# Patient Record
Sex: FEMALE | Race: OTHER | Employment: UNEMPLOYED | URBAN - METROPOLITAN AREA
[De-identification: names, ages, dates, MRNs, and addresses within clinical notes are randomized per-mention and may not be internally consistent; named-entity substitution may affect disease eponyms.]

---

## 2017-01-10 DIAGNOSIS — N18.4 CKD (CHRONIC KIDNEY DISEASE) STAGE 4, GFR 15-29 ML/MIN (HCC): ICD-10-CM

## 2017-01-10 DIAGNOSIS — I10 ESSENTIAL HYPERTENSION WITH GOAL BLOOD PRESSURE LESS THAN 140/90: ICD-10-CM

## 2017-01-11 RX ORDER — AMLODIPINE BESYLATE 10 MG/1
10 TABLET ORAL DAILY
Qty: 90 TABLET | Refills: 1 | Status: SHIPPED | OUTPATIENT
Start: 2017-01-11 | End: 2017-05-02

## 2017-01-11 RX ORDER — FUROSEMIDE 20 MG/1
TABLET ORAL
Qty: 360 TABLET | Refills: 0 | Status: SHIPPED | OUTPATIENT
Start: 2017-01-11 | End: 2017-02-28

## 2017-01-11 RX ORDER — RANITIDINE 300 MG/1
300 TABLET ORAL NIGHTLY
Qty: 90 TABLET | Refills: 1 | Status: SHIPPED | OUTPATIENT
Start: 2017-01-11 | End: 2017-09-01

## 2017-01-11 RX ORDER — MONTELUKAST SODIUM 10 MG/1
TABLET ORAL
Qty: 45 TABLET | Refills: 1 | Status: SHIPPED | OUTPATIENT
Start: 2017-01-11 | End: 2017-05-02

## 2017-01-11 NOTE — TELEPHONE ENCOUNTER
From: Diaz Tan  To: Yang Puckett DO  Sent: 1/10/2017 10:48 PM CST  Subject: Medication Renewal Request    Original authorizing provider: DO Diaz Bateman would like a refill of the following medications:   Jerry Pill

## 2017-01-11 NOTE — TELEPHONE ENCOUNTER
Pt requesting refills on Furosemide and Amlodipine, protocol failed, unable to approve    LOV 12/13/16    LF Furosemide 12/13/16    LF Amlodipine 9/27/16    Pt also requesting refill on Montelukast and Ranitidine, protocol passed, refill approved    LOV 12

## 2017-02-06 DIAGNOSIS — Z79.4 LONG TERM CURRENT USE OF INSULIN (HCC): ICD-10-CM

## 2017-02-06 DIAGNOSIS — E11.21 DIABETIC NEPHROPATHY ASSOCIATED WITH TYPE 2 DIABETES MELLITUS (HCC): ICD-10-CM

## 2017-02-06 DIAGNOSIS — N18.4 CKD (CHRONIC KIDNEY DISEASE) STAGE 4, GFR 15-29 ML/MIN (HCC): ICD-10-CM

## 2017-02-06 DIAGNOSIS — N18.4 TYPE 2 DIABETES MELLITUS WITH STAGE 4 CHRONIC KIDNEY DISEASE, UNSPECIFIED LONG TERM INSULIN USE STATUS: ICD-10-CM

## 2017-02-06 DIAGNOSIS — E11.22 TYPE 2 DIABETES MELLITUS WITH STAGE 4 CHRONIC KIDNEY DISEASE, UNSPECIFIED LONG TERM INSULIN USE STATUS: ICD-10-CM

## 2017-02-06 DIAGNOSIS — I10 ESSENTIAL HYPERTENSION WITH GOAL BLOOD PRESSURE LESS THAN 140/90: ICD-10-CM

## 2017-02-07 RX ORDER — HYDRALAZINE HYDROCHLORIDE 25 MG/1
25 TABLET, FILM COATED ORAL 2 TIMES DAILY
Qty: 180 TABLET | Refills: 1 | Status: CANCELLED | OUTPATIENT
Start: 2017-02-07

## 2017-02-07 RX ORDER — PEN NEEDLE, DIABETIC 30 GX3/16"
1 NEEDLE, DISPOSABLE MISCELLANEOUS DAILY
Qty: 50 EACH | Refills: 1 | Status: SHIPPED | OUTPATIENT
Start: 2017-02-07 | End: 2017-05-15

## 2017-02-07 RX ORDER — CETIRIZINE HYDROCHLORIDE 10 MG/1
10 TABLET ORAL
Qty: 90 TABLET | Refills: 1 | Status: CANCELLED | OUTPATIENT
Start: 2017-02-07

## 2017-02-07 RX ORDER — ATORVASTATIN CALCIUM 80 MG/1
80 TABLET, FILM COATED ORAL DAILY
Qty: 90 TABLET | Refills: 3 | Status: CANCELLED | OUTPATIENT
Start: 2017-02-07 | End: 2018-02-02

## 2017-02-07 RX ORDER — LEVOTHYROXINE SODIUM 88 UG/1
88 TABLET ORAL
Qty: 90 TABLET | Refills: 1 | Status: CANCELLED | OUTPATIENT
Start: 2017-02-07

## 2017-02-07 NOTE — TELEPHONE ENCOUNTER
From: Brandon Terry  To:  Kwame Hernandez DO  Sent: 2/6/2017 10:42 PM CST  Subject: Medication Renewal Request    Original authorizing provider: DO Brandon Card would like a refill of the following medications:  Insuli

## 2017-02-07 NOTE — TELEPHONE ENCOUNTER
Pt requesting multiple refills, Pt given 6 month refills on 9/27/16 of all medications except pen needles and Lantus    Pen needles pass protocol, refill approved    Lantus, no protocol, unable to approve    LOV 12/13/16    LF 9/6/16  9ml w/6 refills    Fu

## 2017-02-07 NOTE — TELEPHONE ENCOUNTER
From: Iván Matthew  To: Apple Rodriguez MD  Sent: 2/6/2017 10:42 PM CST  Subject: Medication Renewal Request    Original authorizing provider: Ivy Cogan, MD Wava Caves would like a refill of the following medications:  Diane Del Cid

## 2017-02-13 RX ORDER — METOLAZONE 5 MG/1
5 TABLET ORAL
Qty: 30 TABLET | Refills: 3 | Status: SHIPPED | OUTPATIENT
Start: 2017-02-13 | End: 2017-09-18

## 2017-02-28 RX ORDER — FUROSEMIDE 20 MG/1
TABLET ORAL
Qty: 360 TABLET | Refills: 0 | Status: SHIPPED | OUTPATIENT
Start: 2017-02-28 | End: 2017-05-02

## 2017-02-28 NOTE — TELEPHONE ENCOUNTER
Pt requesting refill on Furosemide, protocol failed, unable to approve    LOV 12/13/16    LF 9/27/16 #180w/1    Future Appointments  Date Time Provider Luci Loja   4/18/2017 2:00 PM Natty Sherman DO [facility] [place]

## 2017-03-01 NOTE — TELEPHONE ENCOUNTER
Pt requesting refill on Carvedilol, protocol failed, unable to approve    LOV 12/13/16    LF 6/21/16 #180 w/1    Future Appointments  Date Time Provider Luci Loja   4/18/2017 2:00 PM Kris Adams DO [facility] 30 [facility] [place]

## 2017-03-02 RX ORDER — CARVEDILOL 25 MG/1
TABLET ORAL
Qty: 180 TABLET | Refills: 0 | Status: SHIPPED | OUTPATIENT
Start: 2017-03-02 | End: 2017-05-02

## 2017-05-02 ENCOUNTER — OFFICE VISIT (OUTPATIENT)
Dept: FAMILY MEDICINE CLINIC | Facility: CLINIC | Age: 57
End: 2017-05-02

## 2017-05-02 VITALS
BODY MASS INDEX: 32 KG/M2 | TEMPERATURE: 98 F | WEIGHT: 164.38 LBS | SYSTOLIC BLOOD PRESSURE: 145 MMHG | RESPIRATION RATE: 17 BRPM | DIASTOLIC BLOOD PRESSURE: 75 MMHG | OXYGEN SATURATION: 98 % | HEART RATE: 82 BPM

## 2017-05-02 DIAGNOSIS — E11.22 TYPE 2 DIABETES MELLITUS WITH STAGE 4 CHRONIC KIDNEY DISEASE, WITH LONG-TERM CURRENT USE OF INSULIN (HCC): ICD-10-CM

## 2017-05-02 DIAGNOSIS — J44.9 ASTHMA WITH COPD (CHRONIC OBSTRUCTIVE PULMONARY DISEASE) (HCC): Chronic | ICD-10-CM

## 2017-05-02 DIAGNOSIS — E78.5 HYPERLIPIDEMIA ASSOCIATED WITH TYPE 2 DIABETES MELLITUS (HCC): ICD-10-CM

## 2017-05-02 DIAGNOSIS — N18.4 TYPE 2 DIABETES MELLITUS WITH STAGE 4 CHRONIC KIDNEY DISEASE, WITH LONG-TERM CURRENT USE OF INSULIN (HCC): ICD-10-CM

## 2017-05-02 DIAGNOSIS — N18.4 CKD (CHRONIC KIDNEY DISEASE) STAGE 4, GFR 15-29 ML/MIN (HCC): ICD-10-CM

## 2017-05-02 DIAGNOSIS — Z79.4 TYPE 2 DIABETES MELLITUS WITH STAGE 4 CHRONIC KIDNEY DISEASE, WITH LONG-TERM CURRENT USE OF INSULIN (HCC): ICD-10-CM

## 2017-05-02 DIAGNOSIS — I10 ESSENTIAL HYPERTENSION WITH GOAL BLOOD PRESSURE LESS THAN 140/90: ICD-10-CM

## 2017-05-02 DIAGNOSIS — F33.2 SEVERE EPISODE OF RECURRENT MAJOR DEPRESSIVE DISORDER, WITHOUT PSYCHOTIC FEATURES (HCC): ICD-10-CM

## 2017-05-02 DIAGNOSIS — D69.6 THROMBOCYTOPENIA (HCC): Chronic | ICD-10-CM

## 2017-05-02 DIAGNOSIS — E11.69 HYPERLIPIDEMIA ASSOCIATED WITH TYPE 2 DIABETES MELLITUS (HCC): ICD-10-CM

## 2017-05-02 DIAGNOSIS — G47.33 OBSTRUCTIVE SLEEP APNEA SYNDROME: ICD-10-CM

## 2017-05-02 PROBLEM — J44.89 ASTHMA WITH COPD (CHRONIC OBSTRUCTIVE PULMONARY DISEASE): Chronic | Status: ACTIVE | Noted: 2017-05-02

## 2017-05-02 PROBLEM — J44.89 ASTHMA WITH COPD (CHRONIC OBSTRUCTIVE PULMONARY DISEASE) (HCC): Chronic | Status: ACTIVE | Noted: 2017-05-02

## 2017-05-02 PROCEDURE — G0010 ADMIN HEPATITIS B VACCINE: HCPCS | Performed by: FAMILY MEDICINE

## 2017-05-02 PROCEDURE — 90746 HEPB VACCINE 3 DOSE ADULT IM: CPT | Performed by: FAMILY MEDICINE

## 2017-05-02 PROCEDURE — 99214 OFFICE O/P EST MOD 30 MIN: CPT | Performed by: FAMILY MEDICINE

## 2017-05-02 RX ORDER — ATORVASTATIN CALCIUM 80 MG/1
80 TABLET, FILM COATED ORAL NIGHTLY
Qty: 90 TABLET | Refills: 1 | Status: SHIPPED | OUTPATIENT
Start: 2017-05-02 | End: 2017-11-27

## 2017-05-02 RX ORDER — OMEPRAZOLE 20 MG/1
20 CAPSULE, DELAYED RELEASE ORAL
Qty: 60 CAPSULE | Refills: 6 | COMMUNITY
Start: 2017-05-02 | End: 2018-12-11

## 2017-05-02 RX ORDER — CARVEDILOL 25 MG/1
25 TABLET ORAL 2 TIMES DAILY WITH MEALS
Qty: 180 TABLET | Refills: 1 | Status: SHIPPED | OUTPATIENT
Start: 2017-05-02 | End: 2018-04-04

## 2017-05-02 RX ORDER — AMLODIPINE BESYLATE 10 MG/1
10 TABLET ORAL DAILY
Qty: 90 TABLET | Refills: 1 | Status: SHIPPED | OUTPATIENT
Start: 2017-05-02 | End: 2017-09-17

## 2017-05-02 RX ORDER — MONTELUKAST SODIUM 10 MG/1
TABLET ORAL
Qty: 45 TABLET | Refills: 1 | Status: SHIPPED | OUTPATIENT
Start: 2017-05-02 | End: 2017-09-17

## 2017-05-02 RX ORDER — BUPROPION HYDROCHLORIDE 150 MG/1
150 TABLET, EXTENDED RELEASE ORAL DAILY
Qty: 90 TABLET | Refills: 2 | Status: SHIPPED | OUTPATIENT
Start: 2017-05-02 | End: 2017-11-27

## 2017-05-02 RX ORDER — LEVOTHYROXINE SODIUM 88 UG/1
88 TABLET ORAL
Qty: 90 TABLET | Refills: 1 | Status: SHIPPED | OUTPATIENT
Start: 2017-05-02 | End: 2017-09-17

## 2017-05-02 RX ORDER — ONDANSETRON 8 MG/1
8 TABLET, ORALLY DISINTEGRATING ORAL EVERY 8 HOURS PRN
Qty: 90 TABLET | Refills: 1 | Status: SHIPPED | OUTPATIENT
Start: 2017-05-02 | End: 2018-12-11

## 2017-05-02 NOTE — PATIENT INSTRUCTIONS
Wednesday wishes, advance directives, living will  -Meet one-on-one with member of palliative care team.  -Assisted in completing a power of  for healthcare.  -Witnessed your document-make copies for your life    For additional information on advan

## 2017-05-02 NOTE — PROGRESS NOTES
Patient presents with: Follow - Up: labs, medication    DM, HL, HTN f/u. HPI:   Brandon Terry is a 64year old female who presents for recheck of her diabetes, HTN, lipids. Patient’s FBS have been .   Last HgbA1c was   HGBA1C (%)   Date 178 lb      HGBA1C (%)   Date Value   11/28/2016 6.4*   06/21/2016 6.8*   03/15/2016 6.6*   ----------  CHOLESTEROL, TOTAL (mg/dL)   Date Value   11/28/2016 134   03/16/2016 98   08/26/2015 169   ----------  HDL CHOLESTEROL (mg/dL)   Date Value   11/28/201 1 Inhaler Rfl: 1   Blood Glucose Monitoring Suppl (TRUETRACK BLOOD GLUCOSE) W/DEVICE Does not apply Kit Check blood sugars BID and prn symptoms of hypoglycemia Disp: 1 kit Rfl: 0   metolazone 5 MG Oral Tab Take 1 tablet (5 mg total) by mouth 3 (three) time • Prerenal azotemia    • Diastolic dysfunction 4/1002     with LVH. EF >55% on ECHO    • Renal disorder    • Diabetic retinopathy ([facility] [address] 1/26/15     Per Dr. Constanza Montemayor   • Cataracts, bilateral 1/26/15     Per Dr. Constanza Montemayor   • Moderate nonproliferative diabet Signs: As above. General: WD/WN in no acute distress. HEENT: is unremarkable, PERRLA and EOMI. No carotid bruits. No thyromegaly or masses. Heart: Regular rate and rhythm. S1, S2 no murmurs.    Lungs: Clear to auscultation bilaterally, with no wheeze Calcium 80 MG Oral Tab; Take 1 tablet (80 mg total) by mouth nightly. Dispense: 90 tablet; Refill: 1  Adjust meds if labs indicate  - CMP in 3 months; Future  - Lipid in 3 months; Future    4.  Thrombocytopenia (HCC)  stable  Last .0 WNL  Monitor wi capsule (20 mg total) by mouth 2 (two) times daily before meals. 1 tab po q am on empty stomach 45mins AC breakfast      carvedilol 25 MG Oral Tab 180 tablet 1      Sig: Take 1 tablet (25 mg total) by mouth 2 (two) times daily with meals.       BuPROPion HC

## 2017-05-12 DIAGNOSIS — N18.4 TYPE 2 DIABETES MELLITUS WITH STAGE 4 CHRONIC KIDNEY DISEASE, UNSPECIFIED LONG TERM INSULIN USE STATUS: ICD-10-CM

## 2017-05-12 DIAGNOSIS — E11.22 TYPE 2 DIABETES MELLITUS WITH STAGE 4 CHRONIC KIDNEY DISEASE, UNSPECIFIED LONG TERM INSULIN USE STATUS: ICD-10-CM

## 2017-05-12 DIAGNOSIS — Z79.4 LONG TERM CURRENT USE OF INSULIN (HCC): ICD-10-CM

## 2017-05-15 RX ORDER — HYDRALAZINE HYDROCHLORIDE 25 MG/1
TABLET, FILM COATED ORAL
Qty: 180 TABLET | Refills: 0 | Status: SHIPPED | OUTPATIENT
Start: 2017-05-15 | End: 2017-11-27

## 2017-05-15 RX ORDER — PEN NEEDLE, DIABETIC 30 GX3/16"
1 NEEDLE, DISPOSABLE MISCELLANEOUS DAILY
Qty: 50 EACH | Refills: 1 | Status: SHIPPED | OUTPATIENT
Start: 2017-05-15 | End: 2017-12-12

## 2017-05-15 RX ORDER — CETIRIZINE HYDROCHLORIDE 10 MG/1
TABLET ORAL
Qty: 90 TABLET | Refills: 0 | Status: SHIPPED | OUTPATIENT
Start: 2017-05-15 | End: 2017-10-07

## 2017-05-15 NOTE — TELEPHONE ENCOUNTER
Refill request for insulin pen needles, protocol passed, refill approved. Refill request for insulin glargine pen injector, no protocol, cannot approve. LOV 5/2/2017. LF 2/7/2017 9 ml w/ 6 refills.  Please advise, thanks

## 2017-05-15 NOTE — TELEPHONE ENCOUNTER
Pt requesting refill on Hydralazine, protocol failed, unable to approve    LOV 5/2/17     9/27/16 #180 w/1      Pt also requesting refill on Cetirizine, protocol passed, refill approved    LOV 5/2/17     9/27/16 #90w/ 1

## 2017-05-15 NOTE — TELEPHONE ENCOUNTER
From: Tish Lundborg  To:  Anibal Kemp DO  Sent: 5/12/2017 4:44 PM CDT  Subject: Medication Renewal Request    Original authorizing provider: Rolla Slider, DO Tish Lundborg would like a refill of the following medications:  Insuli

## 2017-06-11 DIAGNOSIS — N18.4 CKD (CHRONIC KIDNEY DISEASE) STAGE 4, GFR 15-29 ML/MIN (HCC): ICD-10-CM

## 2017-06-11 DIAGNOSIS — I10 ESSENTIAL HYPERTENSION WITH GOAL BLOOD PRESSURE LESS THAN 140/90: ICD-10-CM

## 2017-06-12 RX ORDER — FUROSEMIDE 20 MG/1
TABLET ORAL
Qty: 180 TABLET | Refills: 1 | Status: SHIPPED | OUTPATIENT
Start: 2017-06-12 | End: 2017-11-27

## 2017-06-12 NOTE — TELEPHONE ENCOUNTER
Refill request for furosemide. Protocol failed, unable to refill. LOV 5/2/2017. LF 9/27/2016 #180 w/ 1.

## 2017-06-12 NOTE — TELEPHONE ENCOUNTER
From: Henna Smith  To:  Shaquille Escobar DO  Sent: 6/11/2017 6:15 AM CDT  Subject: Medication Renewal Request    Original authorizing provider: DO Henna Fernandez would like a refill of the following medications:  furose

## 2017-07-17 DIAGNOSIS — N18.4 TYPE 2 DIABETES MELLITUS WITH STAGE 4 CHRONIC KIDNEY DISEASE, UNSPECIFIED LONG TERM INSULIN USE STATUS: ICD-10-CM

## 2017-07-17 DIAGNOSIS — E11.22 TYPE 2 DIABETES MELLITUS WITH STAGE 4 CHRONIC KIDNEY DISEASE, UNSPECIFIED LONG TERM INSULIN USE STATUS: ICD-10-CM

## 2017-07-17 DIAGNOSIS — Z79.4 LONG TERM CURRENT USE OF INSULIN (HCC): ICD-10-CM

## 2017-07-17 NOTE — TELEPHONE ENCOUNTER
Refill request for insulin glargine (lantus) No protocol, cannot approve. LOV 5/2/2017 LF 5/15/2017 9 mL w/ 6 refills.

## 2017-07-17 NOTE — TELEPHONE ENCOUNTER
From: Castro Murphy  Sent: 7/17/2017 1:00 PM CDT  Subject: Medication Renewal Request    Castro Murphy would like a refill of the following medications:  Insulin Glargine (LANTUS SOLOSTAR) 100 UNIT/ML Subcutaneous Solution Pen-injector

## 2017-08-28 ENCOUNTER — PATIENT OUTREACH (OUTPATIENT)
Dept: FAMILY MEDICINE CLINIC | Facility: CLINIC | Age: 57
End: 2017-08-28

## 2017-08-28 NOTE — PROGRESS NOTES
Called son to re-schedule appt for MAW visit that was cancelled from August 1. LVM for son to call back.

## 2017-09-05 RX ORDER — RANITIDINE 300 MG/1
TABLET ORAL
Qty: 90 TABLET | Refills: 0 | Status: SHIPPED | OUTPATIENT
Start: 2017-09-05 | End: 2018-03-11

## 2017-09-14 ENCOUNTER — PATIENT OUTREACH (OUTPATIENT)
Dept: FAMILY MEDICINE CLINIC | Facility: CLINIC | Age: 57
End: 2017-09-14

## 2017-09-17 DIAGNOSIS — N18.4 CKD (CHRONIC KIDNEY DISEASE) STAGE 4, GFR 15-29 ML/MIN (HCC): ICD-10-CM

## 2017-09-17 DIAGNOSIS — N18.4 TYPE 2 DIABETES MELLITUS WITH STAGE 4 CHRONIC KIDNEY DISEASE, UNSPECIFIED LONG TERM INSULIN USE STATUS: ICD-10-CM

## 2017-09-17 DIAGNOSIS — E03.9 ACQUIRED HYPOTHYROIDISM: Primary | ICD-10-CM

## 2017-09-17 DIAGNOSIS — I10 ESSENTIAL HYPERTENSION WITH GOAL BLOOD PRESSURE LESS THAN 140/90: ICD-10-CM

## 2017-09-17 DIAGNOSIS — E11.22 TYPE 2 DIABETES MELLITUS WITH STAGE 4 CHRONIC KIDNEY DISEASE, UNSPECIFIED LONG TERM INSULIN USE STATUS: ICD-10-CM

## 2017-09-17 DIAGNOSIS — Z79.4 LONG TERM CURRENT USE OF INSULIN (HCC): ICD-10-CM

## 2017-09-17 RX ORDER — METOLAZONE 5 MG/1
5 TABLET ORAL
Qty: 30 TABLET | Refills: 3 | Status: CANCELLED
Start: 2017-09-18

## 2017-09-18 ENCOUNTER — TELEPHONE (OUTPATIENT)
Dept: FAMILY MEDICINE CLINIC | Facility: CLINIC | Age: 57
End: 2017-09-18

## 2017-09-18 DIAGNOSIS — N18.4 CKD (CHRONIC KIDNEY DISEASE) STAGE 4, GFR 15-29 ML/MIN (HCC): ICD-10-CM

## 2017-09-18 DIAGNOSIS — I10 ESSENTIAL HYPERTENSION WITH GOAL BLOOD PRESSURE LESS THAN 140/90: ICD-10-CM

## 2017-09-18 RX ORDER — AMLODIPINE BESYLATE 10 MG/1
TABLET ORAL
Qty: 90 TABLET | Refills: 0 | OUTPATIENT
Start: 2017-09-18

## 2017-09-18 RX ORDER — MONTELUKAST SODIUM 10 MG/1
TABLET ORAL
Qty: 45 TABLET | Refills: 0 | Status: SHIPPED
Start: 2017-09-18 | End: 2018-03-11

## 2017-09-18 RX ORDER — LEVOTHYROXINE SODIUM 88 UG/1
88 TABLET ORAL
Qty: 30 TABLET | Refills: 0 | Status: SHIPPED
Start: 2017-09-18 | End: 2017-10-07

## 2017-09-18 RX ORDER — METOLAZONE 5 MG/1
TABLET ORAL
Qty: 38 TABLET | Refills: 0 | OUTPATIENT
Start: 2017-09-18

## 2017-09-18 RX ORDER — AMLODIPINE BESYLATE 10 MG/1
10 TABLET ORAL DAILY
Qty: 30 TABLET | Refills: 0 | Status: SHIPPED
Start: 2017-09-18 | End: 2017-10-07

## 2017-09-18 RX ORDER — METOLAZONE 5 MG/1
5 TABLET ORAL
Qty: 36 TABLET | Refills: 0 | Status: SHIPPED | OUTPATIENT
Start: 2017-09-18 | End: 2018-02-08

## 2017-09-18 RX ORDER — LEVOTHYROXINE SODIUM 88 UG/1
TABLET ORAL
Qty: 90 TABLET | Refills: 0 | OUTPATIENT
Start: 2017-09-18

## 2017-09-18 NOTE — TELEPHONE ENCOUNTER
Refill request for montelukast, protocol passed, refill approved. Refill requests for levothyroxine and amlodipine, protocols failed. Unable to approve. LOV 5/2/2017 LF levothyroxine 5/2/2017 #90 w/ 1. Last fill amlodipine 5/2/2017 #90 w/ 1.     Refill r

## 2017-09-18 NOTE — TELEPHONE ENCOUNTER
From: Nandini Barakat  Sent: 9/17/2017 5:57 PM CDT  Subject: Medication Renewal Request    Nandini Barakat would like a refill of the following medications:     Levothyroxine Sodium 88 MCG Oral Tab RAIMUNDO Elias     Montelukast Sodium

## 2017-09-18 NOTE — TELEPHONE ENCOUNTER
No lab work in almost 1 year and diabetic. Seen in the office 5/2017. Will not continue to fill medications until lab work is drawn. Please inform. Will reject any further refills for diabetes.

## 2017-09-22 NOTE — TELEPHONE ENCOUNTER
Spoke with son Evan [place], informed him of need for labs and appointment. Lab hours and locations provided. Evan [place] will take pt for labs, then call for appointment    No future appointments.

## 2017-10-07 DIAGNOSIS — I10 ESSENTIAL HYPERTENSION WITH GOAL BLOOD PRESSURE LESS THAN 140/90: ICD-10-CM

## 2017-10-07 DIAGNOSIS — N18.4 CKD (CHRONIC KIDNEY DISEASE) STAGE 4, GFR 15-29 ML/MIN (HCC): ICD-10-CM

## 2017-10-09 RX ORDER — CETIRIZINE HYDROCHLORIDE 10 MG/1
TABLET ORAL
Qty: 30 TABLET | Refills: 0 | Status: SHIPPED | OUTPATIENT
Start: 2017-10-09 | End: 2017-11-27

## 2017-10-09 RX ORDER — AMLODIPINE BESYLATE 10 MG/1
TABLET ORAL
Qty: 30 TABLET | Refills: 0 | Status: SHIPPED | OUTPATIENT
Start: 2017-10-09 | End: 2017-11-27

## 2017-10-09 RX ORDER — LEVOTHYROXINE SODIUM 88 UG/1
TABLET ORAL
Qty: 30 TABLET | Refills: 0 | Status: SHIPPED | OUTPATIENT
Start: 2017-10-09 | End: 2017-11-27

## 2017-10-26 ENCOUNTER — TELEPHONE (OUTPATIENT)
Dept: FAMILY MEDICINE CLINIC | Facility: CLINIC | Age: 57
End: 2017-10-26

## 2017-11-07 ENCOUNTER — APPOINTMENT (OUTPATIENT)
Dept: GENERAL RADIOLOGY | Age: 57
End: 2017-11-07
Attending: PHYSICIAN ASSISTANT
Payer: MEDICARE

## 2017-11-07 ENCOUNTER — HOSPITAL ENCOUNTER (EMERGENCY)
Age: 57
Discharge: HOME | End: 2017-11-07
Payer: MEDICARE

## 2017-11-07 VITALS
DIASTOLIC BLOOD PRESSURE: 75 MMHG | OXYGEN SATURATION: 97 % | BODY MASS INDEX: 28.16 KG/M2 | RESPIRATION RATE: 16 BRPM | TEMPERATURE: 99 F | WEIGHT: 153 LBS | SYSTOLIC BLOOD PRESSURE: 158 MMHG | HEART RATE: 74 BPM | HEIGHT: 62 IN

## 2017-11-07 DIAGNOSIS — S80.01XA CONTUSION OF RIGHT KNEE, INITIAL ENCOUNTER: ICD-10-CM

## 2017-11-07 DIAGNOSIS — S42.202A CLOSED FRACTURE OF PROXIMAL END OF LEFT HUMERUS, UNSPECIFIED FRACTURE MORPHOLOGY, INITIAL ENCOUNTER: Primary | ICD-10-CM

## 2017-11-07 PROCEDURE — 73560 X-RAY EXAM OF KNEE 1 OR 2: CPT | Performed by: PHYSICIAN ASSISTANT

## 2017-11-07 PROCEDURE — 99284 EMERGENCY DEPT VISIT MOD MDM: CPT

## 2017-11-07 PROCEDURE — 73110 X-RAY EXAM OF WRIST: CPT | Performed by: PHYSICIAN ASSISTANT

## 2017-11-07 PROCEDURE — 73562 X-RAY EXAM OF KNEE 3: CPT | Performed by: PHYSICIAN ASSISTANT

## 2017-11-07 PROCEDURE — 90471 IMMUNIZATION ADMIN: CPT

## 2017-11-07 PROCEDURE — 73080 X-RAY EXAM OF ELBOW: CPT | Performed by: PHYSICIAN ASSISTANT

## 2017-11-07 PROCEDURE — 73030 X-RAY EXAM OF SHOULDER: CPT | Performed by: PHYSICIAN ASSISTANT

## 2017-11-07 RX ORDER — HYDROCODONE BITARTRATE AND ACETAMINOPHEN 5; 325 MG/1; MG/1
TABLET ORAL
Status: COMPLETED
Start: 2017-11-07 | End: 2017-11-07

## 2017-11-07 RX ORDER — HYDROCODONE BITARTRATE AND ACETAMINOPHEN 5; 325 MG/1; MG/1
1 TABLET ORAL ONCE
Status: COMPLETED | OUTPATIENT
Start: 2017-11-07 | End: 2017-11-07

## 2017-11-07 RX ORDER — HYDROCODONE BITARTRATE AND ACETAMINOPHEN 5; 325 MG/1; MG/1
1 TABLET ORAL EVERY 6 HOURS PRN
Qty: 17 TABLET | Refills: 0 | Status: SHIPPED | OUTPATIENT
Start: 2017-11-07 | End: 2018-01-02 | Stop reason: ALTCHOICE

## 2017-11-07 RX ORDER — NAPROXEN 500 MG/1
500 TABLET ORAL 2 TIMES DAILY PRN
Qty: 30 TABLET | Refills: 0 | Status: SHIPPED | OUTPATIENT
Start: 2017-11-07 | End: 2017-11-07 | Stop reason: CLARIF

## 2017-11-07 NOTE — ED INITIAL ASSESSMENT (HPI)
Tripped and fell on sidewalk, denies loc, landed on left arm, pain from left hand up to left shoulder, also pain to right knee

## 2017-11-07 NOTE — ED PROVIDER NOTES
Patient Seen in: THE [facility] Emergency Department In [place]    History   Patient presents with:  Upper Extremity Injury (musculoskeletal)    Stated Complaint: l arm inj    HPI    Yvette Benz is a 68-year-old female who comes in today after tripping and falli Used                      Alcohol use: No                Review of Systems    Positive for stated complaint: l arm inj  Other systems are as noted in HPI. Constitutional and vital signs reviewed.       All other systems reviewed and negative except as note Skin: Skin is warm and dry. No rash noted. She is not diaphoretic. No erythema. No pallor. Psychiatric: She has a normal mood and affect.  Her behavior is normal. Judgment and thought content normal.       ED Course   Labs Reviewed - No data to display COMPLETE (MIN 2 VIEWS), LEFT (CPT=73030)     TECHNIQUE:  Multiple views were obtained. COMPARISON:  None.   INDICATIONS:  l arm inj  PATIENT STATED HISTORY: (As transcribed by Technologist)  Patient states she tripped and fell on sidewalk, landed on left a Comments:              l arm inj Tripped and fell on sidewalk, denies loc, landed on left arm, pain from               left hand up to left shoulder, also pain to right knee                        Order Specific Question: What is the Relevant Clinical Arabella today.        Disposition and Plan     Clinical Impression:  Closed fracture of proximal end of left humerus, unspecified fracture morphology, initial encounter  (primary encounter diagnosis)  Contusion of right knee, initial encounter    Disposition:  Dis

## 2017-11-13 DIAGNOSIS — E11.22 TYPE 2 DIABETES MELLITUS WITH STAGE 4 CHRONIC KIDNEY DISEASE, UNSPECIFIED LONG TERM INSULIN USE STATUS: ICD-10-CM

## 2017-11-13 DIAGNOSIS — N18.4 TYPE 2 DIABETES MELLITUS WITH STAGE 4 CHRONIC KIDNEY DISEASE, UNSPECIFIED LONG TERM INSULIN USE STATUS: ICD-10-CM

## 2017-11-13 DIAGNOSIS — E11.21 DIABETIC NEPHROPATHY ASSOCIATED WITH TYPE 2 DIABETES MELLITUS (HCC): ICD-10-CM

## 2017-11-14 DIAGNOSIS — N18.4 TYPE 2 DIABETES MELLITUS WITH STAGE 4 CHRONIC KIDNEY DISEASE, UNSPECIFIED LONG TERM INSULIN USE STATUS: ICD-10-CM

## 2017-11-14 DIAGNOSIS — E11.22 TYPE 2 DIABETES MELLITUS WITH STAGE 4 CHRONIC KIDNEY DISEASE, UNSPECIFIED LONG TERM INSULIN USE STATUS: ICD-10-CM

## 2017-11-14 DIAGNOSIS — E11.21 DIABETIC NEPHROPATHY ASSOCIATED WITH TYPE 2 DIABETES MELLITUS (HCC): ICD-10-CM

## 2017-11-14 RX ORDER — DIAPER,BRIEF,ADULT, DISPOSABLE
EACH MISCELLANEOUS
Qty: 150 STRIP | Refills: 0 | Status: SHIPPED | OUTPATIENT
Start: 2017-11-14 | End: 2018-01-12

## 2017-11-14 NOTE — TELEPHONE ENCOUNTER
From: Alexandru Hurtado  Sent: 11/13/2017 7:37 PM CST  Subject: Medication Renewal Request    Alexandru Hurtado would like a refill of the following medications:     Glucose Blood (TRUETRACK TEST) In Vitro Strip Kendra Ugarte, DO]    Preferred

## 2017-11-14 NOTE — TELEPHONE ENCOUNTER
Pt requesting refill on test strips, protocol passed, refill approved    LOV 5/2/17    LF 9/6/16 #100 w/11    Future Appointments  Date Time Provider Luci Loja   11/27/2017 5:40 PM Pedro Luis Kay DO [facility] [place]   12/4/2017 2:30 PM Allyson Schneider

## 2017-11-27 ENCOUNTER — OFFICE VISIT (OUTPATIENT)
Dept: FAMILY MEDICINE CLINIC | Facility: CLINIC | Age: 57
End: 2017-11-27

## 2017-11-27 VITALS
BODY MASS INDEX: 30 KG/M2 | SYSTOLIC BLOOD PRESSURE: 152 MMHG | DIASTOLIC BLOOD PRESSURE: 80 MMHG | RESPIRATION RATE: 18 BRPM | WEIGHT: 162.19 LBS | TEMPERATURE: 98 F

## 2017-11-27 DIAGNOSIS — N18.4 CKD (CHRONIC KIDNEY DISEASE) STAGE 4, GFR 15-29 ML/MIN (HCC): ICD-10-CM

## 2017-11-27 DIAGNOSIS — Z79.4 TYPE 2 DIABETES MELLITUS WITH STAGE 4 CHRONIC KIDNEY DISEASE, WITH LONG-TERM CURRENT USE OF INSULIN (HCC): ICD-10-CM

## 2017-11-27 DIAGNOSIS — E11.3599 PROLIFERATIVE DIABETIC RETINOPATHY ASSOCIATED WITH TYPE 2 DIABETES MELLITUS, UNSPECIFIED LATERALITY, UNSPECIFIED PROLIFERATIVE RETINOPATHY TYPE (HCC): ICD-10-CM

## 2017-11-27 DIAGNOSIS — Z12.11 SCREEN FOR COLON CANCER: ICD-10-CM

## 2017-11-27 DIAGNOSIS — J44.9 ASTHMA WITH COPD (CHRONIC OBSTRUCTIVE PULMONARY DISEASE) (HCC): Chronic | ICD-10-CM

## 2017-11-27 DIAGNOSIS — Z12.39 SCREENING FOR MALIGNANT NEOPLASM OF BREAST: ICD-10-CM

## 2017-11-27 DIAGNOSIS — R87.618 OTHER ABNORMAL CYTOLOGICAL FINDING OF SPECIMEN FROM CERVIX: ICD-10-CM

## 2017-11-27 DIAGNOSIS — E11.22 TYPE 2 DIABETES MELLITUS WITH STAGE 4 CHRONIC KIDNEY DISEASE, WITH LONG-TERM CURRENT USE OF INSULIN (HCC): ICD-10-CM

## 2017-11-27 DIAGNOSIS — I10 ESSENTIAL HYPERTENSION WITH GOAL BLOOD PRESSURE LESS THAN 140/90: ICD-10-CM

## 2017-11-27 DIAGNOSIS — E11.69 HYPERLIPIDEMIA ASSOCIATED WITH TYPE 2 DIABETES MELLITUS (HCC): ICD-10-CM

## 2017-11-27 DIAGNOSIS — N18.4 TYPE 2 DIABETES MELLITUS WITH STAGE 4 CHRONIC KIDNEY DISEASE, WITH LONG-TERM CURRENT USE OF INSULIN (HCC): ICD-10-CM

## 2017-11-27 DIAGNOSIS — E78.5 HYPERLIPIDEMIA ASSOCIATED WITH TYPE 2 DIABETES MELLITUS (HCC): ICD-10-CM

## 2017-11-27 DIAGNOSIS — S42.295A OTHER CLOSED NONDISPLACED FRACTURE OF PROXIMAL END OF LEFT HUMERUS, INITIAL ENCOUNTER: ICD-10-CM

## 2017-11-27 DIAGNOSIS — Z00.00 ENCOUNTER FOR ANNUAL HEALTH EXAMINATION: Primary | ICD-10-CM

## 2017-11-27 DIAGNOSIS — N18.5 CKD (CHRONIC KIDNEY DISEASE) STAGE 5, GFR LESS THAN 15 ML/MIN (HCC): ICD-10-CM

## 2017-11-27 PROCEDURE — 99214 OFFICE O/P EST MOD 30 MIN: CPT | Performed by: FAMILY MEDICINE

## 2017-11-27 PROCEDURE — 82570 ASSAY OF URINE CREATININE: CPT | Performed by: FAMILY MEDICINE

## 2017-11-27 PROCEDURE — 90746 HEPB VACCINE 3 DOSE ADULT IM: CPT | Performed by: FAMILY MEDICINE

## 2017-11-27 PROCEDURE — G0010 ADMIN HEPATITIS B VACCINE: HCPCS | Performed by: FAMILY MEDICINE

## 2017-11-27 PROCEDURE — 90686 IIV4 VACC NO PRSV 0.5 ML IM: CPT | Performed by: FAMILY MEDICINE

## 2017-11-27 PROCEDURE — G0008 ADMIN INFLUENZA VIRUS VAC: HCPCS | Performed by: FAMILY MEDICINE

## 2017-11-27 PROCEDURE — 82043 UR ALBUMIN QUANTITATIVE: CPT | Performed by: FAMILY MEDICINE

## 2017-11-27 PROCEDURE — G0439 PPPS, SUBSEQ VISIT: HCPCS | Performed by: FAMILY MEDICINE

## 2017-11-27 RX ORDER — NAPROXEN 500 MG/1
TABLET ORAL
Refills: 0 | COMMUNITY
Start: 2017-11-07 | End: 2018-04-05

## 2017-11-27 RX ORDER — AMLODIPINE BESYLATE 10 MG/1
10 TABLET ORAL
Qty: 90 TABLET | Refills: 0 | Status: SHIPPED | OUTPATIENT
Start: 2017-11-27 | End: 2018-07-10

## 2017-11-27 RX ORDER — CETIRIZINE HYDROCHLORIDE 10 MG/1
TABLET ORAL
Qty: 365 TABLET | Refills: 0 | COMMUNITY
Start: 2017-11-27 | End: 2018-01-04

## 2017-11-27 RX ORDER — CARVEDILOL 25 MG/1
TABLET ORAL
Refills: 1 | Status: CANCELLED | OUTPATIENT
Start: 2017-11-27

## 2017-11-27 RX ORDER — HYDRALAZINE HYDROCHLORIDE 25 MG/1
TABLET, FILM COATED ORAL
Qty: 180 TABLET | Refills: 0 | Status: SHIPPED | OUTPATIENT
Start: 2017-11-27 | End: 2018-04-05

## 2017-11-27 RX ORDER — ATORVASTATIN CALCIUM 80 MG/1
80 TABLET, FILM COATED ORAL NIGHTLY
Qty: 90 TABLET | Refills: 0 | Status: SHIPPED | OUTPATIENT
Start: 2017-11-27 | End: 2018-03-13

## 2017-11-27 RX ORDER — LEVOTHYROXINE SODIUM 88 UG/1
88 TABLET ORAL
Qty: 30 TABLET | Refills: 0 | Status: SHIPPED | OUTPATIENT
Start: 2017-11-27 | End: 2018-01-02

## 2017-11-27 RX ORDER — BUPROPION HYDROCHLORIDE 150 MG/1
150 TABLET, EXTENDED RELEASE ORAL DAILY
Qty: 90 TABLET | Refills: 3 | Status: SHIPPED | OUTPATIENT
Start: 2017-11-27 | End: 2018-06-18

## 2017-11-27 RX ORDER — FUROSEMIDE 20 MG/1
TABLET ORAL
Qty: 180 TABLET | Refills: 0 | Status: SHIPPED | OUTPATIENT
Start: 2017-11-27 | End: 2018-03-22

## 2017-11-28 ENCOUNTER — TELEPHONE (OUTPATIENT)
Dept: FAMILY MEDICINE CLINIC | Facility: CLINIC | Age: 57
End: 2017-11-28

## 2017-11-28 RX ORDER — FUROSEMIDE 20 MG/1
TABLET ORAL
Qty: 270 TABLET | Refills: 0 | OUTPATIENT
Start: 2017-11-28

## 2017-11-28 NOTE — TELEPHONE ENCOUNTER
Spoke with Isrrael Garvey from medical records at [facility]. Looking for patient's colonoscopy report. No record on file at [facility]. Isrrael Garvey will return call with information.

## 2017-11-28 NOTE — PATIENT INSTRUCTIONS
Wednesday wishes, advance directives, living will-power of - bring copy to visit  -Meet one-on-one with member of palliative care team.  -Assisted in completing a power of  for healthcare.  -Witnessed your document-make copies for your life EKG - covered if needed at [facility], and non-screening if indicated for medical reasons Electrocardiogram date03/15/2016 Routine EKG is not a screening covered service except at the Welcome to Medicare Visit    Abdominal aortic aneurysm s 33-67, Covered every 2 yrs up to age 79 or Yearly if High Risk   Pap Smear,1 Year due on 07/26/2017     Chlamydia  Annually if high risk No results found for: CHLAMYDIA No flowsheet data found.     Screening Mammogram      Mammogram    Recommend Annually to physician but may not be covered, or covered at this frequency, by your insurer. Please check with your insurance carrier before scheduling to verify coverage.    PREVENTATIVE SERVICES  INDICATIONS AND SCHEDULE Internal Lab or Procedure External Lab or Proc years- more often if abnormal Colonoscopy,10 Years due on 09/26/2010 Update [facility] if applicable    Flex Sigmoidoscopy Screen  Covered every 5 years No results found for this or any previous visit. No flowsheet data found.      Fecal Occult Bloo previous visit.  Please get once after your 65th birthday    Pneumococcal 23 (Pneumovax)  Covered Once after 72   Orders placed or performed in visit on 07/26/16  -PNEUMOCOCCAL IMM (PNEUMOVAX)    Please get once after your 65th birthday    Hepatitis B for M

## 2017-11-29 ENCOUNTER — TELEPHONE (OUTPATIENT)
Dept: FAMILY MEDICINE CLINIC | Facility: CLINIC | Age: 57
End: 2017-11-29

## 2017-11-29 PROBLEM — S42.202A CLOSED FRACTURE OF LEFT PROXIMAL HUMERUS: Status: ACTIVE | Noted: 2017-11-29

## 2017-11-29 RX ORDER — ALBUTEROL SULFATE 90 UG/1
2 AEROSOL, METERED RESPIRATORY (INHALATION) EVERY 4 HOURS PRN
Qty: 18 G | Refills: 0 | Status: SHIPPED | OUTPATIENT
Start: 2017-11-29 | End: 2018-01-02 | Stop reason: ALTCHOICE

## 2017-11-29 NOTE — TELEPHONE ENCOUNTER
Spoke with patient's son, Pippa Josue, he states he will call us back with [facility] physician that performed colonoscopy on patient.

## 2017-11-29 NOTE — TELEPHONE ENCOUNTER
Spoke with son, Clover Ruthie, reviewed results and recommendations, son verbalized understanding. Notes Recorded by Rufino Knutson DO on 11/28/2017 at 10:01 PM CST  Worse micro albumin in urine. Known renal disease-needs labs drawn- ASAP . Please inform.  Ask

## 2017-11-29 NOTE — TELEPHONE ENCOUNTER
Please call and verify why taking naproxen and update medical hx also verify if taking aspirin 81mg EC daily.

## 2017-11-29 NOTE — PROGRESS NOTES
HPI:   Kamaljit Navarro is a 62year old female who presents for a Medicare Subsequent Annual Wellness visit (Pt already had Initial Annual Wellness). On 11/7/2017, tripped and fell on the sidewalk taking her grandchildren to the bus.   Fractured weeks ago woke up early am and used albuterol for sob with relief. No asthma flare >1 year. If she uses cpap and takes singular she has no asthma symptoms. She denies wheezing, sob, cough, LEON. Taking montelukast. Has AAP at home from last visit.      Wendi Valenzuela 11/28/2016   TSH 1.620 06/21/2016   CREATSERUM 5.61 (H) 11/28/2016   GLU 81 11/28/2016        CBC  (most recent labs)     Lab Results  Component Value Date   WBC 8.3 11/28/2016   HGB 10.4 (L) 11/28/2016   .0 11/28/2016        ALLERGIES:   She is all as needed for Nausea. Flunisolide 25 MCG/ACT (0.025%) Nasal Solution 2 sprays by Each Nare route 2 (two) times daily.    Albuterol Sulfate HFA (PROAIR HFA) 108 (90 BASE) MCG/ACT Inhalation Aero Soln Inhale 2 puffs into the lungs every 4 (four) hours as ne never smoked. She has never used smokeless tobacco. She reports that she does not drink alcohol or use drugs.      REVIEW OF SYSTEMS:   GENERAL: feels well otherwise  SKIN: denies any unusual skin lesions  EYES: denies blurred vision or double vision  HEENT tenderness/mass/nodules; no carotid bruit or JVD   Back:   Symmetric, no curvature, ROM normal, no CVA tenderness   Lungs:   Clear to auscultation bilaterally, respirations unlabored   Heart:  Regular rate and rhythm, S1 and S2 normal, no murmur, rub, or g Future  - LIPID PANEL; Future  - HEMOGLOBIN A1C; Future  - MICROALB/CREAT RATIO, RANDOM URINE; Future  - COMP METABOLIC PANEL (14); Future  - LIPID PANEL;  Future  - HEMOGLOBIN A1C; Future  - HEPATITIS B VACCINE, OVER 20  - OPHTHALMOLOGY - INTERNAL  - MICRO not certain location or physician- signed medical release. Had upper endoscopy 5/2015- Dr Parviz Wray and explained difference in procedure. Will need to repeat if records not located. Pt and son agree.        9. Proliferative diabetic retinopathy associated w [id number] does not have a Power of  for [employer] on file in [place].  Discussed with patient and provided information-son present for discussion and provided Wednesday wishes           PLAN:  The patient indicates understanding of these issues 1-Yes     Have you had any memory issues?: 0-No    Fall/Risk Scorin    Scoring Interpretation: 4+ At Risk     Depression Screening (PHQ-2/PHQ-9): Over the LAST 2 WEEKS   Little interest or pleasure in doing things (over the last two weeks)?: Nearly kasey Visit Annually: Diabetics, FHx Glaucoma, AA>50, > 65 Data entered on: 2/23/2015   Last Dilated Eye Exam 1/26/2015       Bone Density Screening      Dexascan Every two years No results found for this or any previous visit. No flowsheet data found. Value   11/28/2016 3.6     POTASSIUM (mmol/L)   Date Value   07/11/2014 5.3 (H)    No flowsheet data found.     Creatinine  Annually CREATININE (mg/dL)   Date Value   07/11/2014 2.04 (H)     Creatinine (mg/dL)   Date Value   11/28/2016 5.61 (H)    No flowsh

## 2017-12-11 ENCOUNTER — TELEPHONE (OUTPATIENT)
Dept: FAMILY MEDICINE CLINIC | Facility: CLINIC | Age: 57
End: 2017-12-11

## 2017-12-11 DIAGNOSIS — Z79.4 TYPE 2 DIABETES MELLITUS WITH STAGE 4 CHRONIC KIDNEY DISEASE, WITH LONG-TERM CURRENT USE OF INSULIN (HCC): Primary | ICD-10-CM

## 2017-12-11 DIAGNOSIS — E11.22 TYPE 2 DIABETES MELLITUS WITH STAGE 4 CHRONIC KIDNEY DISEASE, WITH LONG-TERM CURRENT USE OF INSULIN (HCC): Primary | ICD-10-CM

## 2017-12-11 DIAGNOSIS — N18.4 TYPE 2 DIABETES MELLITUS WITH STAGE 4 CHRONIC KIDNEY DISEASE, WITH LONG-TERM CURRENT USE OF INSULIN (HCC): Primary | ICD-10-CM

## 2017-12-11 RX ORDER — CETIRIZINE HYDROCHLORIDE 10 MG/1
TABLET ORAL
Qty: 30 TABLET | Refills: 0 | Status: SHIPPED | OUTPATIENT
Start: 2017-12-11 | End: 2018-01-12

## 2017-12-11 RX ORDER — CETIRIZINE HYDROCHLORIDE 10 MG/1
TABLET ORAL
Qty: 30 TABLET | Refills: 0 | OUTPATIENT
Start: 2017-12-11

## 2017-12-11 NOTE — TELEPHONE ENCOUNTER
Dilia Beck from Dr Kwame Pal office requesting referral for patient's upcoming appointment.     Referral to be faxed to [phone]    Future Appointments  Date Time Provider Luci Loja   1/2/2018 1:20 PM Timmy Valente,  [facility] 30 [facility] [place]   2/1/2018 2:00

## 2017-12-12 DIAGNOSIS — Z79.4 LONG TERM CURRENT USE OF INSULIN (HCC): ICD-10-CM

## 2017-12-12 DIAGNOSIS — E11.22 TYPE 2 DIABETES MELLITUS WITH STAGE 4 CHRONIC KIDNEY DISEASE, UNSPECIFIED LONG TERM INSULIN USE STATUS: ICD-10-CM

## 2017-12-12 DIAGNOSIS — N18.4 TYPE 2 DIABETES MELLITUS WITH STAGE 4 CHRONIC KIDNEY DISEASE, UNSPECIFIED LONG TERM INSULIN USE STATUS: ICD-10-CM

## 2017-12-13 RX ORDER — PEN NEEDLE, DIABETIC 30 GX3/16"
1 NEEDLE, DISPOSABLE MISCELLANEOUS DAILY
Qty: 50 EACH | Refills: 1 | Status: SHIPPED
Start: 2017-12-13 | End: 2018-04-05

## 2017-12-13 NOTE — TELEPHONE ENCOUNTER
Pt requesting refill of Lantus, no protocol, unable to approve:     Last Time Medication was Filled:  9/18/17 9 ml    Last Office Visit with PCP: 11/27/17    Future Appointments  Date Time Provider Luci Loja   1/2/2018 1:20 PM DO WALTER Atkinson

## 2017-12-13 NOTE — TELEPHONE ENCOUNTER
From: Leydi Coy  Sent: 12/12/2017 7:50 PM CST  Subject: Medication Renewal Request    Leydi Coy would like a refill of the following medications:     Insulin Pen Needle (PEN NEEDLES) 31G X 5 MM Does not apply Misjuan daniel Mcgill

## 2017-12-15 ENCOUNTER — APPOINTMENT (OUTPATIENT)
Dept: LAB | Age: 57
End: 2017-12-15
Attending: FAMILY MEDICINE
Payer: MEDICARE

## 2017-12-15 DIAGNOSIS — Z79.4 TYPE 2 DIABETES MELLITUS WITH STAGE 4 CHRONIC KIDNEY DISEASE, WITH LONG-TERM CURRENT USE OF INSULIN (HCC): ICD-10-CM

## 2017-12-15 DIAGNOSIS — E11.22 TYPE 2 DIABETES MELLITUS WITH STAGE 4 CHRONIC KIDNEY DISEASE, WITH LONG-TERM CURRENT USE OF INSULIN (HCC): ICD-10-CM

## 2017-12-15 DIAGNOSIS — N18.4 TYPE 2 DIABETES MELLITUS WITH STAGE 4 CHRONIC KIDNEY DISEASE, WITH LONG-TERM CURRENT USE OF INSULIN (HCC): ICD-10-CM

## 2017-12-15 DIAGNOSIS — E03.9 ACQUIRED HYPOTHYROIDISM: ICD-10-CM

## 2017-12-15 PROCEDURE — 80053 COMPREHEN METABOLIC PANEL: CPT | Performed by: FAMILY MEDICINE

## 2017-12-15 PROCEDURE — 83036 HEMOGLOBIN GLYCOSYLATED A1C: CPT | Performed by: FAMILY MEDICINE

## 2017-12-15 PROCEDURE — 80061 LIPID PANEL: CPT | Performed by: FAMILY MEDICINE

## 2017-12-15 PROCEDURE — 84439 ASSAY OF FREE THYROXINE: CPT | Performed by: FAMILY MEDICINE

## 2017-12-15 PROCEDURE — 36415 COLL VENOUS BLD VENIPUNCTURE: CPT | Performed by: FAMILY MEDICINE

## 2017-12-15 PROCEDURE — 84443 ASSAY THYROID STIM HORMONE: CPT | Performed by: FAMILY MEDICINE

## 2017-12-15 RX ORDER — BUPROPION HYDROCHLORIDE 150 MG/1
150 TABLET, EXTENDED RELEASE ORAL DAILY
Qty: 90 TABLET | Refills: 3
Start: 2017-12-15

## 2017-12-15 NOTE — TELEPHONE ENCOUNTER
From: Elena Connelly  Sent: 12/15/2017 10:44 AM CST  Subject: Medication Renewal Request    Elena Connelly would like a refill of the following medications:     BuPROPion HCl ER, SR, 150 MG Oral Tablet 12 Hr James DO Carlos]    Preferr

## 2017-12-15 NOTE — TELEPHONE ENCOUNTER
Refills on file, spoke to [facility] pharmacist. Notified patient's son that refills are at [facility].     Future Appointments  Date Time Provider Luci Loja   1/2/2018 1:20 PM Lenny Foreman DO [facility] 30 [facility] [place]   2/1/2018 2:00 PM Robert Schneider MD

## 2017-12-20 ENCOUNTER — TELEPHONE (OUTPATIENT)
Dept: FAMILY MEDICINE CLINIC | Facility: CLINIC | Age: 57
End: 2017-12-20

## 2017-12-20 NOTE — TELEPHONE ENCOUNTER
Spoke with son, reviewed results and recommendations.  Pt completed eye exam with Dr Lloyd Self and will schedule with Dr Sandra Pyle for kidneys    Notes Recorded by Lety Rosales DO on 12/19/2017 at 10:44 PM CST  Diabetes controlled.  Needs her eye exam ASAP.  P

## 2017-12-26 ENCOUNTER — TELEPHONE (OUTPATIENT)
Dept: NEPHROLOGY | Facility: CLINIC | Age: 57
End: 2017-12-26

## 2017-12-26 NOTE — TELEPHONE ENCOUNTER
Per dr Karyn Steele asking pt's son to call for an appt on Jan 8th, dr Sherron Mukherjee wants her to come in B4 February appt.

## 2017-12-27 NOTE — TELEPHONE ENCOUNTER
Will speak with patient at appointment on 1/2/2018.     Future Appointments  Date Time Provider Luci Loja   1/2/2018 1:20 PM Ravindra Lambert DO [facility] 30 [facility] [place]   1/8/2018 1:00 AM Jey Erwin MD [facility] [place]   2/1/2018 2:00 PM [place]

## 2018-01-02 ENCOUNTER — OFFICE VISIT (OUTPATIENT)
Dept: FAMILY MEDICINE CLINIC | Facility: CLINIC | Age: 58
End: 2018-01-02

## 2018-01-02 VITALS
DIASTOLIC BLOOD PRESSURE: 60 MMHG | OXYGEN SATURATION: 97 % | BODY MASS INDEX: 30 KG/M2 | SYSTOLIC BLOOD PRESSURE: 124 MMHG | HEART RATE: 63 BPM | RESPIRATION RATE: 18 BRPM | WEIGHT: 165.19 LBS | TEMPERATURE: 98 F

## 2018-01-02 DIAGNOSIS — Z12.11 SCREEN FOR COLON CANCER: ICD-10-CM

## 2018-01-02 DIAGNOSIS — N18.5 CKD (CHRONIC KIDNEY DISEASE) STAGE 5, GFR LESS THAN 15 ML/MIN (HCC): ICD-10-CM

## 2018-01-02 DIAGNOSIS — E78.5 HYPERLIPIDEMIA ASSOCIATED WITH TYPE 2 DIABETES MELLITUS (HCC): ICD-10-CM

## 2018-01-02 DIAGNOSIS — E11.21 DIABETIC NEPHROPATHY ASSOCIATED WITH TYPE 2 DIABETES MELLITUS (HCC): ICD-10-CM

## 2018-01-02 DIAGNOSIS — E11.22 TYPE 2 DIABETES MELLITUS WITH STAGE 4 CHRONIC KIDNEY DISEASE, UNSPECIFIED LONG TERM INSULIN USE STATUS: ICD-10-CM

## 2018-01-02 DIAGNOSIS — N18.4 TYPE 2 DIABETES MELLITUS WITH STAGE 4 CHRONIC KIDNEY DISEASE, UNSPECIFIED LONG TERM INSULIN USE STATUS: ICD-10-CM

## 2018-01-02 DIAGNOSIS — J30.2 SEASONAL ALLERGIC RHINITIS, UNSPECIFIED CHRONICITY, UNSPECIFIED TRIGGER: ICD-10-CM

## 2018-01-02 DIAGNOSIS — E03.9 ACQUIRED HYPOTHYROIDISM: Primary | ICD-10-CM

## 2018-01-02 DIAGNOSIS — J01.01 ACUTE RECURRENT MAXILLARY SINUSITIS: ICD-10-CM

## 2018-01-02 DIAGNOSIS — E11.69 HYPERLIPIDEMIA ASSOCIATED WITH TYPE 2 DIABETES MELLITUS (HCC): ICD-10-CM

## 2018-01-02 PROCEDURE — G0009 ADMIN PNEUMOCOCCAL VACCINE: HCPCS | Performed by: FAMILY MEDICINE

## 2018-01-02 PROCEDURE — 90670 PCV13 VACCINE IM: CPT | Performed by: FAMILY MEDICINE

## 2018-01-02 PROCEDURE — 99214 OFFICE O/P EST MOD 30 MIN: CPT | Performed by: FAMILY MEDICINE

## 2018-01-02 RX ORDER — DIAPER,BRIEF,ADULT, DISPOSABLE
EACH MISCELLANEOUS
Qty: 150 STRIP | Refills: 0 | Status: CANCELLED | OUTPATIENT
Start: 2018-01-02

## 2018-01-02 RX ORDER — LEVOTHYROXINE SODIUM 88 UG/1
88 TABLET ORAL
Qty: 90 TABLET | Refills: 3 | Status: SHIPPED | OUTPATIENT
Start: 2018-01-02 | End: 2019-01-23

## 2018-01-02 RX ORDER — FLUNISOLIDE 0.25 MG/ML
2 SOLUTION NASAL 2 TIMES DAILY
Qty: 25 ML | Refills: 3 | Status: SHIPPED | OUTPATIENT
Start: 2018-01-02 | End: 2018-04-05

## 2018-01-02 RX ORDER — AMOXICILLIN AND CLAVULANATE POTASSIUM 875; 125 MG/1; MG/1
1 TABLET, FILM COATED ORAL 2 TIMES DAILY
Qty: 20 TABLET | Refills: 0 | Status: SHIPPED | OUTPATIENT
Start: 2018-01-02 | End: 2018-01-12

## 2018-01-02 NOTE — TELEPHONE ENCOUNTER
l-m on pt's son voicemail telling him dr Aguilar Alt wants to see pt this week;offered 4:45pm on 1-4-18

## 2018-01-02 NOTE — PATIENT INSTRUCTIONS
Central scheduling- call to schedule diagnostic tests, labs, imaging, physical therapy. Follow prompts.    [phone]- call to schedule mammogram    [address] - Gastroenterology    Vinnie Sanders- speaks Sami- colonoscopy  [address]

## 2018-01-02 NOTE — PROGRESS NOTES
Patient presents with: Follow - Up: labs    DM, HL, HTN f/u. HPI:   Lynette Candelario is a 62year old female who presents for recheck of her diabetes, HTN, lipids. Patient’s FBS have been . On Lantus only.   If evening blood sugar less darius extended release with some improvement. Using flunisolide for allergies and nose. Unsure of when had screening for colon cancer or location. Agrees to have repeated. Asking for referral.    Wt Readings from Last 6 Encounters:  01/02/18 : 165 lb 3. 2 tablet Rfl: 0   atorvastatin 80 MG Oral Tab Take 1 tablet (80 mg total) by mouth nightly. Disp: 90 tablet Rfl: 0   BuPROPion HCl ER, SR, 150 MG Oral Tablet 12 Hr Take 1 tablet (150 mg total) by mouth daily.  Disp: 90 tablet Rfl: 3   cetirizine 10 MG Oral Ta Rfl: 0      Past Medical History:   Diagnosis Date   • Allergic rhinitis    • Cataracts, bilateral 1/26/15    Per Dr. Constanza Montemayor   • Cholecystitis    • Chronic kidney disease (CKD)    • Depression    • Diabetic retinopathy ([facility] 1/26/15    Per Dr. Constanza Montemayor   • chronic leg edema  Pertinent positives and negatives noted in the the HPI.     EXAM:   /60 (BP Location: Right arm, Patient Position: Sitting, Cuff Size: adult)   Pulse 63   Temp 97.6 °F (36.4 °C) (Oral)   Resp 18   Wt 165 lb 3.2 oz   SpO2 97%   BMI 3 levothyroxine in a.m. on an empty stomach without other medications    5.  Hyperlipidemia associated with type 2 diabetes mellitus ([facility] [address]  LDL not at goal but question if had been taking regularly  Monitor and repeat labs in 3 months  If not controlled then understanding. Patient is notified to call with any questions, complications, allergies, or worsening or changing symptoms. Patient is to call with any side effects or complications from the treatments as a result of today.        Imaging & Referrals:  Non

## 2018-01-03 ENCOUNTER — OFFICE VISIT (OUTPATIENT)
Dept: PHYSICAL THERAPY | Age: 58
End: 2018-01-03
Attending: ORTHOPAEDIC SURGERY
Payer: MEDICARE

## 2018-01-03 DIAGNOSIS — S42.295D OTHER CLOSED NONDISPLACED FRACTURE OF PROXIMAL END OF LEFT HUMERUS WITH ROUTINE HEALING, SUBSEQUENT ENCOUNTER: ICD-10-CM

## 2018-01-03 PROCEDURE — 97110 THERAPEUTIC EXERCISES: CPT

## 2018-01-03 PROCEDURE — 97162 PT EVAL MOD COMPLEX 30 MIN: CPT

## 2018-01-03 NOTE — PROGRESS NOTES
UPPER EXTREMITY EVALUATION:   Referring Physician: Dr. Ricki Shields  Diagnosis: left proximal humerus fracture     Date of Service: 1/3/2018     PATIENT SUMMARY   General Dennise is a 62year old y/o female who presents to therapy today with complaints has impaired shoulder active and passive range of motion limited by pain, decreased strength as expected after the injury and immobilization.    Adrianna Pierson would benefit from skilled Physical Therapy to address the above impairments to improve shoulder AROM, deg AROM into abduction with LUE for increased functional use of her left UE and for side reaching motions    Frequency / Duration: Patient will be seen for 2 x/week or a total of 8 visits over a 90 day period. Treatment will include: Manual Therapy;  Lashae Maldonado

## 2018-01-04 ENCOUNTER — OFFICE VISIT (OUTPATIENT)
Dept: NEPHROLOGY | Facility: CLINIC | Age: 58
End: 2018-01-04

## 2018-01-04 VITALS — SYSTOLIC BLOOD PRESSURE: 132 MMHG | DIASTOLIC BLOOD PRESSURE: 78 MMHG | BODY MASS INDEX: 30 KG/M2 | WEIGHT: 163 LBS

## 2018-01-04 DIAGNOSIS — N18.5 STAGE 5 CHRONIC KIDNEY DISEASE NOT ON CHRONIC DIALYSIS (HCC): Primary | ICD-10-CM

## 2018-01-04 PROCEDURE — 99215 OFFICE O/P EST HI 40 MIN: CPT | Performed by: INTERNAL MEDICINE

## 2018-01-04 NOTE — PROGRESS NOTES
Nephrology Progress Note      Valerie Marino is a 62year old female.     HPI:   Patient presents with:  Chronic Kidney Disease  Hypertension      61 y/o  female - primarily [address] speaking with hx of CKD - Stage V, HTN, DM (20+ yrs), reti Breast Cancer Paternal Aunt 36   • High Blood Pressure Mother    • High Blood Pressure Son    • Hypertension Son    • Depression Daughter    • High Blood Pressure, and Pre-Diabetes [OTHER] Daughter    • High Blood Pressure Son    • Depression Daughter am on empty stomach 45mins AC breakfast Disp: 60 capsule Rfl: 6   Cholecalciferol (VITAMIN D) 2000 UNITS Oral Tab Take 1 tablet by mouth daily.  Disp: 30 tablet Rfl: 0   Flunisolide 25 MCG/ACT (0.025%) Nasal Solution 2 sprays by Each Nare route 2 (two) time 153 lb  05/02/17 : 164 lb 6.4 oz  12/13/16 : 162 lb       General: Alert and oriented in no apparent distress.  Depressed affect  HEENT: No scleral icterus, MMM  Neck: Supple, no OTILIA or thyromegaly  Cardiac: Regular rate and rhythm, S1, S2 normal, no murmur

## 2018-01-08 ENCOUNTER — OFFICE VISIT (OUTPATIENT)
Dept: PHYSICAL THERAPY | Age: 58
End: 2018-01-08
Attending: ORTHOPAEDIC SURGERY
Payer: MEDICARE

## 2018-01-08 PROCEDURE — 97110 THERAPEUTIC EXERCISES: CPT

## 2018-01-08 NOTE — PROGRESS NOTES
Dx: left proximal humerus fracture 11/7/17         Authorized # of Visits:  8         Next MD visit: none scheduled  Fall Risk: standard         Precautions: n/a           Subjective: son present to translate.  He reports she has been doing her exercises da

## 2018-01-10 ENCOUNTER — APPOINTMENT (OUTPATIENT)
Dept: PHYSICAL THERAPY | Age: 58
End: 2018-01-10
Attending: ORTHOPAEDIC SURGERY
Payer: MEDICARE

## 2018-01-12 DIAGNOSIS — E11.22 TYPE 2 DIABETES MELLITUS WITH STAGE 4 CHRONIC KIDNEY DISEASE, UNSPECIFIED LONG TERM INSULIN USE STATUS: ICD-10-CM

## 2018-01-12 DIAGNOSIS — N18.4 TYPE 2 DIABETES MELLITUS WITH STAGE 4 CHRONIC KIDNEY DISEASE, UNSPECIFIED LONG TERM INSULIN USE STATUS: ICD-10-CM

## 2018-01-12 DIAGNOSIS — E11.21 DIABETIC NEPHROPATHY ASSOCIATED WITH TYPE 2 DIABETES MELLITUS (HCC): ICD-10-CM

## 2018-01-15 RX ORDER — CETIRIZINE HYDROCHLORIDE 10 MG/1
10 TABLET ORAL DAILY
Qty: 30 TABLET | Refills: 2 | Status: SHIPPED
Start: 2018-01-15 | End: 2018-02-07

## 2018-01-15 RX ORDER — DIAPER,BRIEF,ADULT, DISPOSABLE
EACH MISCELLANEOUS
Qty: 180 STRIP | Refills: 0 | Status: SHIPPED
Start: 2018-01-15 | End: 2018-04-05

## 2018-01-15 NOTE — TELEPHONE ENCOUNTER
Refill request for true track test strips, protocol passed, refills approved.      Refill request for cetrizine, protocol passed, refills approved

## 2018-01-15 NOTE — TELEPHONE ENCOUNTER
From: Leydi Coy  Sent: 1/12/2018 4:23 PM CST  Subject: Medication Renewal Request    Leydi Coy would like a refill of the following medications:     TRUETRACK TEST In Vitro Strip Yeny Brown DO]     CETIRIZINE 10 MG Oral Tab

## 2018-01-17 ENCOUNTER — OFFICE VISIT (OUTPATIENT)
Dept: PHYSICAL THERAPY | Age: 58
End: 2018-01-17
Attending: ORTHOPAEDIC SURGERY
Payer: MEDICARE

## 2018-01-17 PROCEDURE — 97110 THERAPEUTIC EXERCISES: CPT

## 2018-01-22 ENCOUNTER — OFFICE VISIT (OUTPATIENT)
Dept: PHYSICAL THERAPY | Age: 58
End: 2018-01-22
Attending: ORTHOPAEDIC SURGERY
Payer: MEDICARE

## 2018-01-22 PROCEDURE — 97110 THERAPEUTIC EXERCISES: CPT

## 2018-01-24 ENCOUNTER — APPOINTMENT (OUTPATIENT)
Dept: PHYSICAL THERAPY | Age: 58
End: 2018-01-24
Attending: ORTHOPAEDIC SURGERY
Payer: MEDICARE

## 2018-01-29 ENCOUNTER — OFFICE VISIT (OUTPATIENT)
Dept: PHYSICAL THERAPY | Age: 58
End: 2018-01-29
Attending: ORTHOPAEDIC SURGERY
Payer: MEDICARE

## 2018-01-29 PROCEDURE — 97110 THERAPEUTIC EXERCISES: CPT

## 2018-01-29 NOTE — PROGRESS NOTES
Dx: left proximal humerus fracture 11/7/17         Authorized # of Visits:  8         Next MD visit: none scheduled  Fall Risk: standard         Precautions: n/a       Discharge Summary    Pt has attended 5, cancelled 1, and no shown 1 visits in Physical T functional activities she was doing prior to the accident      Plan: at this time I feel she is appropriate to continue with her home program. They have been educated to continue strengthening over the next 2 months and if any questions or concerns arise t udated       Skilled Services: skilled selection of exercise progression within pain tolerance     Charges: there x 3       Total Timed Treatment: 40 min  Total Treatment Time: 40 min

## 2018-02-07 DIAGNOSIS — N18.4 CKD (CHRONIC KIDNEY DISEASE) STAGE 4, GFR 15-29 ML/MIN (HCC): ICD-10-CM

## 2018-02-07 RX ORDER — METOLAZONE 5 MG/1
5 TABLET ORAL
Qty: 36 TABLET | Refills: 0 | Status: CANCELLED
Start: 2018-02-07

## 2018-02-08 DIAGNOSIS — N18.4 CKD (CHRONIC KIDNEY DISEASE) STAGE 4, GFR 15-29 ML/MIN (HCC): ICD-10-CM

## 2018-02-08 RX ORDER — METOLAZONE 5 MG/1
5 TABLET ORAL
Qty: 36 TABLET | Refills: 1 | Status: ON HOLD | OUTPATIENT
Start: 2018-02-09 | End: 2018-04-07

## 2018-02-08 RX ORDER — CETIRIZINE HYDROCHLORIDE 10 MG/1
10 TABLET ORAL DAILY
Qty: 30 TABLET | Refills: 2 | Status: SHIPPED
Start: 2018-02-08 | End: 2018-03-26

## 2018-02-08 NOTE — TELEPHONE ENCOUNTER
From: Iván Matthew  Sent: 2/7/2018 5:57 PM CST  Subject: Medication Renewal Request    Iván Matthew would like a refill of the following medications:     cetirizine 10 MG Oral Tab RAIMUNDO Osborne    Preferred pharmacy: [facility]

## 2018-02-08 NOTE — TELEPHONE ENCOUNTER
From: Kanwal James  Sent: 2/7/2018 5:57 PM CST  Subject: Medication Renewal Request    Kanwalus Saldanao would like a refill of the following medications:     metolazone 5 MG Oral Tab Mu Palomo MD]    Preferred pharmacy: [facility] [address] [place] [address] AT [address] [place] [address], [phone], [phone]    Comment:      Medication renewals requested in this message routed separately:     cetirizine 10 MG Oral Tab RAIMUNDO Neely

## 2018-02-23 ENCOUNTER — PATIENT MESSAGE (OUTPATIENT)
Dept: FAMILY MEDICINE CLINIC | Facility: CLINIC | Age: 58
End: 2018-02-23

## 2018-02-23 DIAGNOSIS — J44.9 ASTHMA WITH COPD (CHRONIC OBSTRUCTIVE PULMONARY DISEASE) (HCC): Chronic | ICD-10-CM

## 2018-02-23 DIAGNOSIS — G47.33 SEVERE OBSTRUCTIVE SLEEP APNEA: Primary | ICD-10-CM

## 2018-02-26 RX ORDER — LEVOTHYROXINE SODIUM 88 UG/1
TABLET ORAL
Qty: 30 TABLET | Refills: 0 | OUTPATIENT
Start: 2018-02-26

## 2018-02-26 NOTE — TELEPHONE ENCOUNTER
From: General Bowden  To:  Pedro Luis Kay DO  Sent: 2/23/2018 7:15 PM CST  Subject: Referral Request    Hello,    Would I be able to get a referral to see s sleep specialist Kerwin Mcneill for sleep apnea    Thanks  Donis Pro

## 2018-03-12 DIAGNOSIS — N18.4 CKD (CHRONIC KIDNEY DISEASE) STAGE 4, GFR 15-29 ML/MIN (HCC): ICD-10-CM

## 2018-03-12 RX ORDER — RANITIDINE 300 MG/1
TABLET ORAL
Qty: 90 TABLET | Refills: 0 | Status: SHIPPED | OUTPATIENT
Start: 2018-03-12 | End: 2018-04-05

## 2018-03-12 RX ORDER — MONTELUKAST SODIUM 10 MG/1
TABLET ORAL
Qty: 45 TABLET | Refills: 0 | Status: SHIPPED | OUTPATIENT
Start: 2018-03-12 | End: 2018-04-05

## 2018-03-12 RX ORDER — OFLOXACIN 3 MG/ML
SOLUTION/ DROPS OPHTHALMIC
Refills: 0 | COMMUNITY
Start: 2018-02-02 | End: 2018-04-05

## 2018-03-12 RX ORDER — PREDNISOLONE ACETATE 10 MG/ML
SUSPENSION/ DROPS OPHTHALMIC
Refills: 0 | COMMUNITY
Start: 2018-02-02 | End: 2018-04-05

## 2018-03-12 RX ORDER — KETOROLAC TROMETHAMINE 5 MG/ML
SOLUTION OPHTHALMIC
Refills: 0 | COMMUNITY
Start: 2018-02-02 | End: 2018-04-05

## 2018-03-12 RX ORDER — METOLAZONE 5 MG/1
5 TABLET ORAL
Qty: 36 TABLET | Refills: 1
Start: 2018-03-12

## 2018-03-12 NOTE — TELEPHONE ENCOUNTER
Pt requesting refills on Montelukast and Ranitidine, protocols passed, refills approved    LOV 1/2/18  LF 9/18/17    Future Appointments  Date Time Provider Luci Loja   4/3/2018 1:00 PM Lety March, DO EMG 30 EMG [place]   4/3/2018 4:00 PM Sandra Pyle,

## 2018-03-12 NOTE — TELEPHONE ENCOUNTER
From: Octavio Mary  Sent: 3/12/2018 1:38 PM CDT  Subject: Medication Renewal Request    Octavio Mary would like a refill of the following medications:     metolazone 5 MG Oral Tab Jimbo Lowe MD]    Preferred pharmacy: [address] AT [address] [place] [address], [phone], [phone]    Comment:

## 2018-03-13 DIAGNOSIS — N18.4 CKD (CHRONIC KIDNEY DISEASE) STAGE 4, GFR 15-29 ML/MIN (HCC): ICD-10-CM

## 2018-03-13 DIAGNOSIS — E11.21 DIABETIC NEPHROPATHY ASSOCIATED WITH TYPE 2 DIABETES MELLITUS (HCC): ICD-10-CM

## 2018-03-13 DIAGNOSIS — I10 ESSENTIAL HYPERTENSION WITH GOAL BLOOD PRESSURE LESS THAN 140/90: ICD-10-CM

## 2018-03-13 DIAGNOSIS — N18.4 TYPE 2 DIABETES MELLITUS WITH STAGE 4 CHRONIC KIDNEY DISEASE, UNSPECIFIED LONG TERM INSULIN USE STATUS: ICD-10-CM

## 2018-03-13 DIAGNOSIS — Z79.4 LONG TERM CURRENT USE OF INSULIN (HCC): ICD-10-CM

## 2018-03-13 DIAGNOSIS — E11.22 TYPE 2 DIABETES MELLITUS WITH STAGE 4 CHRONIC KIDNEY DISEASE, UNSPECIFIED LONG TERM INSULIN USE STATUS: ICD-10-CM

## 2018-03-13 RX ORDER — INSULIN GLARGINE 100 [IU]/ML
INJECTION, SOLUTION SUBCUTANEOUS
Qty: 15 ML | Refills: 0 | Status: SHIPPED | OUTPATIENT
Start: 2018-03-13 | End: 2018-04-05

## 2018-03-13 RX ORDER — ATORVASTATIN CALCIUM 80 MG/1
TABLET, FILM COATED ORAL
Qty: 90 TABLET | Refills: 0 | Status: SHIPPED | OUTPATIENT
Start: 2018-03-13 | End: 2018-04-05

## 2018-03-13 RX ORDER — DIAPER,BRIEF,ADULT, DISPOSABLE
EACH MISCELLANEOUS
Qty: 100 STRIP | Refills: 0 | Status: SHIPPED | OUTPATIENT
Start: 2018-03-13 | End: 2018-04-05

## 2018-03-13 RX ORDER — AMLODIPINE BESYLATE 10 MG/1
TABLET ORAL
Qty: 90 TABLET | Refills: 0 | Status: SHIPPED | OUTPATIENT
Start: 2018-03-13 | End: 2018-04-05

## 2018-03-13 NOTE — TELEPHONE ENCOUNTER
Pt requesting refill of Amlodipine, protocol failed, unable to approve:     Last Time Medication was Filled:  11/27/17    Last Office Visit with PCP: 1/2/18    Future Appointments  Date Time Provider Luci Loja   4/3/2018 1:00 PM Celia Chen DO

## 2018-03-22 DIAGNOSIS — N18.4 CKD (CHRONIC KIDNEY DISEASE) STAGE 4, GFR 15-29 ML/MIN (HCC): ICD-10-CM

## 2018-03-22 DIAGNOSIS — I10 ESSENTIAL HYPERTENSION WITH GOAL BLOOD PRESSURE LESS THAN 140/90: ICD-10-CM

## 2018-03-23 RX ORDER — FUROSEMIDE 20 MG/1
TABLET ORAL
Qty: 180 TABLET | Refills: 0 | Status: SHIPPED | OUTPATIENT
Start: 2018-03-23 | End: 2018-04-05

## 2018-03-23 NOTE — TELEPHONE ENCOUNTER
Pt requesting refill of Furosemide, protocol failed, unable to approve:     Last Time Medication was Filled:  11/27/17    Last Office Visit with PCP: 1/2/18    Future Appointments  Date Time Provider Luci Loja   4/3/2018 1:00 PM Rufino Knutson DO

## 2018-03-26 RX ORDER — CETIRIZINE HYDROCHLORIDE 10 MG/1
TABLET ORAL
Qty: 90 TABLET | Refills: 0 | Status: SHIPPED | OUTPATIENT
Start: 2018-03-26 | End: 2018-04-05

## 2018-03-26 NOTE — TELEPHONE ENCOUNTER
Pt requesting refill on Cetirizine, protocol passed, refill approved      LOV 1/2/18    LF 2/8/18    Future Appointments  Date Time Provider Luci Loja   4/3/2018 1:00 PM Timmy Valente, DO EMG 30 EMG [place]   4/3/2018 4:00 PM Kia Kent MD [facility]

## 2018-03-29 ENCOUNTER — TELEPHONE (OUTPATIENT)
Dept: FAMILY MEDICINE CLINIC | Facility: CLINIC | Age: 58
End: 2018-03-29

## 2018-03-29 DIAGNOSIS — N18.5 CKD (CHRONIC KIDNEY DISEASE) STAGE 5, GFR LESS THAN 15 ML/MIN (HCC): Primary | ICD-10-CM

## 2018-03-29 NOTE — TELEPHONE ENCOUNTER
Future Appointments  Date Time Provider Luci Loja   4/3/2018 1:00 PM Jose Alejandro Kaiser DO EMG 30 EMG [place]   4/3/2018 4:00 PM Joyice Goldmann, MD [facility] EM[facility] [place]   7/16/2018 2:30 PM Magi Dillon MD [facility] NEGRIT[address] [place]   11/29/2018 1:20 PM [place]

## 2018-04-04 ENCOUNTER — LAB ENCOUNTER (OUTPATIENT)
Dept: LAB | Age: 58
DRG: 674 | End: 2018-04-04
Attending: INTERNAL MEDICINE
Payer: MEDICARE

## 2018-04-04 DIAGNOSIS — I10 ESSENTIAL HYPERTENSION WITH GOAL BLOOD PRESSURE LESS THAN 140/90: ICD-10-CM

## 2018-04-04 DIAGNOSIS — N18.5 STAGE 5 CHRONIC KIDNEY DISEASE NOT ON CHRONIC DIALYSIS (HCC): ICD-10-CM

## 2018-04-04 DIAGNOSIS — E11.69 HYPERLIPIDEMIA ASSOCIATED WITH TYPE 2 DIABETES MELLITUS (HCC): ICD-10-CM

## 2018-04-04 DIAGNOSIS — N18.4 TYPE 2 DIABETES MELLITUS WITH STAGE 4 CHRONIC KIDNEY DISEASE (HCC): ICD-10-CM

## 2018-04-04 DIAGNOSIS — E78.5 HYPERLIPIDEMIA ASSOCIATED WITH TYPE 2 DIABETES MELLITUS (HCC): ICD-10-CM

## 2018-04-04 DIAGNOSIS — E11.22 TYPE 2 DIABETES MELLITUS WITH STAGE 4 CHRONIC KIDNEY DISEASE (HCC): ICD-10-CM

## 2018-04-04 DIAGNOSIS — N18.5 CKD (CHRONIC KIDNEY DISEASE) STAGE 5, GFR LESS THAN 15 ML/MIN (HCC): ICD-10-CM

## 2018-04-04 PROCEDURE — 83036 HEMOGLOBIN GLYCOSYLATED A1C: CPT

## 2018-04-04 PROCEDURE — 80061 LIPID PANEL: CPT

## 2018-04-04 PROCEDURE — 83735 ASSAY OF MAGNESIUM: CPT

## 2018-04-04 PROCEDURE — 85025 COMPLETE CBC W/AUTO DIFF WBC: CPT

## 2018-04-04 PROCEDURE — 36415 COLL VENOUS BLD VENIPUNCTURE: CPT

## 2018-04-04 PROCEDURE — 84100 ASSAY OF PHOSPHORUS: CPT

## 2018-04-04 PROCEDURE — 80053 COMPREHEN METABOLIC PANEL: CPT

## 2018-04-05 ENCOUNTER — HOSPITAL ENCOUNTER (INPATIENT)
Facility: HOSPITAL | Age: 58
LOS: 2 days | Discharge: HOME | DRG: 674 | End: 2018-04-07
Attending: EMERGENCY MEDICINE | Admitting: HOSPITALIST
Payer: MEDICARE

## 2018-04-05 DIAGNOSIS — N19 UREMIA: ICD-10-CM

## 2018-04-05 DIAGNOSIS — E16.2 HYPOGLYCEMIA: ICD-10-CM

## 2018-04-05 DIAGNOSIS — N17.9 ACUTE RENAL FAILURE SUPERIMPOSED ON STAGE 4 CHRONIC KIDNEY DISEASE, UNSPECIFIED ACUTE RENAL FAILURE TYPE (HCC): Primary | ICD-10-CM

## 2018-04-05 DIAGNOSIS — N18.4 ACUTE RENAL FAILURE SUPERIMPOSED ON STAGE 4 CHRONIC KIDNEY DISEASE, UNSPECIFIED ACUTE RENAL FAILURE TYPE (HCC): Primary | ICD-10-CM

## 2018-04-05 DIAGNOSIS — N18.6 ESRD (END STAGE RENAL DISEASE) (HCC): ICD-10-CM

## 2018-04-05 PROBLEM — E87.2 METABOLIC ACIDOSIS: Status: ACTIVE | Noted: 2018-04-05

## 2018-04-05 PROBLEM — E87.20 METABOLIC ACIDOSIS: Status: ACTIVE | Noted: 2018-04-05

## 2018-04-05 PROCEDURE — 99223 1ST HOSP IP/OBS HIGH 75: CPT | Performed by: HOSPITALIST

## 2018-04-05 PROCEDURE — 99223 1ST HOSP IP/OBS HIGH 75: CPT | Performed by: INTERNAL MEDICINE

## 2018-04-05 RX ORDER — HYDRALAZINE HYDROCHLORIDE 25 MG/1
25 TABLET, FILM COATED ORAL 2 TIMES DAILY
Status: DISCONTINUED | OUTPATIENT
Start: 2018-04-05 | End: 2018-04-05

## 2018-04-05 RX ORDER — DEXTROSE MONOHYDRATE 25 G/50ML
50 INJECTION, SOLUTION INTRAVENOUS
Status: DISCONTINUED | OUTPATIENT
Start: 2018-04-05 | End: 2018-04-07

## 2018-04-05 RX ORDER — FUROSEMIDE 20 MG/1
20 TABLET ORAL EVERY EVENING
Status: ON HOLD | COMMUNITY
End: 2018-04-07

## 2018-04-05 RX ORDER — AMLODIPINE BESYLATE 5 MG/1
10 TABLET ORAL
Status: DISCONTINUED | OUTPATIENT
Start: 2018-04-05 | End: 2018-04-07

## 2018-04-05 RX ORDER — PANTOPRAZOLE SODIUM 40 MG/1
40 TABLET, DELAYED RELEASE ORAL
Status: DISCONTINUED | OUTPATIENT
Start: 2018-04-06 | End: 2018-04-07

## 2018-04-05 RX ORDER — MONTELUKAST SODIUM 10 MG/1
5 TABLET ORAL NIGHTLY
Status: DISCONTINUED | OUTPATIENT
Start: 2018-04-05 | End: 2018-04-07

## 2018-04-05 RX ORDER — CETIRIZINE HYDROCHLORIDE 10 MG/1
10 TABLET ORAL DAILY
COMMUNITY
End: 2018-05-16

## 2018-04-05 RX ORDER — ACETAMINOPHEN 160 MG
2000 TABLET,DISINTEGRATING ORAL DAILY
Status: DISCONTINUED | OUTPATIENT
Start: 2018-04-05 | End: 2018-04-07

## 2018-04-05 RX ORDER — RANITIDINE 300 MG/1
300 CAPSULE ORAL EVERY EVENING
COMMUNITY
End: 2018-06-26

## 2018-04-05 RX ORDER — CARVEDILOL 12.5 MG/1
25 TABLET ORAL 2 TIMES DAILY WITH MEALS
Status: DISCONTINUED | OUTPATIENT
Start: 2018-04-05 | End: 2018-04-07

## 2018-04-05 RX ORDER — ACETAMINOPHEN 325 MG/1
650 TABLET ORAL EVERY 6 HOURS PRN
Status: DISCONTINUED | OUTPATIENT
Start: 2018-04-05 | End: 2018-04-07

## 2018-04-05 RX ORDER — MONTELUKAST SODIUM 10 MG/1
5 TABLET ORAL NIGHTLY
COMMUNITY
End: 2018-06-26

## 2018-04-05 RX ORDER — FUROSEMIDE 20 MG/1
40 TABLET ORAL EVERY MORNING
Status: ON HOLD | COMMUNITY
End: 2018-04-07

## 2018-04-05 RX ORDER — LEVOTHYROXINE SODIUM 88 UG/1
88 TABLET ORAL
Status: DISCONTINUED | OUTPATIENT
Start: 2018-04-05 | End: 2018-04-07

## 2018-04-05 RX ORDER — HEPARIN SODIUM 5000 [USP'U]/ML
5000 INJECTION, SOLUTION INTRAVENOUS; SUBCUTANEOUS EVERY 8 HOURS SCHEDULED
Status: DISCONTINUED | OUTPATIENT
Start: 2018-04-05 | End: 2018-04-07

## 2018-04-05 RX ORDER — ATORVASTATIN CALCIUM 80 MG/1
80 TABLET, FILM COATED ORAL NIGHTLY
COMMUNITY
End: 2018-04-26

## 2018-04-05 RX ORDER — ALBUMIN (HUMAN) 12.5 G/50ML
100 SOLUTION INTRAVENOUS AS NEEDED
Status: DISCONTINUED | OUTPATIENT
Start: 2018-04-05 | End: 2018-04-07

## 2018-04-05 RX ORDER — HYDRALAZINE HYDROCHLORIDE 25 MG/1
25 TABLET, FILM COATED ORAL 2 TIMES DAILY
Status: DISCONTINUED | OUTPATIENT
Start: 2018-04-05 | End: 2018-04-07

## 2018-04-05 RX ORDER — HEPARIN SODIUM 1000 [USP'U]/ML
1.5 INJECTION, SOLUTION INTRAVENOUS; SUBCUTANEOUS ONCE
Status: COMPLETED | OUTPATIENT
Start: 2018-04-05 | End: 2018-04-06

## 2018-04-05 RX ORDER — MULTIVIT-MIN/IRON/FOLIC ACID/K 18-600-40
2000 CAPSULE ORAL DAILY
COMMUNITY
End: 2020-01-07 | Stop reason: ALTCHOICE

## 2018-04-05 RX ORDER — CETIRIZINE HYDROCHLORIDE 5 MG/1
5 TABLET ORAL DAILY
Status: DISCONTINUED | OUTPATIENT
Start: 2018-04-05 | End: 2018-04-07

## 2018-04-05 RX ORDER — ATORVASTATIN CALCIUM 80 MG/1
80 TABLET, FILM COATED ORAL NIGHTLY
Status: DISCONTINUED | OUTPATIENT
Start: 2018-04-05 | End: 2018-04-07

## 2018-04-05 RX ORDER — CARVEDILOL 25 MG/1
TABLET ORAL
Qty: 180 TABLET | Refills: 3 | Status: SHIPPED | OUTPATIENT
Start: 2018-04-05 | End: 2018-09-06

## 2018-04-05 RX ORDER — DEXTROSE MONOHYDRATE 25 G/50ML
50 INJECTION, SOLUTION INTRAVENOUS ONCE
Status: COMPLETED | OUTPATIENT
Start: 2018-04-05 | End: 2018-04-05

## 2018-04-05 RX ORDER — HYDRALAZINE HYDROCHLORIDE 25 MG/1
25 TABLET, FILM COATED ORAL 2 TIMES DAILY
COMMUNITY
End: 2018-09-06

## 2018-04-05 RX ORDER — BUPROPION HYDROCHLORIDE 150 MG/1
150 TABLET, EXTENDED RELEASE ORAL DAILY
Status: DISCONTINUED | OUTPATIENT
Start: 2018-04-05 | End: 2018-04-07

## 2018-04-05 RX ORDER — ONDANSETRON 2 MG/ML
4 INJECTION INTRAMUSCULAR; INTRAVENOUS EVERY 4 HOURS PRN
Status: DISCONTINUED | OUTPATIENT
Start: 2018-04-05 | End: 2018-04-07

## 2018-04-05 RX ORDER — NAPROXEN 500 MG/1
500 TABLET ORAL 2 TIMES DAILY WITH MEALS
Status: ON HOLD | COMMUNITY
End: 2018-04-07

## 2018-04-05 NOTE — PROGRESS NOTES
NURSING ADMISSION NOTE      Patient admitted via Cart  Oriented to room. Safety precautions initiated. Bed in low position. Call light in reach. VSS afebrile. Son at bedside. Dr. Eliud Thakur notified of admit.

## 2018-04-05 NOTE — PROGRESS NOTES
[facility] Pharmacy Note:  Renal Dose Adjustment for Cetirizine (ZYRTEC)    Kamaljit Navarro has been prescribed Cetirizine (Zyrtec) 10 mg orally daily. Estimated Creatinine Clearance: 4.9 mL/min (A) (based on SCr of 9.93 mg/dL (H)).     Considering andrew

## 2018-04-05 NOTE — H&P
[facility] H[facility]PITAL[facility]  History and Physical     Ryan Fire Patient Status:  Emergency    1960 MRN [id number]   Location 6[address] [facility] Attending Aramis Koo MD   Hosp Day # 0 PCP Rosendo Encinas DO     Chief Compl Past Surgical History: Past Surgical History:  No date: APPENDECTOMY  2013: EYE SURGERY      Comment: RV surgery  1990: HYSTERECTOMY      Comment: fibroids.  unsure but thinks she still has her                cervix  ??: AMPARO BIOPSY STEREOTACTIC NODULE 1 S tablet Rfl: 0   MONTELUKAST SODIUM 10 MG Oral Tab TAKE 1/2 TABLET BY MOUTH EVERY NIGHT Disp: 45 tablet Rfl: 0   ketorolac tromethamine 0.5 % Ophthalmic Solution INSTILL 1 DROP IN LEFT EYE QID Disp:  Rfl: 0   metolazone 5 MG Oral Tab Take 1 tablet (5 mg tot (VITAMIN D) 2000 UNITS Oral Tab Take 1 tablet by mouth daily. Disp: 30 tablet Rfl: 0   Dextrose-Fructose-Sod Citrate (NAUZENE) [phone] MG Oral Chew Tab Chew 2-4 tablets by mouth every 15 (fifteen) minutes as needed (MAX 24 tabs/24 hours).  Disp: 100 tab Epic.  ASSESSMENT / PLAN:   1. ALINA on CKD IV now progressed warranting HD  1. Permacath and HD in next 24 hours  2. Renal consult   2. Metabolic acidosis- 2/2 above- monitor   3. INDRA with obesity  hypoventilation syndrome- INDRA protocol   4.  HTN- cont home

## 2018-04-05 NOTE — ED PROVIDER NOTES
Patient Seen in: [facility] Emergency Department    History   Patient presents with:  Abnormal Result (metabolic, cardiac)    Stated Complaint: abnormal labs; needs dialysis per PMD    HPI    Presents to the emergency department with her son and the [facility]PPER GI ENDO NO BARRETTS      Comment: antral erosions        Smoking status: Never Smoker                                                              Smokeless tobacco: Never Used                      Alcohol use:  No                Review of Systems within normal limits   CBC W/ DIFFERENTIAL - Abnormal; Notable for the following:     RBC 2.87 (*)     HGB 8.0 (*)     HCT 24.4 (*)     All other components within normal limits   IRON AND TIBC - Normal   FERRITIN - Normal   POCT GLUCOSE - Normal   CBC WIT duplicate medications found. Please discuss with provider.           Present on Admission  Date Reviewed: 1/2/2018          ICD-10-CM Noted POA    * (Principal)Acute renal failure superimposed on stage 4 chronic kidney disease, unspecified acute renal failu

## 2018-04-05 NOTE — TELEPHONE ENCOUNTER
Last office visit 1-2-18  Last BP 3-5-18 124/74  Last refill 5-2-17 #180 with 1; 10-8-17 reported historically.

## 2018-04-05 NOTE — CONSULTS
[facility]  Report of Consultation    Hill Hutchinson Patient Status:  Emergency    1960 MRN [id number]   Location [address] [facility] Attending Elvina Mcburney, MD   [facility] Day # 0 PCP Veena Hill DO     Reason for Cons Problem Relation Age of Onset   • Diabetes Father    • Breast Cancer Maternal Aunt 36   • Breast Cancer Paternal Aunt 44   • High Blood Pressure Mother    • High Blood Pressure Son    • Hypertension Son    • Depression Daughter    • High Blood Pressure, omeprazole 20 MG Oral Capsule Delayed Release Take 1 capsule (20 mg total) by mouth 2 (two) times daily before meals.  1 tab po q am on empty stomach 45mins AC breakfast   ondansetron 8 MG Oral Tablet Dispersible Take 1 tablet (8 mg total) by mouth every ----------      Imaging:  Reviewed    Impression:    1.  ESRD - due to DM/HTN; her disease has progressed and now with uremic symptoms  - plan to initiate HD during this admit  - will [facility] tomorrow  - SW consult for o/p HD unit placement    2

## 2018-04-06 ENCOUNTER — APPOINTMENT (OUTPATIENT)
Dept: INTERVENTIONAL RADIOLOGY/VASCULAR | Facility: HOSPITAL | Age: 58
DRG: 674 | End: 2018-04-06
Attending: INTERNAL MEDICINE
Payer: MEDICARE

## 2018-04-06 PROCEDURE — 0JH63XZ INSERTION OF TUNNELED VASCULAR ACCESS DEVICE INTO CHEST SUBCUTANEOUS TISSUE AND FASCIA, PERCUTANEOUS APPROACH: ICD-10-PCS | Performed by: RADIOLOGY

## 2018-04-06 PROCEDURE — 02HV33Z INSERTION OF INFUSION DEVICE INTO SUPERIOR VENA CAVA, PERCUTANEOUS APPROACH: ICD-10-PCS | Performed by: RADIOLOGY

## 2018-04-06 PROCEDURE — 99232 SBSQ HOSP IP/OBS MODERATE 35: CPT | Performed by: HOSPITALIST

## 2018-04-06 PROCEDURE — 90935 HEMODIALYSIS ONE EVALUATION: CPT | Performed by: INTERNAL MEDICINE

## 2018-04-06 PROCEDURE — 5A1D70Z PERFORMANCE OF URINARY FILTRATION, INTERMITTENT, LESS THAN 6 HOURS PER DAY: ICD-10-PCS | Performed by: INTERNAL MEDICINE

## 2018-04-06 RX ORDER — LIDOCAINE HYDROCHLORIDE 10 MG/ML
INJECTION, SOLUTION INFILTRATION; PERINEURAL
Status: COMPLETED
Start: 2018-04-06 | End: 2018-04-06

## 2018-04-06 RX ORDER — ONDANSETRON 2 MG/ML
INJECTION INTRAMUSCULAR; INTRAVENOUS
Status: DISCONTINUED
Start: 2018-04-06 | End: 2018-04-06 | Stop reason: WASHOUT

## 2018-04-06 RX ORDER — LIDOCAINE HYDROCHLORIDE AND EPINEPHRINE 15; 5 MG/ML; UG/ML
INJECTION, SOLUTION EPIDURAL
Status: DISCONTINUED
Start: 2018-04-06 | End: 2018-04-06 | Stop reason: WASHOUT

## 2018-04-06 RX ORDER — HEPARIN SODIUM 5000 [USP'U]/ML
INJECTION, SOLUTION INTRAVENOUS; SUBCUTANEOUS
Status: COMPLETED
Start: 2018-04-06 | End: 2018-04-06

## 2018-04-06 RX ORDER — MIDAZOLAM HYDROCHLORIDE 1 MG/ML
INJECTION INTRAMUSCULAR; INTRAVENOUS
Status: COMPLETED
Start: 2018-04-06 | End: 2018-04-06

## 2018-04-06 NOTE — CM/SW NOTE
04/06/18 1415   CM/SW Referral Data   Referral Source Physician   Reason for Referral Discharge planning  (out pt HD)   Informant Patient; Children      Method of Interpretation Translation line    Translated To Discharge   Interpre

## 2018-04-06 NOTE — PLAN OF CARE
METABOLIC/FLUID AND ELECTROLYTES - ADULT    • Glucose maintained within prescribed range Progressing    • Electrolytes maintained within normal limits Progressing        Pt tolerating diet well, bedside BG monitored, no insulin required.  NPO after MN for p

## 2018-04-06 NOTE — PROGRESS NOTES
[facility]  Progress Note    Bernita Hatchet Patient Status:  Emergency    1960 MRN [id number]   Location [address] [facility] Attending Ming Salmon MD   [facility] Day # 1 PCP Marie Zamudio DO     No acute issues  s/p PC Once in dialysis   hydrALAzine HCl (APRESOLINE) tab 25 mg 25 mg Oral BID       Physical Exam:  Vital signs: Blood pressure 142/66, pulse 63, temperature 97.9 °F (36.6 °C), temperature source Oral, resp. rate 16, height 62\", weight 158 lb, SpO2 95 %.   Gene concerns.     Ranjit Rosas MD  4/6/2018

## 2018-04-06 NOTE — PAYOR COMM NOTE
--------------  CONTINUED STAY REVIEW    Payor: MALINA DANIELS    Procedure: Permcath placement     Pre-Procedure Diagnosis:  ESRD     Post-Procedure Diagnosis: Same        Findings:  R IJ 19 cm perm cath placed with tip to prox RA.   OK to use     Specimens:

## 2018-04-06 NOTE — PROCEDURES
[facility]  Procedure Note    Rick Novoa Patient Status:  Inpatient    1960 MRN [id number]   [facility] Dominick [facility]   [facility] Day # 1 PCP Elin Nelson DO     Procedure: Permcath p

## 2018-04-06 NOTE — H&P
[address] [id number] Patient Status:  Inpatient    1960 MRN [id number]   [facility] 3NW-A Attending Samaria Wiseman MD   [facility] Day # 1 PCP Jason Bahena DO     Admitting Diagnosis:   ESRD    H Depression Daughter    • High Blood Pressure, and Pre-Diabetes [OTHER] Daughter    • High Blood Pressure Son    • Depression Daughter        Allergies/Medications: Allergies:     Ace Inhibitors          Renal insuffficiency  B12-Ca                  Rash placement    Monik Chase MD  4/6/2018  8:34 AM

## 2018-04-06 NOTE — PLAN OF CARE
Received call from chemistry, glucose on am lab 69. Bedside glucose rechecked , result 70. Pt NPO. D50 given IVP per protocol. IR dept called & will send for pt at 0800. Informed of glucose & action taken. 1130 Dr Mesfin Putnam informed of BS & dextrose given.

## 2018-04-06 NOTE — PROGRESS NOTES
[facility]  Progress Note     Dalila Laura Patient Status:  Inpatient    1960 MRN [id number]   [facility] 3NW-A Attending Carolina Dailey MD   Hosp Day # 1 PCP Taras Saavedra DO     Chief Complaint: ESRD  S:  Ardelia Deal mg Oral BID with meals   • AmLODIPine Besylate  10 mg Oral Daily   • BuPROPion HCl ER (SR)  150 mg Oral Daily   • Levothyroxine Sodium  88 mcg Oral Before breakfast   • atorvastatin  80 mg Oral Nightly   • Cetirizine HCl  5 mg Oral Daily   • Vitamin D3  2,

## 2018-04-06 NOTE — PROGRESS NOTES
[facility]  Progress Note      Pt assessed at bedside for report of oozing around catheter site from procedure this AM.  Site assessed, slow trickle of blood products noted from skin insertion site of catheter.   Treated with small injection of gelfoam

## 2018-04-06 NOTE — PAYOR COMM NOTE
--------------  ADMISSION REVIEW         4/5      ED LATE      Abnormal Result     Stated Complaint: abnormal labs; needs dialysis per PMD        Presents to the emergency department   URGENTLY SENT HERE  + FATIGUED PAST WEEK   they were directed here by anirudh within normal limits   CBC W/ DIFFERENTIAL - Abnormal; Notable for the following:      RBC 2.87 (*)       HGB 8.0 (*)       HCT 24.4 (*)       All other components within normal limits   IRON AND TIBC - Normal   FERRITIN - Normal   POCT GLUCOSE - Normal

## 2018-04-07 VITALS
TEMPERATURE: 98 F | SYSTOLIC BLOOD PRESSURE: 143 MMHG | RESPIRATION RATE: 18 BRPM | HEIGHT: 62 IN | HEART RATE: 77 BPM | WEIGHT: 158 LBS | BODY MASS INDEX: 29.08 KG/M2 | OXYGEN SATURATION: 96 % | DIASTOLIC BLOOD PRESSURE: 75 MMHG

## 2018-04-07 PROCEDURE — 99239 HOSP IP/OBS DSCHRG MGMT >30: CPT | Performed by: HOSPITALIST

## 2018-04-07 PROCEDURE — 90935 HEMODIALYSIS ONE EVALUATION: CPT | Performed by: INTERNAL MEDICINE

## 2018-04-07 RX ORDER — HEPARIN SODIUM 1000 [USP'U]/ML
1000 INJECTION, SOLUTION INTRAVENOUS; SUBCUTANEOUS
Status: DISCONTINUED | OUTPATIENT
Start: 2018-04-07 | End: 2018-04-07

## 2018-04-07 NOTE — PROGRESS NOTES
[facility]  Progress Note     Sam Lisa Patient Status:  Inpatient    1960 MRN [id number]   [facility] 3NW-A Attending Ronald Del Toro MD   [facility] Day # 2 PCP Juan Nichole DO     Chief Complaint: ESRD  S:  Daniel Syed 10,000 Units Intravenous Once in dialysis   • Pantoprazole Sodium  40 mg Oral QAM AC   • carvedilol  25 mg Oral BID with meals   • AmLODIPine Besylate  10 mg Oral Daily   • BuPROPion HCl ER (SR)  150 mg Oral Daily   • Levothyroxine Sodium  88 mcg Oral Bef

## 2018-04-07 NOTE — PLAN OF CARE
METABOLIC/FLUID AND ELECTROLYTES - ADULT    • Glucose maintained within prescribed range Adequate for Discharge    • Electrolytes maintained within normal limits Adequate for Discharge                Patient assessed and ready for DC.  All instructions give

## 2018-04-07 NOTE — PLAN OF CARE
METABOLIC/FLUID AND ELECTROLYTES - ADULT    • Glucose maintained within prescribed range Progressing    • Electrolytes maintained within normal limits Progressing        Alert and oriented x 4. Romanian speaking, understands little [place]. VSS.  Sats >92 on

## 2018-04-07 NOTE — PLAN OF CARE
METABOLIC/FLUID AND ELECTROLYTES - ADULT    • Glucose maintained within prescribed range Progressing    • Electrolytes maintained within normal limits Progressing              0815: Patient BRENDA for permacath insertion.       0900: Patient back from cath lab

## 2018-04-07 NOTE — PROGRESS NOTES
[facility]  Nephrology Progress Note    Ryan Capps Patient Status:  Inpatient    1960 MRN [id number]   [facility] 3NW-A Attending Harvey Huang MD   [facility] Day # 2 PCP Rosendo Encinas DO       SUBJECTIVE:  Pt seen on d Lab  04/06/18   0809  04/06/18   1130  04/06/18   1731  04/06/18   2049  04/07/18   0726   PGLU  189*  104*  91  175*  91       Meds:     Current Facility-Administered Medications:  epoetin tennille (EPOGEN,PROCRIT) injection 10,000 Units 10,000 Units Luiz Councilman recommendations as outpt and will need referral for AVF ASAP as well as transplant eval.    #2. Anemia- due to #1. Cont ESAs for goal hgb 10-11 gms    #3.   HTN- cont usual meds and follow BP closely    OK for d/c from renal standpoint    Questions/concer

## 2018-04-07 NOTE — RESPIRATORY THERAPY NOTE
INDRA - Equipment Use Daily Summary:  · Set Mode CFLEX  · Usage in hours: 2:19  · 90% Pressure (EPAP) level:   · 90% Insp Pressure (IPAP): 16  · AHI: 22  · Supplemental Oxygen:  · Comments:

## 2018-04-07 NOTE — CM/SW NOTE
Per RN pt is ready for d/c however there is no confirmation for her outpt dialysis although referral was sent and received yesterday. Nephrologist okayed pt to d/c without confirmation, per RN.  SW left message for LISETTE Cowan who made referral to follow up Mon

## 2018-04-08 NOTE — DISCHARGE SUMMARY
[facility] HOSPITAL[facility]  DISCHARGE SUMMARY     Leydi Coy Patient Status:  Inpatient    1960 MRN [id number]   [facility] 3NW-A Attending No att. providers found   Hosp Day # 2 PCP Yeny Brown DO     Date of Admission: 20 rashes.       Brief Synopsis: Patient was found to have acidosis secondary to profound kidney disease. Patient now advanced end-stage renal disease. Catheter was placed and patient underwent dialysis.   Significant improvement throughout the hospital cour known as:  SINGULAIR      Take 5 mg by mouth nightly. Refills:  0     omeprazole 20 MG Cpdr  Commonly known as:  PRILOSEC      Take 1 capsule (20 mg total) by mouth 2 (two) times daily before meals.  1 tab po q am on empty stomach 45mins AC breakfast   Qu

## 2018-04-09 ENCOUNTER — PATIENT OUTREACH (OUTPATIENT)
Dept: CASE MANAGEMENT | Age: 58
End: 2018-04-09

## 2018-04-09 DIAGNOSIS — N17.9 ACUTE RENAL FAILURE SUPERIMPOSED ON STAGE 4 CHRONIC KIDNEY DISEASE, UNSPECIFIED ACUTE RENAL FAILURE TYPE (HCC): ICD-10-CM

## 2018-04-09 DIAGNOSIS — N18.4 ACUTE RENAL FAILURE SUPERIMPOSED ON STAGE 4 CHRONIC KIDNEY DISEASE, UNSPECIFIED ACUTE RENAL FAILURE TYPE (HCC): ICD-10-CM

## 2018-04-09 DIAGNOSIS — N19 UREMIA: ICD-10-CM

## 2018-04-09 DIAGNOSIS — E87.5 HYPERKALEMIA: ICD-10-CM

## 2018-04-09 NOTE — PROGRESS NOTES
Initial Post Discharge Follow Up   Discharge Date: 4/7/18  Contact Date: 4/9/2018    Consent Verification:  Assessment Completed With: Other: Selma Rahman patient's son Permission received per patient?  written  HIPAA Verified?   Yes    Discharge Dx:    Uremia yet they will discuss with Dr. Boone Stevens  o Which diet? Zhanna Ramírez states they were told to watch her potassium  - Do you need more information on this diet?  no      Medications:     Current Outpatient Prescriptions:  CARVEDILOL 25 MG Oral Tab TAKE 1 TABLET(25 M time.    Services ordered at D/C?   No,         Needs post D/C:   Now that you are home, are there any needs or concerns you need addressed before your next visit with your PCP?  (DME, meds, disease concerns, Etc): No     Follow up appointments:       Your doctor and when to call 911. Magali Leanne verbalized understanding of these. NCM instructed Magali Perdomo  to call his mother PCP with any questions or needs, he states he will.     [x]  Discharge Summary, Discharge medications reviewed/discussed/and reconciled with tate

## 2018-04-09 NOTE — PAYOR COMM NOTE
--------------  DISCHARGE REVIEW    Payor: Cuauhtemoc Tyler #:  [id number]  Authorization Number: N/A    Admit date: 4/5/18  Admit time:  9323  Discharge Date: 4/7/2018  3:03 PM     Admitting Physician: Gerry Chisholm MD  Attending Physician:  Ellen sarabia

## 2018-04-12 ENCOUNTER — LAB ENCOUNTER (OUTPATIENT)
Dept: LAB | Facility: HOSPITAL | Age: 58
End: 2018-04-12
Attending: INTERNAL MEDICINE
Payer: MEDICARE

## 2018-04-12 DIAGNOSIS — Z71.84 ENCOUNTER FOR COUNSELING FOR TRAVEL: ICD-10-CM

## 2018-04-12 DIAGNOSIS — N18.6 ESRD (END STAGE RENAL DISEASE) (HCC): Primary | ICD-10-CM

## 2018-04-12 PROCEDURE — 86803 HEPATITIS C AB TEST: CPT

## 2018-04-12 PROCEDURE — 87389 HIV-1 AG W/HIV-1&-2 AB AG IA: CPT

## 2018-04-12 PROCEDURE — 36415 COLL VENOUS BLD VENIPUNCTURE: CPT

## 2018-04-13 ENCOUNTER — TELEPHONE (OUTPATIENT)
Dept: FAMILY MEDICINE CLINIC | Facility: CLINIC | Age: 58
End: 2018-04-13

## 2018-04-13 NOTE — TELEPHONE ENCOUNTER
Attempted to reach son to reschedule appointment to discuss labs, no answer, VM full. My chart message sent    Notes recorded by Jose Alejandro Kaiser, DO on 4/4/2018 at 10:09 PM CDT  Discuss at upcoming [address]  4/12/2018.  Controlled lipids and diabetes.     Appointm

## 2018-04-13 NOTE — TELEPHONE ENCOUNTER
Pt requesting refill of Hydralazine, protocol failed, unable to approve:     Last Time Medication was Filled:  11/27/17    Last Office Visit with PCP: 1/2/18    Future Appointments  Date Time Provider Luci Loja   4/26/2018 9:00 AM VISHNU Hutchinson

## 2018-04-15 DIAGNOSIS — E11.21 DIABETIC NEPHROPATHY ASSOCIATED WITH TYPE 2 DIABETES MELLITUS (HCC): ICD-10-CM

## 2018-04-15 DIAGNOSIS — E11.22 TYPE 2 DIABETES MELLITUS WITH STAGE 4 CHRONIC KIDNEY DISEASE (HCC): ICD-10-CM

## 2018-04-15 DIAGNOSIS — N18.4 TYPE 2 DIABETES MELLITUS WITH STAGE 4 CHRONIC KIDNEY DISEASE (HCC): ICD-10-CM

## 2018-04-16 ENCOUNTER — TELEPHONE (OUTPATIENT)
Dept: FAMILY MEDICINE CLINIC | Facility: CLINIC | Age: 58
End: 2018-04-16

## 2018-04-16 RX ORDER — HYDRALAZINE HYDROCHLORIDE 25 MG/1
TABLET, FILM COATED ORAL
Qty: 180 TABLET | Refills: 0 | OUTPATIENT
Start: 2018-04-16

## 2018-04-16 RX ORDER — DIAPER,BRIEF,ADULT, DISPOSABLE
EACH MISCELLANEOUS
Qty: 100 STRIP | Refills: 11 | Status: SHIPPED | OUTPATIENT
Start: 2018-04-16 | End: 2018-09-06

## 2018-04-16 RX ORDER — HYDRALAZINE HYDROCHLORIDE 25 MG/1
25 TABLET, FILM COATED ORAL 2 TIMES DAILY
Qty: 180 TABLET | Refills: 0 | OUTPATIENT
Start: 2018-04-16

## 2018-04-16 NOTE — TELEPHONE ENCOUNTER
Pt requesting refills of test strips, protocol passed, refill approved    LOV 1/2/18    Future Appointments  Date Time Provider Luci Purvi   4/26/2018 9:00 AM Joann Moulton,  EMG 30 [facility] [place]   7/16/2018 2:30 PM [facility] [id number]

## 2018-04-16 NOTE — TELEPHONE ENCOUNTER
[place] from [employer], called in regards to a PCP referral for outpatient Dialysis. She asked to be called back with a status to [phone] or for order to be faxed to [phone].

## 2018-04-16 NOTE — TELEPHONE ENCOUNTER
Left message for [facility], orders were forwarded to Nephrology as PCP does not do referrals for dialysis

## 2018-04-17 NOTE — TELEPHONE ENCOUNTER
Spoke with Alejo, informed her that PCP does not do referral for Dialysis, needs to come from Nephrologist. Informed Alejo that PCP will not manage Dialysis and our office was instructed by referrals, Enma Schwartz, that Nephrology is to provide referral

## 2018-04-17 NOTE — TELEPHONE ENCOUNTER
Need to call nephrologist for refill of hydralazine. Patient recently started dialysis. Will on occasion adjust medication and do not want to cause confusion. Please inform.

## 2018-04-18 NOTE — TELEPHONE ENCOUNTER
Spoke with son Selma Rahman, informed him that Nephrology will need to be managing Hydralazine as it may need to be adjusted with dialysis.  Son verbalized understanding    Message forwarded to Dr Pricilla Silva

## 2018-04-20 ENCOUNTER — TELEPHONE (OUTPATIENT)
Dept: NEPHROLOGY | Facility: CLINIC | Age: 58
End: 2018-04-20

## 2018-04-26 ENCOUNTER — OFFICE VISIT (OUTPATIENT)
Dept: FAMILY MEDICINE CLINIC | Facility: CLINIC | Age: 58
End: 2018-04-26

## 2018-04-26 VITALS
DIASTOLIC BLOOD PRESSURE: 70 MMHG | TEMPERATURE: 98 F | BODY MASS INDEX: 28 KG/M2 | HEART RATE: 68 BPM | SYSTOLIC BLOOD PRESSURE: 120 MMHG | RESPIRATION RATE: 18 BRPM | WEIGHT: 150.38 LBS | OXYGEN SATURATION: 98 %

## 2018-04-26 DIAGNOSIS — R20.2 PARESTHESIA: ICD-10-CM

## 2018-04-26 DIAGNOSIS — Z23 NEED FOR VACCINATION: Primary | ICD-10-CM

## 2018-04-26 DIAGNOSIS — Z99.2 TYPE 2 DIABETES MELLITUS WITH CHRONIC KIDNEY DISEASE ON CHRONIC DIALYSIS, WITHOUT LONG-TERM CURRENT USE OF INSULIN (HCC): ICD-10-CM

## 2018-04-26 DIAGNOSIS — E78.5 HYPERLIPIDEMIA ASSOCIATED WITH TYPE 2 DIABETES MELLITUS (HCC): ICD-10-CM

## 2018-04-26 DIAGNOSIS — N18.6 TYPE 2 DIABETES MELLITUS WITH CHRONIC KIDNEY DISEASE ON CHRONIC DIALYSIS, WITHOUT LONG-TERM CURRENT USE OF INSULIN (HCC): ICD-10-CM

## 2018-04-26 DIAGNOSIS — R29.898 LEG WEAKNESS, BILATERAL: ICD-10-CM

## 2018-04-26 DIAGNOSIS — E11.22 TYPE 2 DIABETES MELLITUS WITH CHRONIC KIDNEY DISEASE ON CHRONIC DIALYSIS, WITHOUT LONG-TERM CURRENT USE OF INSULIN (HCC): ICD-10-CM

## 2018-04-26 DIAGNOSIS — E11.69 HYPERLIPIDEMIA ASSOCIATED WITH TYPE 2 DIABETES MELLITUS (HCC): ICD-10-CM

## 2018-04-26 PROCEDURE — 90746 HEPB VACCINE 3 DOSE ADULT IM: CPT | Performed by: FAMILY MEDICINE

## 2018-04-26 PROCEDURE — 99214 OFFICE O/P EST MOD 30 MIN: CPT | Performed by: FAMILY MEDICINE

## 2018-04-26 PROCEDURE — G0010 ADMIN HEPATITIS B VACCINE: HCPCS | Performed by: FAMILY MEDICINE

## 2018-04-26 RX ORDER — ATORVASTATIN CALCIUM 80 MG/1
80 TABLET, FILM COATED ORAL NIGHTLY
Qty: 90 TABLET | Refills: 1 | Status: SHIPPED | OUTPATIENT
Start: 2018-04-26 | End: 2018-06-18

## 2018-04-26 NOTE — PATIENT INSTRUCTIONS
Programa para el cuidado de los pies en personas con diabetes    Usted depende constantemente de estella pies para moverse de un lado a otro.  Sin embargo, si tiene diabetes, estella pies necesitan un cuidado especial. Incluso un problema pequeño en un pie puede Milena examen determina el estado de diversas partes de mccrary pie. En primer lugar, le revisarán mccrary piel y uñas en busca de signos de infección. Se comprobará la circulación de la lenora tomando el pulso en ambos pies.  Es posible que le sangita pruebas para estud Cuando usted tiene diabetes, es preferible prevenir los problemas que tener que tratarlos más adelante. Por esta razón, visite a mccrary equipo de atención médica para hacerse chequeos y cuidar de estella pies regularmente.  Mccrary equipo de [employer] pued Usted puede ayudar a mantener mccrary nivel de azúcar sanguíneo dentro de los límites recomendados si lleva paradise alimentación elen. Mccrary equipo de atención médica puede ayudarle a crear un plan de comidas nutritivo y bajo en grasas.  Usted puede desempeñar un papel · Los carbohidratos consisten en féculas, azúcares y [place], y se encuentran en muchos alimentos, entre ellos la fruta, el pan, las pastas, la [place] y los dulces.  De [employer], los carbohidratos tienen el mayor efecto sobre el nivel de azúcar en l Para controlar el nivel de azúcar, lo importante no es solamente lo que come, sino también cuándo lo come. Es posible que deba comer varias comidas pequeñas, espaciadas de forma regular a lo yogesh del día, para mantenerse dentro de los límites recomendados La complicación más seria del pie diabético es la gangrena. Generalmente ocurre en la punta de los dedos seth áreas de piel ennegrecidas. El [place] nora es tejido [place].  En los casos severos, la gangrena se extiende para abarcar el dedo del pie completo, o · Bethany Longorias abierta con pus saliendo de la herida  · Dolor de pie o pierna que aumenta  · Nuevas zonas enrojecidas o hinchadas o zonas sensibles en el pie  · Fiebre de 101º F (38.3º C) o más johny  Date Last Reviewed: 5/25/2016  © 4034-5272 [facility] [employer] · No se trate los callos usted mismo. Hable con mccrary proveedor de [employer] o con un podiatra (podólogo; un proveedor de atención médica especializado en el cuidado de los pies) si necesita ayuda para recortarse las uñas de los pies.   · Use paradise crema o El uso de calzado adecuado es muy importante. Si algunas zonas de estella pies se steward dañado a consecuencia del exceso de presión, mccrary proveedor de atención médica podrá recomendarle que cambie de calzado.  En algunos casos lo único que se necesita es evitar el El ejercicio regular mejora el flujo de lenora en estella pies. También aumenta la fortaleza y la flexibilidad de estella pies. El mejor tipo de [address] es paradise actividad [place], seth caminar o pedalear en paradise bicicleta estacionaria.  [address]

## 2018-04-26 NOTE — PROGRESS NOTES
Patient presents with: Follow - Up: labs    DM, HL, HTN f/u. HPI:   Emil Biswas is a 62year old female who presents for recheck of her diabetes, HTN, lipids.  Patient’s FBS have been 130-140, post prandial 160-170 off lantus- ate sugar carb 47   11/28/2016 48   ----------  LDL Cholesterol (mg/dL)   Date Value   04/04/2018 35   12/15/2017 102   11/28/2016 46   ----------  AST (U/L)   Date Value   04/05/2018 39   04/04/2018 29   12/15/2017 25   05/23/2011 21   ----------  ALT (U/L)   Date Value Rere   • Cholecystitis    • Chronic kidney disease (CKD)    • Depression    • Diabetic retinopathy ([facility].) 1/26/15    Per Dr. Mona Rollins   • Dialysis patient [facility])     3xs weekly   • Diastolic dysfunction 7/5830    with LVH. EF >55% on ECHO    • Encephalopat Location: Left arm, Patient Position: Sitting, Cuff Size: adult)   Pulse 68   Temp 97.9 °F (36.6 °C) (Oral)   Resp 18   Wt 150 lb 6.4 oz   SpO2 98%   BMI 27.51 kg/m²   Vital Signs: As above. General: WD/WN in no acute distress.     HEENT: is unremarkable, sensation intact  Poor meat intake  - VITAMIN B12; Future    5.  Leg weakness, bilateral  With exercise or walking, suspect deconditioning with uremic syndrome almost x 2 years now on dialysis  Parking placard signed    Mammogram after right chest port au c

## 2018-05-24 DIAGNOSIS — I10 ESSENTIAL HYPERTENSION WITH GOAL BLOOD PRESSURE LESS THAN 140/90: ICD-10-CM

## 2018-05-24 DIAGNOSIS — N18.4 CKD (CHRONIC KIDNEY DISEASE) STAGE 4, GFR 15-29 ML/MIN (HCC): ICD-10-CM

## 2018-05-25 NOTE — TELEPHONE ENCOUNTER
Pt requesting refill of Furosemide, protocol failed, unable to approve:     Last Time Medication was Filled:  3/23/18    Last Office Visit with PCP: 4/26/18    Future Appointments  Date Time Provider Luci Loja   6/15/2018 1:45 PM Attila Núñez

## 2018-05-26 RX ORDER — FUROSEMIDE 20 MG/1
TABLET ORAL
Qty: 180 TABLET | Refills: 0 | OUTPATIENT
Start: 2018-05-26

## 2018-05-26 NOTE — TELEPHONE ENCOUNTER
Pt on dialysis. Verify discontinued by nephrology. Pt should call nephrology for refill. Please notify.

## 2018-06-14 DIAGNOSIS — N18.4 CKD (CHRONIC KIDNEY DISEASE) STAGE 4, GFR 15-29 ML/MIN (HCC): ICD-10-CM

## 2018-06-14 DIAGNOSIS — I10 ESSENTIAL HYPERTENSION WITH GOAL BLOOD PRESSURE LESS THAN 140/90: ICD-10-CM

## 2018-06-14 RX ORDER — AMLODIPINE BESYLATE 10 MG/1
TABLET ORAL
Qty: 90 TABLET | Refills: 0 | Status: SHIPPED | OUTPATIENT
Start: 2018-06-14 | End: 2018-07-17

## 2018-06-14 NOTE — TELEPHONE ENCOUNTER
Pt requesting refill of Amlodipine, protocol failed, unable to approve:     Last Time Medication was Filled:  11/27/17    Last Office Visit with PCP: 4/26/18    Future Appointments  Date Time Provider Luci Loja   6/15/2018 1:45 PM David Peña,

## 2018-06-15 ENCOUNTER — TELEPHONE (OUTPATIENT)
Dept: FAMILY MEDICINE CLINIC | Facility: CLINIC | Age: 58
End: 2018-06-15

## 2018-06-15 DIAGNOSIS — N18.6 TYPE 2 DIABETES MELLITUS WITH ESRD (END-STAGE RENAL DISEASE) (HCC): Primary | ICD-10-CM

## 2018-06-15 DIAGNOSIS — E11.22 TYPE 2 DIABETES MELLITUS WITH ESRD (END-STAGE RENAL DISEASE) (HCC): Primary | ICD-10-CM

## 2018-06-18 RX ORDER — ATORVASTATIN CALCIUM 80 MG/1
TABLET, FILM COATED ORAL
Qty: 90 TABLET | Refills: 0 | Status: SHIPPED | OUTPATIENT
Start: 2018-06-18 | End: 2018-07-17

## 2018-06-18 RX ORDER — BUPROPION HYDROCHLORIDE 150 MG/1
TABLET, EXTENDED RELEASE ORAL
Qty: 90 TABLET | Refills: 0 | Status: SHIPPED | OUTPATIENT
Start: 2018-06-18 | End: 2018-07-17

## 2018-06-18 NOTE — TELEPHONE ENCOUNTER
Pt requesting refill Atorvastatin protocol passed. Refill approved.     LOV 4/26/18  LF 4/26/18 #90 (1 refill)    Pt requesting refill of Bupropion, no protocol, unable to approve:     Last Time Medication was Filled:  11/27/17 #90 (3 refills)    Last Offic

## 2018-06-25 DIAGNOSIS — I10 ESSENTIAL HYPERTENSION WITH GOAL BLOOD PRESSURE LESS THAN 140/90: ICD-10-CM

## 2018-06-25 DIAGNOSIS — N18.4 CKD (CHRONIC KIDNEY DISEASE) STAGE 4, GFR 15-29 ML/MIN (HCC): ICD-10-CM

## 2018-06-25 RX ORDER — AMLODIPINE BESYLATE 10 MG/1
10 TABLET ORAL
Qty: 90 TABLET | Refills: 0 | Status: CANCELLED
Start: 2018-06-25

## 2018-06-26 NOTE — TELEPHONE ENCOUNTER
Pt requesting refill of Amlodipine, protocol failed, unable to approve:     Last Time Medication was Filled:  6/14/18 #90    Last Office Visit with PCP: 4/26/18    Directed pt to contact pharmacy

## 2018-06-26 NOTE — TELEPHONE ENCOUNTER
From: Candelaria Apodaca  Sent: 6/25/2018 9:49 PM CDT  Subject: Medication Renewal Request    Candelaria Apodaca would like a refill of the following medications:      AmLODIPine Besylate 10 MG Oral Tab Lucas Gerardo, DO]    Preferred pharmacy: [address] AT [address] [place] [address], [phone], [phone]    Comment:

## 2018-07-10 ENCOUNTER — OFFICE VISIT (OUTPATIENT)
Dept: FAMILY MEDICINE CLINIC | Facility: CLINIC | Age: 58
End: 2018-07-10

## 2018-07-10 VITALS
HEART RATE: 71 BPM | BODY MASS INDEX: 27 KG/M2 | DIASTOLIC BLOOD PRESSURE: 80 MMHG | SYSTOLIC BLOOD PRESSURE: 132 MMHG | WEIGHT: 150 LBS | OXYGEN SATURATION: 98 % | RESPIRATION RATE: 18 BRPM | TEMPERATURE: 98 F

## 2018-07-10 DIAGNOSIS — L97.512 SKIN ULCER OF SECOND TOE OF RIGHT FOOT WITH FAT LAYER EXPOSED (HCC): Primary | ICD-10-CM

## 2018-07-10 DIAGNOSIS — L97.522 SKIN ULCER OF SECOND TOE OF LEFT FOOT WITH FAT LAYER EXPOSED (HCC): ICD-10-CM

## 2018-07-10 DIAGNOSIS — Z99.2 TYPE 2 DIABETES MELLITUS WITH CHRONIC KIDNEY DISEASE ON CHRONIC DIALYSIS, WITHOUT LONG-TERM CURRENT USE OF INSULIN (HCC): ICD-10-CM

## 2018-07-10 DIAGNOSIS — N18.6 TYPE 2 DIABETES MELLITUS WITH CHRONIC KIDNEY DISEASE ON CHRONIC DIALYSIS, WITHOUT LONG-TERM CURRENT USE OF INSULIN (HCC): ICD-10-CM

## 2018-07-10 DIAGNOSIS — E11.22 TYPE 2 DIABETES MELLITUS WITH CHRONIC KIDNEY DISEASE ON CHRONIC DIALYSIS, WITHOUT LONG-TERM CURRENT USE OF INSULIN (HCC): ICD-10-CM

## 2018-07-10 PROCEDURE — 99214 OFFICE O/P EST MOD 30 MIN: CPT | Performed by: FAMILY MEDICINE

## 2018-07-10 RX ORDER — AMOXICILLIN AND CLAVULANATE POTASSIUM 875; 125 MG/1; MG/1
1 TABLET, FILM COATED ORAL 2 TIMES DAILY
Qty: 20 TABLET | Refills: 0 | Status: SHIPPED | OUTPATIENT
Start: 2018-07-10 | End: 2018-07-17 | Stop reason: SINTOL

## 2018-07-10 RX ORDER — HONEY 100 %
1 PASTE (ML) TOPICAL 2 TIMES DAILY
Qty: 103 ML | Refills: 0 | Status: SHIPPED | OUTPATIENT
Start: 2018-07-10 | End: 2018-09-06

## 2018-07-10 NOTE — PATIENT INSTRUCTIONS
· Change dressing 2xdaily and observe for signs of infection such as increased redness, pain, swelling, drainage, streaking redness or fever, call immediately with your primary care provider or go to nearest ER.   · Apply medihoney -cover with wearing rupinder

## 2018-07-10 NOTE — PROGRESS NOTES
CHIEF COMPLAINT: Patient presents with:  Blisters: both feet/toes; x 4 days    HPI:     Brandon Terry is a 62year old female presents for bilateral blisters on second toes distal after wearing new closed toe shoes 5 days ago.   Daughter noticed 4 Diabetes Father    • Breast Cancer Maternal Aunt 36   • Breast Cancer Paternal Aunt 44   • High Blood Pressure Mother    • High Blood Pressure Son    • Hypertension Son    • Depression Daughter    • High Blood Pressure, and Pre-Diabetes [OTHER] Daughter Disp: 90 tablet Rfl: 3   omeprazole 20 MG Oral Capsule Delayed Release Take 1 capsule (20 mg total) by mouth 2 (two) times daily before meals.  1 tab po q am on empty stomach 45mins AC breakfast Disp: 60 capsule Rfl: 6   ondansetron 8 MG Oral Tablet Dispers foot with fat layer exposed ([address]  2.  Skin ulcer of second toe of left foot with fat layer exposed ([address]  Foot care discussed  Keep covered in sandals with cotton socks and open to air at night and rest at home  Wearing open sandals to avoid rubbing  ABX t result of today.      Problem List:   Patient Active Problem List:     Hyperlipidemia associated with type 2 diabetes mellitus ([facility]     Paresthesia     GERD (gastroesophageal reflux disease)     Vitamin D deficiency     Hypomagnesemia     Seasonal allergie

## 2018-07-16 ENCOUNTER — NURSE ONLY (OUTPATIENT)
Dept: FAMILY MEDICINE CLINIC | Facility: CLINIC | Age: 58
End: 2018-07-16

## 2018-07-16 DIAGNOSIS — E11.22 TYPE 2 DIABETES MELLITUS WITH CHRONIC KIDNEY DISEASE ON CHRONIC DIALYSIS, WITHOUT LONG-TERM CURRENT USE OF INSULIN (HCC): ICD-10-CM

## 2018-07-16 DIAGNOSIS — N18.5 STAGE 5 CHRONIC KIDNEY DISEASE NOT ON CHRONIC DIALYSIS (HCC): ICD-10-CM

## 2018-07-16 DIAGNOSIS — E11.69 HYPERLIPIDEMIA ASSOCIATED WITH TYPE 2 DIABETES MELLITUS (HCC): ICD-10-CM

## 2018-07-16 DIAGNOSIS — N18.6 TYPE 2 DIABETES MELLITUS WITH CHRONIC KIDNEY DISEASE ON CHRONIC DIALYSIS, WITHOUT LONG-TERM CURRENT USE OF INSULIN (HCC): ICD-10-CM

## 2018-07-16 DIAGNOSIS — R20.2 PARESTHESIA: ICD-10-CM

## 2018-07-16 DIAGNOSIS — E78.5 HYPERLIPIDEMIA ASSOCIATED WITH TYPE 2 DIABETES MELLITUS (HCC): ICD-10-CM

## 2018-07-16 DIAGNOSIS — Z99.2 TYPE 2 DIABETES MELLITUS WITH CHRONIC KIDNEY DISEASE ON CHRONIC DIALYSIS, WITHOUT LONG-TERM CURRENT USE OF INSULIN (HCC): ICD-10-CM

## 2018-07-16 LAB
ALBUMIN SERPL-MCNC: 3.6 G/DL (ref 3.5–4.8)
ALP LIVER SERPL-CCNC: 152 U/L (ref 46–118)
ALT SERPL-CCNC: 40 U/L (ref 14–54)
AST SERPL-CCNC: 30 U/L (ref 15–41)
BASOPHILS # BLD AUTO: 0.04 X10(3) UL (ref 0–0.1)
BASOPHILS NFR BLD AUTO: 0.6 %
BILIRUB SERPL-MCNC: 0.4 MG/DL (ref 0.1–2)
BUN BLD-MCNC: 62 MG/DL (ref 8–20)
CALCIUM BLD-MCNC: 8.4 MG/DL (ref 8.3–10.3)
CHLORIDE: 98 MMOL/L (ref 101–111)
CHOLEST SMN-MCNC: 144 MG/DL (ref ?–200)
CO2: 29 MMOL/L (ref 22–32)
CREAT BLD-MCNC: 8.01 MG/DL (ref 0.55–1.02)
EOSINOPHIL # BLD AUTO: 0.3 X10(3) UL (ref 0–0.3)
EOSINOPHIL NFR BLD AUTO: 4.5 %
ERYTHROCYTE [DISTWIDTH] IN BLOOD BY AUTOMATED COUNT: 15.1 % (ref 11.5–16)
EST. AVERAGE GLUCOSE BLD GHB EST-MCNC: 169 MG/DL (ref 68–126)
GLUCOSE BLD-MCNC: 164 MG/DL (ref 70–99)
HAV AB SERPL IA-ACNC: 1123 PG/ML (ref 193–986)
HBA1C MFR BLD HPLC: 7.5 % (ref ?–5.7)
HCT VFR BLD AUTO: 35.5 % (ref 34–50)
HDLC SERPL-MCNC: 47 MG/DL (ref 45–?)
HDLC SERPL: 3.06 {RATIO} (ref ?–4.44)
HGB BLD-MCNC: 11.2 G/DL (ref 12–16)
IMMATURE GRANULOCYTE COUNT: 0.02 X10(3) UL (ref 0–1)
IMMATURE GRANULOCYTE RATIO %: 0.3 %
LDLC SERPL CALC-MCNC: 53 MG/DL (ref ?–130)
LYMPHOCYTES # BLD AUTO: 1.55 X10(3) UL (ref 0.9–4)
LYMPHOCYTES NFR BLD AUTO: 23 %
M PROTEIN MFR SERPL ELPH: 7.7 G/DL (ref 6.1–8.3)
MCH RBC QN AUTO: 26.7 PG (ref 27–33.2)
MCHC RBC AUTO-ENTMCNC: 31.5 G/DL (ref 31–37)
MCV RBC AUTO: 84.5 FL (ref 81–100)
MONOCYTES # BLD AUTO: 0.6 X10(3) UL (ref 0.1–1)
MONOCYTES NFR BLD AUTO: 8.9 %
NEUTROPHIL ABS PRELIM: 4.23 X10 (3) UL (ref 1.3–6.7)
NEUTROPHILS # BLD AUTO: 4.23 X10(3) UL (ref 1.3–6.7)
NEUTROPHILS NFR BLD AUTO: 62.7 %
NONHDLC SERPL-MCNC: 97 MG/DL (ref ?–130)
PLATELET # BLD AUTO: 190 10(3)UL (ref 150–450)
POTASSIUM SERPL-SCNC: 5 MMOL/L (ref 3.6–5.1)
RBC # BLD AUTO: 4.2 X10(6)UL (ref 3.8–5.1)
RED CELL DISTRIBUTION WIDTH-SD: 45.5 FL (ref 35.1–46.3)
SODIUM SERPL-SCNC: 136 MMOL/L (ref 136–144)
TRIGL SERPL-MCNC: 220 MG/DL (ref ?–150)
VLDLC SERPL CALC-MCNC: 44 MG/DL (ref 5–40)
WBC # BLD AUTO: 6.7 X10(3) UL (ref 4–13)

## 2018-07-16 PROCEDURE — 82607 VITAMIN B-12: CPT | Performed by: FAMILY MEDICINE

## 2018-07-16 PROCEDURE — 83036 HEMOGLOBIN GLYCOSYLATED A1C: CPT | Performed by: FAMILY MEDICINE

## 2018-07-16 PROCEDURE — 36415 COLL VENOUS BLD VENIPUNCTURE: CPT | Performed by: FAMILY MEDICINE

## 2018-07-16 PROCEDURE — 85025 COMPLETE CBC W/AUTO DIFF WBC: CPT | Performed by: FAMILY MEDICINE

## 2018-07-16 PROCEDURE — 80061 LIPID PANEL: CPT | Performed by: FAMILY MEDICINE

## 2018-07-16 PROCEDURE — 80053 COMPREHEN METABOLIC PANEL: CPT | Performed by: FAMILY MEDICINE

## 2018-07-16 NOTE — PROGRESS NOTES
Patient came in for fasting labs. Patient drawn out of left AC x 1 attempt. Green and lavender tubes drawn.

## 2018-07-17 ENCOUNTER — OFFICE VISIT (OUTPATIENT)
Dept: FAMILY MEDICINE CLINIC | Facility: CLINIC | Age: 58
End: 2018-07-17
Payer: MEDICARE

## 2018-07-17 VITALS
WEIGHT: 148.19 LBS | TEMPERATURE: 98 F | RESPIRATION RATE: 16 BRPM | OXYGEN SATURATION: 99 % | DIASTOLIC BLOOD PRESSURE: 74 MMHG | SYSTOLIC BLOOD PRESSURE: 136 MMHG | HEART RATE: 73 BPM | BODY MASS INDEX: 27 KG/M2

## 2018-07-17 DIAGNOSIS — E11.69 HYPERLIPIDEMIA ASSOCIATED WITH TYPE 2 DIABETES MELLITUS (HCC): ICD-10-CM

## 2018-07-17 DIAGNOSIS — E78.5 HYPERLIPIDEMIA ASSOCIATED WITH TYPE 2 DIABETES MELLITUS (HCC): ICD-10-CM

## 2018-07-17 DIAGNOSIS — E11.29 TYPE 2 DIABETES MELLITUS WITH OTHER DIABETIC KIDNEY COMPLICATION, WITHOUT LONG-TERM CURRENT USE OF INSULIN (HCC): ICD-10-CM

## 2018-07-17 DIAGNOSIS — I10 ESSENTIAL HYPERTENSION: ICD-10-CM

## 2018-07-17 DIAGNOSIS — L97.522 SKIN ULCER OF LEFT FOOT WITH FAT LAYER EXPOSED (HCC): ICD-10-CM

## 2018-07-17 DIAGNOSIS — N18.4 CKD (CHRONIC KIDNEY DISEASE) STAGE 4, GFR 15-29 ML/MIN (HCC): ICD-10-CM

## 2018-07-17 DIAGNOSIS — Z12.11 ENCOUNTER FOR SCREENING COLONOSCOPY: ICD-10-CM

## 2018-07-17 DIAGNOSIS — L97.512 SKIN ULCER OF TOE OF RIGHT FOOT WITH FAT LAYER EXPOSED (HCC): Primary | ICD-10-CM

## 2018-07-17 DIAGNOSIS — I10 ESSENTIAL HYPERTENSION WITH GOAL BLOOD PRESSURE LESS THAN 140/90: ICD-10-CM

## 2018-07-17 PROCEDURE — 99214 OFFICE O/P EST MOD 30 MIN: CPT | Performed by: FAMILY MEDICINE

## 2018-07-17 RX ORDER — ATORVASTATIN CALCIUM 80 MG/1
TABLET, FILM COATED ORAL
Qty: 90 TABLET | Refills: 0 | Status: SHIPPED | OUTPATIENT
Start: 2018-07-17 | End: 2018-09-06

## 2018-07-17 RX ORDER — BUPROPION HYDROCHLORIDE 150 MG/1
TABLET, EXTENDED RELEASE ORAL
Qty: 90 TABLET | Refills: 2 | Status: SHIPPED | OUTPATIENT
Start: 2018-07-17 | End: 2018-09-06

## 2018-07-17 RX ORDER — MONTELUKAST SODIUM 10 MG/1
5 TABLET ORAL NIGHTLY
Qty: 45 TABLET | Refills: 0 | Status: SHIPPED | OUTPATIENT
Start: 2018-07-17 | End: 2018-10-06

## 2018-07-17 RX ORDER — AMLODIPINE BESYLATE 10 MG/1
10 TABLET ORAL
Qty: 90 TABLET | Refills: 0 | Status: SHIPPED | OUTPATIENT
Start: 2018-07-17 | End: 2018-10-06

## 2018-07-17 NOTE — PROGRESS NOTES
CHIEF COMPLAINT: Patient presents with: Follow - Up: wound right foot    HPI:     Lynette Candelario is a 62year old female presents for FU bilateral blisters on second toes distal after wearing new closed toe shoes 2 weeks ago.  Day 7 of a 10 of aug History:  No date: APPENDECTOMY  2013: EYE SURGERY      Comment: RV surgery  1990: HYSTERECTOMY      Comment: fibroids.  unsure but thinks she still has her                cervix  ??: AMPARO BIOPSY STEREOTACTIC NODULE 1 SITE RIGHT      Comment: BENIGN  1990: O MEALS Disp: 180 tablet Rfl: 3   Cholecalciferol (VITAMIN D) 2000 units Oral Cap Take 2,000 Units by mouth daily. Disp:  Rfl:    hydrALAzine HCl 25 MG Oral Tab Take 25 mg by mouth 2 (two) times daily.  Disp:  Rfl:    Levothyroxine Sodium 88 MCG Oral Tab Take wounds tender to touch. Neuro: AOx3. Normal gait. Monofilament intact toes /feet bilateral.     LABS     No visits with results within 2 Month(s) from this visit.    Latest known visit with results is:   [facility] Lab Encounter on 04/12/2018   Component Date Va no appt with wound care- sooner if signs of infection or worse ulcers, fever or new symptoms-pt and daughter to understand  Plan-AVS given  - OP RERERRAL TO WOUND VISIT    5.  Type 2 diabetes mellitus with chronic kidney disease on chronic dialysis, without 07/26/2021      Patient/Caregiver Education: Patient/Caregiver Education: There are no barriers to learning. Medical education done. Outcome: Patient verbalizes understanding.  Patient is notified to call with any questions, comp lications, allergies, or Leg weakness, bilateral     Skin ulcer of left foot with fat layer exposed ([address]     Skin ulcer of toe of right foot with fat layer exposed [facility])      Imaging & Referrals:  None     7/10/2018  Yeny Brown, DO      Patient understands plan and follow-up.

## 2018-07-25 ENCOUNTER — TELEPHONE (OUTPATIENT)
Dept: FAMILY MEDICINE CLINIC | Facility: CLINIC | Age: 58
End: 2018-07-25

## 2018-07-25 NOTE — TELEPHONE ENCOUNTER
Son Gilberto Sammy calling, Pt has appointment with wound care for wound on her foot.  Per OV note, pt ok to cancel appointment with Dr Armaan Stafford if pt has appointment with wound care  Appointment cancelled  Future Appointments  Date Time Provider Luci Loja

## 2018-08-10 ENCOUNTER — OFFICE VISIT (OUTPATIENT)
Dept: WOUND CARE | Facility: HOSPITAL | Age: 58
End: 2018-08-10
Attending: NURSE PRACTITIONER
Payer: MEDICARE

## 2018-08-10 DIAGNOSIS — L97.509 DIABETIC FOOT ULCER (HCC): Primary | ICD-10-CM

## 2018-08-10 DIAGNOSIS — E11.621 DIABETIC FOOT ULCER (HCC): Primary | ICD-10-CM

## 2018-08-10 DIAGNOSIS — L97.522 NON-PRESSURE CHRONIC ULCER OF OTHER PART OF LEFT FOOT WITH FAT LAYER EXPOSED (HCC): ICD-10-CM

## 2018-08-10 PROCEDURE — 99215 OFFICE O/P EST HI 40 MIN: CPT

## 2018-08-10 NOTE — PROGRESS NOTES
Subjective    Chief Complaint  This information was obtained from the patient  Patients here for initial visit for bilateral foot. Patients stated 3 weeks ago develop a blister due a shoes. applying a medihoney gel to the wound.     Allergies  Ace inhibitor Appendectomy  Eye surgery  Upper GI endoscopy  breast biopsy  Cyst removal    Complaints and Symptoms  This information was obtained from the patient, caregiver and chart.   Patient complains of:   Eyes: Other (has had cataracts removed, + diabetic retinopa bupropion HCl  mg tablet,12 hr sustained-release oral tablet extended release 12 hr oral  omeprazole 20 mg capsule,delayed release oral capsule,delayed release(DR/EC) oral  levothyroxine 88 mcg tablet oral tablet oral  cholecalciferol (vitamin D3) 2, The periwound skin moisture is normal. The periwound skin did not exhibit: Edema, Induration, Crepitus, Fluctuance, Ecchymosis, Erythema, Hemosiderosis. The temperature of the periwound skin is Warm.  Periwound skin does not exhibit signs or symptoms of inf Discussed with patient and son the aspects of wound healing including:  blood flow in/out (arterial vs venous) and micro vs macro flow, wound bed optimization including moist wound healing, removal of necrosis and bioburden control, offloading and making s

## 2018-08-24 ENCOUNTER — OFFICE VISIT (OUTPATIENT)
Dept: WOUND CARE | Facility: HOSPITAL | Age: 58
End: 2018-08-24
Attending: NURSE PRACTITIONER
Payer: MEDICARE

## 2018-08-24 DIAGNOSIS — L97.509 DIABETIC FOOT ULCER (HCC): ICD-10-CM

## 2018-08-24 DIAGNOSIS — L97.522 NON-PRESSURE CHRONIC ULCER OF OTHER PART OF LEFT FOOT WITH FAT LAYER EXPOSED (HCC): Primary | ICD-10-CM

## 2018-08-24 DIAGNOSIS — E11.621 DIABETIC FOOT ULCER (HCC): ICD-10-CM

## 2018-08-24 PROCEDURE — 99213 OFFICE O/P EST LOW 20 MIN: CPT

## 2018-08-24 RX ORDER — CETIRIZINE HYDROCHLORIDE 10 MG/1
TABLET ORAL
Qty: 90 TABLET | Refills: 0 | Status: SHIPPED | OUTPATIENT
Start: 2018-08-24 | End: 2018-09-06

## 2018-08-24 NOTE — TELEPHONE ENCOUNTER
Pt requesting refill on Cetirizine protocol passed, refill approved    LOV 7/17/18    LF 5/16/18    Future Appointments  Date Time Provider Luci Loja   8/24/2018 11:00 AM [facility] [address] [facility]   9/20/2018 1:00 AM Audrey Morrow MD [facility]

## 2018-08-24 NOTE — PROGRESS NOTES
Subjective    Chief Complaint  This information was obtained from the patient  Patients here for initial visit for bilateral foot. Wound open to air on arrival. Stated it was dressed prior to coming, left open for appt.     Allergies  Ace inhibitors (Reacti Cardiovascular (Central/Peripheral): Chest Pain, Dyspnea on Exertion, Intermittent Claudication, Lower extremity (leg) resting pain  Integumentary (Hair/Skin/Nails): Dryness, Ulcers  Psychiatric: Nervousness / Tension, Memory Loss    Additional Information Judgment and insight intact. Alert and oriented times 3. No evidence of depression, anxiety, or agitation. Calm, cooperative, and communicative. Appropriate interactions and affect. Assessment    Active Problems    ICD-10  (Encounter Diagnosis) E11.

## 2018-09-06 ENCOUNTER — HOSPITAL ENCOUNTER (OUTPATIENT)
Facility: HOSPITAL | Age: 58
Setting detail: OBSERVATION
Discharge: HOME | End: 2018-09-07
Attending: EMERGENCY MEDICINE | Admitting: HOSPITALIST
Payer: MEDICARE

## 2018-09-06 ENCOUNTER — APPOINTMENT (OUTPATIENT)
Dept: CT IMAGING | Facility: HOSPITAL | Age: 58
End: 2018-09-06
Attending: EMERGENCY MEDICINE
Payer: MEDICARE

## 2018-09-06 DIAGNOSIS — N18.6 ESRD (END STAGE RENAL DISEASE) (HCC): ICD-10-CM

## 2018-09-06 DIAGNOSIS — R10.9 ABDOMINAL PAIN, ACUTE: ICD-10-CM

## 2018-09-06 DIAGNOSIS — R11.2 NAUSEA AND VOMITING, INTRACTABILITY OF VOMITING NOT SPECIFIED, UNSPECIFIED VOMITING TYPE: Primary | ICD-10-CM

## 2018-09-06 LAB
ALBUMIN SERPL-MCNC: 4.2 G/DL (ref 3.5–4.8)
ALBUMIN/GLOB SERPL: 0.9 {RATIO} (ref 1–2)
ALP LIVER SERPL-CCNC: 158 U/L (ref 46–118)
ALT SERPL-CCNC: 37 U/L (ref 14–54)
ANION GAP SERPL CALC-SCNC: 14 MMOL/L (ref 0–18)
AST SERPL-CCNC: 37 U/L (ref 15–41)
BASOPHILS # BLD AUTO: 0.03 X10(3) UL (ref 0–0.1)
BASOPHILS NFR BLD AUTO: 0.4 %
BILIRUB SERPL-MCNC: 0.8 MG/DL (ref 0.1–2)
BUN BLD-MCNC: 56 MG/DL (ref 8–20)
BUN/CREAT SERPL: 5.6 (ref 10–20)
CALCIUM BLD-MCNC: 8.8 MG/DL (ref 8.3–10.3)
CHLORIDE SERPL-SCNC: 98 MMOL/L (ref 101–111)
CO2 SERPL-SCNC: 23 MMOL/L (ref 22–32)
CREAT BLD-MCNC: 9.96 MG/DL (ref 0.55–1.02)
EOSINOPHIL # BLD AUTO: 0.18 X10(3) UL (ref 0–0.3)
EOSINOPHIL NFR BLD AUTO: 2.2 %
ERYTHROCYTE [DISTWIDTH] IN BLOOD BY AUTOMATED COUNT: 16.3 % (ref 11.5–16)
EST. AVERAGE GLUCOSE BLD GHB EST-MCNC: 177 MG/DL (ref 68–126)
GLOBULIN PLAS-MCNC: 4.9 G/DL (ref 2.5–4)
GLUCOSE BLD-MCNC: 170 MG/DL (ref 70–99)
GLUCOSE BLD-MCNC: 75 MG/DL (ref 65–99)
GLUCOSE BLD-MCNC: 98 MG/DL (ref 65–99)
HAV IGM SER QL: NONREACTIVE
HBA1C MFR BLD HPLC: 7.8 % (ref ?–5.7)
HBV CORE IGM SER QL: NONREACTIVE
HBV SURFACE AG SERPL QL IA: NONREACTIVE
HCT VFR BLD AUTO: 36.5 % (ref 34–50)
HCV AB SERPL QL IA: NONREACTIVE
HGB BLD-MCNC: 12.5 G/DL (ref 12–16)
IMMATURE GRANULOCYTE COUNT: 0.01 X10(3) UL (ref 0–1)
IMMATURE GRANULOCYTE RATIO %: 0.1 %
LYMPHOCYTES # BLD AUTO: 1.5 X10(3) UL (ref 0.9–4)
LYMPHOCYTES NFR BLD AUTO: 18 %
M PROTEIN MFR SERPL ELPH: 9.1 G/DL (ref 6.1–8.3)
MCH RBC QN AUTO: 27.8 PG (ref 27–33.2)
MCHC RBC AUTO-ENTMCNC: 34.2 G/DL (ref 31–37)
MCV RBC AUTO: 81.1 FL (ref 81–100)
MONOCYTES # BLD AUTO: 0.55 X10(3) UL (ref 0.1–1)
MONOCYTES NFR BLD AUTO: 6.6 %
NEUTROPHIL ABS PRELIM: 6.08 X10 (3) UL (ref 1.3–6.7)
NEUTROPHILS # BLD AUTO: 6.08 X10(3) UL (ref 1.3–6.7)
NEUTROPHILS NFR BLD AUTO: 72.7 %
OSMOLALITY SERPL CALC.SUM OF ELEC: 299 MOSM/KG (ref 275–295)
PLATELET # BLD AUTO: 186 10(3)UL (ref 150–450)
POTASSIUM SERPL-SCNC: 4.5 MMOL/L (ref 3.6–5.1)
RBC # BLD AUTO: 4.5 X10(6)UL (ref 3.8–5.1)
RED CELL DISTRIBUTION WIDTH-SD: 47.7 FL (ref 35.1–46.3)
SODIUM SERPL-SCNC: 135 MMOL/L (ref 136–144)
WBC # BLD AUTO: 8.4 X10(3) UL (ref 4–13)

## 2018-09-06 PROCEDURE — 5A1D70Z PERFORMANCE OF URINARY FILTRATION, INTERMITTENT, LESS THAN 6 HOURS PER DAY: ICD-10-PCS | Performed by: INTERNAL MEDICINE

## 2018-09-06 PROCEDURE — 99220 INITIAL OBSERVATION CARE,LEVL III: CPT | Performed by: HOSPITALIST

## 2018-09-06 PROCEDURE — 99214 OFFICE O/P EST MOD 30 MIN: CPT | Performed by: INTERNAL MEDICINE

## 2018-09-06 PROCEDURE — 74176 CT ABD & PELVIS W/O CONTRAST: CPT | Performed by: EMERGENCY MEDICINE

## 2018-09-06 RX ORDER — BUPROPION HYDROCHLORIDE 150 MG/1
150 TABLET, EXTENDED RELEASE ORAL DAILY
COMMUNITY
End: 2018-10-16

## 2018-09-06 RX ORDER — MONTELUKAST SODIUM 5 MG/1
5 TABLET, CHEWABLE ORAL NIGHTLY
Status: DISCONTINUED | OUTPATIENT
Start: 2018-09-06 | End: 2018-09-07

## 2018-09-06 RX ORDER — LEVOTHYROXINE SODIUM 88 UG/1
88 TABLET ORAL
Status: DISCONTINUED | OUTPATIENT
Start: 2018-09-06 | End: 2018-09-07

## 2018-09-06 RX ORDER — ALBUMIN (HUMAN) 12.5 G/50ML
100 SOLUTION INTRAVENOUS AS NEEDED
Status: DISCONTINUED | OUTPATIENT
Start: 2018-09-06 | End: 2018-09-07

## 2018-09-06 RX ORDER — PANTOPRAZOLE SODIUM 20 MG/1
20 TABLET, DELAYED RELEASE ORAL
Status: DISCONTINUED | OUTPATIENT
Start: 2018-09-07 | End: 2018-09-07

## 2018-09-06 RX ORDER — SODIUM CHLORIDE 9 MG/ML
INJECTION, SOLUTION INTRAVENOUS CONTINUOUS
Status: ACTIVE | OUTPATIENT
Start: 2018-09-06 | End: 2018-09-06

## 2018-09-06 RX ORDER — AMLODIPINE BESYLATE 5 MG/1
10 TABLET ORAL
Status: DISCONTINUED | OUTPATIENT
Start: 2018-09-06 | End: 2018-09-07

## 2018-09-06 RX ORDER — CARVEDILOL 12.5 MG/1
25 TABLET ORAL 2 TIMES DAILY WITH MEALS
Status: DISCONTINUED | OUTPATIENT
Start: 2018-09-06 | End: 2018-09-07

## 2018-09-06 RX ORDER — ACETAMINOPHEN 325 MG/1
650 TABLET ORAL EVERY 6 HOURS PRN
Status: DISCONTINUED | OUTPATIENT
Start: 2018-09-06 | End: 2018-09-07

## 2018-09-06 RX ORDER — ONDANSETRON 4 MG/1
8 TABLET, ORALLY DISINTEGRATING ORAL EVERY 8 HOURS PRN
Status: DISCONTINUED | OUTPATIENT
Start: 2018-09-06 | End: 2018-09-07

## 2018-09-06 RX ORDER — HEPARIN SODIUM 5000 [USP'U]/ML
5000 INJECTION, SOLUTION INTRAVENOUS; SUBCUTANEOUS EVERY 8 HOURS SCHEDULED
Status: DISCONTINUED | OUTPATIENT
Start: 2018-09-06 | End: 2018-09-07

## 2018-09-06 RX ORDER — ONDANSETRON 2 MG/ML
4 INJECTION INTRAMUSCULAR; INTRAVENOUS EVERY 4 HOURS PRN
Status: DISCONTINUED | OUTPATIENT
Start: 2018-09-06 | End: 2018-09-06

## 2018-09-06 RX ORDER — IBUPROFEN 600 MG/1
600 TABLET ORAL EVERY 4 HOURS PRN
Status: DISCONTINUED | OUTPATIENT
Start: 2018-09-06 | End: 2018-09-06

## 2018-09-06 RX ORDER — CALCIUM ACETATE 667 MG/1
667 CAPSULE ORAL
Status: DISCONTINUED | OUTPATIENT
Start: 2018-09-06 | End: 2018-09-07

## 2018-09-06 RX ORDER — CARVEDILOL 25 MG/1
25 TABLET ORAL 2 TIMES DAILY WITH MEALS
COMMUNITY
End: 2018-12-11

## 2018-09-06 RX ORDER — ATORVASTATIN CALCIUM 80 MG/1
80 TABLET, FILM COATED ORAL NIGHTLY
COMMUNITY
End: 2018-12-11

## 2018-09-06 RX ORDER — METOCLOPRAMIDE HYDROCHLORIDE 5 MG/ML
5 INJECTION INTRAMUSCULAR; INTRAVENOUS EVERY 8 HOURS PRN
Status: DISCONTINUED | OUTPATIENT
Start: 2018-09-06 | End: 2018-09-07

## 2018-09-06 RX ORDER — ATORVASTATIN CALCIUM 80 MG/1
80 TABLET, FILM COATED ORAL NIGHTLY
Status: DISCONTINUED | OUTPATIENT
Start: 2018-09-06 | End: 2018-09-07

## 2018-09-06 RX ORDER — SODIUM CHLORIDE 9 MG/ML
INJECTION, SOLUTION INTRAVENOUS ONCE
Status: COMPLETED | OUTPATIENT
Start: 2018-09-06 | End: 2018-09-07

## 2018-09-06 RX ORDER — CETIRIZINE HYDROCHLORIDE 5 MG/1
5 TABLET ORAL DAILY
Status: DISCONTINUED | OUTPATIENT
Start: 2018-09-06 | End: 2018-09-07

## 2018-09-06 RX ORDER — DEXTROSE MONOHYDRATE 25 G/50ML
50 INJECTION, SOLUTION INTRAVENOUS
Status: DISCONTINUED | OUTPATIENT
Start: 2018-09-06 | End: 2018-09-07

## 2018-09-06 RX ORDER — MORPHINE SULFATE 4 MG/ML
2 INJECTION, SOLUTION INTRAMUSCULAR; INTRAVENOUS EVERY 2 HOUR PRN
Status: DISCONTINUED | OUTPATIENT
Start: 2018-09-06 | End: 2018-09-07

## 2018-09-06 RX ORDER — HEPARIN SODIUM 1000 [USP'U]/ML
1.5 INJECTION, SOLUTION INTRAVENOUS; SUBCUTANEOUS ONCE
Status: COMPLETED | OUTPATIENT
Start: 2018-09-06 | End: 2018-09-06

## 2018-09-06 RX ORDER — MORPHINE SULFATE 4 MG/ML
1 INJECTION, SOLUTION INTRAMUSCULAR; INTRAVENOUS EVERY 2 HOUR PRN
Status: DISCONTINUED | OUTPATIENT
Start: 2018-09-06 | End: 2018-09-07

## 2018-09-06 RX ORDER — BUPROPION HYDROCHLORIDE 150 MG/1
150 TABLET, EXTENDED RELEASE ORAL DAILY
Status: DISCONTINUED | OUTPATIENT
Start: 2018-09-06 | End: 2018-09-07

## 2018-09-06 RX ORDER — ONDANSETRON 2 MG/ML
4 INJECTION INTRAMUSCULAR; INTRAVENOUS EVERY 6 HOURS PRN
Status: DISCONTINUED | OUTPATIENT
Start: 2018-09-06 | End: 2018-09-07

## 2018-09-06 RX ORDER — IBUPROFEN 200 MG
200 TABLET ORAL EVERY 4 HOURS PRN
Status: DISCONTINUED | OUTPATIENT
Start: 2018-09-06 | End: 2018-09-06

## 2018-09-06 RX ORDER — ONDANSETRON 2 MG/ML
4 INJECTION INTRAMUSCULAR; INTRAVENOUS ONCE
Status: COMPLETED | OUTPATIENT
Start: 2018-09-06 | End: 2018-09-06

## 2018-09-06 RX ORDER — CETIRIZINE HYDROCHLORIDE 10 MG/1
10 TABLET ORAL DAILY
COMMUNITY
End: 2018-10-08

## 2018-09-06 RX ORDER — CALCIUM ACETATE 667 MG/1
1 CAPSULE ORAL
COMMUNITY
End: 2020-01-07 | Stop reason: ALTCHOICE

## 2018-09-06 RX ORDER — IBUPROFEN 400 MG/1
400 TABLET ORAL EVERY 4 HOURS PRN
Status: DISCONTINUED | OUTPATIENT
Start: 2018-09-06 | End: 2018-09-06

## 2018-09-06 RX ORDER — MORPHINE SULFATE 4 MG/ML
4 INJECTION, SOLUTION INTRAMUSCULAR; INTRAVENOUS EVERY 2 HOUR PRN
Status: DISCONTINUED | OUTPATIENT
Start: 2018-09-06 | End: 2018-09-07

## 2018-09-06 NOTE — H&P
[facility]  History and Physical     Emil True Patient Status:  Observation    1960 MRN [id number]   [facility] 4NW-A Attending Mary Lange MD   Hosp Day # 0 PCP Dev Joseph DO     Chief Complaint: n/v/d [facility])       Past Surgical History: Past Surgical History:  No date: APPENDECTOMY  2013: EYE SURGERY      Comment: RV surgery  1990: HYSTERECTOMY      Comment: fibroids.  unsure but thinks she still has her                cervix  ??: AMPARO BIOPSY STEREOTACTIC capsule Rfl: 6   ondansetron 8 MG Oral Tablet Dispersible Take 1 tablet (8 mg total) by mouth every 8 (eight) hours as needed for Nausea.  Disp: 90 tablet Rfl: 1     Physical Exam:    /70 (BP Location: Right arm)   Pulse 64   Temp 98.3 °F (36.8 °C) (O PRN    Quality:  · DVT Prophylaxis: heparin  · CODE status: full  · Hagen: no  Plan of care discussed with patient     Mikala Mathew MD  9/6/2018

## 2018-09-06 NOTE — PROGRESS NOTES
[facility] Pharmacy Note:  Renal Dose Adjustment for Metoclopramide (REGLAN)    Emily Cardoso has been prescribed Metoclopramide (REGLAN) 10 mg every 8 hours as needed for nausea,vomiting.     Estimated Creatinine Clearance: 4.5 mL/min (A) (based on SCr

## 2018-09-06 NOTE — ED NOTES
Nausea is relieved, patient continues to have abdominal pain, unsure if she would like pain medication. Updated on plan for CT scan and estimate of time. VSS, will continue to monitor.   Side rails x 2 up, call light in reach, family at bedside

## 2018-09-06 NOTE — PROGRESS NOTES
[facility] Pharmacy Note:  Renal Dose Adjustment for Cetirizine (ZYRTEC)    Rick Novoa has been prescribed Cetirizine (Zyrtec) 10 mg orally daily. Estimated Creatinine Clearance: 4.5 mL/min (A) (based on SCr of 9.96 mg/dL (H)).     Her calculated c

## 2018-09-06 NOTE — CONSULTS
[facility]  Report of Consultation    Kamaljit Ramon Patient Status:  Emergency    1960 MRN [id number]   Location [address] [facility] Attending Anaid Lowe MD   [facility] Day # 0 PCP Authur Gosselin, DO     Reason for C Maternal Aunt 40   • Breast Cancer Paternal Aunt 44   • High Blood Pressure Mother    • High Blood Pressure Son    • Hypertension Son    • Depression Daughter    • High Blood Pressure, and Pre-Diabetes [OTHER] Daughter    • High Blood Pressure Son    • Dep °F (37.1 °C)     No intake or output data in the 24 hours ending 09/06/18 1331  Last 3 Weights  09/06/18 1038 : 145 lb 8.1 oz  07/17/18 1611 : 148 lb 3.2 oz  07/10/18 1447 : 150 lb  04/26/18 0915 : 150 lb 6.4 oz  04/05/18 1214 : 158 lb    General: Alert an 170*       Recent Labs   Lab  09/06/18   1044   ALT  37   AST  37   ALB  4.2       No results for input(s): PGLU in the last 168 hours. Imaging:  CT noted    Impression/Plan:    #1. ESRD- due to DM/HTN. Plan HD today per usual routine.       #2.  Geeta Kaiser

## 2018-09-06 NOTE — PLAN OF CARE
NURSING ADMISSION NOTE      Patient admitted via cart  Oriented to room. Safety precautions initiated. Bed in low position. Call light in reach.   Family at bedside, all questions and concerns addressed, support given, will monitor    Pt on dialysis

## 2018-09-06 NOTE — ED INITIAL ASSESSMENT (HPI)
Last Friday c/o nausea and vomiting taking zofran no relief  Unable to have a bowel movement did take a suppository  She is a dialysis Tue Thursday and Saturday  She did not go today because they felt she was to sick   Unable to keep any food down

## 2018-09-06 NOTE — ED PROVIDER NOTES
Patient Seen in: [facility] Emergency Department    History   Patient presents with:  Nausea/Vomiting/Diarrhea (gastrointestinal)    Stated Complaint: n/v lst bm sunday    HPI    Patient is a 20-year-old male who presents emergency room with a histo History:  No date: APPENDECTOMY  2013: EYE SURGERY      Comment: RV surgery  1990: HYSTERECTOMY      Comment: fibroids.  unsure but thinks she still has her                cervix  ??: AMPARO BIOPSY STEREOTACTIC NODULE 1 SITE RIGHT      Comment: BENIGN  1990: O and no organomegaly appreciated. There is normoactive bowel sounds. There is no hernia. EXTREMITIES: There is no cyanosis, clubbing, or edema appreciated. Pulses are 2+ and equal in all 4 extremities. There is no AV fistula appreciated.   NEURO: Patient i duodenum which may represent mild duodenitis. 2.  Small hiatal hernia.     Dictated by: Munir Hastings MD on 9/06/2018 at 12:38     Approved by: Munir Hastings MD                Patient had an IV line established and blood work drawn including a CBC, ch

## 2018-09-07 VITALS
HEIGHT: 60 IN | HEART RATE: 53 BPM | SYSTOLIC BLOOD PRESSURE: 120 MMHG | WEIGHT: 145.5 LBS | OXYGEN SATURATION: 98 % | BODY MASS INDEX: 28.57 KG/M2 | RESPIRATION RATE: 18 BRPM | DIASTOLIC BLOOD PRESSURE: 64 MMHG | TEMPERATURE: 99 F

## 2018-09-07 LAB
ALBUMIN SERPL-MCNC: 3.3 G/DL (ref 3.5–4.8)
ALBUMIN/GLOB SERPL: 0.9 {RATIO} (ref 1–2)
ALP LIVER SERPL-CCNC: 120 U/L (ref 46–118)
ALT SERPL-CCNC: 33 U/L (ref 14–54)
ANION GAP SERPL CALC-SCNC: 7 MMOL/L (ref 0–18)
AST SERPL-CCNC: 36 U/L (ref 15–41)
BILIRUB SERPL-MCNC: 0.8 MG/DL (ref 0.1–2)
BUN BLD-MCNC: 19 MG/DL (ref 8–20)
BUN/CREAT SERPL: 3.5 (ref 10–20)
CALCIUM BLD-MCNC: 8.2 MG/DL (ref 8.3–10.3)
CHLORIDE SERPL-SCNC: 104 MMOL/L (ref 101–111)
CO2 SERPL-SCNC: 26 MMOL/L (ref 22–32)
CREAT BLD-MCNC: 5.38 MG/DL (ref 0.55–1.02)
GLOBULIN PLAS-MCNC: 3.7 G/DL (ref 2.5–4)
GLUCOSE BLD-MCNC: 130 MG/DL (ref 65–99)
GLUCOSE BLD-MCNC: 93 MG/DL (ref 70–99)
GLUCOSE BLD-MCNC: 98 MG/DL (ref 65–99)
M PROTEIN MFR SERPL ELPH: 7 G/DL (ref 6.1–8.3)
OSMOLALITY SERPL CALC.SUM OF ELEC: 286 MOSM/KG (ref 275–295)
POTASSIUM SERPL-SCNC: 3.7 MMOL/L (ref 3.6–5.1)
SODIUM SERPL-SCNC: 137 MMOL/L (ref 136–144)

## 2018-09-07 PROCEDURE — 99213 OFFICE O/P EST LOW 20 MIN: CPT | Performed by: INTERNAL MEDICINE

## 2018-09-07 PROCEDURE — 99217 OBSERVATION CARE DISCHARGE: CPT | Performed by: HOSPITALIST

## 2018-09-07 NOTE — PROGRESS NOTES
Patient tolerated dialysis this shift. No complaint of pain. Family at bedside to translate. Accucheck QID,Resting quietly,C-PAP in use. Will monitor.

## 2018-09-07 NOTE — PLAN OF CARE
GASTROINTESTINAL - ADULT    • Minimal or absence of nausea and vomiting Adequate for Discharge    • Maintains adequate nutritional intake (undernourished) Adequate for Discharge        Pt. Denies having any nausea or vomiting.  Pt. Diet advanced from clear

## 2018-09-07 NOTE — PROGRESS NOTES
[facility]  Progress Note     Brittany Keyes Patient Status:  Observation    1960 MRN [id number]   [facility] 4NW-A Attending Richard Alegria MD   Hosp Day # 0 PCP Clemencia Flanagan,      Chief Complaint: Herman Mccauley  S:  Joan Rogers Oral QAM AC   • AmLODIPine Besylate  10 mg Oral Daily   • Heparin Sodium (Porcine)  5,000 Units Subcutaneous Q8H [facility]   • Insulin Aspart Pen  1-10 Units Subcutaneous TID AC and HS     ASSESSMENT / PLAN:   1. Acute duodenitis- much improved  1.  Advance diet

## 2018-09-07 NOTE — PROGRESS NOTES
[facility]  Nephrology Progress Note    Sam Lisa Patient Status:  Observation    1960 MRN [id number]   [facility] 4NW-A Attending Ronald Del Toro MD   Hosp Day # 0 PCP Juan Nichole DO       SUBJECTIVE:  Feels bett Facility-Administered Medications:   Albumin Human (ALBUMINAR) 25 % solution 100 mL 100 mL Intravenous PRN   atorvastatin (LIPITOR) tab 80 mg 80 mg Oral Nightly   BuPROPion HCl ER (SR) (WELLBUTRIN SR) 12 hr tab 150 mg 150 mg Oral Daily   calcium acetate (PH antihypertensive agents and follow     #3.  duodenitis- feels better today. Anti-emetics prn. Advancing diet    Questions/concerns were discussed with patient and/or family by bedside.           Williams Mike  9/7/2018  12:25 PM

## 2018-09-08 NOTE — DISCHARGE SUMMARY
[facility]  DISCHARGE SUMMARY     Jae Haynes Patient Status:  Observation    1960 MRN [id number]   [facility] 4NW-A Attending No att. providers found   Hosp Day # 0 PCP Joshua Prajapati DO     Date of Admission:  chest pain or shortness of breath. She denies any dysuria or hematuria. Patient denies dizziness or headache but does get mildly lightheaded.     Brief Synopsis: patient was given gentle IVF and bowel rest.  Patient suspected to have viral gastroenteritis Cpdr  Commonly known as:  PRILOSEC      Take 1 capsule (20 mg total) by mouth 2 (two) times daily before meals.  1 tab po q am on empty stomach 45mins AC breakfast   Quantity:  60 capsule  Refills:  6     ondansetron 8 MG Tbdp  Commonly known as:  Alison Mcfarlane

## 2018-09-10 ENCOUNTER — PATIENT OUTREACH (OUTPATIENT)
Dept: CASE MANAGEMENT | Age: 58
End: 2018-09-10

## 2018-09-10 DIAGNOSIS — Z02.9 ENCOUNTERS FOR ADMINISTRATIVE PURPOSE: ICD-10-CM

## 2018-09-10 NOTE — PROGRESS NOTES
Initial Post Discharge Follow Up   Discharge Date: 9/7/18  Contact Date: 9/10/2018    Consent Verification:  Assessment Completed With: Isabel Cyndee, lexis  HIPAA Verified?   Yes    Discharge Dx:     Acute duodenitis  ESRD   Hyponatremia    General:   • How have by mouth daily.  Disp:  Rfl:    Levothyroxine Sodium 88 MCG Oral Tab Take 1 tablet (88 mcg total) by mouth before breakfast. Disp: 90 tablet Rfl: 3   omeprazole 20 MG Oral Capsule Delayed Release Take 1 capsule (20 mg total) by mouth 2 (two) times daily bef 11/29/18 with her PCP. Advised call sent to PCP regarding a TCM HFU. Have you made all of your follow up appointments? yes    Is there any reason as to why you cannot make your appointments?    No     NCM Reviewed upcoming Specialist Appt with patient

## 2018-09-20 ENCOUNTER — ANESTHESIA (OUTPATIENT)
Dept: CARDIAC SURGERY | Facility: HOSPITAL | Age: 58
End: 2018-09-20
Payer: MEDICARE

## 2018-09-20 ENCOUNTER — HOSPITAL ENCOUNTER (OUTPATIENT)
Facility: HOSPITAL | Age: 58
LOS: 1 days | Discharge: HOME | End: 2018-09-20
Attending: SURGERY | Admitting: SURGERY
Payer: MEDICARE

## 2018-09-20 ENCOUNTER — ANESTHESIA EVENT (OUTPATIENT)
Dept: CARDIAC SURGERY | Facility: HOSPITAL | Age: 58
End: 2018-09-20
Payer: MEDICARE

## 2018-09-20 VITALS
WEIGHT: 149 LBS | DIASTOLIC BLOOD PRESSURE: 66 MMHG | TEMPERATURE: 98 F | OXYGEN SATURATION: 97 % | HEART RATE: 65 BPM | RESPIRATION RATE: 20 BRPM | SYSTOLIC BLOOD PRESSURE: 123 MMHG | BODY MASS INDEX: 27.42 KG/M2 | HEIGHT: 62 IN

## 2018-09-20 LAB
ANION GAP SERPL CALC-SCNC: 5 MMOL/L (ref 0–18)
APTT PPP: 30.9 SECONDS (ref 26.1–34.6)
ATRIAL RATE: 61 BPM
BUN BLD-MCNC: 48 MG/DL (ref 8–20)
BUN/CREAT SERPL: 6 (ref 10–20)
CALCIUM BLD-MCNC: 8.8 MG/DL (ref 8.3–10.3)
CHLORIDE SERPL-SCNC: 104 MMOL/L (ref 101–111)
CO2 SERPL-SCNC: 29 MMOL/L (ref 22–32)
CREAT BLD-MCNC: 7.96 MG/DL (ref 0.55–1.02)
GLUCOSE BLD-MCNC: 121 MG/DL (ref 70–99)
GLUCOSE BLD-MCNC: 132 MG/DL (ref 65–99)
INR BLD: 0.98 (ref 0.9–1.1)
OSMOLALITY SERPL CALC.SUM OF ELEC: 300 MOSM/KG (ref 275–295)
P AXIS: 19 DEGREES
P-R INTERVAL: 168 MS
POTASSIUM SERPL-SCNC: 5.4 MMOL/L (ref 3.6–5.1)
PSA SERPL DL<=0.01 NG/ML-MCNC: 13.4 SECONDS (ref 12.4–14.7)
Q-T INTERVAL: 432 MS
QRS DURATION: 88 MS
QTC CALCULATION (BEZET): 434 MS
R AXIS: -33 DEGREES
SODIUM SERPL-SCNC: 138 MMOL/L (ref 136–144)
T AXIS: -37 DEGREES
VENTRICULAR RATE: 61 BPM

## 2018-09-20 PROCEDURE — 85610 PROTHROMBIN TIME: CPT | Performed by: SURGERY

## 2018-09-20 PROCEDURE — 85730 THROMBOPLASTIN TIME PARTIAL: CPT | Performed by: SURGERY

## 2018-09-20 PROCEDURE — 36415 COLL VENOUS BLD VENIPUNCTURE: CPT | Performed by: SURGERY

## 2018-09-20 PROCEDURE — 93005 ELECTROCARDIOGRAM TRACING: CPT

## 2018-09-20 PROCEDURE — 80048 BASIC METABOLIC PNL TOTAL CA: CPT | Performed by: SURGERY

## 2018-09-20 PROCEDURE — 82962 GLUCOSE BLOOD TEST: CPT

## 2018-09-20 PROCEDURE — 031809F BYPASS LEFT BRACHIAL ARTERY TO LOWER ARM VEIN WITH AUTOLOGOUS VENOUS TISSUE, OPEN APPROACH: ICD-10-PCS | Performed by: SURGERY

## 2018-09-20 PROCEDURE — 93010 ELECTROCARDIOGRAM REPORT: CPT | Performed by: INTERNAL MEDICINE

## 2018-09-20 RX ORDER — ONDANSETRON 2 MG/ML
4 INJECTION INTRAMUSCULAR; INTRAVENOUS AS NEEDED
Status: DISCONTINUED | OUTPATIENT
Start: 2018-09-20 | End: 2018-09-20

## 2018-09-20 RX ORDER — SODIUM CHLORIDE 9 MG/ML
INJECTION, SOLUTION INTRAVENOUS CONTINUOUS
Status: DISCONTINUED | OUTPATIENT
Start: 2018-09-20 | End: 2018-09-20

## 2018-09-20 RX ORDER — NALOXONE HYDROCHLORIDE 0.4 MG/ML
80 INJECTION, SOLUTION INTRAMUSCULAR; INTRAVENOUS; SUBCUTANEOUS AS NEEDED
Status: DISCONTINUED | OUTPATIENT
Start: 2018-09-20 | End: 2018-09-20

## 2018-09-20 RX ORDER — BUPIVACAINE HYDROCHLORIDE 5 MG/ML
INJECTION, SOLUTION PERINEURAL AS NEEDED
Status: DISCONTINUED | OUTPATIENT
Start: 2018-09-20 | End: 2018-09-20 | Stop reason: HOSPADM

## 2018-09-20 RX ORDER — ONDANSETRON 2 MG/ML
INJECTION INTRAMUSCULAR; INTRAVENOUS
Status: COMPLETED
Start: 2018-09-20 | End: 2018-09-20

## 2018-09-20 RX ORDER — DEXTROSE MONOHYDRATE 25 G/50ML
50 INJECTION, SOLUTION INTRAVENOUS
Status: DISCONTINUED | OUTPATIENT
Start: 2018-09-20 | End: 2018-09-20

## 2018-09-20 RX ORDER — CEFAZOLIN SODIUM 1 G/3ML
INJECTION, POWDER, FOR SOLUTION INTRAMUSCULAR; INTRAVENOUS
Status: DISCONTINUED | OUTPATIENT
Start: 2018-09-20 | End: 2018-09-20 | Stop reason: HOSPADM

## 2018-09-20 RX ORDER — MORPHINE SULFATE 2 MG/ML
2 INJECTION, SOLUTION INTRAMUSCULAR; INTRAVENOUS EVERY 5 MIN PRN
Status: DISCONTINUED | OUTPATIENT
Start: 2018-09-20 | End: 2018-09-20

## 2018-09-20 RX ORDER — HYDROCODONE BITARTRATE AND ACETAMINOPHEN 5; 325 MG/1; MG/1
1 TABLET ORAL EVERY 4 HOURS PRN
Qty: 30 TABLET | Refills: 0 | Status: SHIPPED | OUTPATIENT
Start: 2018-09-20 | End: 2018-12-11 | Stop reason: ALTCHOICE

## 2018-09-20 RX ORDER — HYDROCODONE BITARTRATE AND ACETAMINOPHEN 5; 325 MG/1; MG/1
2 TABLET ORAL AS NEEDED
Status: DISCONTINUED | OUTPATIENT
Start: 2018-09-20 | End: 2018-09-20

## 2018-09-20 RX ORDER — HYDROCODONE BITARTRATE AND ACETAMINOPHEN 5; 325 MG/1; MG/1
1 TABLET ORAL AS NEEDED
Status: DISCONTINUED | OUTPATIENT
Start: 2018-09-20 | End: 2018-09-20

## 2018-09-20 RX ORDER — SODIUM CHLORIDE, SODIUM LACTATE, POTASSIUM CHLORIDE, CALCIUM CHLORIDE 600; 310; 30; 20 MG/100ML; MG/100ML; MG/100ML; MG/100ML
INJECTION, SOLUTION INTRAVENOUS CONTINUOUS
Status: DISCONTINUED | OUTPATIENT
Start: 2018-09-20 | End: 2018-09-20

## 2018-09-20 NOTE — ANESTHESIA PREPROCEDURE EVALUATION
PRE-OP EVALUATION    Patient Name: Elena Connelly    Pre-op Diagnosis: end stage renal disease    Procedure(s):  left arm arteriovenous fistula   SA pending    Surgeon(s) and Role:     Jose Oh MD - Primary    Pre-op vitals reviewed. GI/Hepatic/Renal      (+) GERD       (+) chronic renal disease and ESRD                   Cardiovascular  Comment: 1. Left ventricle:  The cavity size was normal. Wall thickness was normal.     Systolic function was normal. The estimated ejection fraction w 09/06/2018    MCV 81.1 09/06/2018    MCH 27.8 09/06/2018    MCHC 34.2 09/06/2018    RDW 16.3 (H) 09/06/2018    .0 09/06/2018     Lab Results   Component Value Date     09/07/2018    K 3.7 09/07/2018     09/07/2018    CO2 26.0 09/07/2018

## 2018-09-20 NOTE — ANESTHESIA POSTPROCEDURE EVALUATION
[facility] Patient Status:  Inpatient   Age/Gender 62year old female MRN [id number]   Location [address] [facility] Attending Jass Kate MD   [facility] Day # 0 PCP Heather Devries DO       Anesthesia Pos

## 2018-09-20 NOTE — PAYOR COMM NOTE
--------------  ADMISSION REVIEW       9/20    DIRECT FOR OR                    Pre-Operative Diagnosis: end stage renal disease     Post-Operative Diagnosis: same     Procedure Performed:   Left median antecubital vein to brachial artery av fistula at ant

## 2018-09-20 NOTE — BRIEF OP NOTE
Pre-Operative Diagnosis: end stage renal disease     Post-Operative Diagnosis: same     Procedure Performed:   Left median antecubital vein to brachial artery av fistula at antecubital fossa    Surgeon(s) and Role:     Apollo Perez MD - Primary    As

## 2018-09-21 ENCOUNTER — TELEPHONE (OUTPATIENT)
Dept: FAMILY MEDICINE CLINIC | Facility: CLINIC | Age: 58
End: 2018-09-21

## 2018-09-21 NOTE — TELEPHONE ENCOUNTER
Son, Areta Norton calling, pt had Fistula on Le arm created yesterday. Son reports that pt had nausea yesterday, but was not able to vomit since she had not eaten. Son reports this morning patient has been vomiting, no fever, no other symptoms.  Pt is supposed to

## 2018-09-24 NOTE — PAYOR COMM NOTE
--------------  DISCHARGE REVIEW    Payor: Sasha Harada #:  [id number]  Authorization Number: N/A    Admit date: 9/20/18  Admit time:  0888  Discharge Date: 9/20/2018  2:20 PM     Admitting Physician: Gaby Kwan MD  Attending Physician:  Ellen

## 2018-09-26 NOTE — OPERATIVE REPORT
[facility]    PATIENT'S NAME: Sharon  PHYSICIAN: Marcela Garrett M.D. OPERATING PHYSICIAN: Marcela Garrett M.D.    PATIENT ACCOUNT#:   [de-identified]    LOCATION:  PACU [facility] [facility] PACU 2 ED [address] #:   [id number] therefore did not attempt passing a 3 dilator.   Given that both veins were under 3 mm, I decided to use the median antecubital vein and would assess the maturity of the basilic and cephalic veins, understanding that the patient may need further procedure t

## 2018-09-26 NOTE — H&P
[facility]  Vascular Surgery Consultation    Ryan Capps Patient Status:  Outpatient in a Bed    1960 MRN [id number]   Location [address] [facility] Attending No att. providers found   Hosp Day # 1 PCP Aurelio Wilcox Comment:  RV surgery  1990: HYSTERECTOMY      Comment:  fibroids.  unsure but thinks she still has her cervix  ??: AMPARO BIOPSY STEREOTACTIC NODULE 1 SITE RIGHT      Comment:  BENIGN  1990: OTHER SURGICAL HISTORY      Comment:  cyst removal  5/15: UPPER GI EN wnl  CAROTID: No bruits  RESPIRATORY: no rales, rhonchi, or wheezes B  CARDIO: RRR without murmur, no murmur, no gallop   ABDOMEN: soft, non-tender with no palpable aneurysm or masses  BACK: normal, no tenderness  SKIN: no rashes, no suspicious lesions, wa

## 2018-10-05 RX ORDER — RANITIDINE 300 MG/1
CAPSULE ORAL
Qty: 90 CAPSULE | Refills: 0 | Status: SHIPPED | OUTPATIENT
Start: 2018-10-05 | End: 2018-12-11

## 2018-10-05 NOTE — TELEPHONE ENCOUNTER
Pt requesting a refill of Ranitidine, protocol passed. Refill approved.     LOV 7/17/18  LF 6/26/18 #90 with 0 refills    Future Appointments   Date Time Provider Luci Loja   10/5/2018  9:30 AM RODNEY Patel   11/29/201

## 2018-10-06 ENCOUNTER — PATIENT MESSAGE (OUTPATIENT)
Dept: FAMILY MEDICINE CLINIC | Facility: CLINIC | Age: 58
End: 2018-10-06

## 2018-10-06 DIAGNOSIS — N18.4 CKD (CHRONIC KIDNEY DISEASE) STAGE 4, GFR 15-29 ML/MIN (HCC): ICD-10-CM

## 2018-10-06 DIAGNOSIS — I10 ESSENTIAL HYPERTENSION WITH GOAL BLOOD PRESSURE LESS THAN 140/90: ICD-10-CM

## 2018-10-08 RX ORDER — CETIRIZINE HYDROCHLORIDE 10 MG/1
10 TABLET ORAL DAILY
Qty: 90 TABLET | Refills: 0 | Status: SHIPPED | OUTPATIENT
Start: 2018-10-08 | End: 2019-04-18

## 2018-10-08 RX ORDER — AMLODIPINE BESYLATE 10 MG/1
TABLET ORAL
Qty: 90 TABLET | Refills: 0 | OUTPATIENT
Start: 2018-10-08

## 2018-10-08 RX ORDER — MONTELUKAST SODIUM 10 MG/1
TABLET ORAL
Qty: 45 TABLET | Refills: 0 | OUTPATIENT
Start: 2018-10-08

## 2018-10-08 RX ORDER — MONTELUKAST SODIUM 10 MG/1
5 TABLET ORAL NIGHTLY
Qty: 45 TABLET | Refills: 0 | Status: SHIPPED | OUTPATIENT
Start: 2018-10-08 | End: 2018-12-11

## 2018-10-08 NOTE — TELEPHONE ENCOUNTER
From: Elena Connelly  To:  Sp Gonzalez DO  Sent: 10/6/2018 9:52 PM CDT  Subject: Prescription Question    Can I please get a refill for Cetirizine and can I pick it up at the [address], [address]    Thanks   Parvez Mares

## 2018-10-08 NOTE — TELEPHONE ENCOUNTER
Pt requesting a refill of certrizine, procotol pased. Refill approved.     LOV 7/17/18  LF 7/17/18 #90 with 0 refills    Future Appointments   Date Time Provider Luci Loja   10/29/2018 11:00 AM [facility] [place]   10/29/2018

## 2018-10-08 NOTE — TELEPHONE ENCOUNTER
Duplicate request for medications. See RX encounter from today 10/8/18.  Refills denied already addressed today 10/8/18

## 2018-10-08 NOTE — TELEPHONE ENCOUNTER
PT requesting a refill of montelukast, protocol passed. Refill approved. LOV 7/17/18  LF 7/17/18 #45 with 0 refills    PT also requesting a refill of amlodipine, protocol failed. Unable to approve.     LOV 7/17/18  LF 7/17/18 #90 with 0 refills    Future

## 2018-10-08 NOTE — TELEPHONE ENCOUNTER
Regarding: Prescription Question  Contact: [phone]  ----- Message from Generic, Tesora sent at 10/6/2018  9:52 PM CDT -----    Can I please get a refill for Cetirizine and can I pick it up at the [address], [address] [facility]    T

## 2018-10-09 RX ORDER — AMLODIPINE BESYLATE 10 MG/1
10 TABLET ORAL
Qty: 90 TABLET | Refills: 0 | Status: SHIPPED | OUTPATIENT
Start: 2018-10-09 | End: 2018-12-11

## 2018-10-09 NOTE — TELEPHONE ENCOUNTER
Patient is behind on health maintenance. Please call needs to complete FIT card testing in the next week and mail in. If negative then has 1 year until next colon screening is recommended. Colonoscopy is preferred method for screening for colon cancer.

## 2018-10-15 ENCOUNTER — PATIENT MESSAGE (OUTPATIENT)
Dept: FAMILY MEDICINE CLINIC | Facility: CLINIC | Age: 58
End: 2018-10-15

## 2018-10-16 RX ORDER — BUPROPION HYDROCHLORIDE 150 MG/1
150 TABLET, EXTENDED RELEASE ORAL DAILY
Qty: 90 TABLET | Refills: 0 | Status: SHIPPED | OUTPATIENT
Start: 2018-10-16 | End: 2018-12-11

## 2018-10-16 NOTE — TELEPHONE ENCOUNTER
From: Emil Biswas  To: Rufino Knutson DO  Sent: 10/15/2018 8:39 PM CDT  Subject: Prescription Question    Can I please get a refill for Bupropion SR 150mg.  Can I please pick it up at the [facility] at [address] [place] [address]    Thanks

## 2018-10-16 NOTE — TELEPHONE ENCOUNTER
Pt requesting refill of bupropion, no protocol  unable to approve:     Last Time Medication was Filled:  7/17/18  Last Office Visit with PCP: 7/17/18    Future Appointments   Date Time Provider Luci Loja   10/29/2018 11:00 AM [facility] [facility]

## 2018-12-11 ENCOUNTER — OFFICE VISIT (OUTPATIENT)
Dept: FAMILY MEDICINE CLINIC | Facility: CLINIC | Age: 58
End: 2018-12-11
Payer: MEDICARE

## 2018-12-11 VITALS
WEIGHT: 152 LBS | DIASTOLIC BLOOD PRESSURE: 66 MMHG | HEART RATE: 75 BPM | BODY MASS INDEX: 30.24 KG/M2 | HEIGHT: 59.5 IN | SYSTOLIC BLOOD PRESSURE: 126 MMHG | TEMPERATURE: 98 F | OXYGEN SATURATION: 96 % | RESPIRATION RATE: 18 BRPM

## 2018-12-11 DIAGNOSIS — I10 ESSENTIAL HYPERTENSION: ICD-10-CM

## 2018-12-11 DIAGNOSIS — J44.9 ASTHMA WITH COPD (CHRONIC OBSTRUCTIVE PULMONARY DISEASE) (HCC): Chronic | ICD-10-CM

## 2018-12-11 DIAGNOSIS — J30.2 SEASONAL ALLERGIES: ICD-10-CM

## 2018-12-11 DIAGNOSIS — Z12.4 SCREENING FOR CERVICAL CANCER: ICD-10-CM

## 2018-12-11 DIAGNOSIS — R87.618 OTHER ABNORMAL CYTOLOGICAL FINDING OF SPECIMEN FROM CERVIX: ICD-10-CM

## 2018-12-11 DIAGNOSIS — N18.6 TYPE 2 DIABETES MELLITUS WITH CHRONIC KIDNEY DISEASE ON CHRONIC DIALYSIS, WITHOUT LONG-TERM CURRENT USE OF INSULIN (HCC): ICD-10-CM

## 2018-12-11 DIAGNOSIS — Z99.2 ESRD (END STAGE RENAL DISEASE) ON DIALYSIS (HCC): ICD-10-CM

## 2018-12-11 DIAGNOSIS — G47.33 SEVERE OBSTRUCTIVE SLEEP APNEA: ICD-10-CM

## 2018-12-11 DIAGNOSIS — E11.69 HYPERLIPIDEMIA ASSOCIATED WITH TYPE 2 DIABETES MELLITUS (HCC): ICD-10-CM

## 2018-12-11 DIAGNOSIS — N18.6 ESRD ON HEMODIALYSIS (HCC): ICD-10-CM

## 2018-12-11 DIAGNOSIS — Z99.2 ESRD ON HEMODIALYSIS (HCC): ICD-10-CM

## 2018-12-11 DIAGNOSIS — E78.5 HYPERLIPIDEMIA ASSOCIATED WITH TYPE 2 DIABETES MELLITUS (HCC): ICD-10-CM

## 2018-12-11 DIAGNOSIS — D63.1 ANEMIA IN ESRD (END-STAGE RENAL DISEASE) (HCC): ICD-10-CM

## 2018-12-11 DIAGNOSIS — N18.6 ANEMIA IN ESRD (END-STAGE RENAL DISEASE) (HCC): ICD-10-CM

## 2018-12-11 DIAGNOSIS — Z23 NEED FOR VACCINATION: ICD-10-CM

## 2018-12-11 DIAGNOSIS — N18.6 ESRD (END STAGE RENAL DISEASE) ON DIALYSIS (HCC): ICD-10-CM

## 2018-12-11 DIAGNOSIS — Z99.2 TYPE 2 DIABETES MELLITUS WITH CHRONIC KIDNEY DISEASE ON CHRONIC DIALYSIS, WITHOUT LONG-TERM CURRENT USE OF INSULIN (HCC): ICD-10-CM

## 2018-12-11 DIAGNOSIS — E11.22 TYPE 2 DIABETES MELLITUS WITH CHRONIC KIDNEY DISEASE ON CHRONIC DIALYSIS, WITHOUT LONG-TERM CURRENT USE OF INSULIN (HCC): ICD-10-CM

## 2018-12-11 DIAGNOSIS — E03.9 ACQUIRED HYPOTHYROIDISM: ICD-10-CM

## 2018-12-11 DIAGNOSIS — Z12.11 SCREENING FOR COLON CANCER: ICD-10-CM

## 2018-12-11 DIAGNOSIS — Z12.39 SCREENING FOR MALIGNANT NEOPLASM OF BREAST: ICD-10-CM

## 2018-12-11 DIAGNOSIS — H43.10 VITREOUS HEMORRHAGE, UNSPECIFIED LATERALITY (HCC): ICD-10-CM

## 2018-12-11 DIAGNOSIS — F32.A DEPRESSIVE DISORDER: ICD-10-CM

## 2018-12-11 DIAGNOSIS — Z00.00 ENCOUNTER FOR ANNUAL HEALTH EXAMINATION: ICD-10-CM

## 2018-12-11 DIAGNOSIS — Z00.00 MEDICARE ANNUAL WELLNESS VISIT, SUBSEQUENT: Primary | ICD-10-CM

## 2018-12-11 DIAGNOSIS — E11.3599 PROLIFERATIVE DIABETIC RETINOPATHY ASSOCIATED WITH TYPE 2 DIABETES MELLITUS, UNSPECIFIED LATERALITY, UNSPECIFIED PROLIFERATIVE RETINOPATHY TYPE (HCC): ICD-10-CM

## 2018-12-11 PROBLEM — N17.9 ACUTE RENAL FAILURE SUPERIMPOSED ON STAGE 4 CHRONIC KIDNEY DISEASE (HCC): Status: RESOLVED | Noted: 2018-04-05 | Resolved: 2018-12-11

## 2018-12-11 PROBLEM — N17.9 ACUTE RENAL FAILURE (ARF) (HCC): Status: RESOLVED | Noted: 2018-04-05 | Resolved: 2018-12-11

## 2018-12-11 PROBLEM — N18.4 ACUTE RENAL FAILURE SUPERIMPOSED ON STAGE 4 CHRONIC KIDNEY DISEASE, UNSPECIFIED ACUTE RENAL FAILURE TYPE (HCC): Status: RESOLVED | Noted: 2018-04-05 | Resolved: 2018-12-11

## 2018-12-11 PROBLEM — S42.202A CLOSED FRACTURE OF LEFT PROXIMAL HUMERUS: Status: RESOLVED | Noted: 2017-11-29 | Resolved: 2018-12-11

## 2018-12-11 PROBLEM — N17.9 ACUTE RENAL FAILURE SUPERIMPOSED ON STAGE 4 CHRONIC KIDNEY DISEASE, UNSPECIFIED ACUTE RENAL FAILURE TYPE (HCC): Status: RESOLVED | Noted: 2018-04-05 | Resolved: 2018-12-11

## 2018-12-11 PROBLEM — N17.9 ACUTE RENAL FAILURE (ARF): Status: RESOLVED | Noted: 2018-04-05 | Resolved: 2018-12-11

## 2018-12-11 PROBLEM — D69.6 THROMBOCYTOPENIA (HCC): Chronic | Status: RESOLVED | Noted: 2017-05-02 | Resolved: 2018-12-11

## 2018-12-11 PROBLEM — R11.2 NAUSEA AND VOMITING: Status: RESOLVED | Noted: 2018-09-06 | Resolved: 2018-12-11

## 2018-12-11 PROBLEM — D69.6 THROMBOCYTOPENIA: Chronic | Status: RESOLVED | Noted: 2017-05-02 | Resolved: 2018-12-11

## 2018-12-11 PROBLEM — E87.2 METABOLIC ACIDOSIS: Status: RESOLVED | Noted: 2018-04-05 | Resolved: 2018-12-11

## 2018-12-11 PROBLEM — E87.20 METABOLIC ACIDOSIS: Status: RESOLVED | Noted: 2018-04-05 | Resolved: 2018-12-11

## 2018-12-11 PROBLEM — N18.4 ACUTE RENAL FAILURE SUPERIMPOSED ON STAGE 4 CHRONIC KIDNEY DISEASE (HCC): Status: RESOLVED | Noted: 2018-04-05 | Resolved: 2018-12-11

## 2018-12-11 PROBLEM — E16.2 HYPOGLYCEMIA: Status: RESOLVED | Noted: 2018-04-05 | Resolved: 2018-12-11

## 2018-12-11 PROBLEM — N19 UREMIA: Status: RESOLVED | Noted: 2018-04-05 | Resolved: 2018-12-11

## 2018-12-11 PROBLEM — R11.2 NAUSEA AND VOMITING, INTRACTABILITY OF VOMITING NOT SPECIFIED, UNSPECIFIED VOMITING TYPE: Status: RESOLVED | Noted: 2018-09-06 | Resolved: 2018-12-11

## 2018-12-11 PROBLEM — R10.9 ABDOMINAL PAIN, ACUTE: Status: RESOLVED | Noted: 2018-09-06 | Resolved: 2018-12-11

## 2018-12-11 PROCEDURE — 87624 HPV HI-RISK TYP POOLED RSLT: CPT | Performed by: FAMILY MEDICINE

## 2018-12-11 PROCEDURE — 88175 CYTOPATH C/V AUTO FLUID REDO: CPT | Performed by: FAMILY MEDICINE

## 2018-12-11 PROCEDURE — 80053 COMPREHEN METABOLIC PANEL: CPT | Performed by: FAMILY MEDICINE

## 2018-12-11 PROCEDURE — G0008 ADMIN INFLUENZA VIRUS VAC: HCPCS | Performed by: FAMILY MEDICINE

## 2018-12-11 PROCEDURE — G0439 PPPS, SUBSEQ VISIT: HCPCS | Performed by: FAMILY MEDICINE

## 2018-12-11 PROCEDURE — 83036 HEMOGLOBIN GLYCOSYLATED A1C: CPT | Performed by: FAMILY MEDICINE

## 2018-12-11 PROCEDURE — 36415 COLL VENOUS BLD VENIPUNCTURE: CPT | Performed by: FAMILY MEDICINE

## 2018-12-11 PROCEDURE — 80061 LIPID PANEL: CPT | Performed by: FAMILY MEDICINE

## 2018-12-11 PROCEDURE — 99214 OFFICE O/P EST MOD 30 MIN: CPT | Performed by: FAMILY MEDICINE

## 2018-12-11 PROCEDURE — 90686 IIV4 VACC NO PRSV 0.5 ML IM: CPT | Performed by: FAMILY MEDICINE

## 2018-12-11 RX ORDER — BUPROPION HYDROCHLORIDE 150 MG/1
150 TABLET, EXTENDED RELEASE ORAL DAILY
Qty: 90 TABLET | Refills: 0 | Status: SHIPPED | OUTPATIENT
Start: 2018-12-11 | End: 2019-03-15

## 2018-12-11 RX ORDER — CARVEDILOL 25 MG/1
25 TABLET ORAL 2 TIMES DAILY WITH MEALS
Qty: 180 TABLET | Refills: 1 | Status: SHIPPED | OUTPATIENT
Start: 2018-12-11 | End: 2019-03-15

## 2018-12-11 RX ORDER — ATORVASTATIN CALCIUM 80 MG/1
80 TABLET, FILM COATED ORAL NIGHTLY
Qty: 90 TABLET | Refills: 1 | Status: SHIPPED | OUTPATIENT
Start: 2018-12-11 | End: 2019-03-15

## 2018-12-11 RX ORDER — OMEPRAZOLE 20 MG/1
20 CAPSULE, DELAYED RELEASE ORAL
Qty: 60 CAPSULE | Refills: 6 | Status: SHIPPED | OUTPATIENT
Start: 2018-12-11 | End: 2019-12-31

## 2018-12-11 RX ORDER — MONTELUKAST SODIUM 10 MG/1
10 TABLET ORAL NIGHTLY
Qty: 90 TABLET | Refills: 3 | Status: SHIPPED | OUTPATIENT
Start: 2018-12-11 | End: 2020-02-12

## 2018-12-11 RX ORDER — ALBUTEROL SULFATE 90 UG/1
2 AEROSOL, METERED RESPIRATORY (INHALATION) EVERY 4 HOURS PRN
Qty: 18 G | Refills: 0 | Status: SHIPPED | OUTPATIENT
Start: 2018-12-11 | End: 2019-09-30

## 2018-12-11 RX ORDER — AMLODIPINE BESYLATE 10 MG/1
10 TABLET ORAL
Qty: 90 TABLET | Refills: 1 | Status: SHIPPED | OUTPATIENT
Start: 2018-12-11 | End: 2019-03-15

## 2018-12-11 NOTE — PROGRESS NOTES
HPI:   Clemencia Forbes is a 62year old female who presents for a Medicare Subsequent Annual Wellness visit (Pt already had Initial Annual Wellness).         Diabetes- Off insulin. No DM medications. -120. Post prandials 120-140.  Last visi hypothyroidism and here to recheck. Has been tolerating the medication well. Last TSH was year ago, needs repeat. Denies any shakiness, palpitations, increased BM's or wgt loss. Denies any fatigue, wgt gain.     Due for mammogram but has Port-A-Cath in rig Depression Screening (PHQ-2/PHQ-9): Over the LAST 2 WEEKS   Little interest or pleasure in doing things (over the last two weeks)?: Several days  Feeling down, depressed, or hopeless (over the last two weeks)?: Several days  PHQ-2 SCORE: 2     Adv Encounters:  12/11/18 : 152 lb  10/05/18 : 132 lb  09/20/18 : 149 lb     Last Cholesterol Labs:   Lab Results   Component Value Date    CHOLEST 124 12/11/2018    HDL 46 12/11/2018    LDL 42 12/11/2018    TRIG 181 (H) 12/11/2018          Last Chemistry Labs Levothyroxine Sodium 88 MCG Oral Tab Take 1 tablet (88 mcg total) by mouth before breakfast.      MEDICAL INFORMATION:   She  has a past medical history of Allergic rhinitis, Cataract, Cataracts, bilateral (1/26/15), Cholecystitis, Chronic kidney disease exertion  CARDIOVASCULAR: denies chest pain on exertion  GI: denies abdominal pain, denies heartburn  : on dialysis denies dysuria, vaginal discharge or itching, no complaint of urinary incontinence   MUSCULOSKELETAL: denies back pain  NEURO: denies head nodes: Cervical, supraclavicular, and axillary nodes normal   Neurologic: Normal   , Breasts:  normal appearance, no masses or tenderness and Pelvic: cervix normal in appearance, external genitalia normal, no adnexal masses or tenderness, no cervical motio mellitus, unspecified laterality, unspecified proliferative retinopathy type ([address]  Vitreous hemorrhage, unspecified laterality ([address]  -     HEMOGLOBIN A1C; Future  NEEDS APPT with optho-Rere ASAP- son states has scheduled.     Severe obstructive sleep a dose.    Other orders  -     omeprazole 20 MG Oral Capsule Delayed Release; Take 1 capsule (20 mg total) by mouth 2 (two) times daily before meals. 1 tab po q am on empty stomach 45mins AC breakfast  -     BuPROPion HCl ER, SR, 150 MG Oral Tablet 12 Hr;  Ta Flex Sigmoidoscopy Screen every 10 years No results found for this or any previous visit. No flowsheet data found. Fecal Occult Blood Annually No results found for: FOB No flowsheet data found.     Glaucoma Screening      Ophthalmology Visit Annually: D SPECIFIC DISEASE MONITORING Internal Lab or Procedure External Lab or Procedure         Diabetes      HgbA1C  Annually HgbA1C (%)   Date Value   12/11/2018 7.4 (H)       No flowsheet data found.     Creat/alb ratio  Annually Malb/Cre Calc (ug/mg)   Date V

## 2018-12-11 NOTE — PROGRESS NOTES
Patient came in for fasting labs. Patient first draw attempt at right [facility] unsuccessful. Second attempt at right [facility]. 1 Green and 1 lavender  tube drawn.

## 2018-12-11 NOTE — PATIENT INSTRUCTIONS
Minerva Duenas's SCREENING SCHEDULE   Tests on this list are recommended by your physician but may not be covered, or covered at this frequency, by your insurer. Please check with your insurance carrier before scheduling to verify coverage.    [place] Screening  Covered up to Age 76     Colonoscopy Screen   Covered every 10 years- more often if abnormal Colonoscopy due on 09/26/1960 Update Health Maintenance if applicable    Flex Sigmoidoscopy Screen  Covered every 5 years No results found for this or a (Prevnar)  Covered Once after 65 Orders placed or performed in visit on 01/02/18   • PNEUMOCOCCAL VACC, 13 MARLEN IM    Please get once after your 65th birthday    Pneumococcal 23 (Pneumovax)  Covered Once after 65 Orders placed or performed in visit on 07/26 (the forms are also available in [facility])  www. [email or web address]. org  This link also has information from the [address] [place] [address] regarding Advance Directives. Colorectal Cancer Screening    Colorectal cancer is cancer in the colon or rectum.  It is spread to nearby organs or lymph nodes or to other parts of the body. Finding and removing polyps can help prevent cancer from forming. Your screening  Screening means looking for a health problem before you have symptoms.  During screening for colorectal rectum. A bowel prep is a liquid diet plus strong laxatives or enemas. You will be awake for the test, but you may be given medicine to help you relax.  At the start of the test, a healthcare provider who specializes in imaging tests (radiologist) places a a lab for testing. If a polyp can’t be removed, a sample of tissue is taken and the polyp might be removed later during surgery.  You will need to bring someone with you to drive you home after this test. Colonoscopy is the only screening test that lets you pueden desarrollarse y sangrar. Estos vasos pueden dañar zonas de la retina, causando visión borrosa y distorsionada. ¿Cuáles son Euna Passey? Esta enfermedad de los ojos se debe a la diabetes.  Con el tiempo, la diabetes melissa Sahu oftalmólogo le revisará los ojos con paradise lámpara de hendidura. La retinopatía diabética ocurre cuando la diabetes daña los vasos sanguíneos en la parte posterior del hal, lo que puede producir pérdida de visión.  Para detectarla a tiempo, hágase un e hal).  · Tomografía de coherencia ocular (OCT, por estella siglas en inglés) para crear paradise imagen de la retina usando ondas luminosas y mega si hay un escape de líquido en ciertas partes del hal. También permite medir el grosor de la retina.   Neda Gomez fluor cáncer colorrectal y estella factores de riesgo, y hable con mccrary proveedor de [address] detección. [place] podría salvarle la pat.   Factores de riesgo del cáncer colorrectal  Mccrary riesgo de tener cáncer colorrectal aumenta si usted:  · Ti detección  Las pruebas de detección tienen la finalidad de determinar si existe un problema de peggy antes de que aparezcan los síntomas.  Jean-Pierre estella pruebas de detección del cáncer colorrectal, mccrary proveedor de atención médica le hará preguntas sobre estella a prueba, gustavo es posible que le den [employer] ayudarán a relajarse. Al comienzo de la prueba, un radiólogo (proveedor de atención médica que se especializa en pruebas de diagnóstico por imágenes) le inserta un tubo marrero en el recto.  Bonnie tubo se medicamento para adormecerle. Luego, se utilizará un tubo yogesh y flexible con martine, llamado colonoscopio, que le introducirán suavemente en el recto hasta recorrer todo el colon. Las imágenes de mccrary colon se verán en paradise pantalla de video.  Si se encuentra a [address]. [facility], [facility]. Todos los derechos reservados. Esta información no pretende sustituir la atención médica profesional. Sólo mccrary médico puede diagnosticar y tratar un problema de peggy.         ¿Qué es la osteo puede disminuir la pérdida ósea manteniéndose activo y [place] consumo de calcio. Si usted tiene osteoporosis, puede aprender medidas de seguridad que podrá [employer].     Date Last Reviewed: 10/17/2015  © 9516-1149 The [employer], mariia. Necesidades diarias de [employer] 14 a los 18 años: 1,300 mg  [employer] 19 a los 30 años: 1,000 mg  [employer] 31 a los 50 años: 1,000 mg      Date Last Reviewed: 10/17/2015  © 2330-9986 The [address].  [address] Livia Diop, [place] peggy. Caminar para mantenerse en forma    Caminar para mantenerse en forma es beneficioso para todo el shanelle, incluso para aquellas personas que ya están en buena forma.  Caminar es paradise de las maneras más seguras de poner mccrary cuerpo en forma mediante actividad que puede practicarse sin peligro aunque se tenga johny presión arterial, diabetes, trastornos cardíacos u otros problemas. Hable con mccrary proveedor de atención médica antes de comenzar.   Date Last Reviewed: 5/9/2015  © 6884-9979 The [facility]

## 2018-12-16 PROBLEM — L97.522 SKIN ULCER OF LEFT FOOT WITH FAT LAYER EXPOSED (HCC): Status: RESOLVED | Noted: 2018-07-17 | Resolved: 2018-12-16

## 2018-12-16 PROBLEM — L97.512 SKIN ULCER OF TOE OF RIGHT FOOT WITH FAT LAYER EXPOSED (HCC): Status: RESOLVED | Noted: 2018-07-17 | Resolved: 2018-12-16

## 2018-12-19 ENCOUNTER — APPOINTMENT (OUTPATIENT)
Dept: LAB | Facility: HOSPITAL | Age: 58
End: 2018-12-19
Attending: SURGERY
Payer: MEDICARE

## 2018-12-19 ENCOUNTER — LAB ENCOUNTER (OUTPATIENT)
Dept: LAB | Facility: HOSPITAL | Age: 58
End: 2018-12-19
Attending: SURGERY
Payer: MEDICARE

## 2018-12-19 DIAGNOSIS — N18.6 ESRD (END STAGE RENAL DISEASE) (HCC): ICD-10-CM

## 2018-12-19 DIAGNOSIS — Z01.818 PRE-OP TESTING: ICD-10-CM

## 2018-12-19 DIAGNOSIS — Z01.818 PREOP TESTING: ICD-10-CM

## 2018-12-19 PROCEDURE — 36415 COLL VENOUS BLD VENIPUNCTURE: CPT

## 2018-12-19 PROCEDURE — 93010 ELECTROCARDIOGRAM REPORT: CPT | Performed by: INTERNAL MEDICINE

## 2018-12-19 PROCEDURE — 85730 THROMBOPLASTIN TIME PARTIAL: CPT

## 2018-12-19 PROCEDURE — 85610 PROTHROMBIN TIME: CPT

## 2018-12-19 PROCEDURE — 93005 ELECTROCARDIOGRAM TRACING: CPT

## 2018-12-26 ENCOUNTER — ANESTHESIA EVENT (OUTPATIENT)
Dept: CARDIAC SURGERY | Facility: HOSPITAL | Age: 58
End: 2018-12-26

## 2018-12-27 ENCOUNTER — ANESTHESIA (OUTPATIENT)
Dept: CARDIAC SURGERY | Facility: HOSPITAL | Age: 58
End: 2018-12-27

## 2018-12-27 ENCOUNTER — HOSPITAL ENCOUNTER (OUTPATIENT)
Facility: HOSPITAL | Age: 58
Setting detail: HOSPITAL OUTPATIENT SURGERY
Discharge: HOME | End: 2018-12-27
Attending: SURGERY | Admitting: SURGERY
Payer: MEDICARE

## 2018-12-27 VITALS
TEMPERATURE: 98 F | HEART RATE: 75 BPM | SYSTOLIC BLOOD PRESSURE: 131 MMHG | RESPIRATION RATE: 16 BRPM | WEIGHT: 147 LBS | OXYGEN SATURATION: 97 % | BODY MASS INDEX: 27.75 KG/M2 | DIASTOLIC BLOOD PRESSURE: 53 MMHG | HEIGHT: 61 IN

## 2018-12-27 DIAGNOSIS — Z01.818 PRE-OP TESTING: Primary | ICD-10-CM

## 2018-12-27 DIAGNOSIS — N18.6 ESRD (END STAGE RENAL DISEASE) (HCC): ICD-10-CM

## 2018-12-27 PROCEDURE — 05WY07Z REVISION OF AUTOLOGOUS TISSUE SUBSTITUTE IN UPPER VEIN, OPEN APPROACH: ICD-10-PCS | Performed by: SURGERY

## 2018-12-27 PROCEDURE — 82962 GLUCOSE BLOOD TEST: CPT

## 2018-12-27 PROCEDURE — 80048 BASIC METABOLIC PNL TOTAL CA: CPT | Performed by: SURGERY

## 2018-12-27 RX ORDER — BACITRACIN 50000 [USP'U]/1
INJECTION, POWDER, LYOPHILIZED, FOR SOLUTION INTRAMUSCULAR AS NEEDED
Status: DISCONTINUED | OUTPATIENT
Start: 2018-12-27 | End: 2018-12-27 | Stop reason: HOSPADM

## 2018-12-27 RX ORDER — CEFAZOLIN SODIUM/WATER 2 G/20 ML
SYRINGE (ML) INTRAVENOUS
Status: DISCONTINUED | OUTPATIENT
Start: 2018-12-27 | End: 2018-12-27

## 2018-12-27 RX ORDER — DEXTROSE MONOHYDRATE 25 G/50ML
50 INJECTION, SOLUTION INTRAVENOUS
Status: DISCONTINUED | OUTPATIENT
Start: 2018-12-27 | End: 2018-12-27

## 2018-12-27 RX ORDER — ONDANSETRON 2 MG/ML
INJECTION INTRAMUSCULAR; INTRAVENOUS
Status: COMPLETED
Start: 2018-12-27 | End: 2018-12-27

## 2018-12-27 RX ORDER — HYDROCODONE BITARTRATE AND ACETAMINOPHEN 5; 325 MG/1; MG/1
2 TABLET ORAL AS NEEDED
Status: DISCONTINUED | OUTPATIENT
Start: 2018-12-27 | End: 2018-12-27

## 2018-12-27 RX ORDER — BUPIVACAINE HYDROCHLORIDE 5 MG/ML
INJECTION, SOLUTION PERINEURAL AS NEEDED
Status: DISCONTINUED | OUTPATIENT
Start: 2018-12-27 | End: 2018-12-27 | Stop reason: HOSPADM

## 2018-12-27 RX ORDER — DIPHENHYDRAMINE HYDROCHLORIDE 50 MG/ML
12.5 INJECTION INTRAMUSCULAR; INTRAVENOUS AS NEEDED
Status: DISCONTINUED | OUTPATIENT
Start: 2018-12-27 | End: 2018-12-27

## 2018-12-27 RX ORDER — HYDROMORPHONE HYDROCHLORIDE 1 MG/ML
0.4 INJECTION, SOLUTION INTRAMUSCULAR; INTRAVENOUS; SUBCUTANEOUS EVERY 5 MIN PRN
Status: DISCONTINUED | OUTPATIENT
Start: 2018-12-27 | End: 2018-12-27

## 2018-12-27 RX ORDER — SODIUM CHLORIDE 9 MG/ML
INJECTION, SOLUTION INTRAVENOUS CONTINUOUS
Status: DISCONTINUED | OUTPATIENT
Start: 2018-12-27 | End: 2018-12-27

## 2018-12-27 RX ORDER — HYDROCODONE BITARTRATE AND ACETAMINOPHEN 5; 325 MG/1; MG/1
1 TABLET ORAL AS NEEDED
Status: DISCONTINUED | OUTPATIENT
Start: 2018-12-27 | End: 2018-12-27

## 2018-12-27 RX ORDER — NALOXONE HYDROCHLORIDE 0.4 MG/ML
80 INJECTION, SOLUTION INTRAMUSCULAR; INTRAVENOUS; SUBCUTANEOUS AS NEEDED
Status: DISCONTINUED | OUTPATIENT
Start: 2018-12-27 | End: 2018-12-27

## 2018-12-27 RX ORDER — ONDANSETRON 2 MG/ML
4 INJECTION INTRAMUSCULAR; INTRAVENOUS AS NEEDED
Status: DISCONTINUED | OUTPATIENT
Start: 2018-12-27 | End: 2018-12-27

## 2018-12-27 NOTE — ANESTHESIA PREPROCEDURE EVALUATION
PRE-OP EVALUATION    Patient Name: Mi De Anda    Pre-op Diagnosis: end stage renal disease    Procedure(s):  arteriovenous fistula revision  SA pending    Surgeon(s) and Role:     Gage Weller MD - Primary    Pre-op vitals reviewed. Dispersible Take 1 tablet (8 mg total) by mouth every 8 (eight) hours as needed for Nausea. Disp: 90 tablet Rfl: 1       Allergies: Ace Inhibitors; B12-Ca      Anesthesia Evaluation    Patient summary reviewed.     Anesthetic Complications  (+) history of a Component Value Date    WBC 6.54 11/16/2018    RBC 4.30 11/16/2018    HGB 12.5 11/16/2018    HCT 38.8 11/16/2018    MCV 90.2 11/16/2018    MCH 29.1 11/16/2018    MCHC 32.2 11/16/2018    RDW 14.7 11/16/2018     11/16/2018     Lab Results   Componen

## 2018-12-27 NOTE — ANESTHESIA POSTPROCEDURE EVALUATION
[address] [facility] Patient Status:  Inpatient   Age/Gender 62year old female MRN [id number]   Location [facility] Attending Tashia Marquez MD   [facility] Day # 0 PCP Elin Nelson DO       Anesthesia Pos

## 2018-12-27 NOTE — H&P
[facility]  Vascular Surgery     [facility] Patient Status:  Inpatient    1960 MRN [id number]   Location [facility] Attending Giovanni Garcia MD   Hosp Day # 0 PCP Claudine Walls DO       History of Pre cyst removal   • UPPER GI ENDO NO BARRETTS  5/15    antral erosions     Family History   Problem Relation Age of Onset   • Diabetes Father    • Breast Cancer Maternal Aunt 36   • Breast Cancer Paternal Aunt 36   • High Blood Pressure Mother    • High Blood CONSTITUTIONAL: denies fever, chills  ENT: denies sore throat, nasal drainage/congestion  CHEST/CVS: denies cough, sputum, trouble breathing/SOB, chest pain, LEON  GI: denies abdominal pain, heartburn, diarrhea, blood in bm, tarry bm, constipation,     related to not having this procedure. Thank you for allowing me to participate in the care of your patient.     Remediso Maldonado MD  12/27/2018  6:56 AM

## 2018-12-27 NOTE — OPERATIVE REPORT
[facility]    PATIENT'S NAME: JAYESH Darby   ATTENDING PHYSICIAN: Karena Chirinos M.D. OPERATING PHYSICIAN: Karena Chirinos M.D.    PATIENT ACCOUNT#:   [de-identified]    LOCATION:  [place] [address] [facility]  MEDICAL RECORD #:   [id number] previous incision and identified the basilic vein. I then encircled it, dissected it free from the surrounding subcutaneous tissues, encircled it with a vessel loop. I then placed a temporary bulldog on it and then reevaluated the cephalic vein.   The cep

## 2018-12-27 NOTE — BRIEF OP NOTE
Pre-Operative Diagnosis: end stage renal disease     Post-Operative Diagnosis: end stage renal disease      Procedure Performed:   Revision of left arm cephalic av fistula - ligation of basilic vein at median antecubital       Surgeon(s) and Role:     * Ga

## 2019-01-10 RX ORDER — RANITIDINE 300 MG/1
CAPSULE ORAL
Qty: 90 CAPSULE | Refills: 0 | OUTPATIENT
Start: 2019-01-10

## 2019-01-11 ENCOUNTER — LAB ENCOUNTER (OUTPATIENT)
Dept: LAB | Facility: HOSPITAL | Age: 59
End: 2019-01-11
Attending: INTERNAL MEDICINE
Payer: MEDICARE

## 2019-01-11 ENCOUNTER — PATIENT MESSAGE (OUTPATIENT)
Dept: FAMILY MEDICINE CLINIC | Facility: CLINIC | Age: 59
End: 2019-01-11

## 2019-01-11 ENCOUNTER — APPOINTMENT (OUTPATIENT)
Dept: LAB | Age: 59
End: 2019-01-11
Attending: FAMILY MEDICINE
Payer: MEDICARE

## 2019-01-11 DIAGNOSIS — Z78.9 PATIENT TRAVELS: Primary | ICD-10-CM

## 2019-01-11 LAB — HCV AB SERPL QL IA: NONREACTIVE

## 2019-01-11 PROCEDURE — 86803 HEPATITIS C AB TEST: CPT

## 2019-01-11 PROCEDURE — 36415 COLL VENOUS BLD VENIPUNCTURE: CPT

## 2019-01-11 PROCEDURE — 87389 HIV-1 AG W/HIV-1&-2 AB AG IA: CPT

## 2019-01-11 RX ORDER — RANITIDINE 300 MG/1
CAPSULE ORAL
Qty: 90 CAPSULE | Refills: 0 | Status: SHIPPED | OUTPATIENT
Start: 2019-01-11 | End: 2019-03-15

## 2019-01-11 NOTE — TELEPHONE ENCOUNTER
Please call patient. May restart ranitidine and avoid trigger foods. Typically caffeine, acidic foods, orange juice, lemon, lime's, tomatoes or tomato sauce such as pizza or spaghetti etc., also avoid alcohol, caffeine, mint and chocolate.   Please verify

## 2019-01-11 NOTE — TELEPHONE ENCOUNTER
From: Higinio Hernandez  To: Gely Lorenzo DO  Sent: 1/11/2019 2:53 PM CST  Subject: Prescription Question    Dr. Vira Lesch,    I have notice that since I no longer take RANITIDINE.  The top of my stomach hurts, as well as I wake up with nausea, and

## 2019-01-15 DIAGNOSIS — N18.4 CKD (CHRONIC KIDNEY DISEASE) STAGE 4, GFR 15-29 ML/MIN (HCC): ICD-10-CM

## 2019-01-15 DIAGNOSIS — I10 ESSENTIAL HYPERTENSION WITH GOAL BLOOD PRESSURE LESS THAN 140/90: ICD-10-CM

## 2019-01-16 ENCOUNTER — PATIENT MESSAGE (OUTPATIENT)
Dept: FAMILY MEDICINE CLINIC | Facility: CLINIC | Age: 59
End: 2019-01-16

## 2019-01-16 DIAGNOSIS — N18.6 TYPE 2 DIABETES MELLITUS WITH CHRONIC KIDNEY DISEASE ON CHRONIC DIALYSIS, WITHOUT LONG-TERM CURRENT USE OF INSULIN (HCC): Primary | ICD-10-CM

## 2019-01-16 DIAGNOSIS — E11.22 TYPE 2 DIABETES MELLITUS WITH CHRONIC KIDNEY DISEASE ON CHRONIC DIALYSIS, WITHOUT LONG-TERM CURRENT USE OF INSULIN (HCC): Primary | ICD-10-CM

## 2019-01-16 DIAGNOSIS — Z99.2 TYPE 2 DIABETES MELLITUS WITH CHRONIC KIDNEY DISEASE ON CHRONIC DIALYSIS, WITHOUT LONG-TERM CURRENT USE OF INSULIN (HCC): Primary | ICD-10-CM

## 2019-01-16 RX ORDER — MONTELUKAST SODIUM 10 MG/1
TABLET ORAL
Qty: 45 TABLET | Refills: 0 | OUTPATIENT
Start: 2019-01-16

## 2019-01-16 RX ORDER — AMLODIPINE BESYLATE 10 MG/1
TABLET ORAL
Qty: 90 TABLET | Refills: 0 | OUTPATIENT
Start: 2019-01-16

## 2019-01-16 NOTE — TELEPHONE ENCOUNTER
Pt requests a refill of amlodipine and montelukast, protocol failed. Unable to approve.     LOV with PCP 12/11/18    LF montelukast 12/11/18 #90 with 3 refill    Lf amlodipine 12/11/18 #90 with 1 refill  Spoke to Archie pedroza, pts granddaughter, pt preferred phar

## 2019-01-17 NOTE — TELEPHONE ENCOUNTER
From: Jonah Winslow  To: Clarissa Simmons DO  Sent: 1/16/2019 7:50 PM CST  Subject: Prescription Question    I need a script for test strip to test my blood sugar.  I do not see them in my medication list.    Allen Ansari

## 2019-01-21 ENCOUNTER — TELEPHONE (OUTPATIENT)
Dept: FAMILY MEDICINE CLINIC | Facility: CLINIC | Age: 59
End: 2019-01-21

## 2019-01-21 NOTE — TELEPHONE ENCOUNTER
Beverly Rahman states heme occult cards did not have a collection date, pt would need to resend cards

## 2019-01-23 DIAGNOSIS — E03.9 ACQUIRED HYPOTHYROIDISM: Primary | ICD-10-CM

## 2019-01-24 RX ORDER — CALCIUM CITRATE/VITAMIN D3 200MG-6.25
TABLET ORAL
Qty: 100 STRIP | Refills: 11 | Status: SHIPPED | OUTPATIENT
Start: 2019-01-24 | End: 2019-11-18

## 2019-01-24 RX ORDER — LEVOTHYROXINE SODIUM 88 UG/1
TABLET ORAL
Qty: 90 TABLET | Refills: 0 | Status: SHIPPED | OUTPATIENT
Start: 2019-01-24 | End: 2019-03-15

## 2019-01-24 NOTE — TELEPHONE ENCOUNTER
Pt requesting a refill of levothyroxine, protocol failed. Unable to approve.     LOV with PCP 12/11/18    LF 1/2/18 #90    Future Appointments   Date Time Provider Luci Loja   2/11/2019  8:40 AM Honorio Boss MD [facility] SUB GI   2/20/2019  8:3

## 2019-01-28 NOTE — TELEPHONE ENCOUNTER
Spoke to pts daughter in [place], on pts release. Informed her of no dates on collection cards. States pt is in [facility] and wont get back until 2/4/19. PT [facility] has an appt with Dr. Denis Colorado 2/11/9.  If not having a colonoscopy will call to d repeat testin

## 2019-02-24 ENCOUNTER — ANESTHESIA EVENT (OUTPATIENT)
Dept: ENDOSCOPY | Facility: HOSPITAL | Age: 59
End: 2019-02-24
Payer: MEDICARE

## 2019-03-07 ENCOUNTER — LAB ENCOUNTER (OUTPATIENT)
Dept: LAB | Facility: HOSPITAL | Age: 59
End: 2019-03-07
Attending: INTERNAL MEDICINE
Payer: MEDICARE

## 2019-03-07 ENCOUNTER — ANESTHESIA (OUTPATIENT)
Dept: ENDOSCOPY | Facility: HOSPITAL | Age: 59
End: 2019-03-07
Payer: MEDICARE

## 2019-03-07 ENCOUNTER — HOSPITAL ENCOUNTER (OUTPATIENT)
Facility: HOSPITAL | Age: 59
Setting detail: HOSPITAL OUTPATIENT SURGERY
Discharge: HOME | End: 2019-03-07
Attending: INTERNAL MEDICINE | Admitting: INTERNAL MEDICINE
Payer: MEDICARE

## 2019-03-07 VITALS
RESPIRATION RATE: 20 BRPM | SYSTOLIC BLOOD PRESSURE: 121 MMHG | HEIGHT: 61 IN | OXYGEN SATURATION: 100 % | TEMPERATURE: 98 F | HEART RATE: 66 BPM | DIASTOLIC BLOOD PRESSURE: 60 MMHG | WEIGHT: 147 LBS | BODY MASS INDEX: 27.75 KG/M2

## 2019-03-07 DIAGNOSIS — R12 CHRONIC HEARTBURN: ICD-10-CM

## 2019-03-07 DIAGNOSIS — R19.4 CHANGE IN BOWEL HABIT: ICD-10-CM

## 2019-03-07 DIAGNOSIS — Z01.818 PRE-OP TESTING: Primary | ICD-10-CM

## 2019-03-07 LAB
ANION GAP SERPL CALC-SCNC: 11 MMOL/L (ref 0–18)
BUN BLD-MCNC: 52 MG/DL (ref 7–18)
BUN/CREAT SERPL: 6 (ref 10–20)
CALCIUM BLD-MCNC: 8.7 MG/DL (ref 8.5–10.1)
CHLORIDE SERPL-SCNC: 104 MMOL/L (ref 98–107)
CO2 SERPL-SCNC: 25 MMOL/L (ref 21–32)
CREAT BLD-MCNC: 8.69 MG/DL (ref 0.55–1.02)
GLUCOSE BLD-MCNC: 141 MG/DL (ref 70–99)
GLUCOSE BLD-MCNC: 152 MG/DL (ref 70–99)
OSMOLALITY SERPL CALC.SUM OF ELEC: 307 MOSM/KG (ref 275–295)
POTASSIUM SERPL-SCNC: 6.5 MMOL/L (ref 3.5–5.1)
SODIUM SERPL-SCNC: 140 MMOL/L (ref 136–145)

## 2019-03-07 PROCEDURE — 82962 GLUCOSE BLOOD TEST: CPT

## 2019-03-07 PROCEDURE — 80048 BASIC METABOLIC PNL TOTAL CA: CPT

## 2019-03-07 PROCEDURE — 36415 COLL VENOUS BLD VENIPUNCTURE: CPT

## 2019-03-07 RX ORDER — SODIUM CHLORIDE 9 MG/ML
INJECTION, SOLUTION INTRAVENOUS CONTINUOUS
Status: DISCONTINUED | OUTPATIENT
Start: 2019-03-07 | End: 2019-03-07

## 2019-03-07 NOTE — H&P
[address] [facility] Patient Status:  Hospital Outpatient Surgery    1960 MRN [id number]   [facility] ENDOSCOPY Attending Naina Starr MD   [facility] Day # 0 PCP DO Gurvinder Osborne Performed by Kiley Richardson MD at [facility] CVOR   • A.V. FISTULA Left 9/20/2018    Performed by Kiley Richardson MD at [address] [place] [address]     • CATARACT     • COLONOSCOPY     • ESOPHAGOGASTRODUODENOSCOPY (EGD) N/A 5/19/2015    Performed by Timo Parks hemorrhoids. General: Denies fatigue, chills/fever, night sweats, weight loss, loss of appetite, weight gain, sleep disturbance.   Neurological: Denies frequent headaches, history of stroke, recent passing out, recent dizziness, convulsions/seizures, demen without obvious abnormality, atraumatic. Eyes: Conjunctivae/corneas clear. No scleral icterus. No conjunctival     hemorrhage. Nose: Nares normal.   Throat: Lips, mucosa, and tongue normal.  No thrush noted.    Neck: Soft, supple neck; trachea midline

## 2019-03-07 NOTE — ANESTHESIA PREPROCEDURE EVALUATION
PRE-OP EVALUATION    Patient Name: Beck Allred    Pre-op Diagnosis: Change in bowel habit [R19.4]  Chronic heartburn [R12]    Procedure(s):  ESOPHAGOGASTRODUODENOSCOPY AND COLONOSCOPY      Surgeon(s) and Role:     * Dusty Vitale MD - Ghassan Bean B12-Ca      Anesthesia Evaluation    Patient summary reviewed. Anesthetic Complications           GI/Hepatic/Renal             (+) chronic renal disease and ESRD                   Cardiovascular      ECG reviewed.                (+) hyperlipidemia

## 2019-03-11 ENCOUNTER — NURSE ONLY (OUTPATIENT)
Dept: FAMILY MEDICINE CLINIC | Facility: CLINIC | Age: 59
End: 2019-03-11
Payer: MEDICARE

## 2019-03-11 DIAGNOSIS — E11.69 HYPERLIPIDEMIA ASSOCIATED WITH TYPE 2 DIABETES MELLITUS (HCC): Primary | ICD-10-CM

## 2019-03-11 DIAGNOSIS — N18.6 TYPE 2 DIABETES MELLITUS WITH CHRONIC KIDNEY DISEASE ON CHRONIC DIALYSIS, WITHOUT LONG-TERM CURRENT USE OF INSULIN (HCC): ICD-10-CM

## 2019-03-11 DIAGNOSIS — E78.5 HYPERLIPIDEMIA ASSOCIATED WITH TYPE 2 DIABETES MELLITUS (HCC): Primary | ICD-10-CM

## 2019-03-11 DIAGNOSIS — E11.22 TYPE 2 DIABETES MELLITUS WITH CHRONIC KIDNEY DISEASE ON CHRONIC DIALYSIS, WITHOUT LONG-TERM CURRENT USE OF INSULIN (HCC): ICD-10-CM

## 2019-03-11 DIAGNOSIS — E03.9 ACQUIRED HYPOTHYROIDISM: ICD-10-CM

## 2019-03-11 DIAGNOSIS — Z99.2 TYPE 2 DIABETES MELLITUS WITH CHRONIC KIDNEY DISEASE ON CHRONIC DIALYSIS, WITHOUT LONG-TERM CURRENT USE OF INSULIN (HCC): ICD-10-CM

## 2019-03-11 LAB
ALBUMIN SERPL-MCNC: 3.8 G/DL (ref 3.4–5)
ALBUMIN/GLOB SERPL: 1 {RATIO} (ref 1–2)
ALP LIVER SERPL-CCNC: 156 U/L (ref 46–118)
ALT SERPL-CCNC: 30 U/L (ref 13–56)
ANION GAP SERPL CALC-SCNC: 13 MMOL/L (ref 0–18)
AST SERPL-CCNC: 22 U/L (ref 15–37)
BILIRUB SERPL-MCNC: 0.5 MG/DL (ref 0.1–2)
BUN BLD-MCNC: 51 MG/DL (ref 7–18)
BUN/CREAT SERPL: 6.3 (ref 10–20)
CALCIUM BLD-MCNC: 8.3 MG/DL (ref 8.5–10.1)
CHLORIDE SERPL-SCNC: 100 MMOL/L (ref 98–107)
CHOLEST SMN-MCNC: 135 MG/DL (ref ?–200)
CO2 SERPL-SCNC: 25 MMOL/L (ref 21–32)
CREAT BLD-MCNC: 8.04 MG/DL (ref 0.55–1.02)
EST. AVERAGE GLUCOSE BLD GHB EST-MCNC: 163 MG/DL (ref 68–126)
GLOBULIN PLAS-MCNC: 3.8 G/DL (ref 2.8–4.4)
GLUCOSE BLD-MCNC: 156 MG/DL (ref 70–99)
HBA1C MFR BLD HPLC: 7.3 % (ref ?–5.7)
HDLC SERPL-MCNC: 51 MG/DL (ref 40–59)
LDLC SERPL CALC-MCNC: 45 MG/DL (ref ?–100)
M PROTEIN MFR SERPL ELPH: 7.6 G/DL (ref 6.4–8.2)
NONHDLC SERPL-MCNC: 84 MG/DL (ref ?–130)
OSMOLALITY SERPL CALC.SUM OF ELEC: 303 MOSM/KG (ref 275–295)
POTASSIUM SERPL-SCNC: 4.7 MMOL/L (ref 3.5–5.1)
SODIUM SERPL-SCNC: 138 MMOL/L (ref 136–145)
T4 FREE SERPL-MCNC: 1.2 NG/DL (ref 0.8–1.7)
TRIGL SERPL-MCNC: 197 MG/DL (ref 30–149)
TSI SER-ACNC: 1.79 MIU/ML (ref 0.36–3.74)
VLDLC SERPL CALC-MCNC: 39 MG/DL (ref 0–30)

## 2019-03-11 PROCEDURE — 80053 COMPREHEN METABOLIC PANEL: CPT | Performed by: FAMILY MEDICINE

## 2019-03-11 PROCEDURE — 84439 ASSAY OF FREE THYROXINE: CPT | Performed by: FAMILY MEDICINE

## 2019-03-11 PROCEDURE — 84443 ASSAY THYROID STIM HORMONE: CPT | Performed by: FAMILY MEDICINE

## 2019-03-11 PROCEDURE — 83036 HEMOGLOBIN GLYCOSYLATED A1C: CPT | Performed by: FAMILY MEDICINE

## 2019-03-11 PROCEDURE — 80061 LIPID PANEL: CPT | Performed by: FAMILY MEDICINE

## 2019-03-11 PROCEDURE — 36415 COLL VENOUS BLD VENIPUNCTURE: CPT | Performed by: FAMILY MEDICINE

## 2019-03-11 NOTE — PROGRESS NOTES
Patient came in for fasting labs. Patient drawn out of right AC x 1 attempt. Green and chrisube drawn.

## 2019-03-13 ENCOUNTER — TELEPHONE (OUTPATIENT)
Dept: FAMILY MEDICINE CLINIC | Facility: CLINIC | Age: 59
End: 2019-03-13

## 2019-03-13 NOTE — TELEPHONE ENCOUNTER
Called Dr. Abrams Falling office to request Lupe's last eye exam report. Per medical records Dept, will fax report today (3/13/2019). Office fax and phone number including office ours given.

## 2019-03-14 ENCOUNTER — MED REC SCAN ONLY (OUTPATIENT)
Dept: FAMILY MEDICINE CLINIC | Facility: CLINIC | Age: 59
End: 2019-03-14

## 2019-03-15 ENCOUNTER — OFFICE VISIT (OUTPATIENT)
Dept: FAMILY MEDICINE CLINIC | Facility: CLINIC | Age: 59
End: 2019-03-15
Payer: MEDICARE

## 2019-03-15 VITALS
SYSTOLIC BLOOD PRESSURE: 114 MMHG | WEIGHT: 155 LBS | HEART RATE: 67 BPM | TEMPERATURE: 98 F | DIASTOLIC BLOOD PRESSURE: 66 MMHG | OXYGEN SATURATION: 99 % | RESPIRATION RATE: 18 BRPM | BODY MASS INDEX: 29 KG/M2

## 2019-03-15 DIAGNOSIS — J45.20 MILD INTERMITTENT ASTHMA WITHOUT COMPLICATION: ICD-10-CM

## 2019-03-15 DIAGNOSIS — T17.320A CHOKING DUE TO FOOD IN LARYNX, INITIAL ENCOUNTER: ICD-10-CM

## 2019-03-15 DIAGNOSIS — E03.9 ACQUIRED HYPOTHYROIDISM: ICD-10-CM

## 2019-03-15 DIAGNOSIS — G47.33 OBSTRUCTIVE SLEEP APNEA SYNDROME: ICD-10-CM

## 2019-03-15 DIAGNOSIS — I10 ESSENTIAL HYPERTENSION: ICD-10-CM

## 2019-03-15 DIAGNOSIS — E78.5 HYPERLIPIDEMIA ASSOCIATED WITH TYPE 2 DIABETES MELLITUS (HCC): ICD-10-CM

## 2019-03-15 DIAGNOSIS — E11.22 TYPE 2 DIABETES MELLITUS WITH CHRONIC KIDNEY DISEASE ON CHRONIC DIALYSIS, WITHOUT LONG-TERM CURRENT USE OF INSULIN (HCC): Primary | ICD-10-CM

## 2019-03-15 DIAGNOSIS — N18.6 TYPE 2 DIABETES MELLITUS WITH CHRONIC KIDNEY DISEASE ON CHRONIC DIALYSIS, WITHOUT LONG-TERM CURRENT USE OF INSULIN (HCC): Primary | ICD-10-CM

## 2019-03-15 DIAGNOSIS — Z99.2 TYPE 2 DIABETES MELLITUS WITH CHRONIC KIDNEY DISEASE ON CHRONIC DIALYSIS, WITHOUT LONG-TERM CURRENT USE OF INSULIN (HCC): Primary | ICD-10-CM

## 2019-03-15 DIAGNOSIS — E11.69 HYPERLIPIDEMIA ASSOCIATED WITH TYPE 2 DIABETES MELLITUS (HCC): ICD-10-CM

## 2019-03-15 PROCEDURE — 99214 OFFICE O/P EST MOD 30 MIN: CPT | Performed by: FAMILY MEDICINE

## 2019-03-15 RX ORDER — RANITIDINE 300 MG/1
CAPSULE ORAL
Qty: 90 CAPSULE | Refills: 1 | Status: SHIPPED | OUTPATIENT
Start: 2019-03-15 | End: 2019-07-03

## 2019-03-15 RX ORDER — BUPROPION HYDROCHLORIDE 150 MG/1
150 TABLET, EXTENDED RELEASE ORAL DAILY
Qty: 90 TABLET | Refills: 3 | Status: SHIPPED | OUTPATIENT
Start: 2019-03-15 | End: 2020-05-22

## 2019-03-15 RX ORDER — LEVOTHYROXINE SODIUM 88 UG/1
TABLET ORAL
Qty: 90 TABLET | Refills: 3 | Status: SHIPPED | OUTPATIENT
Start: 2019-03-15 | End: 2020-04-03

## 2019-03-15 RX ORDER — OLOPATADINE HYDROCHLORIDE 7 MG/ML
SOLUTION OPHTHALMIC
Refills: 3 | COMMUNITY
Start: 2019-01-16

## 2019-03-15 RX ORDER — AMLODIPINE BESYLATE 10 MG/1
10 TABLET ORAL
Qty: 90 TABLET | Refills: 1 | Status: SHIPPED | OUTPATIENT
Start: 2019-03-15 | End: 2019-07-03

## 2019-03-15 RX ORDER — ATORVASTATIN CALCIUM 80 MG/1
80 TABLET, FILM COATED ORAL NIGHTLY
Qty: 90 TABLET | Refills: 1 | Status: SHIPPED | OUTPATIENT
Start: 2019-03-15 | End: 2019-07-03

## 2019-03-15 RX ORDER — CARVEDILOL 25 MG/1
25 TABLET ORAL 2 TIMES DAILY WITH MEALS
Qty: 180 TABLET | Refills: 1 | Status: SHIPPED | OUTPATIENT
Start: 2019-03-15 | End: 2019-07-03

## 2019-03-15 RX ORDER — POLYETHYLENE GLYCOL 3350 17 G/17G
17 POWDER, FOR SOLUTION ORAL DAILY
COMMUNITY
End: 2019-05-03 | Stop reason: ALTCHOICE

## 2019-03-15 NOTE — PROGRESS NOTES
Patient presents with:  Diabetes: F/U on labs     DM, HL, HTN f/u. HPI:   Janene Cobb is a 62year old female who presents for recheck of her diabetes, HTN, lipids. Patient’s FBS have been 120.  Post prandial 130-150   Last HgbA1c was   HgbA1 the upper throat. Happening with solids. Will have bronchospasm and cough and chest tightness is relieved with the inhaler. Taking PPI and Zantac. Has happened in the mornings not associated to eating. No chronic cough.   Symptoms started in the past c Oral Capsule Delayed Release Take 1 capsule (20 mg total) by mouth 2 (two) times daily before meals.  1 tab po q am on empty stomach 45mins AC breakfast Disp: 60 capsule Rfl: 6   Montelukast Sodium 10 MG Oral Tab Take 1 tablet (10 mg total) by mouth nightly disorder    • Severe obstructive sleep apnea SPLIT NIGHT 5-9-16   • Sleep apnea     cpap   • Traction detachment of retina    • Unspecified disorder of thyroid    • Visual impairment     glasses   • Vitreous hemorrhage (HCC)    • Wears glasses       Past S HPI.    EXAM:   /66 (BP Location: Right arm, Patient Position: Sitting, Cuff Size: adult)   Pulse 67   Temp 97.6 °F (36.4 °C) (Oral)   Resp 18   Wt 155 lb   SpO2 99%   BMI 29.29 kg/m²   Vital Signs: As above. General: WD/WN in no acute distress. aerobic exercise weekly  <2g Na diet daily  Weight loss  Home b/p monitoring 5x weekly goal >140/90, bring readings and cuff to each visit recommended- declines since evaluated at dialysis  Continue current medication  - amLODIPine Besylate 10 MG Oral Tab; buPROPion HCl ER, SR, 150 MG Oral Tablet 12 Hr 90 tablet 3     Sig: Take 1 tablet (150 mg total) by mouth daily.        Health Maintenance:  Mammogram due on 01/12/2017  Diabetes Care Dilated Eye Exam due on 12/15/2018    Patient/Caregiver Education: Ramon Walden

## 2019-03-15 NOTE — PATIENT INSTRUCTIONS
As of October 6th 2014, the Drug Enforcement Agency [facility]) is reclassifying all hydrocodone combination medications from Schedule III to Schedule II. This includes medications such as Norco, Vicodin, Lortab, Zohydro, and Vicoprofen.      What this means for [address]. [address] [facility], [address] [place] [address]. All rights reserved. This information is not intended as a substitute for professional medical care. Always follow your healthcare professional's instructions.         Diabetes: Keeping Feet During office visits, take off your shoes and socks as soon as you get in the exam room. Ask your healthcare provider to examine your feet for problems. This will make it easier to find and treat small skin irritations before they get worse.  Regular checku your healthcare provider before starting any exercise program. Also mention if any exercise causes pain, redness, or other signs of foot problems.     Note: If you have any kind of break in the skin of your foot or ankle, keep the area clean.  Then call you your feet. If you have neuropathy, you could get a burn and not feel it. · Stop smoking. Smoking restricts blood flow and can make it harder for wounds to heal.  · Don't use sharp blades to trim your nails.  Use a nail clipper and file instead.   Have regu open-toed or open-heeled. If you have questions about what kinds of shoes and socks are best, talk to your healthcare team.  Get regular exercise  Regular exercise improves blood flow in your feet. It also increases foot strength and flexibility.  Gentle ex proteger estella pies y mantenerlos sanos.   Ponga especial cuidado  Estos son algunos consejoa para poner especial cuidado en estella pies:  · Inspeccione diariamente los pies para mega si hay algún problema, seth enrojecimiento, ampollas, grietas, resecamiento de sensibilidad en los pies. Si tiene neuropatía, es posible que necesite hacerse exámenes médicos más a menudo. Utilice [facility] [address] [place] [address] adecuado es muy importante.  Si algunas zonas de estella pies se steward dañado a consecuencia del exceso de p hacer ejercicios especiales para los pies. Solo consulte a mccrary proveedor de atención médica antes de comenzar un programa de ejercicios.  También mencione si algún ejercicio le causa dolor, enrojecimiento u otros signos de problemas en los pies.     Importan

## 2019-03-21 ENCOUNTER — TELEPHONE (OUTPATIENT)
Dept: FAMILY MEDICINE CLINIC | Facility: CLINIC | Age: 59
End: 2019-03-21

## 2019-03-21 DIAGNOSIS — E11.22 TYPE 2 DIABETES MELLITUS WITH CHRONIC KIDNEY DISEASE ON CHRONIC DIALYSIS, WITHOUT LONG-TERM CURRENT USE OF INSULIN (HCC): ICD-10-CM

## 2019-03-21 DIAGNOSIS — Z99.2 ESRD (END STAGE RENAL DISEASE) ON DIALYSIS (HCC): Primary | ICD-10-CM

## 2019-03-21 DIAGNOSIS — Z99.2 TYPE 2 DIABETES MELLITUS WITH CHRONIC KIDNEY DISEASE ON CHRONIC DIALYSIS, WITHOUT LONG-TERM CURRENT USE OF INSULIN (HCC): ICD-10-CM

## 2019-03-21 DIAGNOSIS — N18.6 ESRD (END STAGE RENAL DISEASE) ON DIALYSIS (HCC): Primary | ICD-10-CM

## 2019-03-21 DIAGNOSIS — N18.6 TYPE 2 DIABETES MELLITUS WITH CHRONIC KIDNEY DISEASE ON CHRONIC DIALYSIS, WITHOUT LONG-TERM CURRENT USE OF INSULIN (HCC): ICD-10-CM

## 2019-03-21 NOTE — TELEPHONE ENCOUNTER
Per Zenobia at [facility], pt needs to have port-a-cath removed, but d/t insurance coverage needs to be through [facility], referral for general surgery pended

## 2019-03-21 NOTE — TELEPHONE ENCOUNTER
Zenobia with [facility] called stating that they need a referral to remove patient catheter.   Please call her at [phone]

## 2019-03-22 NOTE — TELEPHONE ENCOUNTER
Informed pts EC Jose Luis regarding order placed for Dr. Justina Martins to schedule consult for removal of dialysis catheter. Provided contact information for Dr. Justina Martins.  PTs EC verbalized understanding

## 2019-03-27 ENCOUNTER — HOSPITAL ENCOUNTER (OUTPATIENT)
Dept: GENERAL RADIOLOGY | Age: 59
Discharge: HOME | End: 2019-03-27
Attending: FAMILY MEDICINE
Payer: MEDICARE

## 2019-03-27 DIAGNOSIS — T17.320A CHOKING DUE TO FOOD IN LARYNX, INITIAL ENCOUNTER: ICD-10-CM

## 2019-03-27 PROCEDURE — 74246 X-RAY XM UPR GI TRC 2CNTRST: CPT | Performed by: FAMILY MEDICINE

## 2019-03-28 ENCOUNTER — TELEPHONE (OUTPATIENT)
Dept: FAMILY MEDICINE CLINIC | Facility: CLINIC | Age: 59
End: 2019-03-28

## 2019-03-28 NOTE — TELEPHONE ENCOUNTER
LMOM to return call to the office. Provided pt office phone [phone] along with office hours given. LM using interpretor services [facility]#[id number]    ----- Message from Lily Martinez MD sent at 3/27/2019  9:38 PM CDT -----  Results reviewed.  Tests show n

## 2019-03-29 NOTE — TELEPHONE ENCOUNTER
LMOM to return call to the office as this is my 2nd attempt to try to reach you. Provided pt office phone [phone] along with office hours given.

## 2019-04-01 NOTE — TELEPHONE ENCOUNTER
LMOM to return call to the office as this was my 3rd and final attempt to try to reach you. Provided pt office phone [phone] along with office hours given. Also, mailed patient a letter to contact office. Closing encounter.

## 2019-04-02 ENCOUNTER — OFFICE VISIT (OUTPATIENT)
Dept: SURGERY | Facility: CLINIC | Age: 59
End: 2019-04-02
Payer: MEDICARE

## 2019-04-02 VITALS
DIASTOLIC BLOOD PRESSURE: 80 MMHG | HEART RATE: 73 BPM | SYSTOLIC BLOOD PRESSURE: 143 MMHG | BODY MASS INDEX: 28 KG/M2 | TEMPERATURE: 98 F | WEIGHT: 147.69 LBS

## 2019-04-02 DIAGNOSIS — D63.1 ANEMIA IN ESRD (END-STAGE RENAL DISEASE) (HCC): ICD-10-CM

## 2019-04-02 DIAGNOSIS — Z99.2 ESRD ON HEMODIALYSIS (HCC): ICD-10-CM

## 2019-04-02 DIAGNOSIS — Z99.2 VASCULAR DIALYSIS CATHETER IN PLACE (HCC): Primary | ICD-10-CM

## 2019-04-02 DIAGNOSIS — Z99.2 TYPE 2 DIABETES MELLITUS WITH CHRONIC KIDNEY DISEASE ON CHRONIC DIALYSIS, WITHOUT LONG-TERM CURRENT USE OF INSULIN (HCC): ICD-10-CM

## 2019-04-02 DIAGNOSIS — I77.0 A-V FISTULA (HCC): ICD-10-CM

## 2019-04-02 DIAGNOSIS — N18.6 TYPE 2 DIABETES MELLITUS WITH CHRONIC KIDNEY DISEASE ON CHRONIC DIALYSIS, WITHOUT LONG-TERM CURRENT USE OF INSULIN (HCC): ICD-10-CM

## 2019-04-02 DIAGNOSIS — G47.33 SEVERE OBSTRUCTIVE SLEEP APNEA: ICD-10-CM

## 2019-04-02 DIAGNOSIS — Z78.9 NON-ENGLISH SPEAKING PATIENT: ICD-10-CM

## 2019-04-02 DIAGNOSIS — N18.6 ESRD ON HEMODIALYSIS (HCC): ICD-10-CM

## 2019-04-02 DIAGNOSIS — J44.9 ASTHMA WITH COPD (CHRONIC OBSTRUCTIVE PULMONARY DISEASE) (HCC): ICD-10-CM

## 2019-04-02 DIAGNOSIS — N18.6 ANEMIA IN ESRD (END-STAGE RENAL DISEASE) (HCC): ICD-10-CM

## 2019-04-02 DIAGNOSIS — E11.22 TYPE 2 DIABETES MELLITUS WITH CHRONIC KIDNEY DISEASE ON CHRONIC DIALYSIS, WITHOUT LONG-TERM CURRENT USE OF INSULIN (HCC): ICD-10-CM

## 2019-04-02 PROCEDURE — 99204 OFFICE O/P NEW MOD 45 MIN: CPT | Performed by: SURGERY

## 2019-04-02 NOTE — H&P
New Patient Visit Note       Active Problems      1. Vascular dialysis catheter in place [facility])    2. A-V fistula ([facility]    3. Asthma with COPD (chronic obstructive pulmonary disease) ([facility]    4.  Type 2 diabetes mellitus with chronic kidney disease on chroni impairment     glasses   • Vitreous hemorrhage ([facility]    • Wears glasses      Past Surgical History:   Procedure Laterality Date   • A.V. FISTULA Left 12/27/2018    Performed by Ej Fontaine MD at [facility] CVOR   • A.V. FISTULA Left 9/20/2018    Performed by GLUCOSE TEST) In Vitro Strip, Check blood sugars twice daily and as needed for symptoms of hypoglycemia, Disp: 100 strip, Rfl: 11  •  omeprazole 20 MG Oral Capsule Delayed Release, Take 1 capsule (20 mg total) by mouth 2 (two) times daily before meals.  1 t Physical Findings   /80   Pulse 73   Temp 98.3 °F (36.8 °C) (Oral)   Wt 147 lb 11.3 oz   BMI 27.91 kg/m²   Physical Exam   Constitutional: She is oriented to person, place, and time. She appears well-developed and well-nourished.    HENT:   Head fistula cannot be established  The patient and her daughter are comfortable with this treatment plan and have no further questions at this time      Assessment  Vascular dialysis catheter in place [facility])  (primary encounter diagnosis)  A-V fistula ([facility] [address] [facility]  As

## 2019-04-16 ENCOUNTER — TELEPHONE (OUTPATIENT)
Dept: SURGERY | Facility: CLINIC | Age: 59
End: 2019-04-16

## 2019-04-16 NOTE — TELEPHONE ENCOUNTER
Britney Kee office phoned our office regarding pt. Inquiring pts ppt with Dr. Haydee Theodore and referrals used. Appears closed. Pt has hd one visit per appt with Dr. Haydee Theodore. Notified of above.

## 2019-04-18 ENCOUNTER — OFFICE VISIT (OUTPATIENT)
Dept: SURGERY | Facility: CLINIC | Age: 59
End: 2019-04-18
Payer: MEDICARE

## 2019-04-18 VITALS
DIASTOLIC BLOOD PRESSURE: 72 MMHG | SYSTOLIC BLOOD PRESSURE: 121 MMHG | HEART RATE: 70 BPM | BODY MASS INDEX: 29.44 KG/M2 | WEIGHT: 149.94 LBS | HEIGHT: 60 IN

## 2019-04-18 DIAGNOSIS — G47.33 SEVERE OBSTRUCTIVE SLEEP APNEA: ICD-10-CM

## 2019-04-18 DIAGNOSIS — N18.6 ESRD ON HEMODIALYSIS (HCC): ICD-10-CM

## 2019-04-18 DIAGNOSIS — I10 ESSENTIAL HYPERTENSION: ICD-10-CM

## 2019-04-18 DIAGNOSIS — E78.5 HYPERLIPIDEMIA ASSOCIATED WITH TYPE 2 DIABETES MELLITUS (HCC): ICD-10-CM

## 2019-04-18 DIAGNOSIS — E03.9 ACQUIRED HYPOTHYROIDISM: ICD-10-CM

## 2019-04-18 DIAGNOSIS — I15.0 RENOVASCULAR HYPERTENSION: ICD-10-CM

## 2019-04-18 DIAGNOSIS — D63.1 ANEMIA IN ESRD (END-STAGE RENAL DISEASE) (HCC): ICD-10-CM

## 2019-04-18 DIAGNOSIS — Z99.2 ESRD ON HEMODIALYSIS (HCC): ICD-10-CM

## 2019-04-18 DIAGNOSIS — Z99.2 TYPE 2 DIABETES MELLITUS WITH CHRONIC KIDNEY DISEASE ON CHRONIC DIALYSIS, WITHOUT LONG-TERM CURRENT USE OF INSULIN (HCC): ICD-10-CM

## 2019-04-18 DIAGNOSIS — J44.9 ASTHMA WITH COPD (CHRONIC OBSTRUCTIVE PULMONARY DISEASE) (HCC): ICD-10-CM

## 2019-04-18 DIAGNOSIS — E11.69 HYPERLIPIDEMIA ASSOCIATED WITH TYPE 2 DIABETES MELLITUS (HCC): ICD-10-CM

## 2019-04-18 DIAGNOSIS — E11.22 TYPE 2 DIABETES MELLITUS WITH CHRONIC KIDNEY DISEASE ON CHRONIC DIALYSIS, WITHOUT LONG-TERM CURRENT USE OF INSULIN (HCC): ICD-10-CM

## 2019-04-18 DIAGNOSIS — N18.6 ANEMIA IN ESRD (END-STAGE RENAL DISEASE) (HCC): ICD-10-CM

## 2019-04-18 DIAGNOSIS — N18.6 TYPE 2 DIABETES MELLITUS WITH CHRONIC KIDNEY DISEASE ON CHRONIC DIALYSIS, WITHOUT LONG-TERM CURRENT USE OF INSULIN (HCC): ICD-10-CM

## 2019-04-18 DIAGNOSIS — Z99.2 VASCULAR DIALYSIS CATHETER IN PLACE (HCC): Primary | ICD-10-CM

## 2019-04-18 PROCEDURE — 99213 OFFICE O/P EST LOW 20 MIN: CPT | Performed by: SURGERY

## 2019-04-18 RX ORDER — BUPROPION HYDROCHLORIDE 150 MG/1
150 TABLET, EXTENDED RELEASE ORAL DAILY
Qty: 90 TABLET | Refills: 3 | Status: CANCELLED | OUTPATIENT
Start: 2019-04-18

## 2019-04-18 RX ORDER — ATORVASTATIN CALCIUM 80 MG/1
80 TABLET, FILM COATED ORAL NIGHTLY
Qty: 90 TABLET | Refills: 1 | Status: CANCELLED | OUTPATIENT
Start: 2019-04-18

## 2019-04-18 RX ORDER — AMLODIPINE BESYLATE 10 MG/1
10 TABLET ORAL
Qty: 90 TABLET | Refills: 1 | Status: CANCELLED | OUTPATIENT
Start: 2019-04-18

## 2019-04-18 RX ORDER — LEVOTHYROXINE SODIUM 88 UG/1
TABLET ORAL
Qty: 90 TABLET | Refills: 3 | Status: CANCELLED | OUTPATIENT
Start: 2019-04-18

## 2019-04-19 RX ORDER — CETIRIZINE HYDROCHLORIDE 10 MG/1
10 TABLET ORAL DAILY
Qty: 90 TABLET | Refills: 0 | Status: SHIPPED | OUTPATIENT
Start: 2019-04-19 | End: 2019-07-01

## 2019-04-19 NOTE — TELEPHONE ENCOUNTER
Pt requesting refill of Atorvastatin 80 mg, levothyroxine 88 mcg, Amlodipine 10 mg, and bupropion 150 mg, refills medications ordered at last OV 3/15/19       Pt requesting refill of cetirizine 10 mg, passed protocol , refill approved, sent to pharmacy:

## 2019-04-19 NOTE — PROGRESS NOTES
Follow Up Visit Note       Active Problems      1. Vascular dialysis catheter in place [facility])    2. Anemia in ESRD (end-stage renal disease) ([facility]    3.  Type 2 diabetes mellitus with chronic kidney disease on chronic dialysis, without long-term current use hemorrhage [facility])    • Wears glasses      Past Surgical History:   Procedure Laterality Date   • A.V. FISTULA Left 12/27/2018    Performed by Concepcion Clay MD at [facility]   • A.V. FISTULA Left 9/20/2018    Performed by Concepcion Clay MD at [id number] [place] [address]. [address] blood sugars twice daily and as needed for symptoms of hypoglycemia, Disp: 100 strip, Rfl: 11  •  omeprazole 20 MG Oral Capsule Delayed Release, Take 1 capsule (20 mg total) by mouth 2 (two) times daily before meals.  1 tab po q am on empty stomach 45mins A Pulse 70   Ht 60\"   Wt 149 lb 14.6 oz   BMI 29.28 kg/m²   Physical Exam   Constitutional: She is oriented to person, place, and time. She appears well-developed and well-nourished. HENT:   Head: Normocephalic and atraumatic.    Eyes: Pupils are equal, ro catheter removal can be scheduled on a Monday or Wednesday pending the patient's dialysis schedule.   The patient's daughter will contact her nephrologist to discuss performing dialysis without heparin on the day prior to surgery  The patient's daughter leah

## 2019-04-24 ENCOUNTER — TELEPHONE (OUTPATIENT)
Dept: SURGERY | Facility: CLINIC | Age: 59
End: 2019-04-24

## 2019-04-24 DIAGNOSIS — N18.6 END STAGE RENAL DISEASE (HCC): Primary | ICD-10-CM

## 2019-04-25 ENCOUNTER — TELEPHONE (OUTPATIENT)
Dept: SURGERY | Facility: CLINIC | Age: 59
End: 2019-04-25

## 2019-04-25 DIAGNOSIS — N18.6 END STAGE RENAL DISEASE (HCC): Primary | ICD-10-CM

## 2019-05-03 ENCOUNTER — OFFICE VISIT (OUTPATIENT)
Dept: FAMILY MEDICINE CLINIC | Facility: CLINIC | Age: 59
End: 2019-05-03
Payer: MEDICARE

## 2019-05-03 VITALS
OXYGEN SATURATION: 100 % | BODY MASS INDEX: 31 KG/M2 | WEIGHT: 156.19 LBS | SYSTOLIC BLOOD PRESSURE: 114 MMHG | HEART RATE: 67 BPM | DIASTOLIC BLOOD PRESSURE: 62 MMHG | TEMPERATURE: 98 F | RESPIRATION RATE: 18 BRPM

## 2019-05-03 DIAGNOSIS — G47.33 SEVERE OBSTRUCTIVE SLEEP APNEA: ICD-10-CM

## 2019-05-03 DIAGNOSIS — Z78.9 NON-ENGLISH SPEAKING PATIENT: ICD-10-CM

## 2019-05-03 DIAGNOSIS — J44.9 ASTHMA WITH COPD (CHRONIC OBSTRUCTIVE PULMONARY DISEASE) (HCC): Primary | Chronic | ICD-10-CM

## 2019-05-03 DIAGNOSIS — K21.9 GASTROESOPHAGEAL REFLUX DISEASE, ESOPHAGITIS PRESENCE NOT SPECIFIED: ICD-10-CM

## 2019-05-03 PROCEDURE — 99214 OFFICE O/P EST MOD 30 MIN: CPT | Performed by: FAMILY MEDICINE

## 2019-05-03 RX ORDER — SUCRALFATE 1 G/1
1 TABLET ORAL
Qty: 120 TABLET | Refills: 2 | Status: SHIPPED | OUTPATIENT
Start: 2019-05-03 | End: 2019-09-30 | Stop reason: ALTCHOICE

## 2019-05-03 RX ORDER — LIDOCAINE AND PRILOCAINE 25; 25 MG/G; MG/G
CREAM TOPICAL
Refills: 11 | COMMUNITY
Start: 2019-03-22 | End: 2020-07-29

## 2019-05-03 NOTE — PROGRESS NOTES
CHIEF COMPLAINT: Patient presents with:   Follow - Up: discuss lab results     HPI:     Edilberto Germain is a 62year old female presents for discuss dysphagia with solids and completed esophagram. Able to swallow liquids but sensation of choking wit • Chronic kidney disease (CKD)    • Constipation    • Depression    • Diabetes (HCC)     no meds   • [facility] 1/26/15    Per Dr. Reyes [place]   • Dialysis patient [facility])     3xs weekly (T, Th, Sat)   • Diarrhea, unspecified    • Diastolic dys Aunt 36   • Breast Cancer Paternal Aunt 36   • Uterine Cancer Paternal Aunt    • High Blood Pressure Mother    • Ulcerative Colitis Mother    • High Blood Pressure Son    • Hypertension Son    • Depression Daughter    • Other (High Blood Pressure, and Pre- into the lungs every 4 (four) hours as needed for Wheezing or Shortness of Breath. Disp: 18 g Rfl: 0   calcium acetate 667 MG Oral Cap Take 1 capsule by mouth 3 (three) times daily with meals.  Disp:  Rfl:    Cholecalciferol (VITAMIN D) 2000 units Oral Cap None.     INDICATIONS:  T17.320A Food in larynx causing asphyxiation, initial encounter     PATIENT STATED HISTORY: (As transcribed by Technologist)  Patient with dysphagia with eating solids and liquids for 12 years.  Per her daughter in law, she has diffi Rosalind Daniels MD       REVIEWED THIS VISIT  ASSESSMENT/PLAN:   62year old female with    1.  Asthma with COPD (chronic obstructive pulmonary disease) (HCC)  - GASTRO - INTERNAL  - PULMONARY - INTERNAL  Difficult to discern if truly asthma is uncontrolled or have comp lications, allergies, or worsening or changing symptoms. Patient is to call with any side effects or complications from the treatments as a result of today.      Problem List:   Patient Active Problem List:     Hyperlipidemia associated with type 2 di

## 2019-05-03 NOTE — PATIENT INSTRUCTIONS
Consejos para controlar el reflujo de ácido (agruras)    Para controlar el reflujo de ácido es necesario hacer algunos cambios básicos en la alimentación y el estilo de pat. Los siguientes consejos pueden ser suficientes para aliviar el malestar.   [place]

## 2019-05-06 ENCOUNTER — TELEPHONE (OUTPATIENT)
Dept: SURGERY | Facility: CLINIC | Age: 59
End: 2019-05-06

## 2019-05-06 DIAGNOSIS — N18.6 END STAGE RENAL DISEASE (HCC): Primary | ICD-10-CM

## 2019-05-08 ENCOUNTER — TELEPHONE (OUTPATIENT)
Dept: SURGERY | Facility: CLINIC | Age: 59
End: 2019-05-08

## 2019-05-17 ENCOUNTER — OFFICE VISIT (OUTPATIENT)
Dept: FAMILY MEDICINE CLINIC | Facility: CLINIC | Age: 59
End: 2019-05-17
Payer: MEDICARE

## 2019-05-17 VITALS
WEIGHT: 154.31 LBS | DIASTOLIC BLOOD PRESSURE: 64 MMHG | SYSTOLIC BLOOD PRESSURE: 108 MMHG | RESPIRATION RATE: 18 BRPM | BODY MASS INDEX: 29 KG/M2 | HEART RATE: 71 BPM | TEMPERATURE: 98 F | OXYGEN SATURATION: 96 %

## 2019-05-17 DIAGNOSIS — E03.9 ACQUIRED HYPOTHYROIDISM: ICD-10-CM

## 2019-05-17 DIAGNOSIS — I15.0 RENOVASCULAR HYPERTENSION: ICD-10-CM

## 2019-05-17 DIAGNOSIS — Z01.818 PRE-OP EXAMINATION: Primary | ICD-10-CM

## 2019-05-17 DIAGNOSIS — E83.42 HYPOMAGNESEMIA: ICD-10-CM

## 2019-05-17 DIAGNOSIS — Z99.2 ESRD (END STAGE RENAL DISEASE) ON DIALYSIS (HCC): ICD-10-CM

## 2019-05-17 DIAGNOSIS — Z78.9 NON-ENGLISH SPEAKING PATIENT: ICD-10-CM

## 2019-05-17 DIAGNOSIS — H43.10 VITREOUS HEMORRHAGE, UNSPECIFIED LATERALITY (HCC): ICD-10-CM

## 2019-05-17 DIAGNOSIS — N18.6 ANEMIA IN ESRD (END-STAGE RENAL DISEASE) (HCC): ICD-10-CM

## 2019-05-17 DIAGNOSIS — K21.9 GASTROESOPHAGEAL REFLUX DISEASE WITHOUT ESOPHAGITIS: ICD-10-CM

## 2019-05-17 DIAGNOSIS — E11.22 TYPE 2 DIABETES MELLITUS WITH CHRONIC KIDNEY DISEASE ON CHRONIC DIALYSIS, WITHOUT LONG-TERM CURRENT USE OF INSULIN (HCC): ICD-10-CM

## 2019-05-17 DIAGNOSIS — N18.6 TYPE 2 DIABETES MELLITUS WITH CHRONIC KIDNEY DISEASE ON CHRONIC DIALYSIS, WITHOUT LONG-TERM CURRENT USE OF INSULIN (HCC): ICD-10-CM

## 2019-05-17 DIAGNOSIS — G47.33 SEVERE OBSTRUCTIVE SLEEP APNEA: ICD-10-CM

## 2019-05-17 DIAGNOSIS — D63.1 ANEMIA IN ESRD (END-STAGE RENAL DISEASE) (HCC): ICD-10-CM

## 2019-05-17 DIAGNOSIS — N18.6 ESRD (END STAGE RENAL DISEASE) ON DIALYSIS (HCC): ICD-10-CM

## 2019-05-17 DIAGNOSIS — F32.A DEPRESSIVE DISORDER: ICD-10-CM

## 2019-05-17 DIAGNOSIS — E55.9 VITAMIN D DEFICIENCY: ICD-10-CM

## 2019-05-17 DIAGNOSIS — E78.5 HYPERLIPIDEMIA ASSOCIATED WITH TYPE 2 DIABETES MELLITUS (HCC): ICD-10-CM

## 2019-05-17 DIAGNOSIS — J30.2 SEASONAL ALLERGIES: ICD-10-CM

## 2019-05-17 DIAGNOSIS — Z99.2 TYPE 2 DIABETES MELLITUS WITH CHRONIC KIDNEY DISEASE ON CHRONIC DIALYSIS, WITHOUT LONG-TERM CURRENT USE OF INSULIN (HCC): ICD-10-CM

## 2019-05-17 DIAGNOSIS — J44.9 ASTHMA WITH COPD (CHRONIC OBSTRUCTIVE PULMONARY DISEASE) (HCC): Chronic | ICD-10-CM

## 2019-05-17 DIAGNOSIS — E11.69 HYPERLIPIDEMIA ASSOCIATED WITH TYPE 2 DIABETES MELLITUS (HCC): ICD-10-CM

## 2019-05-17 PROCEDURE — 99214 OFFICE O/P EST MOD 30 MIN: CPT | Performed by: FAMILY MEDICINE

## 2019-05-17 NOTE — PROGRESS NOTES
1. Have you ever had a sleep study done?  yes    If yes, provide date:  3/23/2015    2. Have you ever been diagnosed with Sleep Apnea? yes  If yes, provide date:     3. Have you ever had a Sleep Study done in last 5 years?   yes If yes, provide date:     4

## 2019-05-17 NOTE — PROGRESS NOTES
CC: Preop exam.    HPI:   Thomas Stevens is a 62year old female who presents for a pre-operative physical exam requested  By Bree Cervantes. Patient is to have removal of right IJ  Used for dialysis previouly.  Now has functioning AV fistula le BEFORE BREAKFAST Disp: 90 tablet Rfl: 3   raNITIdine HCl 300 MG Oral Cap TAKE 1 CAPSULE(300 MG) BY MOUTH EVERY EVENING Disp: 90 capsule Rfl: 1   carvedilol 25 MG Oral Tab Take 1 tablet (25 mg total) by mouth 2 (two) times daily with meals.  Disp: 180 tablet pressure    • High cholesterol    • Hyperlipidemia    • Hypertension    • Irregular bowel habits    • Kidney failure    • Kidney failure, acute (HCC)    • Leg swelling    • Macular puckering of retina    • Moderate nonproliferative diabetic retinopathy(362 Use      Smoking status: Never Smoker      Smokeless tobacco: Never Used    Alcohol use: No    Drug use: No     Occ: retired  Exercise: minimal.  Diet: watches sugar closely   Allergies:     Ace Inhibitors          RENAL INSUFFICIENCY  B12-Ca 03/11/2019 163*   • Glucose 03/11/2019 156*   • Sodium 03/11/2019 138    • Potassium 03/11/2019 4.7    • Chloride 03/11/2019 100    • CO2 03/11/2019 25.0    • Anion Gap 03/11/2019 13    • BUN 03/11/2019 51*   • Creatinine 03/11/2019 8.04*   • BUN/CREA Rati ECG normal, patient medically cleared for procedure. Britney Kee, 05/17/19    ADDENDUM  Pt cleared for procedure.  NL ECG  Britney Kee, 05/22/19, 5:37 PM

## 2019-05-17 NOTE — PATIENT INSTRUCTIONS
As of October 6th 2014, the Drug Enforcement Agency [facility]) is reclassifying all hydrocodone combination medications from Schedule III to Schedule II. This includes medications such as Norco, Vicodin, Lortab, Zohydro, and Vicoprofen.      What this means for

## 2019-05-20 ENCOUNTER — TELEPHONE (OUTPATIENT)
Dept: FAMILY MEDICINE CLINIC | Facility: CLINIC | Age: 59
End: 2019-05-20

## 2019-05-20 ENCOUNTER — APPOINTMENT (OUTPATIENT)
Dept: LAB | Facility: HOSPITAL | Age: 59
End: 2019-05-20
Attending: FAMILY MEDICINE
Payer: MEDICARE

## 2019-05-20 ENCOUNTER — PATIENT OUTREACH (OUTPATIENT)
Dept: FAMILY MEDICINE CLINIC | Facility: CLINIC | Age: 59
End: 2019-05-20

## 2019-05-20 DIAGNOSIS — E11.69 HYPERLIPIDEMIA ASSOCIATED WITH TYPE 2 DIABETES MELLITUS (HCC): Primary | ICD-10-CM

## 2019-05-20 DIAGNOSIS — E03.9 ACQUIRED HYPOTHYROIDISM: ICD-10-CM

## 2019-05-20 DIAGNOSIS — N18.6 TYPE 2 DIABETES MELLITUS WITH CHRONIC KIDNEY DISEASE ON CHRONIC DIALYSIS, WITHOUT LONG-TERM CURRENT USE OF INSULIN (HCC): ICD-10-CM

## 2019-05-20 DIAGNOSIS — E78.5 HYPERLIPIDEMIA ASSOCIATED WITH TYPE 2 DIABETES MELLITUS (HCC): Primary | ICD-10-CM

## 2019-05-20 DIAGNOSIS — Z99.2 TYPE 2 DIABETES MELLITUS WITH CHRONIC KIDNEY DISEASE ON CHRONIC DIALYSIS, WITHOUT LONG-TERM CURRENT USE OF INSULIN (HCC): ICD-10-CM

## 2019-05-20 DIAGNOSIS — Z01.818 PRE-OP EXAMINATION: ICD-10-CM

## 2019-05-20 DIAGNOSIS — E11.22 TYPE 2 DIABETES MELLITUS WITH CHRONIC KIDNEY DISEASE ON CHRONIC DIALYSIS, WITHOUT LONG-TERM CURRENT USE OF INSULIN (HCC): ICD-10-CM

## 2019-05-20 PROCEDURE — 93010 ELECTROCARDIOGRAM REPORT: CPT | Performed by: INTERNAL MEDICINE

## 2019-05-20 PROCEDURE — 93005 ELECTROCARDIOGRAM TRACING: CPT

## 2019-05-20 NOTE — PROGRESS NOTES
Orders from [address] 3/15/2019 were ordered for [facility].  MD requested new orders be placed to be completed at [facility]. To be completed mid June, 6/13/2019

## 2019-05-23 ENCOUNTER — TELEPHONE (OUTPATIENT)
Dept: FAMILY MEDICINE CLINIC | Facility: CLINIC | Age: 59
End: 2019-05-23

## 2019-05-23 NOTE — TELEPHONE ENCOUNTER
----- Message from Hardeep Collins DO sent at 5/22/2019  5:39 PM CDT -----  Results reviewed. Tests show no significant abnormalities.   Please fax preop exam to presurgical testing at [facility] and call patient with clearance/NL ECG

## 2019-05-24 ENCOUNTER — ANESTHESIA EVENT (OUTPATIENT)
Dept: SURGERY | Facility: HOSPITAL | Age: 59
End: 2019-05-24
Payer: MEDICARE

## 2019-05-24 ENCOUNTER — ANESTHESIA (OUTPATIENT)
Dept: SURGERY | Facility: HOSPITAL | Age: 59
End: 2019-05-24
Payer: MEDICARE

## 2019-05-24 ENCOUNTER — HOSPITAL ENCOUNTER (OUTPATIENT)
Facility: HOSPITAL | Age: 59
Setting detail: HOSPITAL OUTPATIENT SURGERY
Discharge: HOME | End: 2019-05-24
Attending: SURGERY | Admitting: SURGERY
Payer: MEDICARE

## 2019-05-24 VITALS
DIASTOLIC BLOOD PRESSURE: 56 MMHG | HEIGHT: 61 IN | RESPIRATION RATE: 16 BRPM | SYSTOLIC BLOOD PRESSURE: 116 MMHG | BODY MASS INDEX: 29.51 KG/M2 | HEART RATE: 75 BPM | OXYGEN SATURATION: 96 % | WEIGHT: 156.31 LBS | TEMPERATURE: 98 F

## 2019-05-24 DIAGNOSIS — N18.6 END STAGE RENAL DISEASE (HCC): ICD-10-CM

## 2019-05-24 PROCEDURE — 82962 GLUCOSE BLOOD TEST: CPT

## 2019-05-24 PROCEDURE — 80048 BASIC METABOLIC PNL TOTAL CA: CPT

## 2019-05-24 PROCEDURE — 88300 SURGICAL PATH GROSS: CPT | Performed by: SURGERY

## 2019-05-24 PROCEDURE — 05PY33Z REMOVAL OF INFUSION DEVICE FROM UPPER VEIN, PERCUTANEOUS APPROACH: ICD-10-PCS | Performed by: SURGERY

## 2019-05-24 RX ORDER — DEXAMETHASONE SODIUM PHOSPHATE 4 MG/ML
4 VIAL (ML) INJECTION AS NEEDED
Status: DISCONTINUED | OUTPATIENT
Start: 2019-05-24 | End: 2019-05-24

## 2019-05-24 RX ORDER — DEXTROSE MONOHYDRATE 25 G/50ML
50 INJECTION, SOLUTION INTRAVENOUS
Status: DISCONTINUED | OUTPATIENT
Start: 2019-05-24 | End: 2019-05-24

## 2019-05-24 RX ORDER — HYDROMORPHONE HYDROCHLORIDE 1 MG/ML
0.4 INJECTION, SOLUTION INTRAMUSCULAR; INTRAVENOUS; SUBCUTANEOUS EVERY 5 MIN PRN
Status: DISCONTINUED | OUTPATIENT
Start: 2019-05-24 | End: 2019-05-24

## 2019-05-24 RX ORDER — ACETAMINOPHEN 500 MG
1000 TABLET ORAL ONCE
Status: DISCONTINUED | OUTPATIENT
Start: 2019-05-24 | End: 2019-05-24

## 2019-05-24 RX ORDER — HYDROCODONE BITARTRATE AND ACETAMINOPHEN 5; 325 MG/1; MG/1
1 TABLET ORAL AS NEEDED
Status: DISCONTINUED | OUTPATIENT
Start: 2019-05-24 | End: 2019-05-24

## 2019-05-24 RX ORDER — LIDOCAINE HYDROCHLORIDE 10 MG/ML
INJECTION, SOLUTION INFILTRATION; PERINEURAL AS NEEDED
Status: DISCONTINUED | OUTPATIENT
Start: 2019-05-24 | End: 2019-05-24 | Stop reason: HOSPADM

## 2019-05-24 RX ORDER — METOCLOPRAMIDE HYDROCHLORIDE 5 MG/ML
10 INJECTION INTRAMUSCULAR; INTRAVENOUS AS NEEDED
Status: DISCONTINUED | OUTPATIENT
Start: 2019-05-24 | End: 2019-05-24

## 2019-05-24 RX ORDER — HEPARIN SODIUM 5000 [USP'U]/ML
5000 INJECTION, SOLUTION INTRAVENOUS; SUBCUTANEOUS ONCE
Status: COMPLETED | OUTPATIENT
Start: 2019-05-24 | End: 2019-05-24

## 2019-05-24 RX ORDER — CEFAZOLIN SODIUM/WATER 2 G/20 ML
2 SYRINGE (ML) INTRAVENOUS ONCE
Status: COMPLETED | OUTPATIENT
Start: 2019-05-24 | End: 2019-05-24

## 2019-05-24 RX ORDER — SODIUM CHLORIDE, SODIUM LACTATE, POTASSIUM CHLORIDE, CALCIUM CHLORIDE 600; 310; 30; 20 MG/100ML; MG/100ML; MG/100ML; MG/100ML
INJECTION, SOLUTION INTRAVENOUS CONTINUOUS
Status: DISCONTINUED | OUTPATIENT
Start: 2019-05-24 | End: 2019-05-24

## 2019-05-24 RX ORDER — HYDROCODONE BITARTRATE AND ACETAMINOPHEN 5; 325 MG/1; MG/1
2 TABLET ORAL AS NEEDED
Status: DISCONTINUED | OUTPATIENT
Start: 2019-05-24 | End: 2019-05-24

## 2019-05-24 RX ORDER — NALOXONE HYDROCHLORIDE 0.4 MG/ML
80 INJECTION, SOLUTION INTRAMUSCULAR; INTRAVENOUS; SUBCUTANEOUS AS NEEDED
Status: DISCONTINUED | OUTPATIENT
Start: 2019-05-24 | End: 2019-05-24

## 2019-05-24 RX ORDER — SODIUM CHLORIDE 9 MG/ML
INJECTION, SOLUTION INTRAVENOUS CONTINUOUS
Status: DISCONTINUED | OUTPATIENT
Start: 2019-05-24 | End: 2019-05-24

## 2019-05-24 RX ORDER — ONDANSETRON 2 MG/ML
4 INJECTION INTRAMUSCULAR; INTRAVENOUS AS NEEDED
Status: DISCONTINUED | OUTPATIENT
Start: 2019-05-24 | End: 2019-05-24

## 2019-05-24 RX ORDER — ACETAMINOPHEN 500 MG
1000 TABLET ORAL ONCE
COMMUNITY

## 2019-05-24 RX ORDER — BUPIVACAINE HYDROCHLORIDE AND EPINEPHRINE 5; 5 MG/ML; UG/ML
INJECTION, SOLUTION EPIDURAL; INTRACAUDAL; PERINEURAL AS NEEDED
Status: DISCONTINUED | OUTPATIENT
Start: 2019-05-24 | End: 2019-05-24 | Stop reason: HOSPADM

## 2019-05-24 NOTE — ANESTHESIA POSTPROCEDURE EVALUATION
[facility] Patient Status:  Hospital Outpatient Surgery   Age/Gender 62year old female MRN [id number]   [facility] Attending Trae Fountain MD   [facility] Day # 0 PCP Jahaira Dennis DO       Anesthesia Pos

## 2019-05-24 NOTE — ANESTHESIA PREPROCEDURE EVALUATION
PRE-OP EVALUATION    Patient Name: Shereen Fernandes    Pre-op Diagnosis: [facility] [N18.6]    Procedure(s):  REMOVAL OF RIGHT INTERNAL JUGULAR GROSHONG DIALYSIS CATHETER    Surgeon(s) and Role:     Raza Gracia MD - Primary daily before meals. 1 tab po q am on empty stomach 45mins AC breakfast Disp: 60 capsule Rfl: 6   Montelukast Sodium 10 MG Oral Tab Take 1 tablet (10 mg total) by mouth nightly.  Disp: 90 tablet Rfl: 3   Albuterol Sulfate  (90 Base) MCG/ACT Inhalation Tobacco Use      Smoking status: Never Smoker      Smokeless tobacco: Never Used    Alcohol use: No      Drug use: No     Available pre-op labs reviewed.      Lab Results   Component Value Date     03/11/2019    K 4.7 03/11/2019     03/11/2019

## 2019-05-24 NOTE — OPERATIVE REPORT
[facility]  Operative Note    Larwence Heading Location: OR   CSN [id number] MRN [id number]   Admission Date 5/24/2019 Operation Date 5/24/2019   Attending Physician Kashmir Johnson MD Operating Physician Jeanie Durbin MD     Date of procedu daughter will contact her nephrologist to discuss performing dialysis without heparin on the day prior to surgery  The possibility of bleeding complications due to the patient's anticoagulation status due to chronic HD wa discussed with the patient and her the catheter was examined and found to be intact. The fibrinous capsule was excised to prevent seroma formation. The wound was then copiously irrigated and electrocautery was used for hemostasis.  The wound was then closed in a layered fashion using vicr

## 2019-05-24 NOTE — H&P
History & Physical Examination    Patient Name: Jayleen Figueroa  MRN: [id number]  [facility]: [id number]  YOB: 1960    Diagnosis: removal perma cath catheter    Present Illness: 26-year-old female with a history of hypertension, diabetes, chr AMOUNT TO ACCESS SITE 1 HOUR BEFORE TREATMENT. COVER WITH AN OCCLUSIVE DRESSING AS DIRECTED. Disp:  Rfl: 11 Taking   cetirizine 10 MG Oral Tab Take 1 tablet (10 mg total) by mouth daily.  Disp: 90 tablet Rfl: 0 Taking   PAZEO 0.7 % Ophthalmic Solution INT this encounter. Allergies:    Ace Inhibitors          RENAL INSUFFICIENCY  B12-Ca                  RASH    Comment:Vit b12 and calcium acetate combo    Past Medical History:   Diagnosis Date   • Allergic rhinitis    • Bad breath    • Cataracts, bilatera unsure but thinks she still has her cervix   • AMPARO BIOPSY STEREOTACTIC NODULE 1 SITE RIGHT  ??     BENIGN   • OTHER SURGICAL HISTORY  1990    cyst removal on thigh area of leg   • UPPER GI ENDO NO BARRETTS  5/15    antral erosions     Family History   Probl

## 2019-05-24 NOTE — BRIEF OP NOTE
Pre-Operative Diagnosis: End stage renal disease ([facility] [N18.6]- requires removal hemodialysis catheter     Post-Operative Diagnosis: End stage renal disease ([facility] [N18.6]      Procedure Performed:   Procedure(s):  REMOVAL OF RIGHT INTERNAL JUGULAR Wilbern Naguabo

## 2019-06-07 ENCOUNTER — OFFICE VISIT (OUTPATIENT)
Dept: SURGERY | Facility: CLINIC | Age: 59
End: 2019-06-07

## 2019-06-07 VITALS
DIASTOLIC BLOOD PRESSURE: 73 MMHG | SYSTOLIC BLOOD PRESSURE: 119 MMHG | BODY MASS INDEX: 30 KG/M2 | HEART RATE: 71 BPM | WEIGHT: 159 LBS | TEMPERATURE: 98 F

## 2019-06-07 DIAGNOSIS — N18.6 ESRD ON HEMODIALYSIS (HCC): ICD-10-CM

## 2019-06-07 DIAGNOSIS — Z99.2 ESRD ON HEMODIALYSIS (HCC): ICD-10-CM

## 2019-06-07 DIAGNOSIS — N18.6 TYPE 2 DIABETES MELLITUS WITH CHRONIC KIDNEY DISEASE ON CHRONIC DIALYSIS, WITHOUT LONG-TERM CURRENT USE OF INSULIN (HCC): ICD-10-CM

## 2019-06-07 DIAGNOSIS — E11.22 TYPE 2 DIABETES MELLITUS WITH CHRONIC KIDNEY DISEASE ON CHRONIC DIALYSIS, WITHOUT LONG-TERM CURRENT USE OF INSULIN (HCC): ICD-10-CM

## 2019-06-07 DIAGNOSIS — Z99.2 TYPE 2 DIABETES MELLITUS WITH CHRONIC KIDNEY DISEASE ON CHRONIC DIALYSIS, WITHOUT LONG-TERM CURRENT USE OF INSULIN (HCC): ICD-10-CM

## 2019-06-07 DIAGNOSIS — J44.9 ASTHMA WITH COPD (CHRONIC OBSTRUCTIVE PULMONARY DISEASE) (HCC): ICD-10-CM

## 2019-06-07 DIAGNOSIS — N18.6 END STAGE RENAL DISEASE (HCC): Primary | ICD-10-CM

## 2019-06-07 PROCEDURE — 99024 POSTOP FOLLOW-UP VISIT: CPT | Performed by: SURGERY

## 2019-06-10 ENCOUNTER — TELEPHONE (OUTPATIENT)
Dept: FAMILY MEDICINE CLINIC | Facility: CLINIC | Age: 59
End: 2019-06-10

## 2019-06-10 NOTE — TELEPHONE ENCOUNTER
Received signed medical records request/authorization form for Ralph Everett to disclose patient health information to the Facility identified below:     Facility / Provider Name: [facility] Address: [address] [place], [place] [address]

## 2019-06-26 ENCOUNTER — PATIENT MESSAGE (OUTPATIENT)
Dept: FAMILY MEDICINE CLINIC | Facility: CLINIC | Age: 59
End: 2019-06-26

## 2019-06-27 NOTE — TELEPHONE ENCOUNTER
From: Megha Rose  To:  Lety Rosales DO  Sent: 6/26/2019 7:50 AM CDT  Subject: Other    Can you please transfer the blood work to:    Bruce  [address] [address], [place] [address]

## 2019-06-28 PROBLEM — IMO0002 COMPLICATIONS AFFECTING OTHER SPECIFIED BODY SYSTEMS, HYPERTENSION: Status: ACTIVE | Noted: 2019-06-28

## 2019-06-28 PROBLEM — R80.9 PROTEINURIA: Status: ACTIVE | Noted: 2019-06-28

## 2019-06-28 PROBLEM — Z99.2 DEPENDENCE ON RENAL DIALYSIS (HCC): Status: ACTIVE | Noted: 2019-06-28

## 2019-06-28 PROBLEM — Z99.2 DEPENDENCE ON RENAL DIALYSIS: Status: ACTIVE | Noted: 2019-06-28

## 2019-06-28 PROBLEM — E21.3 HYPERPARATHYROIDISM (HCC): Status: ACTIVE | Noted: 2019-06-28

## 2019-07-01 RX ORDER — CETIRIZINE HYDROCHLORIDE 10 MG/1
TABLET ORAL
Qty: 90 TABLET | Refills: 1 | Status: SHIPPED | OUTPATIENT
Start: 2019-07-01 | End: 2019-09-30

## 2019-07-01 NOTE — TELEPHONE ENCOUNTER
Pt requesting refill of Cetrizine, passed protocol , refill approved, sent to pharmacy:     Last Time Medication was Filled:  4/19/2019 #90 with 0 refills     Last Office Visit with PCP: 5/17/2019    Future Appointments   Date Time Provider Luci Loja   7/3/2019  9:40 AM Elijah Myrick DO [facility] 30 [facility] [place]   7/27/2019  3:00 PM [facility] [place]   9/25/2019 10:30 AM Martha Arenas MD S[facility] 120 [place]

## 2019-07-03 ENCOUNTER — OFFICE VISIT (OUTPATIENT)
Dept: FAMILY MEDICINE CLINIC | Facility: CLINIC | Age: 59
End: 2019-07-03
Payer: MEDICARE

## 2019-07-03 VITALS
OXYGEN SATURATION: 97 % | TEMPERATURE: 98 F | DIASTOLIC BLOOD PRESSURE: 66 MMHG | HEART RATE: 67 BPM | SYSTOLIC BLOOD PRESSURE: 118 MMHG | RESPIRATION RATE: 20 BRPM | WEIGHT: 159.19 LBS | BODY MASS INDEX: 30 KG/M2

## 2019-07-03 DIAGNOSIS — N18.6 TYPE 2 DIABETES MELLITUS WITH CHRONIC KIDNEY DISEASE ON CHRONIC DIALYSIS, WITHOUT LONG-TERM CURRENT USE OF INSULIN (HCC): Primary | ICD-10-CM

## 2019-07-03 DIAGNOSIS — K21.9 GASTROESOPHAGEAL REFLUX DISEASE, ESOPHAGITIS PRESENCE NOT SPECIFIED: ICD-10-CM

## 2019-07-03 DIAGNOSIS — I10 ESSENTIAL HYPERTENSION: ICD-10-CM

## 2019-07-03 DIAGNOSIS — E11.3522: ICD-10-CM

## 2019-07-03 DIAGNOSIS — N18.6 ESRD ON HEMODIALYSIS (HCC): ICD-10-CM

## 2019-07-03 DIAGNOSIS — E11.69 HYPERLIPIDEMIA ASSOCIATED WITH TYPE 2 DIABETES MELLITUS (HCC): ICD-10-CM

## 2019-07-03 DIAGNOSIS — E11.22 TYPE 2 DIABETES MELLITUS WITH CHRONIC KIDNEY DISEASE ON CHRONIC DIALYSIS, WITHOUT LONG-TERM CURRENT USE OF INSULIN (HCC): Primary | ICD-10-CM

## 2019-07-03 DIAGNOSIS — Z12.11 SCREENING FOR COLON CANCER: ICD-10-CM

## 2019-07-03 DIAGNOSIS — Z99.2 ESRD ON HEMODIALYSIS (HCC): ICD-10-CM

## 2019-07-03 DIAGNOSIS — Z99.2 TYPE 2 DIABETES MELLITUS WITH CHRONIC KIDNEY DISEASE ON CHRONIC DIALYSIS, WITHOUT LONG-TERM CURRENT USE OF INSULIN (HCC): Primary | ICD-10-CM

## 2019-07-03 DIAGNOSIS — E78.5 HYPERLIPIDEMIA ASSOCIATED WITH TYPE 2 DIABETES MELLITUS (HCC): ICD-10-CM

## 2019-07-03 PROBLEM — R29.898 LEG WEAKNESS, BILATERAL: Status: RESOLVED | Noted: 2018-04-26 | Resolved: 2019-07-03

## 2019-07-03 PROCEDURE — 99214 OFFICE O/P EST MOD 30 MIN: CPT | Performed by: FAMILY MEDICINE

## 2019-07-03 RX ORDER — GEMFIBROZIL 600 MG/1
600 TABLET, FILM COATED ORAL
Refills: 0 | Status: CANCELLED | OUTPATIENT
Start: 2019-07-03

## 2019-07-03 RX ORDER — AMLODIPINE BESYLATE 10 MG/1
10 TABLET ORAL
Qty: 90 TABLET | Refills: 1 | Status: SHIPPED | OUTPATIENT
Start: 2019-07-03 | End: 2019-09-30

## 2019-07-03 RX ORDER — ATORVASTATIN CALCIUM 80 MG/1
80 TABLET, FILM COATED ORAL NIGHTLY
Qty: 90 TABLET | Refills: 1 | Status: SHIPPED | OUTPATIENT
Start: 2019-07-03 | End: 2019-09-30

## 2019-07-03 RX ORDER — RANITIDINE 300 MG/1
CAPSULE ORAL
Qty: 90 CAPSULE | Refills: 1 | Status: SHIPPED | OUTPATIENT
Start: 2019-07-03 | End: 2019-09-30

## 2019-07-03 RX ORDER — CARVEDILOL 25 MG/1
25 TABLET ORAL 2 TIMES DAILY WITH MEALS
Qty: 180 TABLET | Refills: 1 | Status: SHIPPED | OUTPATIENT
Start: 2019-07-03 | End: 2019-09-30

## 2019-07-03 NOTE — PROGRESS NOTES
Patient presents with:  Diabetes    DM, HL, HTN f/u. HPI:   Amado Gates is a 62year old female who presents for recheck of her diabetes, HTN, lipids. Patient’s FBS have been not checking regularly. Checked at dialysis and less than 180.  Pos Encounters:  07/03/19 : 159 lb 3.2 oz  06/07/19 : 159 lb  05/24/19 : 156 lb 4.9 oz  05/20/19 : 158 lb  05/17/19 : 154 lb 4.8 oz  05/03/19 : 156 lb 3.2 oz    HEMOGLOBIN A1c (% of total Hgb)   Date Value   06/26/2019 6.8 (H)     HgbA1C (%)   Date Value   03/ TAKE 1 CAPSULE(300 MG) BY MOUTH EVERY EVENING Disp: 90 capsule Rfl: 1   carvedilol 25 MG Oral Tab Take 1 tablet (25 mg total) by mouth 2 (two) times daily with meals.  Disp: 180 tablet Rfl: 1   buPROPion HCl ER, SR, 150 MG Oral Tablet 12 Hr Take 1 tablet (1 Irregular bowel habits    • Kidney failure    • Kidney failure, acute (HCC)    • Leg swelling    • Macular puckering of retina    • Moderate nonproliferative diabetic retinopathy(362.05)     right eye   • PONV (postoperative nausea and vomiting)    • Prere Blood Pressure Son    • Depression Daughter        Ace Inhibitors          RENAL INSUFFICIENCY  B12-Ca                  RASH    Comment:Vit b12 and calcium acetate combo    All PFSH have been reviewed and agree.   REVIEW OF SYSTEMS:   Positive for stated co weightbearing exercises 30 minutes daily recommended  Low-carb diet less than 100 g daily  Follow-up eye exam September 2019-Dr. Couch Links  Repeat labs in 3 months    2.  Screening for colon cancer  - OCCULT BLOOD, FECAL, IMMUNOASSAY-complete  And if neg 01/12/2017    Patient/Caregiver Education: Patient/Caregiver Education: There are no barriers to learning. Medical education done. Outcome: Patient verbalizes understanding.  Patient is notified to call with any questions, complications, allergies, or wor

## 2019-07-03 NOTE — PATIENT INSTRUCTIONS
As of October 6th 2014, the Drug Enforcement Agency [facility]) is reclassifying all hydrocodone combination medications from Schedule III to Schedule II. This includes medications such as Norco, Vicodin, Lortab, Zohydro, and Vicoprofen.      What this means for lenora. Cuando el nivel de colesterol es muy alto, se acumula en las grant de los vasos sanguíneos, haciendo que estos se estrechen y se reduzca el flujo de Elkin. Usted tendrá más probabilidades de padecer un ataque al corazón o al cerebro.   El coleste pescado todas las semanas. La mayoría de pescados contienen ácidos grasos omega-3 que ayudan a reducir el nivel de colesterol en la [place]. ? Coma más alimentos de grano integral que contengan fibra soluble (seth el salvado de marc).  Estos alimentos redu no pretende sustituir la atención médica profesional. Sólo mccrary médico puede diagnosticar y tratar un problema de peggy. Diabetes: Keeping Feet Healthy     Have your feet checked every time you see your healthcare provider, and at least once a year. healthcare provider to examine your feet for problems. This will make it easier to find and treat small skin irritations before they get worse. Regular checkups can also help keep track of the blood flow and feeling in your feet.  If you have neuropathy, yo causes pain, redness, or other signs of foot problems.     Note: If you have any kind of break in the skin of your foot or ankle, keep the area clean. Then call your healthcare provider—especially if the area doesn’t appear to be healing.    Date Last Revie

## 2019-07-04 NOTE — PROGRESS NOTES
Follow Up Visit Note       Active Problems      1. End stage [facility] disease ([facility]    2. Type 2 diabetes mellitus with chronic kidney disease on chronic dialysis, without long-term current use of insulin ([facility]    3. ESRD on hemodialysis ([facility]    4.  Asthma wi A. V. FISTULA Left 12/27/2018    Performed by Sheryl Gutiérrez MD at [facility]   • A.V. FISTULA Left 9/20/2018    Performed by Sheryl Gutiérrez MD at [facility]     • CATARACT     • CATHETER REMOVAL Right 5/24/2019    Performed by Dexter Garcia Oral Capsule Delayed Release, Take 1 capsule (20 mg total) by mouth 2 (two) times daily before meals.  1 tab po q am on empty stomach 45mins AC breakfast, Disp: 60 capsule, Rfl: 6  •  Montelukast Sodium 10 MG Oral Tab, Take 1 tablet (10 mg total) by mouth n 2018  The patient underwent a left median antecubital nerve to brachial artery AV fistula placement by Dr. Ragsdale in September 2018   The patient is primarily Kazakh-speaking  She underwent a revision of this AV fistula in December 2018   The patient is h

## 2019-07-05 PROBLEM — E11.29 TYPE 2 DIABETES MELLITUS WITH KIDNEY COMPLICATION, WITHOUT LONG-TERM CURRENT USE OF INSULIN (HCC): Status: ACTIVE | Noted: 2019-07-05

## 2019-07-27 ENCOUNTER — HOSPITAL ENCOUNTER (OUTPATIENT)
Dept: MAMMOGRAPHY | Age: 59
Discharge: HOME | End: 2019-07-27
Attending: FAMILY MEDICINE
Payer: MEDICARE

## 2019-07-27 DIAGNOSIS — Z12.39 SCREENING FOR MALIGNANT NEOPLASM OF BREAST: ICD-10-CM

## 2019-07-27 PROCEDURE — 77067 SCR MAMMO BI INCL CAD: CPT | Performed by: FAMILY MEDICINE

## 2019-07-27 PROCEDURE — 77063 BREAST TOMOSYNTHESIS BI: CPT | Performed by: FAMILY MEDICINE

## 2019-07-30 ENCOUNTER — TELEPHONE (OUTPATIENT)
Dept: FAMILY MEDICINE CLINIC | Facility: CLINIC | Age: 59
End: 2019-07-30

## 2019-07-30 NOTE — TELEPHONE ENCOUNTER
Left detailed message informing of normal mammogram results and instructed to contact office with additional questions.

## 2019-07-30 NOTE — TELEPHONE ENCOUNTER
----- Message from Juan Nichole DO sent at 7/30/2019 12:33 PM CDT -----  Results reviewed. Released to [address] Tests show no significant abnormalities.

## 2019-08-26 ENCOUNTER — PATIENT MESSAGE (OUTPATIENT)
Dept: FAMILY MEDICINE CLINIC | Facility: CLINIC | Age: 59
End: 2019-08-26

## 2019-08-26 DIAGNOSIS — N18.6 ESRD ON HEMODIALYSIS (HCC): Primary | ICD-10-CM

## 2019-08-26 DIAGNOSIS — Z99.2 ESRD (END STAGE RENAL DISEASE) ON DIALYSIS (HCC): ICD-10-CM

## 2019-08-26 DIAGNOSIS — Z99.2 ESRD ON HEMODIALYSIS (HCC): Primary | ICD-10-CM

## 2019-08-26 DIAGNOSIS — N18.6 ESRD (END STAGE RENAL DISEASE) ON DIALYSIS (HCC): ICD-10-CM

## 2019-08-26 NOTE — TELEPHONE ENCOUNTER
Epic is not letting me place a referral for nephrology external.  Can you please verify with family the name of the doctor and specialties she is seen on September 30th so I can place the referral.  Thank you.

## 2019-08-26 NOTE — TELEPHONE ENCOUNTER
From: Shereen Fernandes  To: Rufino Kntuson DO  Sent: 8/26/2019 11:58 AM CDT  Subject: Referral Request    Dr. Reji Christianson,    We need a referral to start the whole kidney transplant.  As of right now [facility] is within network but they cannot confirm until

## 2019-09-09 ENCOUNTER — TELEPHONE (OUTPATIENT)
Dept: FAMILY MEDICINE CLINIC | Facility: CLINIC | Age: 59
End: 2019-09-09

## 2019-09-09 ENCOUNTER — MED REC SCAN ONLY (OUTPATIENT)
Dept: FAMILY MEDICINE CLINIC | Facility: CLINIC | Age: 59
End: 2019-09-09

## 2019-09-12 NOTE — TELEPHONE ENCOUNTER
Spoke with daughter and relayed message. She verbalized understanding and agreed with plan.    States she will contact [facility] and call us back with provider information

## 2019-09-24 ENCOUNTER — TELEPHONE (OUTPATIENT)
Dept: FAMILY MEDICINE CLINIC | Facility: CLINIC | Age: 59
End: 2019-09-24

## 2019-09-24 LAB
ALBUMIN/GLOBULIN RATIO: 1.6 (CALC) (ref 1–2.5)
ALBUMIN: 4.4 G/DL (ref 3.6–5.1)
ALKALINE PHOSPHATASE: 163 U/L (ref 33–130)
ALT: 13 U/L (ref 6–29)
AST: 17 U/L (ref 10–35)
BILIRUBIN, TOTAL: 0.8 MG/DL (ref 0.2–1.2)
BUN/CREATININE RATIO: 8 (CALC) (ref 6–22)
BUN: 71 MG/DL (ref 7–25)
CALCIUM: 8.2 MG/DL (ref 8.6–10.4)
CARBON DIOXIDE: 27 MMOL/L (ref 20–32)
CHLORIDE: 99 MMOL/L (ref 98–110)
CHOL/HDLC RATIO: 2.6 (CALC)
CHOLESTEROL, TOTAL: 117 MG/DL
CREATININE: 9.07 MG/DL (ref 0.5–1.05)
EGFR IF AFRICN AM: 5 ML/MIN/1.73M2
EGFR IF NONAFRICN AM: 4 ML/MIN/1.73M2
GLOBULIN: 2.8 G/DL (CALC) (ref 1.9–3.7)
GLUCOSE: 187 MG/DL (ref 65–99)
HDL CHOLESTEROL: 45 MG/DL
HEMOGLOBIN A1C: 6.6 % OF TOTAL HGB
HEPATITIS A AB, TOTAL: REACTIVE
LDL-CHOLESTEROL: 47 MG/DL (CALC)
NON-HDL CHOLESTEROL: 72 MG/DL (CALC)
POTASSIUM: 5.1 MMOL/L (ref 3.5–5.3)
PROTEIN, TOTAL: 7.2 G/DL (ref 6.1–8.1)
SODIUM: 140 MMOL/L (ref 135–146)
TRIGLYCERIDES: 178 MG/DL

## 2019-09-30 ENCOUNTER — OFFICE VISIT (OUTPATIENT)
Dept: FAMILY MEDICINE CLINIC | Facility: CLINIC | Age: 59
End: 2019-09-30
Payer: MEDICARE

## 2019-09-30 VITALS
SYSTOLIC BLOOD PRESSURE: 138 MMHG | TEMPERATURE: 98 F | RESPIRATION RATE: 20 BRPM | WEIGHT: 163 LBS | HEART RATE: 83 BPM | BODY MASS INDEX: 30.78 KG/M2 | HEIGHT: 61 IN | DIASTOLIC BLOOD PRESSURE: 80 MMHG | OXYGEN SATURATION: 98 %

## 2019-09-30 DIAGNOSIS — E11.22 TYPE 2 DIABETES MELLITUS WITH CHRONIC KIDNEY DISEASE ON CHRONIC DIALYSIS, WITHOUT LONG-TERM CURRENT USE OF INSULIN (HCC): Primary | ICD-10-CM

## 2019-09-30 DIAGNOSIS — I15.0 RENOVASCULAR HYPERTENSION: ICD-10-CM

## 2019-09-30 DIAGNOSIS — Z99.2 TYPE 2 DIABETES MELLITUS WITH CHRONIC KIDNEY DISEASE ON CHRONIC DIALYSIS, WITHOUT LONG-TERM CURRENT USE OF INSULIN (HCC): Primary | ICD-10-CM

## 2019-09-30 DIAGNOSIS — K21.9 GASTROESOPHAGEAL REFLUX DISEASE, ESOPHAGITIS PRESENCE NOT SPECIFIED: ICD-10-CM

## 2019-09-30 DIAGNOSIS — E78.5 HYPERLIPIDEMIA ASSOCIATED WITH TYPE 2 DIABETES MELLITUS (HCC): ICD-10-CM

## 2019-09-30 DIAGNOSIS — Z76.82 KIDNEY TRANSPLANT CANDIDATE: ICD-10-CM

## 2019-09-30 DIAGNOSIS — E11.69 HYPERLIPIDEMIA ASSOCIATED WITH TYPE 2 DIABETES MELLITUS (HCC): ICD-10-CM

## 2019-09-30 DIAGNOSIS — N18.6 TYPE 2 DIABETES MELLITUS WITH CHRONIC KIDNEY DISEASE ON CHRONIC DIALYSIS, WITHOUT LONG-TERM CURRENT USE OF INSULIN (HCC): Primary | ICD-10-CM

## 2019-09-30 PROCEDURE — 99214 OFFICE O/P EST MOD 30 MIN: CPT | Performed by: FAMILY MEDICINE

## 2019-09-30 RX ORDER — ATORVASTATIN CALCIUM 80 MG/1
80 TABLET, FILM COATED ORAL NIGHTLY
Qty: 90 TABLET | Refills: 1 | Status: SHIPPED | OUTPATIENT
Start: 2019-09-30 | End: 2019-12-30

## 2019-09-30 RX ORDER — AMLODIPINE BESYLATE 10 MG/1
10 TABLET ORAL
Qty: 90 TABLET | Refills: 1 | Status: SHIPPED | OUTPATIENT
Start: 2019-09-30 | End: 2020-07-30

## 2019-09-30 RX ORDER — FAMOTIDINE 20 MG/1
20 TABLET ORAL 2 TIMES DAILY
Qty: 180 TABLET | Refills: 0 | Status: SHIPPED | OUTPATIENT
Start: 2019-09-30 | End: 2019-12-30

## 2019-09-30 RX ORDER — CETIRIZINE HYDROCHLORIDE 10 MG/1
10 TABLET ORAL
Qty: 90 TABLET | Refills: 1 | Status: SHIPPED | OUTPATIENT
Start: 2019-09-30 | End: 2020-05-01

## 2019-09-30 RX ORDER — RANITIDINE 300 MG/1
CAPSULE ORAL
Qty: 90 CAPSULE | Refills: 1 | Status: SHIPPED | OUTPATIENT
Start: 2019-09-30 | End: 2019-09-30

## 2019-09-30 RX ORDER — CARVEDILOL 25 MG/1
25 TABLET ORAL 2 TIMES DAILY WITH MEALS
Qty: 180 TABLET | Refills: 1 | Status: SHIPPED | OUTPATIENT
Start: 2019-09-30 | End: 2020-07-30

## 2019-09-30 NOTE — PATIENT INSTRUCTIONS
As of October 6th 2014, the Drug Enforcement Agency [facility]) is reclassifying all hydrocodone combination medications from Schedule III to Schedule II. This includes medications such as Norco, Vicodin, Lortab, Zohydro, and Vicoprofen.      What this means for se tiene la enfermedad de reflujo gastroesofágico (Chloe Raul), se siente que los ácidos estomacales suben a la boca. Aníbal sea que usted tome o no medicamentos para controlar los síntomas de la ERGE, estos puede mejorar mediante cambios en mccrary estilo de pat.  Dis Todos los derechos reservados. Esta información no pretende sustituir la atención médica profesional. Sólo mccrary médico puede diagnosticar y tratar un problema de peggy.         Enfermedad por reflujo gastroesofágico (adultos)    El esófago es un tubo que llev frutas y los jugos cítricos (naranja, Tiszafüred, sathya). · Trate de comer en forma moderada, en especial, por la noche. Rembert en cantidades pequeñas y con frecuencia es mejor. No se acueste inmediatamente después de comer.  No coma nada tirso las últimas 3 nuestro personal.  Cuándo buscar atención médica  Llame a mccrary proveedor de atención médica ante cualquiera de los siguientes síntomas:  · Dolor estomacal que empeora o se extiende a la parte inferior derecha del abdomen (área del apéndice)  · Aparece el dol anticolinérgicos  · Comience un programa de ejercicio. Pregúntele a mccrary proveedor de [facility] [employer] cuál es la mejor manera de Viki. Puede beneficiarse de actividades sencillas seth caminar o practicar la jardinería. · Deje de fumar.  Inscríbase en un p un plan de comidas que sea adecuado a estella necesidades. Pídale a mccrary equipo de [employer] ayude a preparar un plan de comidas adecuado a estella necesidades.  Milena plan [employer] horario de estella comidas, el tipo de alimentos que debe comer y l sanos  Las proteínas bajas en grasa pueden ayudarle a controlar el peso y a mantener rashel el corazón.  Entre los alimentos proteínicos bajos en grasa, se encuentran los siguientes:  · Pescado  · Proteínas vegetales seth las de los frijoles y chícharos secos aún pueda divertirse sin sobrepasarse con alimentos poco saludables. Dé un buen ejemplo trayendo un platillo saudable a los \"potlucks\".   Elija bocadillos saludables  Cuando se habla de bocadillos se piensa en alimentos con azúcar añadida y grasas, gustavo h mccrary lenora podría elevarse demasiado. Date Last Reviewed: 7/1/2016  © 8651-4338 [employer]. [facility]. Todos los derechos reservados.  Esta información no pretende sustituir la Liliana 23 el nivel de azúcar en la [facility] otros carbohidratos, la fibra de hecho previene que terrence suba demasiado rápido. La fibra se encuentra en las frutas, los vegetales, los granos enteros, los frijoles y arvejas, y en muchas nueces.   Conteo de carbohid carbohidratos es más fácil cuando se tienen las etiquetas de los alimentos. Estrellita la etiqueta para mega cuántos gramos de carbohidratos totales contienen los alimentos. Luego, usted puede calcular cuánto va a comer.  Dos renglones muy importantes que hay que v [facility], [facility]. Todos los derechos reservados. Esta información no pretende sustituir la atención médica profesional. Sólo mccrary médico puede diagnosticar y tratar un problema de peggy.         [address] [facility] se encuentran en muchos alimentos, entre ellos la fruta, el pan, las pastas, la [place] y los dulces. De [employer], los carbohidratos tienen el mayor efecto sobre el nivel de azúcar en la lenora.  Sahu nutricionista puede enseñarle a contar los [employer] pequeñas, espaciadas de forma regular a lo yogesh del día, para mantenerse dentro de los límites recomendados.  Por lo tanto, no se salte el desayuno ni espere hasta paradise hora más avanzada en el día para obtener la mayor parte de estella calorías, ya que esto pue mano.       1 Taza: 1 taza (o paradise porción de tamaño intermedio) tiene aproximadamente el tamaño de un puño. Media taza: Media taza es aproximadamente el tamaño de la jerrica de [place].       Cómo usar la información sobre raciones (“servings”)  La po

## 2019-09-30 NOTE — PROGRESS NOTES
Patient presents with:  Diabetes    DM, HL, HTN f/u. HPI:   Amado Gates is a 61year old female who presents for recheck of her diabetes, HTN, lipids. Patient’s FBS have been not checking regularly. Checked at dialysis and less than 180.  Pos of medication. Not following heartburn diet. Prior visit HPI  'Previously patient was complaining of a choking sensation while sleeping. Had consult with Dr. Besnon Schmid pulmonologist and found to air leaks around sleep apnea mask.   Patient is using new every 4 (four) hours as needed for Wheezing. Disp: 1 Inhaler Rfl: 3   amLODIPine Besylate 10 MG Oral Tab Take 1 tablet (10 mg total) by mouth once daily. Disp: 90 tablet Rfl: 1   atorvastatin 80 MG Oral Tab Take 1 tablet (80 mg total) by mouth nightly.  Dis Cholecystitis    • Chronic kidney disease (CKD)    • Constipation    • Depression    • Diabetes (HCC)     no meds   • Diabetic retinopathy ([facility] 1/26/15    Per Dr. Reyes [place]   • Dialysis patient [facility])     3xs weekly (T, Th, Sat) -  Fistula in left arm   • Elenore Donate BARRETTS  5/15    antral erosions      Social History: Smoking:  Denies  Exercise: minimal.  Diet: low carb diet     Family History   Problem Relation Age of Onset   • Diabetes Father    • Heart Attack Father    • Breast Cancer Maternal Aunt 40   • Breast nightly. Dispense: 90 tablet; Refill: LDL 74  's- suspect due to higher glucose  Mediterranean diet  Continue walking 30 days 5 days a week  Recheck fasting lipids and CMP in 3 mos    2.  Gastroesophageal reflux disease, esophagitis presence not spec nightly. • raNITIdine HCl 300 MG Oral Cap 90 capsule 1     Sig: TAKE 1 CAPSULE(300 MG) BY MOUTH EVERY EVENING   • carvedilol 25 MG Oral Tab 180 tablet 1     Sig: Take 1 tablet (25 mg total) by mouth 2 (two) times daily with meals.    • cetirizine 10 MG Or

## 2019-11-06 RX ORDER — SODIUM CHLORIDE 9 MG/ML
INJECTION, SOLUTION INTRAVENOUS CONTINUOUS
Status: CANCELLED | OUTPATIENT
Start: 2019-11-06

## 2019-11-08 ENCOUNTER — HOSPITAL ENCOUNTER (OUTPATIENT)
Facility: HOSPITAL | Age: 59
Setting detail: HOSPITAL OUTPATIENT SURGERY
Discharge: HOME | End: 2019-11-08
Attending: INTERNAL MEDICINE | Admitting: INTERNAL MEDICINE
Payer: MEDICARE

## 2019-11-08 ENCOUNTER — ANESTHESIA EVENT (OUTPATIENT)
Dept: ENDOSCOPY | Facility: HOSPITAL | Age: 59
End: 2019-11-08
Payer: MEDICARE

## 2019-11-08 ENCOUNTER — ANESTHESIA (OUTPATIENT)
Dept: ENDOSCOPY | Facility: HOSPITAL | Age: 59
End: 2019-11-08
Payer: MEDICARE

## 2019-11-08 VITALS
BODY MASS INDEX: 26.81 KG/M2 | HEIGHT: 61 IN | WEIGHT: 142 LBS | HEART RATE: 67 BPM | SYSTOLIC BLOOD PRESSURE: 126 MMHG | TEMPERATURE: 99 F | DIASTOLIC BLOOD PRESSURE: 52 MMHG | RESPIRATION RATE: 18 BRPM | OXYGEN SATURATION: 98 %

## 2019-11-08 DIAGNOSIS — K59.00 CONSTIPATION, UNSPECIFIED CONSTIPATION TYPE: ICD-10-CM

## 2019-11-08 DIAGNOSIS — R12 HEARTBURN: ICD-10-CM

## 2019-11-08 DIAGNOSIS — R19.7 DIARRHEA, UNSPECIFIED TYPE: ICD-10-CM

## 2019-11-08 PROCEDURE — 82962 GLUCOSE BLOOD TEST: CPT

## 2019-11-08 PROCEDURE — 0DBH8ZX EXCISION OF CECUM, VIA NATURAL OR ARTIFICIAL OPENING ENDOSCOPIC, DIAGNOSTIC: ICD-10-PCS | Performed by: INTERNAL MEDICINE

## 2019-11-08 PROCEDURE — 0DBN8ZX EXCISION OF SIGMOID COLON, VIA NATURAL OR ARTIFICIAL OPENING ENDOSCOPIC, DIAGNOSTIC: ICD-10-PCS | Performed by: INTERNAL MEDICINE

## 2019-11-08 PROCEDURE — 0DB68ZX EXCISION OF STOMACH, VIA NATURAL OR ARTIFICIAL OPENING ENDOSCOPIC, DIAGNOSTIC: ICD-10-PCS | Performed by: INTERNAL MEDICINE

## 2019-11-08 PROCEDURE — 88305 TISSUE EXAM BY PATHOLOGIST: CPT | Performed by: INTERNAL MEDICINE

## 2019-11-08 PROCEDURE — 0DBL8ZX EXCISION OF TRANSVERSE COLON, VIA NATURAL OR ARTIFICIAL OPENING ENDOSCOPIC, DIAGNOSTIC: ICD-10-PCS | Performed by: INTERNAL MEDICINE

## 2019-11-08 PROCEDURE — 0DB98ZX EXCISION OF DUODENUM, VIA NATURAL OR ARTIFICIAL OPENING ENDOSCOPIC, DIAGNOSTIC: ICD-10-PCS | Performed by: INTERNAL MEDICINE

## 2019-11-08 RX ORDER — DEXTROSE MONOHYDRATE 25 G/50ML
50 INJECTION, SOLUTION INTRAVENOUS
Status: DISCONTINUED | OUTPATIENT
Start: 2019-11-08 | End: 2019-11-08

## 2019-11-08 RX ORDER — SODIUM CHLORIDE, SODIUM LACTATE, POTASSIUM CHLORIDE, CALCIUM CHLORIDE 600; 310; 30; 20 MG/100ML; MG/100ML; MG/100ML; MG/100ML
INJECTION, SOLUTION INTRAVENOUS CONTINUOUS
Status: DISCONTINUED | OUTPATIENT
Start: 2019-11-08 | End: 2019-11-08

## 2019-11-08 RX ORDER — SODIUM CHLORIDE 9 MG/ML
INJECTION, SOLUTION INTRAVENOUS CONTINUOUS
Status: DISCONTINUED | OUTPATIENT
Start: 2019-11-08 | End: 2019-11-08

## 2019-11-08 RX ORDER — SODIUM CHLORIDE 9 MG/ML
INJECTION, SOLUTION INTRAVENOUS ONCE
Status: DISCONTINUED | OUTPATIENT
Start: 2019-11-08 | End: 2019-11-08

## 2019-11-08 RX ORDER — LIDOCAINE HYDROCHLORIDE 10 MG/ML
INJECTION, SOLUTION EPIDURAL; INFILTRATION; INTRACAUDAL; PERINEURAL AS NEEDED
Status: DISCONTINUED | OUTPATIENT
Start: 2019-11-08 | End: 2019-11-08 | Stop reason: SURG

## 2019-11-08 RX ORDER — NALOXONE HYDROCHLORIDE 0.4 MG/ML
80 INJECTION, SOLUTION INTRAMUSCULAR; INTRAVENOUS; SUBCUTANEOUS AS NEEDED
Status: DISCONTINUED | OUTPATIENT
Start: 2019-11-08 | End: 2019-11-08

## 2019-11-08 RX ADMIN — LIDOCAINE HYDROCHLORIDE 50 MG: 10 INJECTION, SOLUTION EPIDURAL; INFILTRATION; INTRACAUDAL; PERINEURAL at 07:08:00

## 2019-11-08 NOTE — ANESTHESIA PREPROCEDURE EVALUATION
PRE-OP EVALUATION    Patient Name: Rohit Sousa    Pre-op Diagnosis: Heartburn [R12]  Constipation, unspecified constipation type [K59.00]  Diarrhea, unspecified type [R19.7]    Procedure(s):  ESOPHAGOGASTRODUODENOSCOPY AND COLONOSCOPY      Surg BY MOUTH BEFORE BREAKFAST, Disp: 90 tablet, Rfl: 3  buPROPion HCl ER, SR, 150 MG Oral Tablet 12 Hr, Take 1 tablet (150 mg total) by mouth daily. , Disp: 90 tablet, Rfl: 3  omeprazole 20 MG Oral Capsule Delayed Release, Take 1 capsule (20 mg total) by mouth HYSTERECTOMY  1990    fibroids. unsure but thinks she still has her cervix   • AMPARO BIOPSY STEREO NODULE 1 SITE RIGHT (CPT=19081)  ? ?     BENIGN   • OTHER SURGICAL HISTORY  1990    cyst removal on thigh area of leg   • UPPER GI ENDO NO BARRETTS  5/15    antr

## 2019-11-08 NOTE — OPERATIVE REPORT
Colon operative report  Patient Name: Higinio Hernandez  Procedure: Colonoscopy with snare polypectomy  Date of procedure: 11/8/2019    Indication: Hematochezia, constipation  Date of last colonoscopy: None  Attending: Gabi Bowser M.D.   Consent the rectal bulb was thus visualized. The endoscope was righted, and air was suctioned from the colon to the best of my ability, as it was during withdrawn from the colon. The endoscope was then removed from the patient.   The patient tolerated the procedu

## 2019-11-08 NOTE — OPERATIVE REPORT
EGD operative report  Patient Name: Thomas Stade  Procedure: Esophagogastroduodenoscopy with cold forceps biopsy   Date of procedure: 11/8/2019    Indication: Chronic heartburn despite ongoing PPI therapy, and bloating  Attending: Joanna Rodriguez Stomach: The gastric body, antrum, fundus, cardia, and angularis were normal, without masses, polyps, ulcers, diverticula, or varices. However, scattered linear erosions and erythema was appreciated in both the antrum as well as the fundus.   Cold forceps

## 2019-11-08 NOTE — H&P
[facility] Patient Status:  Hospital Outpatient Surgery    1960 MRN [id number]   [facility] ENDOSCOPY Attending Layne Heimlich, MD   [address] [facility] [facility] [address] Day # 0 PCP DO Gurvinder Zuñiga Miller Nickerson MD at [facility] CVOR   • A.V. FISTULA Left 9/20/2018    Performed by Miller Nickerson MD at [address] [place] [address]     • [address] [place] [address], OPEN Left 01/2019   • CATARACT Bilateral    • CATHETER REMOVAL Right 5/24/2019    Performed by Crescencio Soriano, NaCl infusion, , Intravenous, Once      No current outpatient medications on file.     Review of Systems:  Gastrointestinal: Denies positive test for blood stool, heartburn/indigestion/reflux, belching, difficulty swallowing, irregular bowel habits, painful arthritis, back pain, joint pain, osteoporosis. Heme/Lymphatic: Denies easy bruising, anemia, excessive bleeding, enlarging or painful lymph nodes.   Allergy: Denies medication allergy, latex/rubber allergy, anaphylactic or other reaction to anesthesia, fo

## 2019-11-08 NOTE — ANESTHESIA POSTPROCEDURE EVALUATION
[address] [facility] Patient Status:  Hospital Outpatient Surgery   Age/Gender 61year old female MRN [id number]   Location [address] [place] [address]. Attending Jessica Hernandez MD   Hosp Day # 0 PCP Jerrica Da Silva DO       Anesthesia

## 2019-11-11 PROBLEM — D12.6 TUBULAR ADENOMA OF COLON: Status: ACTIVE | Noted: 2019-11-11

## 2019-11-18 DIAGNOSIS — N18.6 TYPE 2 DIABETES MELLITUS WITH CHRONIC KIDNEY DISEASE ON CHRONIC DIALYSIS, WITHOUT LONG-TERM CURRENT USE OF INSULIN (HCC): ICD-10-CM

## 2019-11-18 DIAGNOSIS — E11.22 TYPE 2 DIABETES MELLITUS WITH CHRONIC KIDNEY DISEASE ON CHRONIC DIALYSIS, WITHOUT LONG-TERM CURRENT USE OF INSULIN (HCC): ICD-10-CM

## 2019-11-18 DIAGNOSIS — Z99.2 TYPE 2 DIABETES MELLITUS WITH CHRONIC KIDNEY DISEASE ON CHRONIC DIALYSIS, WITHOUT LONG-TERM CURRENT USE OF INSULIN (HCC): ICD-10-CM

## 2019-11-19 RX ORDER — CALCIUM CITRATE/VITAMIN D3 200MG-6.25
TABLET ORAL
Qty: 100 STRIP | Refills: 11 | Status: SHIPPED | OUTPATIENT
Start: 2019-11-19 | End: 2020-08-04

## 2019-11-19 NOTE — TELEPHONE ENCOUNTER
Requested Prescriptions     Signed Prescriptions Disp Refills   • Glucose Blood (TRUE METRIX BLOOD GLUCOSE TEST) In Vitro Strip 100 strip 11     Sig: Check blood sugars twice daily and as needed for symptoms of hypoglycemia     Authorizing Provider: Rush Guallpa

## 2019-12-26 ENCOUNTER — TELEPHONE (OUTPATIENT)
Dept: FAMILY MEDICINE CLINIC | Facility: CLINIC | Age: 59
End: 2019-12-26

## 2019-12-26 NOTE — TELEPHONE ENCOUNTER
Soraya with [facility] called to state that patient was discharged from [facility] on 12/24/19. Patient was diagnosed with end stage renal disease.      Soraya    [phone] ext [phone]

## 2019-12-30 DIAGNOSIS — E11.69 HYPERLIPIDEMIA ASSOCIATED WITH TYPE 2 DIABETES MELLITUS (HCC): ICD-10-CM

## 2019-12-30 DIAGNOSIS — K21.9 GASTROESOPHAGEAL REFLUX DISEASE, ESOPHAGITIS PRESENCE NOT SPECIFIED: ICD-10-CM

## 2019-12-30 DIAGNOSIS — E78.5 HYPERLIPIDEMIA ASSOCIATED WITH TYPE 2 DIABETES MELLITUS (HCC): ICD-10-CM

## 2019-12-31 RX ORDER — ATORVASTATIN CALCIUM 80 MG/1
80 TABLET, FILM COATED ORAL NIGHTLY
Qty: 90 TABLET | Refills: 0 | Status: SHIPPED | OUTPATIENT
Start: 2019-12-31 | End: 2020-04-17

## 2019-12-31 RX ORDER — FAMOTIDINE 20 MG/1
20 TABLET ORAL 2 TIMES DAILY
Qty: 180 TABLET | Refills: 0 | Status: SHIPPED | OUTPATIENT
Start: 2019-12-31 | End: 2020-03-27

## 2019-12-31 NOTE — TELEPHONE ENCOUNTER
Pt requesting refill of   Requested Prescriptions     Signed Prescriptions Disp Refills   • atorvastatin 80 MG Oral Tab 90 tablet 0     Sig: Take 1 tablet (80 mg total) by mouth nightly.      Authorizing Provider: Hans Lemus     Ordering User: Bipin Gonsales

## 2020-01-07 ENCOUNTER — OFFICE VISIT (OUTPATIENT)
Dept: FAMILY MEDICINE CLINIC | Facility: CLINIC | Age: 60
End: 2020-01-07
Payer: MEDICARE

## 2020-01-07 VITALS
WEIGHT: 146 LBS | OXYGEN SATURATION: 96 % | HEIGHT: 61 IN | BODY MASS INDEX: 27.56 KG/M2 | DIASTOLIC BLOOD PRESSURE: 56 MMHG | TEMPERATURE: 99 F | SYSTOLIC BLOOD PRESSURE: 124 MMHG | HEART RATE: 73 BPM | RESPIRATION RATE: 18 BRPM

## 2020-01-07 DIAGNOSIS — Z94.0 S/P KIDNEY TRANSPLANT: ICD-10-CM

## 2020-01-07 DIAGNOSIS — E03.9 ACQUIRED HYPOTHYROIDISM: ICD-10-CM

## 2020-01-07 DIAGNOSIS — D84.821 IMMUNOSUPPRESSION DUE TO DRUG THERAPY (HCC): ICD-10-CM

## 2020-01-07 DIAGNOSIS — J44.9 ASTHMA WITH COPD (CHRONIC OBSTRUCTIVE PULMONARY DISEASE) (HCC): Chronic | ICD-10-CM

## 2020-01-07 DIAGNOSIS — D64.9 NORMOCYTIC ANEMIA: ICD-10-CM

## 2020-01-07 DIAGNOSIS — E11.65 UNCONTROLLED TYPE 2 DIABETES MELLITUS WITH HYPERGLYCEMIA (HCC): Primary | ICD-10-CM

## 2020-01-07 DIAGNOSIS — Z79.899 IMMUNOSUPPRESSION DUE TO DRUG THERAPY (HCC): ICD-10-CM

## 2020-01-07 DIAGNOSIS — G47.33 OBSTRUCTIVE SLEEP APNEA SYNDROME: ICD-10-CM

## 2020-01-07 DIAGNOSIS — K21.9 GASTROESOPHAGEAL REFLUX DISEASE, ESOPHAGITIS PRESENCE NOT SPECIFIED: ICD-10-CM

## 2020-01-07 PROBLEM — Z76.82 KIDNEY TRANSPLANT CANDIDATE: Status: RESOLVED | Noted: 2019-09-30 | Resolved: 2020-01-07

## 2020-01-07 PROCEDURE — 99214 OFFICE O/P EST MOD 30 MIN: CPT | Performed by: FAMILY MEDICINE

## 2020-01-07 RX ORDER — TACROLIMUS 4 MG/1
TABLET, EXTENDED RELEASE ORAL
COMMUNITY
Start: 2019-12-16 | End: 2020-07-30

## 2020-01-07 RX ORDER — VALGANCICLOVIR 450 MG/1
TABLET, FILM COATED ORAL
COMMUNITY
Start: 2019-12-16 | End: 2020-07-29

## 2020-01-07 RX ORDER — ISOPROPYL ALCOHOL 0.75 G/1
SWAB TOPICAL
COMMUNITY
Start: 2019-12-20

## 2020-01-07 RX ORDER — LIDOCAINE HYDROCHLORIDE 20 MG/ML
SOLUTION ORAL; TOPICAL 2 TIMES DAILY
COMMUNITY
Start: 2019-12-16

## 2020-01-07 RX ORDER — SULFAMETHOXAZOLE AND TRIMETHOPRIM 400; 80 MG/1; MG/1
TABLET ORAL
COMMUNITY
Start: 2019-12-16 | End: 2020-07-29

## 2020-01-07 RX ORDER — TACROLIMUS 1 MG/1
TABLET, EXTENDED RELEASE ORAL
COMMUNITY
Start: 2019-12-16 | End: 2021-04-28

## 2020-01-07 RX ORDER — GLIPIZIDE 5 MG/1
TABLET ORAL
COMMUNITY
Start: 2019-12-19 | End: 2020-04-16 | Stop reason: DRUGHIGH

## 2020-01-07 RX ORDER — MYCOPHENOLIC ACID 360 MG/1
TABLET, DELAYED RELEASE ORAL
COMMUNITY
Start: 2019-12-16 | End: 2020-07-30

## 2020-01-07 RX ORDER — DIPHENHYDRAMINE HCL 25 MG
TABLET ORAL
COMMUNITY
Start: 2019-12-20

## 2020-01-07 RX ORDER — ERGOCALCIFEROL 1.25 MG/1
CAPSULE ORAL
COMMUNITY
Start: 2019-12-16

## 2020-01-07 RX ORDER — ONDANSETRON 4 MG/1
TABLET, ORALLY DISINTEGRATING ORAL
COMMUNITY
Start: 2019-12-20

## 2020-01-07 NOTE — PATIENT INSTRUCTIONS
As of October 6th 2014, the Drug Enforcement Agency [facility]) is reclassifying all hydrocodone combination medications from Schedule III to Schedule II. This includes medications such as Norco, Vicodin, Lortab, Zohydro, and Vicoprofen.      What this means for es necesario hacer algunos cambios básicos en la alimentación y el estilo de pat. Los siguientes consejos pueden ser suficientes para aliviar el malestar. Vigile lo que come  · Britney Montevallo alimentos grasosos o demasiado picantes.   · Coma menos alimentos ác control:  · Asthma symptoms may cause you to miss school, work, or activities that you enjoy. · Asthma flare-ups can be dangerous, even deadly. · Uncontrolled asthma makes it more likely that you will need emergency department and in-hospital care.   · Un estella valores habituales, para corregirlo, emplee la dosis de insulina regular de la “escala móvil” que le juanjose mccrary médico. Si no recibió ninguna indicación respecto de paradise “escala móvil”, comuníquese con mccrary médico para que le aconseje.   3) Si el nivel de azúc

## 2020-01-07 NOTE — PROGRESS NOTES
CHIEF COMPLAINT: Patient presents with:  Procedure Follow Up: pt post kindney transplant     HPI:     Jose Alberto De La Fuente is a 61year old female presents for follow-up of renal transplant-right kidney due to end-stage renal disease.   Patient underwent happening 2 times weekly. Sometimes uses her albuterol inhaler and feels better when the symptoms occur. Still taking Breo. Complains at times at night has acid taste in her mouth is on 2 acid lowering medications for her stomach.  Last EGD normal except SPLIT NIGHT 5-9-16   • Sleep apnea     cpap   • Traction detachment of retina    • Unspecified disorder of thyroid    • Visual impairment     glasses   • Vitreous hemorrhage (HCC)    • Wears glasses       Past Surgical History:   Procedure Laterality Date Used    Alcohol use: No    Drug use: No       Medications (Active prior to today's visit):  Current Outpatient Medications   Medication Sig Dispense Refill   • ondansetron 4 MG Oral Tablet Dispersible      • sulfamethoxazole-trimethoprim 400-80 MG Oral Tab tablet (150 mg total) by mouth daily. 90 tablet 3   • Montelukast Sodium 10 MG Oral Tab Take 1 tablet (10 mg total) by mouth nightly.  90 tablet 3   • ENVARSUS XR 4 MG Oral Tablet 24 Hr      • lidocaine-prilocaine 2.5-2.5 % External Cream APPLY  SMALL AMOUN yellow 10cm ecchymosis at 12th lateral rib to L2 vertebral level- non tender. Multiple staples starting in the right flank and extending into the right mid abdomen-are intact without redness or drainage or tenderness at site. Healing well.   Psych: Flat a pulmonologist/sleep specialist at upcoming visit. No clinical signs of CHF or renal failure    4.  Gastroesophageal reflux disease, esophagitis presence not specified  Heartburn diet, continue famotidine  Sucralfate not on current med list- will call anna Completed      Patient/Caregiver Education: Patient/Caregiver Education: There are no barriers to learning. Medical education done. Outcome: Patient verbalizes understanding.  Patient is notified to call with any questions, comp lications, allergies, or w

## 2020-01-08 ENCOUNTER — TELEPHONE (OUTPATIENT)
Dept: FAMILY MEDICINE CLINIC | Facility: CLINIC | Age: 60
End: 2020-01-08

## 2020-01-08 PROBLEM — Z79.899 IMMUNOSUPPRESSION DUE TO DRUG THERAPY (HCC): Status: ACTIVE | Noted: 2020-01-08

## 2020-01-08 PROBLEM — Z79.899 IMMUNOSUPPRESSION DUE TO DRUG THERAPY  (HCC): Status: ACTIVE | Noted: 2020-01-08

## 2020-01-08 PROBLEM — Z94.0 S/P KIDNEY TRANSPLANT (HCC): Status: ACTIVE | Noted: 2020-01-08

## 2020-01-08 PROBLEM — D84.821 IMMUNOSUPPRESSION DUE TO DRUG THERAPY (HCC): Status: ACTIVE | Noted: 2020-01-08

## 2020-01-08 PROBLEM — E11.29 TYPE 2 DIABETES MELLITUS WITH KIDNEY COMPLICATION, WITHOUT LONG-TERM CURRENT USE OF INSULIN (HCC): Status: RESOLVED | Noted: 2019-07-05 | Resolved: 2020-01-08

## 2020-01-08 PROBLEM — D84.821 IMMUNOSUPPRESSION DUE TO DRUG THERAPY: Status: ACTIVE | Noted: 2020-01-08

## 2020-01-08 PROBLEM — R80.9 PROTEINURIA: Status: RESOLVED | Noted: 2019-06-28 | Resolved: 2020-01-08

## 2020-01-08 PROBLEM — D64.9 NORMOCYTIC ANEMIA: Status: ACTIVE | Noted: 2020-01-08

## 2020-01-08 PROBLEM — Z99.2 DEPENDENCE ON RENAL DIALYSIS (HCC): Status: RESOLVED | Noted: 2019-06-28 | Resolved: 2020-01-08

## 2020-01-08 PROBLEM — Z79.899 IMMUNOSUPPRESSION DUE TO DRUG THERAPY: Status: ACTIVE | Noted: 2020-01-08

## 2020-01-08 PROBLEM — D84.821 IMMUNOSUPPRESSION DUE TO DRUG THERAPY  (HCC): Status: ACTIVE | Noted: 2020-01-08

## 2020-01-08 PROBLEM — Z94.0 S/P KIDNEY TRANSPLANT: Status: ACTIVE | Noted: 2020-01-08

## 2020-01-08 PROBLEM — Z99.2 DEPENDENCE ON RENAL DIALYSIS: Status: RESOLVED | Noted: 2019-06-28 | Resolved: 2020-01-08

## 2020-01-08 NOTE — TELEPHONE ENCOUNTER
Did not receive med list from hospital discharge and sucralfate as listed as renal impairment, not defined, caution advised, has appointment this week with [facility] kidney transplant specialist.  Need to discuss her heartburn medication and what she is allowed

## 2020-01-09 ENCOUNTER — TELEPHONE (OUTPATIENT)
Dept: FAMILY MEDICINE CLINIC | Facility: CLINIC | Age: 60
End: 2020-01-09

## 2020-01-09 NOTE — TELEPHONE ENCOUNTER
Interpretor ID [id number]    LMOM to return call to the office. Provided pt office phone [phone] along with office hours given.

## 2020-01-09 NOTE — TELEPHONE ENCOUNTER
Referral denied for    Deborah Jacobo 89 [address] [facility] [phone]     He is OON.   Spoke with Alfred De La Torre at [facility] and reports MD is in network and insurance is reviewing the diagnosis codes for authorization

## 2020-01-14 NOTE — TELEPHONE ENCOUNTER
Spoke with Cheri at [facility] [address] reports the referral was denied due to the diagnosis cod of s/p kidney transplant  diagnosis code was removed because she said it was causing referral to go to the transplant department    [facility]  Approval number is [id number]

## 2020-01-14 NOTE — TELEPHONE ENCOUNTER
Spoke with Noy Jamison is taking omeprazole and famotidine  She is not taking sucralfate      They did have apt with transplant specialist 1/13/2020  Needs endocrinologists  Update referral with new endocrin Dr Juana Acosta, currently being reviewed

## 2020-01-15 NOTE — TELEPHONE ENCOUNTER
See TE 1/9/2019  Referral updated and spoke with [facility] as well as referral department 1/14/2020

## 2020-01-23 ENCOUNTER — TELEPHONE (OUTPATIENT)
Dept: FAMILY MEDICINE CLINIC | Facility: CLINIC | Age: 60
End: 2020-01-23

## 2020-01-23 NOTE — TELEPHONE ENCOUNTER
Faxed ophthalmology referral to Dr. Ginger Harkins office.   Confirmation received
pt has been stable at the present time, has no distress noted. Pt has been tolerating diet no nausea or vomiting noted. Had BMx1 soft, small amount.

## 2020-01-24 ENCOUNTER — PATIENT MESSAGE (OUTPATIENT)
Dept: FAMILY MEDICINE CLINIC | Facility: CLINIC | Age: 60
End: 2020-01-24

## 2020-01-24 NOTE — TELEPHONE ENCOUNTER
From: Santiago Call  To: Claudine Walls DO  Sent: 1/24/2020 9:16 AM CST  Subject: Other    Good morning Dr. Orquidea Salcido,    Endo first available appointment is May 29. But transplant has been on top of diabetes medication.  She is running now about 1

## 2020-01-27 NOTE — TELEPHONE ENCOUNTER
Referral updated to reflect   Selam Horton  [address]  Rajiv Hawley [phone]   F [phone]      Referral, face sheet and LOV faxed to Dr Jc Lema

## 2020-02-12 DIAGNOSIS — J44.9 ASTHMA WITH COPD (CHRONIC OBSTRUCTIVE PULMONARY DISEASE) (HCC): Chronic | ICD-10-CM

## 2020-02-12 RX ORDER — MONTELUKAST SODIUM 10 MG/1
TABLET ORAL
Qty: 90 TABLET | Refills: 1 | Status: SHIPPED | OUTPATIENT
Start: 2020-02-12 | End: 2020-08-17

## 2020-02-12 NOTE — TELEPHONE ENCOUNTER
Pt requesting refill of   Requested Prescriptions     Pending Prescriptions Disp Refills   • MONTELUKAST SODIUM 10 MG Oral Tab [Pharmacy Med Name: MONTELUKAST 10MG TABLETS] 90 tablet 3     Sig: TAKE 1 TABLET(10 MG) BY MOUTH EVERY NIGHT   Passed protocol, r

## 2020-03-26 DIAGNOSIS — K21.9 GASTROESOPHAGEAL REFLUX DISEASE, ESOPHAGITIS PRESENCE NOT SPECIFIED: ICD-10-CM

## 2020-03-27 RX ORDER — FAMOTIDINE 20 MG/1
TABLET ORAL
Qty: 180 TABLET | Refills: 0 | Status: SHIPPED | OUTPATIENT
Start: 2020-03-27 | End: 2020-07-10

## 2020-03-27 NOTE — TELEPHONE ENCOUNTER
Pt requesting refill of   Requested Prescriptions     Pending Prescriptions Disp Refills   • FAMOTIDINE 20 MG Oral Tab [Pharmacy Med Name: FAMOTIDINE 20MG TABLETS] 180 tablet 0     Sig: TAKE 1 TABLET BY MOUTH TWICE DAILY     Passed protocol, refill approve

## 2020-04-03 DIAGNOSIS — E03.9 ACQUIRED HYPOTHYROIDISM: ICD-10-CM

## 2020-04-03 RX ORDER — LEVOTHYROXINE SODIUM 88 UG/1
TABLET ORAL
Qty: 90 TABLET | Refills: 0 | Status: SHIPPED | OUTPATIENT
Start: 2020-04-03 | End: 2020-07-24

## 2020-04-13 ENCOUNTER — TELEPHONE (OUTPATIENT)
Dept: FAMILY MEDICINE CLINIC | Facility: CLINIC | Age: 60
End: 2020-04-13

## 2020-04-13 DIAGNOSIS — Z94.0 S/P KIDNEY TRANSPLANT: Primary | ICD-10-CM

## 2020-04-13 NOTE — TELEPHONE ENCOUNTER
[facility], called stating she wanted to know if there was a referral for a transplant for patient. Please call tisha back at [phone].

## 2020-04-13 NOTE — TELEPHONE ENCOUNTER
Office  requesting referral for follow up visit with Dr Chelsea Myles with [facility] for transpant  Fax [phone] upon authorization  Office reports she has been having aprox 4 visits monthly

## 2020-04-15 NOTE — TELEPHONE ENCOUNTER
NPI needed or Dr Kelsey Johnson to complete referral  LMOM to return call to the office. Provided pt office phone [phone] along with office hours given.     [phone]

## 2020-04-16 ENCOUNTER — TELEPHONE (OUTPATIENT)
Dept: FAMILY MEDICINE CLINIC | Facility: CLINIC | Age: 60
End: 2020-04-16

## 2020-04-16 NOTE — TELEPHONE ENCOUNTER
Dr Garland Moreira is with [facility] transplant center  Per [facility] Mara Farrell RN)   Authorization already exists # [id number] valid from 11/22/2019-11/20/2020    Auth Number provided to [facility]

## 2020-04-16 NOTE — TELEPHONE ENCOUNTER
----- Message from [employer] Slider, DO sent at 4/16/2020 12:25 PM CDT -----  Call for video visit tomorrow in am to review labs. Will cc Dr.Anju Barrios, endocrine, A1c results. Please call and if not able to perform video visit, will perform  phone visit.

## 2020-04-16 NOTE — TELEPHONE ENCOUNTER
Spoke with [employer].  APt made  Confirmed they do have my chart meredith  She will log in today to complete pre apt screenings

## 2020-04-17 ENCOUNTER — TELEMEDICINE (OUTPATIENT)
Dept: FAMILY MEDICINE CLINIC | Facility: CLINIC | Age: 60
End: 2020-04-17

## 2020-04-17 ENCOUNTER — TELEPHONE (OUTPATIENT)
Dept: FAMILY MEDICINE CLINIC | Facility: CLINIC | Age: 60
End: 2020-04-17

## 2020-04-17 DIAGNOSIS — E78.5 HYPERLIPIDEMIA ASSOCIATED WITH TYPE 2 DIABETES MELLITUS (HCC): Primary | ICD-10-CM

## 2020-04-17 DIAGNOSIS — R10.33 PERIUMBILICAL ABDOMINAL PAIN: ICD-10-CM

## 2020-04-17 DIAGNOSIS — L65.9 HAIR LOSS: ICD-10-CM

## 2020-04-17 DIAGNOSIS — E11.3522: ICD-10-CM

## 2020-04-17 DIAGNOSIS — E11.69 HYPERLIPIDEMIA ASSOCIATED WITH TYPE 2 DIABETES MELLITUS (HCC): Primary | ICD-10-CM

## 2020-04-17 DIAGNOSIS — Z87.19 HISTORY OF GASTRITIS: ICD-10-CM

## 2020-04-17 PROCEDURE — 99214 OFFICE O/P EST MOD 30 MIN: CPT | Performed by: FAMILY MEDICINE

## 2020-04-17 RX ORDER — ATORVASTATIN CALCIUM 40 MG/1
40 TABLET, FILM COATED ORAL NIGHTLY
Qty: 90 TABLET | Refills: 0 | Status: SHIPPED | OUTPATIENT
Start: 2020-04-17 | End: 2020-07-24

## 2020-04-17 RX ORDER — MAGNESIUM OXIDE 400 MG/1
TABLET ORAL
COMMUNITY
Start: 2020-04-13

## 2020-04-17 NOTE — PROGRESS NOTES
This is a telemedicine visit with live, interactive video and audio. Patient understands and accepts financial responsibility for any deductible, co-insurance and/or co-pays associated with this service. SUBJECTIVE  Discuss lab results.   Taking ator was started on biotin 5000 units supplement. Daughter-in-law is unsure if told was side effect to transplant with stress or due to medications. Last TSH 1.8. Patient denies depressed mood, fatigue, sadness. Feels hopeful.     HISTORY:  Past Medical Hist Bilateral    • CATHETER REMOVAL Right 5/24/2019    Performed by Ashwini Crawford MD at [address] [place] [address]   • COLONOSCOPY     • COLONOSCOPY N/A 11/8/2019    Performed by Gennaro Gonzalez MD at [facility] ENDOSCOPY   • ESOPHAGOGASTRODUODENOSCOPY (EGD) N/A 11/8/2019    Per JANUVIA 100 MG Oral Tab Take 100 mg by mouth daily.      • magnesium oxide 400 MG Oral Tab      • TRUEPLUS PEN NEEDLES 32G X 4 MM Does not apply Misc      • LANTUS SOLOSTAR 100 UNIT/ML Subcutaneous Solution Pen-injector      • Levothyroxine Sodium 88 MCG Or • sulfamethoxazole-trimethoprim 400-80 MG Oral Tab      • valGANciclovir HCl 450 MG Oral Tab      • lidocaine-prilocaine 2.5-2.5 % External Cream APPLY  SMALL AMOUNT TO ACCESS SITE 1 HOUR BEFORE TREATMENT. COVER WITH AN OCCLUSIVE DRESSING AS DIRECTED. associated with type 2 diabetes mellitus (HCC)  -Decrease    atorvastatin 40 MG Oral Tab; Take 1 tablet (40 mg total) by mouth nightly. -     LIPID PANEL; Future-repeat labs fasting mid July 2020  -     COMP METABOLIC PANEL (14);  Future  LDL too low  Medi contacting transplant team concerning questions on hair loss and if patient may be changed to Slow Fe which has less GI side effects.   Any new or worse symptoms or side effects to medication patient and daughter-in-law understand to call office immediately

## 2020-04-17 NOTE — PATIENT INSTRUCTIONS
· Schedule follow-up in June for diabetic eye exam-overdue can attempt to schedule sooner if possible. with pandemic.   · Decrease Rx atorvastatin 40 mg nightly-may use current prescription take atorvastatin half a tablet of 80 mg at bedtime  · Repeat labs adicionales. Cuidados en la casa  El proveedor de atención médica puede recetar medicamentos para el dolor, los síntomas o paradise infección.  Siga las instrucciones del proveedor de atención médica sobre cómo donna estos medicamentos.   Cuidados generales  Zelpha Evener de inmediato si nota alguno de los siguientes síntomas:  El dolor Premont o se transfiere al lado derecho inferior del abdomen  Se presenta vómitos o diarrea, o estos síntomas empeoran  [place] del abdomen  No ha podido evacuar el intestino (defecar) dur baja densidad) conocidas también seth “colesterol schuyler”. Están compuestas principalmente de colesterol, el cual es depositado en las células del organismo.  Si hay demasiado colesterol LDL, terrence puede acumularse en las grant de las arterias, [place] mejor hacer un poco de ejercicio que nada. Si no ha estado haciendo [employer], comience poco a poco. Consulte con mccrary médico para asegurarse de que mccrary plan de ejercicio sea adecuado para usted. Deje de fumar.  Dejar de fumar puede mejorar estella appointment with the first available provider within the specialist group you were referred to that meets your scheduling needs if the referred to provider   is not   available.                 ______________________________________________________________

## 2020-05-01 RX ORDER — CETIRIZINE HYDROCHLORIDE 10 MG/1
TABLET ORAL
Qty: 90 TABLET | Refills: 1 | Status: SHIPPED | OUTPATIENT
Start: 2020-05-01 | End: 2020-10-28

## 2020-05-02 DIAGNOSIS — E11.69 HYPERLIPIDEMIA ASSOCIATED WITH TYPE 2 DIABETES MELLITUS: ICD-10-CM

## 2020-05-02 DIAGNOSIS — E78.5 HYPERLIPIDEMIA ASSOCIATED WITH TYPE 2 DIABETES MELLITUS: ICD-10-CM

## 2020-05-04 RX ORDER — ATORVASTATIN CALCIUM 80 MG/1
TABLET, FILM COATED ORAL
Qty: 90 TABLET | Refills: 1 | OUTPATIENT
Start: 2020-05-04

## 2020-05-22 RX ORDER — BUPROPION HYDROCHLORIDE 150 MG/1
TABLET, EXTENDED RELEASE ORAL
Qty: 90 TABLET | Refills: 3 | Status: SHIPPED | OUTPATIENT
Start: 2020-05-22 | End: 2021-05-14

## 2020-05-22 NOTE — TELEPHONE ENCOUNTER
Pt requesting refill of   Requested Prescriptions     Pending Prescriptions Disp Refills   • BUPROPION HCL ER, SR, 150 MG Oral Tablet 12 Hr [Pharmacy Med Name: BUPROPION SR 150MG TABLETS (12 H)] 90 tablet 3     Sig: TAKE 1 TABLET BY MOUTH DAILY          No

## 2020-07-09 DIAGNOSIS — K21.9 GASTROESOPHAGEAL REFLUX DISEASE, ESOPHAGITIS PRESENCE NOT SPECIFIED: ICD-10-CM

## 2020-07-10 RX ORDER — FAMOTIDINE 20 MG/1
TABLET ORAL
Qty: 180 TABLET | Refills: 0 | Status: SHIPPED | OUTPATIENT
Start: 2020-07-10 | End: 2020-10-08

## 2020-07-10 NOTE — TELEPHONE ENCOUNTER
Pt requesting refill of   Requested Prescriptions     Pending Prescriptions Disp Refills   • FAMOTIDINE 20 MG Oral Tab [Pharmacy Med Name: FAMOTIDINE 20MG TABLETS] 180 tablet 0     Sig: TAKE ONE TABLET BY MOUTH TWICE DAILY        No protocol available, rou

## 2020-07-23 DIAGNOSIS — E78.5 HYPERLIPIDEMIA ASSOCIATED WITH TYPE 2 DIABETES MELLITUS (HCC): ICD-10-CM

## 2020-07-23 DIAGNOSIS — E11.69 HYPERLIPIDEMIA ASSOCIATED WITH TYPE 2 DIABETES MELLITUS (HCC): ICD-10-CM

## 2020-07-23 DIAGNOSIS — E03.9 ACQUIRED HYPOTHYROIDISM: ICD-10-CM

## 2020-07-24 LAB
ALBUMIN/GLOBULIN RATIO: 1.7 (CALC) (ref 1–2.5)
ALBUMIN: 4.2 G/DL (ref 3.6–5.1)
ALKALINE PHOSPHATASE: 350 U/L (ref 37–153)
ALT: 18 U/L (ref 6–29)
AST: 20 U/L (ref 10–35)
BILIRUBIN, TOTAL: 0.6 MG/DL (ref 0.2–1.2)
BUN: 23 MG/DL (ref 7–25)
CALCIUM: 9.3 MG/DL (ref 8.6–10.4)
CARBON DIOXIDE: 28 MMOL/L (ref 20–32)
CHLORIDE: 103 MMOL/L (ref 98–110)
CHOL/HDLC RATIO: 2.4 (CALC)
CHOLESTEROL, TOTAL: 112 MG/DL
CREATININE: 1.01 MG/DL (ref 0.5–1.05)
EGFR IF AFRICN AM: 71 ML/MIN/1.73M2
EGFR IF NONAFRICN AM: 61 ML/MIN/1.73M2
GLOBULIN: 2.5 G/DL (CALC) (ref 1.9–3.7)
GLUCOSE: 223 MG/DL (ref 65–99)
HDL CHOLESTEROL: 47 MG/DL
LDL-CHOLESTEROL: 43 MG/DL (CALC)
NON-HDL CHOLESTEROL: 65 MG/DL (CALC)
POTASSIUM: 4.6 MMOL/L (ref 3.5–5.3)
PROTEIN, TOTAL: 6.7 G/DL (ref 6.1–8.1)
SODIUM: 139 MMOL/L (ref 135–146)
TRIGLYCERIDES: 136 MG/DL

## 2020-07-24 RX ORDER — ATORVASTATIN CALCIUM 40 MG/1
TABLET, FILM COATED ORAL
Qty: 90 TABLET | Refills: 0 | Status: SHIPPED | OUTPATIENT
Start: 2020-07-24 | End: 2020-10-16

## 2020-07-24 RX ORDER — LEVOTHYROXINE SODIUM 88 UG/1
TABLET ORAL
Qty: 90 TABLET | Refills: 0 | Status: SHIPPED | OUTPATIENT
Start: 2020-07-24 | End: 2020-10-16

## 2020-07-24 NOTE — TELEPHONE ENCOUNTER
Pt requesting refill of LEVOTHYROXINE 0.088MG (88MCG) TAB  Last Time Medication was Prescribed :  04/03/2020 qty#30 with no refills    Pt requesting refill of ATORVASTATIN 40MG TABLETS  Last Time Medication was Prescribed :  04/17/2020 qty # 90 with no ref

## 2020-07-30 ENCOUNTER — OFFICE VISIT (OUTPATIENT)
Dept: FAMILY MEDICINE CLINIC | Facility: CLINIC | Age: 60
End: 2020-07-30
Payer: MEDICARE

## 2020-07-30 VITALS
BODY MASS INDEX: 29.3 KG/M2 | HEART RATE: 73 BPM | SYSTOLIC BLOOD PRESSURE: 118 MMHG | DIASTOLIC BLOOD PRESSURE: 64 MMHG | RESPIRATION RATE: 18 BRPM | OXYGEN SATURATION: 97 % | HEIGHT: 61 IN | WEIGHT: 155.19 LBS | TEMPERATURE: 98 F

## 2020-07-30 DIAGNOSIS — Z12.31 SCREENING MAMMOGRAM, ENCOUNTER FOR: ICD-10-CM

## 2020-07-30 DIAGNOSIS — E11.69 HYPERLIPIDEMIA ASSOCIATED WITH TYPE 2 DIABETES MELLITUS (HCC): ICD-10-CM

## 2020-07-30 DIAGNOSIS — K21.9 GASTROESOPHAGEAL REFLUX DISEASE WITHOUT ESOPHAGITIS: ICD-10-CM

## 2020-07-30 DIAGNOSIS — L65.9 HAIR LOSS: ICD-10-CM

## 2020-07-30 DIAGNOSIS — D84.821 IMMUNOSUPPRESSION DUE TO DRUG THERAPY (HCC): ICD-10-CM

## 2020-07-30 DIAGNOSIS — E78.5 HYPERLIPIDEMIA ASSOCIATED WITH TYPE 2 DIABETES MELLITUS (HCC): ICD-10-CM

## 2020-07-30 DIAGNOSIS — Z94.0 S/P KIDNEY TRANSPLANT: ICD-10-CM

## 2020-07-30 DIAGNOSIS — Z86.16 HISTORY OF 2019 NOVEL CORONAVIRUS DISEASE (COVID-19): ICD-10-CM

## 2020-07-30 DIAGNOSIS — E03.9 ACQUIRED HYPOTHYROIDISM: ICD-10-CM

## 2020-07-30 DIAGNOSIS — Z79.899 IMMUNOSUPPRESSION DUE TO DRUG THERAPY (HCC): ICD-10-CM

## 2020-07-30 DIAGNOSIS — D64.9 NORMOCYTIC ANEMIA: ICD-10-CM

## 2020-07-30 DIAGNOSIS — I15.0 RENOVASCULAR HYPERTENSION: ICD-10-CM

## 2020-07-30 DIAGNOSIS — Z00.00 ENCOUNTER FOR ANNUAL HEALTH EXAMINATION: Primary | ICD-10-CM

## 2020-07-30 DIAGNOSIS — Z12.4 CERVICAL CANCER SCREENING: ICD-10-CM

## 2020-07-30 DIAGNOSIS — R74.8 ELEVATED ALKALINE PHOSPHATASE MEASUREMENT: ICD-10-CM

## 2020-07-30 DIAGNOSIS — F32.A DEPRESSIVE DISORDER: ICD-10-CM

## 2020-07-30 DIAGNOSIS — E11.3522: ICD-10-CM

## 2020-07-30 DIAGNOSIS — Z12.4 SCREENING FOR CERVICAL CANCER: ICD-10-CM

## 2020-07-30 LAB
CREAT UR-SCNC: 74.3 MG/DL
MICROALBUMIN UR-MCNC: 2.49 MG/DL
MICROALBUMIN/CREAT 24H UR-RTO: 33.5 UG/MG (ref ?–30)

## 2020-07-30 PROCEDURE — 88175 CYTOPATH C/V AUTO FLUID REDO: CPT | Performed by: FAMILY MEDICINE

## 2020-07-30 PROCEDURE — 87624 HPV HI-RISK TYP POOLED RSLT: CPT | Performed by: FAMILY MEDICINE

## 2020-07-30 PROCEDURE — 82570 ASSAY OF URINE CREATININE: CPT | Performed by: FAMILY MEDICINE

## 2020-07-30 PROCEDURE — 82043 UR ALBUMIN QUANTITATIVE: CPT | Performed by: FAMILY MEDICINE

## 2020-07-30 PROCEDURE — G0439 PPPS, SUBSEQ VISIT: HCPCS | Performed by: FAMILY MEDICINE

## 2020-07-30 PROCEDURE — 99214 OFFICE O/P EST MOD 30 MIN: CPT | Performed by: FAMILY MEDICINE

## 2020-07-30 PROCEDURE — G0101 CA SCREEN;PELVIC/BREAST EXAM: HCPCS | Performed by: FAMILY MEDICINE

## 2020-07-30 RX ORDER — AMLODIPINE BESYLATE 5 MG/1
5 TABLET ORAL
Qty: 90 TABLET | Refills: 1 | Status: SHIPPED | OUTPATIENT
Start: 2020-07-30 | End: 2021-01-25

## 2020-07-30 RX ORDER — MYCOPHENOLIC ACID 360 MG/1
360 TABLET, DELAYED RELEASE ORAL
Qty: 120 TABLET | Refills: 0 | COMMUNITY
Start: 2020-07-30

## 2020-07-30 RX ORDER — CARVEDILOL 25 MG/1
12.5 TABLET ORAL 2 TIMES DAILY WITH MEALS
Qty: 180 TABLET | Refills: 1 | Status: SHIPPED | OUTPATIENT
Start: 2020-07-30 | End: 2021-03-11

## 2020-07-30 NOTE — PATIENT INSTRUCTIONS
Santiago Arce's SCREENING SCHEDULE   Tests on this list are recommended by your physician but may not be covered, or covered at this frequency, by your insurer. Please check with your insurance carrier before scheduling to verify coverage.    [place] history    Colorectal Cancer Screening  Covered up to Age 76     Colonoscopy Screen   Covered every 10 years- more often if abnormal Colonoscopy due on 11/08/2022 Update Health Maintenance if applicable    Flex Sigmoidoscopy Screen  Covered every 5 years N year    Pneumococcal 13 (Prevnar)  Covered Once after 65 Orders placed or performed in visit on 01/02/18   • PNEUMOCOCCAL VACC, 13 MARLEN IM    Please get once after your 65th birthday    Pneumococcal 23 (Pneumovax)  Covered Once after 65 Orders placed or per computer and printer. (the forms are also available in [place]  www. putitinwriting. org  This link also has information from the [facility] regarding Advance Directives.

## 2020-07-30 NOTE — PROGRESS NOTES
HPI:   Wilbert Butler is a 61year old female who presents for a Medicare Subsequent Annual Wellness visit (Pt already had Initial Annual Wellness). History of COVID-19 May/2020-had a headache and increased nasal congestion.   Daughter states placed. Taking iron daily. Taking magnesium daily    Pt presents  for recheck of hyperlipidemia. Patient reports taking medications as instructed, no medication side effects noted. Denies any generalized muscle aches. Pt has h/o hypertension.  Pt has b weeks)?: Several days  PHQ-2 SCORE: 2   lives with family but cannot leave the house due to COVID-19 high risk.   Cut COVID-19 going to [facility] with daughter-in-law suspects well at visit for transplant specialist.  Patient is on bupropion does not want in Normocytic anemia     Immunosuppression due to drug therapy     Periumbilical abdominal pain     History of gastritis     Hair loss    Wt Readings from Last 3 Encounters:  07/30/20 : 155 lb 3.2 oz (70.4 kg)  07/29/20 : 155 lb (70.3 kg)  01/30/20 : 142 lb ( MG) BY MOUTH EVERY NIGHT  ENVARSUS XR 1 MG Oral Tablet 24 Hr,   ergocalciferol 1.25 MG (72160 UT) Oral Cap,   FEROSUL 325 (65 Fe) MG Oral Tab,   Blood Glucose Monitoring Suppl (TRUE METRIX AIR GLUCOSE METER) w/Device Does not apply Kit, U UTD WITH STRIPS T kidney (12/12/2019).     Her family history includes Breast Cancer (age of onset: 36) in her maternal aunt and paternal aunt; Depression in her daughter and daughter; Diabetes in her father; Heart Attack in her father; High Blood Pressure in her mother, son Both Eyes Chart Acuity: 20/50   Able To Tolerate Visual Acuity: Yes      General Appearance:  Alert, cooperative, no distress, appears stated age   Head:  Normocephalic, without obvious abnormality, atraumatic   Eyes:  PERRL, conjunctiva/corneas clear, EOM Normal       Vaccination History     Immunization History   Administered Date(s) Administered   • >=9 YRS AFLURIA TRI PRESERV FREE SINGLE DOSE (54377) FLU CLINIC 09/28/2015   • FLULAVAL 6 months & older 0.5 ml Prefilled syringe (00457) 11/27/2017, 12/11/20 carvedilol 25 MG Oral Tab; Take 0.5 tablets (12.5 mg total) by mouth 2 (two) times daily with meals.   Controlled  cmp q 6 mos  Labs more frequent by by transplant specialist  Low-sodium diet  encourage with a mask 30 minutes daily    Hair loss  -     DERM ordered. Lab work ordered. Return in 4 weeks (on 8/27/2020) for discuss labs if needed, otherwise repeat labs in 6 mos.      uJan Nichole, DO, 7/30/2020     General Health     In the past six months, have you lost more than 10 pounds without trying?: 2 Year due on 12/11/2019 Update Health Maintenance if applicable    Chlamydia  Annually if high risk No results found for: CHLAMYDIA No flowsheet data found.     Screening Mammogram      Mammogram Annually to 76, then as discussed Mammogram due on 07/27/2020 Annually Spirometry date:  No flowsheet data found.             Template: QUINTON NOYOLA MEDICARE ANNUAL ASSESSMENT FEMALE [89209]

## 2020-07-31 ENCOUNTER — TELEPHONE (OUTPATIENT)
Dept: FAMILY MEDICINE CLINIC | Facility: CLINIC | Age: 60
End: 2020-07-31

## 2020-07-31 DIAGNOSIS — Z00.00 ENCOUNTER FOR ANNUAL HEALTH EXAMINATION: Primary | ICD-10-CM

## 2020-07-31 DIAGNOSIS — Z12.4 CERVICAL CANCER SCREENING: ICD-10-CM

## 2020-07-31 NOTE — TELEPHONE ENCOUNTER
Dr. Tanja Mendez, patient seen in office 7/30/2020 for Physical.  No pap was ordered. Patient had hysterectomy, but unclear if patient still has cervix, per surgical history. Please advise if Pap was needed or intended.

## 2020-08-03 LAB — HPV I/H RISK 1 DNA SPEC QL NAA+PROBE: NEGATIVE

## 2020-08-10 ENCOUNTER — MED REC SCAN ONLY (OUTPATIENT)
Dept: FAMILY MEDICINE CLINIC | Facility: CLINIC | Age: 60
End: 2020-08-10

## 2020-08-15 DIAGNOSIS — J44.9 ASTHMA WITH COPD (CHRONIC OBSTRUCTIVE PULMONARY DISEASE) (HCC): Chronic | ICD-10-CM

## 2020-08-17 RX ORDER — MONTELUKAST SODIUM 10 MG/1
TABLET ORAL
Qty: 90 TABLET | Refills: 1 | Status: SHIPPED | OUTPATIENT
Start: 2020-08-17 | End: 2021-01-28

## 2020-08-17 NOTE — TELEPHONE ENCOUNTER
Pt requesting refill of   Requested Prescriptions     Pending Prescriptions Disp Refills   • MONTELUKAST SODIUM 10 MG Oral Tab [Pharmacy Med Name: MONTELUKAST 10MG TABLETS] 90 tablet 1     Sig: TAKE ONE TABLET BY MOUTH EVERY NIGHT     Passed protocol, re

## 2020-08-20 DIAGNOSIS — J44.9 ASTHMA WITH COPD (CHRONIC OBSTRUCTIVE PULMONARY DISEASE) (HCC): Chronic | ICD-10-CM

## 2020-08-21 RX ORDER — MONTELUKAST SODIUM 10 MG/1
10 TABLET ORAL NIGHTLY
Qty: 90 TABLET | Refills: 1 | OUTPATIENT
Start: 2020-08-21

## 2020-08-25 ENCOUNTER — HOSPITAL ENCOUNTER (OUTPATIENT)
Dept: MAMMOGRAPHY | Age: 60
Discharge: HOME | End: 2020-08-25
Attending: FAMILY MEDICINE
Payer: MEDICARE

## 2020-08-25 DIAGNOSIS — Z12.31 SCREENING MAMMOGRAM, ENCOUNTER FOR: ICD-10-CM

## 2020-08-25 PROCEDURE — 77063 BREAST TOMOSYNTHESIS BI: CPT | Performed by: FAMILY MEDICINE

## 2020-08-25 PROCEDURE — 77067 SCR MAMMO BI INCL CAD: CPT | Performed by: FAMILY MEDICINE

## 2020-09-21 ENCOUNTER — PATIENT MESSAGE (OUTPATIENT)
Dept: FAMILY MEDICINE CLINIC | Facility: CLINIC | Age: 60
End: 2020-09-21

## 2020-09-21 ENCOUNTER — TELEPHONE (OUTPATIENT)
Dept: FAMILY MEDICINE CLINIC | Facility: CLINIC | Age: 60
End: 2020-09-21

## 2020-09-21 NOTE — TELEPHONE ENCOUNTER
Sara with [facility] called office asking for a referral for an EEI transplant MED Nephrology. Pt had an appt with them today 09/21/2020 at 10:20am. The provider was Dr. Kimberley Loera. Referral can be faxed to [phone]. Their phone number is [phone].

## 2020-09-22 NOTE — TELEPHONE ENCOUNTER
Called and asked for Sara. This was a central phone line so they are not sure who Tianna Benitez is. Told they who the doctor was and they still were not sure who to send the call to . Faxed a message back to the number provided asking for them to clarify.

## 2020-09-22 NOTE — TELEPHONE ENCOUNTER
Sara from [address] [place] [address] calling to check status of referral.       [phone].

## 2020-09-23 NOTE — TELEPHONE ENCOUNTER
Attepted to Eric Patrick back and they were unable to reach her and reported her line was busy    Per referral dept  This rep Jesus Natali me that the [facility] authorization # [id number] is good until 11/20/20   She stated that the 10 visits is only a place lara and

## 2020-09-23 NOTE — TELEPHONE ENCOUNTER
Yvette Theodore called to follow up regarding referral   Referral [id number] for Nephrology is current until 11/20/2020 however, patient has exceeded the 10 visit allotted    Opened referral to send a request for additional visits and sent a message to referral

## 2020-10-02 ENCOUNTER — APPOINTMENT (OUTPATIENT)
Dept: GENERAL RADIOLOGY | Facility: HOSPITAL | Age: 60
End: 2020-10-02
Attending: EMERGENCY MEDICINE
Payer: MEDICARE

## 2020-10-02 ENCOUNTER — APPOINTMENT (OUTPATIENT)
Dept: CT IMAGING | Facility: HOSPITAL | Age: 60
End: 2020-10-02
Attending: EMERGENCY MEDICINE
Payer: MEDICARE

## 2020-10-02 ENCOUNTER — HOSPITAL ENCOUNTER (OUTPATIENT)
Facility: HOSPITAL | Age: 60
Setting detail: OBSERVATION
Discharge: HOME | End: 2020-10-03
Attending: EMERGENCY MEDICINE | Admitting: HOSPITALIST
Payer: MEDICARE

## 2020-10-02 ENCOUNTER — APPOINTMENT (OUTPATIENT)
Dept: MRI IMAGING | Facility: HOSPITAL | Age: 60
End: 2020-10-02
Attending: HOSPITALIST
Payer: MEDICARE

## 2020-10-02 DIAGNOSIS — R42 DIZZINESS: Primary | ICD-10-CM

## 2020-10-02 PROCEDURE — 70551 MRI BRAIN STEM W/O DYE: CPT | Performed by: HOSPITALIST

## 2020-10-02 PROCEDURE — 71045 X-RAY EXAM CHEST 1 VIEW: CPT | Performed by: EMERGENCY MEDICINE

## 2020-10-02 PROCEDURE — 70450 CT HEAD/BRAIN W/O DYE: CPT | Performed by: EMERGENCY MEDICINE

## 2020-10-02 PROCEDURE — 99220 INITIAL OBSERVATION CARE,LEVL III: CPT | Performed by: HOSPITALIST

## 2020-10-02 RX ORDER — LEVOTHYROXINE SODIUM 88 UG/1
88 TABLET ORAL
Status: DISCONTINUED | OUTPATIENT
Start: 2020-10-03 | End: 2020-10-03

## 2020-10-02 RX ORDER — DEXTROSE MONOHYDRATE 25 G/50ML
50 INJECTION, SOLUTION INTRAVENOUS
Status: DISCONTINUED | OUTPATIENT
Start: 2020-10-02 | End: 2020-10-03

## 2020-10-02 RX ORDER — ACETAMINOPHEN 325 MG/1
650 TABLET ORAL EVERY 6 HOURS PRN
Status: DISCONTINUED | OUTPATIENT
Start: 2020-10-02 | End: 2020-10-03

## 2020-10-02 RX ORDER — MYCOPHENOLIC ACID 360 MG/1
360 TABLET, DELAYED RELEASE ORAL
Status: DISCONTINUED | OUTPATIENT
Start: 2020-10-02 | End: 2020-10-03

## 2020-10-02 RX ORDER — SODIUM CHLORIDE 9 MG/ML
INJECTION, SOLUTION INTRAVENOUS CONTINUOUS
Status: DISCONTINUED | OUTPATIENT
Start: 2020-10-02 | End: 2020-10-03

## 2020-10-02 RX ORDER — VALGANCICLOVIR 450 MG/1
450 TABLET, FILM COATED ORAL DAILY
Status: DISCONTINUED | OUTPATIENT
Start: 2020-10-02 | End: 2020-10-03

## 2020-10-02 RX ORDER — AMLODIPINE BESYLATE 5 MG/1
5 TABLET ORAL
Status: DISCONTINUED | OUTPATIENT
Start: 2020-10-02 | End: 2020-10-03

## 2020-10-02 RX ORDER — ONDANSETRON 2 MG/ML
4 INJECTION INTRAMUSCULAR; INTRAVENOUS EVERY 4 HOURS PRN
Status: DISCONTINUED | OUTPATIENT
Start: 2020-10-02 | End: 2020-10-03

## 2020-10-02 RX ORDER — OMEPRAZOLE 20 MG/1
20 CAPSULE, DELAYED RELEASE ORAL
COMMUNITY
End: 2020-11-02 | Stop reason: DRUGHIGH

## 2020-10-02 RX ORDER — SODIUM CHLORIDE 9 MG/ML
INJECTION, SOLUTION INTRAVENOUS ONCE
Status: COMPLETED | OUTPATIENT
Start: 2020-10-02 | End: 2020-10-02

## 2020-10-02 RX ORDER — BUPROPION HYDROCHLORIDE 150 MG/1
150 TABLET, EXTENDED RELEASE ORAL DAILY
Status: DISCONTINUED | OUTPATIENT
Start: 2020-10-02 | End: 2020-10-03

## 2020-10-02 RX ORDER — MECLIZINE HCL 12.5 MG/1
12.5 TABLET ORAL 3 TIMES DAILY PRN
Status: DISCONTINUED | OUTPATIENT
Start: 2020-10-02 | End: 2020-10-03

## 2020-10-02 RX ORDER — MONTELUKAST SODIUM 10 MG/1
10 TABLET ORAL NIGHTLY
Status: DISCONTINUED | OUTPATIENT
Start: 2020-10-02 | End: 2020-10-03

## 2020-10-02 RX ORDER — CARVEDILOL 12.5 MG/1
12.5 TABLET ORAL 2 TIMES DAILY WITH MEALS
Status: DISCONTINUED | OUTPATIENT
Start: 2020-10-02 | End: 2020-10-03

## 2020-10-02 RX ORDER — ATORVASTATIN CALCIUM 40 MG/1
40 TABLET, FILM COATED ORAL NIGHTLY
Status: DISCONTINUED | OUTPATIENT
Start: 2020-10-02 | End: 2020-10-03

## 2020-10-02 RX ORDER — ASPIRIN 81 MG/1
324 TABLET, CHEWABLE ORAL ONCE
Status: COMPLETED | OUTPATIENT
Start: 2020-10-02 | End: 2020-10-02

## 2020-10-02 RX ORDER — ENOXAPARIN SODIUM 100 MG/ML
40 INJECTION SUBCUTANEOUS DAILY
Status: DISCONTINUED | OUTPATIENT
Start: 2020-10-02 | End: 2020-10-03

## 2020-10-02 NOTE — H&P
SAMUEL HOSPITALIST  History and Physical     [facility] Patient Status:  Emergency    1960 MRN [id number]   Location [address] [place] [address] Attending Jessie Alvarenga [place] [address] [address] 0 PCP Dominic Steel DO Chi obstructive sleep apnea SPLIT NIGHT 5-9-16   • Sleep apnea     cpap   • Traction detachment of retina    • Unspecified disorder of thyroid    • Visual impairment     glasses   • Vitreous hemorrhage (HCC)    • Wears glasses         Past Surgical History: Depression Daughter    • Other (High Blood Pressure, and Pre-Diabetes) Daughter    • High Blood Pressure Son    • Depression Daughter         Allergies:    Ace Inhibitors          RENAL INSUFFICIENCY  B12-Ca                  RASH    Comment:Vit b12 and calc ONCE DAILY, Disp: 90 tablet, Rfl: 1    •  Biotin 5000 MCG Oral Cap, , Disp: , Rfl:     •  Magnesium Oxide 400 (241.3 Mg) MG Oral Tab, , Disp: , Rfl:     •  glipiZIDE 10 MG Oral Tab, Take 10 mg by mouth 2 (two) times daily. , Disp: , Rfl:     •  JANUVIA 100 No wheezes. No rhonchi. Cardiovascular: S1, S2. Regular rate and rhythm. No murmurs, rubs or gallops. Equal pulses. Chest and Back: No tenderness or deformity. Abdomen: Soft, nontender, nondistended. Positive bowel sounds.  No rebound, guarding or orga

## 2020-10-02 NOTE — ED PROVIDER NOTES
Patient Seen in: [facility] Emergency Department      History   Patient presents with:  Dizziness  Eye Visual Problem    Stated Complaint: loss of vision to left eye and dizziness.  began yesterday last known normal was*    HPI    80-year-old with pa eye   • Renal disorder     ESRD   • S/P kidney transplant 01/08/2020 12/12/2019-right kidney transplant donated by his son. Former dialysis patient due to diabetic kidney disease performed at [facility].      • Severe obstructive sleep apnea SPLIT NIGHT 5-9-16 was*  Other systems are as noted in HPI. Constitutional and vital signs reviewed. All other systems reviewed and negative except as noted above.     Physical Exam     ED Triage Vitals [10/02/20 1232]   /73   Pulse 70   Resp 20   Temp 97 °F (36.1 POC Glucose 121 (*)     All other components within normal limits   CBC W/ DIFFERENTIAL - Abnormal; Notable for the following components:    Lymphocyte Absolute 0.60 (*)     All other components within normal limits   PROTHROMBIN TIME (PT) - Normal   PTT, lesion. Calcifications noted  within the left frontal lobe anteriorly and along the superior convexity as well as the right rectus gyrus which is stable likely related to old infectious etiology. SINUSES:           No sign of acute sinusitis.   MASTOIDS: floaters and that her vision appears orange. She has been treated with antivirals for CMV. I reviewed case with OPHTHO Dr. Brett Alvarado who states patient does not require emergent evaluation for her symptoms, and recommended close OP follow up.   Plan for C

## 2020-10-02 NOTE — ED INITIAL ASSESSMENT (HPI)
Pt woke up yesterday with dizziness and vision loss in left eye. Pt did not inform family members. Pt woke up this morning, c/o nausea, dizziness, sour mouth, yellow orange vision in left eye.

## 2020-10-03 VITALS
DIASTOLIC BLOOD PRESSURE: 58 MMHG | OXYGEN SATURATION: 98 % | WEIGHT: 153 LBS | RESPIRATION RATE: 17 BRPM | HEIGHT: 61 IN | SYSTOLIC BLOOD PRESSURE: 125 MMHG | HEART RATE: 70 BPM | BODY MASS INDEX: 28.89 KG/M2 | TEMPERATURE: 98 F

## 2020-10-03 PROCEDURE — 99217 OBSERVATION CARE DISCHARGE: CPT | Performed by: HOSPITALIST

## 2020-10-03 RX ORDER — POTASSIUM CHLORIDE 20 MEQ/1
40 TABLET, EXTENDED RELEASE ORAL ONCE
Status: COMPLETED | OUTPATIENT
Start: 2020-10-03 | End: 2020-10-03

## 2020-10-03 RX ORDER — MECLIZINE HCL 12.5 MG/1
12.5 TABLET ORAL 3 TIMES DAILY PRN
Qty: 30 TABLET | Refills: 0 | Status: SHIPPED | OUTPATIENT
Start: 2020-10-03

## 2020-10-03 NOTE — PLAN OF CARE
Patient discharged to home via wc and support staff. DC instructions reviewed with DIL at bedside. Script provided. IV and tele removed. All belongings sent with patient.

## 2020-10-03 NOTE — PLAN OF CARE
Assumed care at Doctor Kamari 91 and oriented x4, Guatemalan speaking  Neuro assessment done, intact, reports L eye visual changes  States she sees red at times & floaters  L arm precautions for AVF  IVF infusing  Denies pain, dizziness at this time  Call light in

## 2020-10-03 NOTE — PROGRESS NOTES
Patient states she feels better. MRI negative, ok to dc, she needs optho follow up. They were consulted in ED and would like to see her on Monday/Tuesday.   Discussed with daughter in detail, she prefers patients regular optho which is fine, but if cannot

## 2020-10-03 NOTE — RESPIRATORY THERAPY NOTE
INDRA - Equipment Use Daily Summary:  · Set Mode   · Usage in hours:  9.8  · 90% Pressure (EPAP) level: 10.5  · 90% Insp Pressure (IPAP):   · AHI: 6.3  · Supplemental Oxygen:  · Comments:

## 2020-10-03 NOTE — PROGRESS NOTES
NURSING ADMISSION NOTE    Assumed care of patient when transferred from ED. Pt A/O x 4, VSS, Nauruan speaking. Daughter-in-law at bedside, patient request daughter-in-law to translate. Admission Navigator completed. MRI screening form completed.   MR

## 2020-10-03 NOTE — PLAN OF CARE
Assumed care at 0700. No acute issues. Patient reports nausea, zofran given with good results. Per DIl at bedside, pt reports seeing \"red spots\" out of her left eye. Denies numbness, tingling. Follows all commands.    Per Dr. Ede Granados, ok to dc home, f/

## 2020-10-04 NOTE — DISCHARGE SUMMARY
Antoinette Sarmiento HOSPITALIST  DISCHARGE SUMMARY     Medardo Robertson Patient Status:  Observation    1960 MRN [id number]   [facility] 7NE-A Attending No att. providers found   Hosp Day # 0 PCP Flakita Castro DO     Date of Admission: 10/2 medications      Instructions Prescription details   acetaminophen 500 MG Tabs  Commonly known as: TYLENOL EXTRA STRENGTH      Take 1,000 mg by mouth one time.    Refills: 0     Albuterol Sulfate  (90 Base) MCG/ACT Aers      INHALE 2 PUFFS BY MOUTH I Misc      3x daily lancets for True Metrix meter   Quantity: 300 each  Refills: 3     Lantus SoloStar 100 UNIT/ML Sopn  Generic drug: insulin glargine      Inject 30 Units into the skin every morning.    Quantity: 27 mL  Refills: 1     Levothyroxine Sodium 462 5324    In 1 week      Appointments for Next 30 Days 10/4/2020 - 11/3/2020      Date Arrival Time Visit Type Length Department Provider     10/12/2020  8:15 AM  [address] 39 min [place]  13. be permitted in the exam rooms, unless otherwise noted and in accordance with departmental policy.    PATIENT RESPONSIBILITY ESTIMATE    - (Estimate) We will provide you with your estimated remaining deductible and coinsurance balance for your services at

## 2020-10-05 ENCOUNTER — PATIENT OUTREACH (OUTPATIENT)
Dept: CASE MANAGEMENT | Age: 60
End: 2020-10-05

## 2020-10-05 ENCOUNTER — TELEPHONE (OUTPATIENT)
Dept: FAMILY MEDICINE CLINIC | Facility: CLINIC | Age: 60
End: 2020-10-05

## 2020-10-05 DIAGNOSIS — Z02.9 ENCOUNTERS FOR UNSPECIFIED ADMINISTRATIVE PURPOSE: ICD-10-CM

## 2020-10-05 PROCEDURE — 1111F DSCHRG MED/CURRENT MED MERGE: CPT

## 2020-10-05 NOTE — TELEPHONE ENCOUNTER
May have virtual TCM  Please call and schedule at her availability -refer to Jose Cavanaugh RN message   Thank you

## 2020-10-05 NOTE — TELEPHONE ENCOUNTER
Spoke to pt's Dtr Donnal Night per HIPAA for TCM today. Dtr states pt is doing well since d/c and will be seeing ophtho on Wednesday 10/7 from 10am-noon. Attempted to schedule TCM appt for pt with PCP.   Unable to book appt as dtr cannot bring pt in for appt du

## 2020-10-05 NOTE — PROGRESS NOTES
Initial Post Discharge Follow Up   Discharge Date: 10/3/20  Contact Date: 10/5/2020    Consent Verification:  Assessment Completed With: Caregiver: Markell arora received per patient?  written  HIPAA Verified?   Yes    Discharge Dx:    Dizziness Solution Pen-injector Inject 30 Units into the skin every morning.  27 mL 1   • TRUE METRIX BLOOD GLUCOSE TEST In Vitro Strip 3x daily 300 strip 3   • Lancets Does not apply Misc 3x daily lancets for True Metrix meter 300 each 3   • mycophenolate sodium 360 INTO THE LUNGS DAILY 60 each 3   • acetaminophen 500 MG Oral Tab Take 1,000 mg by mouth one time.      • PAZEO 0.7 % Ophthalmic Solution INT 1 GTT IN [facility] EYE 1 TIME D  3     • (NCM)  Were there any medication changes noted on AVS?  yes  o If so, were these Shiva Rivera ([address] [place] [address] Shiva)            [facility] [address] [place] [address] [id number] [phone] Endocrinology - Francisca Monique, [address] [place] [address]

## 2020-10-06 ENCOUNTER — TELEPHONE (OUTPATIENT)
Dept: FAMILY MEDICINE CLINIC | Facility: CLINIC | Age: 60
End: 2020-10-06

## 2020-10-06 DIAGNOSIS — E11.65 UNCONTROLLED TYPE 2 DIABETES MELLITUS WITH HYPERGLYCEMIA (HCC): Primary | ICD-10-CM

## 2020-10-06 NOTE — TELEPHONE ENCOUNTER
Referral faxed to number provided and confirmation received.    Noted to Dr Eduin Mathew office still pending authorization

## 2020-10-06 NOTE — TELEPHONE ENCOUNTER
Ana Maria with Dr. Wilburn Holualoa office called asking for a referral for patient. Fidelia Fang states pt has an appt 10/07/2020 at 11am.  Fax number: [phone].

## 2020-10-07 DIAGNOSIS — K21.9 GASTROESOPHAGEAL REFLUX DISEASE: ICD-10-CM

## 2020-10-08 RX ORDER — FAMOTIDINE 20 MG/1
TABLET ORAL
Qty: 180 TABLET | Refills: 2 | Status: SHIPPED | OUTPATIENT
Start: 2020-10-08 | End: 2020-11-02 | Stop reason: DRUGHIGH

## 2020-10-08 NOTE — TELEPHONE ENCOUNTER
Pt requesting refill of   Requested Prescriptions     Signed Prescriptions Disp Refills   • FAMOTIDINE 20 MG Oral Tab 180 tablet 2     Sig: TAKE ONE TABLET BY MOUTH TWICE DAILY     Authorizing Provider: Michaelle Gore     Ordering User: Green Minus

## 2020-10-09 ENCOUNTER — MED REC SCAN ONLY (OUTPATIENT)
Dept: FAMILY MEDICINE CLINIC | Facility: CLINIC | Age: 60
End: 2020-10-09

## 2020-10-12 ENCOUNTER — TELEMEDICINE (OUTPATIENT)
Dept: FAMILY MEDICINE CLINIC | Facility: CLINIC | Age: 60
End: 2020-10-12

## 2020-10-12 ENCOUNTER — HOSPITAL ENCOUNTER (OUTPATIENT)
Dept: ULTRASOUND IMAGING | Age: 60
Discharge: HOME | End: 2020-10-12
Attending: FAMILY MEDICINE
Payer: MEDICARE

## 2020-10-12 DIAGNOSIS — I15.0 RENOVASCULAR HYPERTENSION: ICD-10-CM

## 2020-10-12 DIAGNOSIS — K59.00 CONSTIPATION, UNSPECIFIED CONSTIPATION TYPE: ICD-10-CM

## 2020-10-12 DIAGNOSIS — E78.5 HYPERLIPIDEMIA ASSOCIATED WITH TYPE 2 DIABETES MELLITUS (HCC): ICD-10-CM

## 2020-10-12 DIAGNOSIS — K21.9 GASTROESOPHAGEAL REFLUX DISEASE WITHOUT ESOPHAGITIS: ICD-10-CM

## 2020-10-12 DIAGNOSIS — H43.392 VITREOUS FLOATERS OF LEFT EYE: ICD-10-CM

## 2020-10-12 DIAGNOSIS — E11.3522 TYPE 2 DIABETES MELLITUS WITH LEFT EYE AFFECTED BY PROLIFERATIVE RETINOPATHY AND TRACTION RETINAL DETACHMENT INVOLVING MACULA, WITH LONG-TERM CURRENT USE OF INSULIN (HCC): ICD-10-CM

## 2020-10-12 DIAGNOSIS — R74.8 ELEVATED ALKALINE PHOSPHATASE MEASUREMENT: ICD-10-CM

## 2020-10-12 DIAGNOSIS — E83.42 HYPOMAGNESEMIA: ICD-10-CM

## 2020-10-12 DIAGNOSIS — R42 DIZZINESS: Primary | ICD-10-CM

## 2020-10-12 DIAGNOSIS — E11.69 HYPERLIPIDEMIA ASSOCIATED WITH TYPE 2 DIABETES MELLITUS (HCC): ICD-10-CM

## 2020-10-12 DIAGNOSIS — Z79.4 TYPE 2 DIABETES MELLITUS WITH LEFT EYE AFFECTED BY PROLIFERATIVE RETINOPATHY AND TRACTION RETINAL DETACHMENT INVOLVING MACULA, WITH LONG-TERM CURRENT USE OF INSULIN (HCC): ICD-10-CM

## 2020-10-12 DIAGNOSIS — D64.9 NORMOCYTIC ANEMIA: ICD-10-CM

## 2020-10-12 PROCEDURE — 76700 US EXAM ABDOM COMPLETE: CPT | Performed by: FAMILY MEDICINE

## 2020-10-12 PROCEDURE — 99495 TRANSJ CARE MGMT MOD F2F 14D: CPT | Performed by: FAMILY MEDICINE

## 2020-10-12 RX ORDER — ASPIRIN 81 MG
100 TABLET, DELAYED RELEASE (ENTERIC COATED) ORAL 2 TIMES DAILY PRN
Qty: 60 TABLET | Refills: 1 | Status: SHIPPED | OUTPATIENT
Start: 2020-10-12

## 2020-10-12 NOTE — PROGRESS NOTES
This visit is conducted using Telemedicine with live, interactive video and audio. Patient has been referred to the [facility] website at [email or web address] to review the yearly Consent to Treat document.     Patient understands and accepts financial res and on an antiretroviral by her transplant team.  Patient underwent MRI of the brain which was negative and next day at discharge symptoms had resolved.   ER physician spoke with ophthalmology in the ED and had follow-up outpatient later that week which she patient collaboration. Patient does not drive and daughter-in-law takes her to all of her appointments. Professional  was declined. Allergies:  She is allergic to ace inhibitors and b12-ca.     Current Meds:    •  FAMOTIDINE 20 MG Oral Tab, ENVARSUS XR 1 MG Oral Tablet 24 Hr,     •  ergocalciferol 1.25 MG (22721 UT) Oral Cap,     •  FEROSUL 325 (65 Fe) MG Oral Tab, 2 (two) times a day.       •  Blood Glucose Monitoring Suppl (TRUE METRIX AIR GLUCOSE METER) w/Device Does not apply Kit, U UTD WI surgery (2013); upper gi endo no barretts (5/15); ish biopsy stereo nodule 1 site right (cpt=19081) (??); other surgical history (1990); colonoscopy; cataract (Bilateral); av fistula revision, open (Left, 01/2019); colonoscopy (N/A, 11/8/2019); and transpl labored breathing. Speaking clearly in Malaysian answering questions appropriately.   SKIN: no rashes, no suspicious lesions  HEENT: atraumatic, normocephalic,  EYES:conjunctiva are clear-non-icteric bilateral  NEURO: Oriented times three, cranial nerves are cause of black stools  Has anemia of chronic disease suspect with transplant    Constipation, unspecified constipation type  Start FiberCon OTC once daily  Drink 64 ounces of water daily  Start OTC Colace 100 mg twice daily as needed for constipation    No

## 2020-10-14 ENCOUNTER — TELEPHONE (OUTPATIENT)
Dept: FAMILY MEDICINE CLINIC | Facility: CLINIC | Age: 60
End: 2020-10-14

## 2020-10-14 ENCOUNTER — PATIENT MESSAGE (OUTPATIENT)
Dept: FAMILY MEDICINE CLINIC | Facility: CLINIC | Age: 60
End: 2020-10-14

## 2020-10-14 PROBLEM — H43.392 VITREOUS FLOATERS OF LEFT EYE: Status: ACTIVE | Noted: 2020-10-14

## 2020-10-14 RX ORDER — FLUTICASONE PROPIONATE 50 MCG
2 SPRAY, SUSPENSION (ML) NASAL DAILY
Qty: 1 BOTTLE | Refills: 3 | Status: SHIPPED | OUTPATIENT
Start: 2020-10-14 | End: 2021-03-11

## 2020-10-14 RX ORDER — DOCUSATE SODIUM 100 MG/1
100 CAPSULE, LIQUID FILLED ORAL 2 TIMES DAILY PRN
Qty: 60 CAPSULE | Refills: 1 | COMMUNITY
Start: 2020-10-14

## 2020-10-14 NOTE — TELEPHONE ENCOUNTER
----- Message from Enrrique Peterson DO sent at 10/12/2020 10:03 PM CDT -----  Liver is diffusely inhomogenous-no focal mass. Gallbladder is normal without calculus and no intrahepatic biliary.   Pancreas is obstructing the bowel gas    Ultrasound was done to

## 2020-10-14 NOTE — TELEPHONE ENCOUNTER
interpretor ID [id number]    LMOM to return call to the office. Provided pt office phone [phone] along with office hours.

## 2020-10-14 NOTE — TELEPHONE ENCOUNTER
From: Amado Gates  To: Enrrique Peterson DO  Sent: 10/14/2020 11:32 AM CDT  Subject: Visit Follow-up Question    Good morning Dr. Tre Moise keeps having nausea, sour mouth and sinus pressure since yesterday.  Her sugar yesterday morning

## 2020-10-14 NOTE — TELEPHONE ENCOUNTER
Rx Flonase sent to pharmacy to help with her sinus pressure. Suspect may be due to allergies. Patient to monitor for fever.   Patient has long history of GERD and has had difficulty controlling since her PPI was discontinued and now recently started a low

## 2020-10-15 NOTE — TELEPHONE ENCOUNTER
ROMIE Chahal notified of results  Verbalize understanding   Apt with Dr Violeta Victoria coming up in Nov

## 2020-10-15 NOTE — TELEPHONE ENCOUNTER
Interpretor ID [id number]    LMOM to return call to the office as this is 2nd attempt to reach you. Provided pt office phone [phone] along with office hours.     My chart message sent, we are trying to reach you

## 2020-10-16 DIAGNOSIS — E78.5 HYPERLIPIDEMIA ASSOCIATED WITH TYPE 2 DIABETES MELLITUS (HCC): ICD-10-CM

## 2020-10-16 DIAGNOSIS — E11.69 HYPERLIPIDEMIA ASSOCIATED WITH TYPE 2 DIABETES MELLITUS (HCC): ICD-10-CM

## 2020-10-16 DIAGNOSIS — E03.9 ACQUIRED HYPOTHYROIDISM: ICD-10-CM

## 2020-10-16 RX ORDER — LEVOTHYROXINE SODIUM 88 UG/1
TABLET ORAL
Qty: 90 TABLET | Refills: 0 | Status: SHIPPED | OUTPATIENT
Start: 2020-10-16 | End: 2020-12-21

## 2020-10-16 RX ORDER — ATORVASTATIN CALCIUM 40 MG/1
TABLET, FILM COATED ORAL
Qty: 90 TABLET | Refills: 0 | Status: SHIPPED | OUTPATIENT
Start: 2020-10-16 | End: 2021-01-25

## 2020-10-27 ENCOUNTER — TELEPHONE (OUTPATIENT)
Dept: FAMILY MEDICINE CLINIC | Facility: CLINIC | Age: 60
End: 2020-10-27

## 2020-10-27 NOTE — TELEPHONE ENCOUNTER
Received fax from [facility] for referral   Patient has GI referral on file  Paulo Garner  AUTH# [id number] - 3 VISITS  E/D 07/30/2020 - 10/28/2020    Will send request to referral dept for extension       Lm for Zaid García at [place] that we have submitted

## 2020-10-28 RX ORDER — CETIRIZINE HYDROCHLORIDE 10 MG/1
TABLET ORAL
Qty: 90 TABLET | Refills: 1 | Status: SHIPPED | OUTPATIENT
Start: 2020-10-28 | End: 2021-04-28

## 2020-10-28 NOTE — TELEPHONE ENCOUNTER
Per referral department:  new authorization for this patient. Auth# [id number]   E/D 10/27/20 - 01/26/2021   3 visits. LMOM to return call to the office. Provided pt office phone [phone] along with office hours.   Detailed message left

## 2020-10-28 NOTE — TELEPHONE ENCOUNTER
Pt requesting refill of   Requested Prescriptions     Pending Prescriptions Disp Refills   • CETIRIZINE 10 MG Oral Tab [Pharmacy Med Name: CETIRIZINE 10MG TABLETS] 90 tablet 1     Sig: TAKE 1 TABLET BY MOUTH ONCE DAILY     Passed protocol, refill approve

## 2020-11-10 ENCOUNTER — MED REC SCAN ONLY (OUTPATIENT)
Dept: FAMILY MEDICINE CLINIC | Facility: CLINIC | Age: 60
End: 2020-11-10

## 2021-01-23 DIAGNOSIS — I15.0 RENOVASCULAR HYPERTENSION: ICD-10-CM

## 2021-01-23 DIAGNOSIS — E11.69 HYPERLIPIDEMIA ASSOCIATED WITH TYPE 2 DIABETES MELLITUS (HCC): ICD-10-CM

## 2021-01-23 DIAGNOSIS — E78.5 HYPERLIPIDEMIA ASSOCIATED WITH TYPE 2 DIABETES MELLITUS (HCC): ICD-10-CM

## 2021-01-25 RX ORDER — AMLODIPINE BESYLATE 5 MG/1
TABLET ORAL
Qty: 90 TABLET | Refills: 0 | Status: SHIPPED | OUTPATIENT
Start: 2021-01-25 | End: 2021-04-22

## 2021-01-25 RX ORDER — ATORVASTATIN CALCIUM 40 MG/1
TABLET, FILM COATED ORAL
Qty: 90 TABLET | Refills: 0 | Status: SHIPPED | OUTPATIENT
Start: 2021-01-25 | End: 2021-04-22

## 2021-01-25 NOTE — TELEPHONE ENCOUNTER
t requesting refill of   Requested Prescriptions     Pending Prescriptions Disp Refills   • AMLODIPINE BESYLATE 5 MG Oral Tab [Pharmacy Med Name: AMLODIPINE BESYLATE 5MG TABLETS] 90 tablet 1     Sig: TAKE ONE TABLET BY MOUTH ONCE A DAY   • ATORVASTATIN 40

## 2021-01-28 DIAGNOSIS — J44.9 ASTHMA WITH COPD (CHRONIC OBSTRUCTIVE PULMONARY DISEASE) (HCC): Chronic | ICD-10-CM

## 2021-01-28 RX ORDER — MONTELUKAST SODIUM 10 MG/1
TABLET ORAL
Qty: 90 TABLET | Refills: 1 | Status: SHIPPED | OUTPATIENT
Start: 2021-01-28 | End: 2021-07-30

## 2021-01-28 NOTE — TELEPHONE ENCOUNTER
Pt requesting refill of   Requested Prescriptions     Pending Prescriptions Disp Refills   • MONTELUKAST SODIUM 10 MG Oral Tab [Pharmacy Med Name: MONTELUKAST 10MG TABLETS] 90 tablet 1     Sig: TAKE ONE TABLET BY MOUTH EVERY NIGHT     Passed protocol, refi

## 2021-02-04 ENCOUNTER — TELEPHONE (OUTPATIENT)
Dept: FAMILY MEDICINE CLINIC | Facility: CLINIC | Age: 61
End: 2021-02-04

## 2021-02-04 DIAGNOSIS — Z99.2 ESRD ON DIALYSIS (HCC): Primary | ICD-10-CM

## 2021-02-04 DIAGNOSIS — N18.6 ESRD ON DIALYSIS (HCC): Primary | ICD-10-CM

## 2021-02-04 NOTE — TELEPHONE ENCOUNTER
[facility] calling for referral to be updated for 2021  Referral pended for approval         Please fax when authorized

## 2021-02-05 NOTE — TELEPHONE ENCOUNTER
Leesa from [facility] looking for a request referral for this patient for up coming appointment.   Please call her at [phone]

## 2021-02-05 NOTE — TELEPHONE ENCOUNTER
Spoke to [facility] and informed them that referral has been entered. status pending. Pt's appt is 2/8/2021.      Will contact referrals dept and see if this can be expedited for appt 2/8/21    Once approved please fax to [phone]

## 2021-02-09 ENCOUNTER — TELEPHONE (OUTPATIENT)
Dept: FAMILY MEDICINE CLINIC | Facility: CLINIC | Age: 61
End: 2021-02-09

## 2021-02-09 DIAGNOSIS — E11.3522 TYPE 2 DIABETES MELLITUS WITH LEFT EYE AFFECTED BY PROLIFERATIVE RETINOPATHY AND TRACTION RETINAL DETACHMENT INVOLVING MACULA, WITH LONG-TERM CURRENT USE OF INSULIN (HCC): Primary | ICD-10-CM

## 2021-02-09 DIAGNOSIS — Z79.4 TYPE 2 DIABETES MELLITUS WITH LEFT EYE AFFECTED BY PROLIFERATIVE RETINOPATHY AND TRACTION RETINAL DETACHMENT INVOLVING MACULA, WITH LONG-TERM CURRENT USE OF INSULIN (HCC): Primary | ICD-10-CM

## 2021-02-09 NOTE — TELEPHONE ENCOUNTER
Received call from [facility] with Dr. Mi Ramirez. Referal  21. Need updated referral.  Pended for signature.

## 2021-02-09 NOTE — TELEPHONE ENCOUNTER
Maury Drilling with Dr. Miryam Alexis office called stating pt needs referral. Pt is seeing Dr. Carolene Runner on 12/12/2021. Their fax number is [phone].

## 2021-02-10 NOTE — TELEPHONE ENCOUNTER
Faxed approved to Dr. Bridget Salas along with request for records to be faxed after consult.    Received confirmation

## 2021-03-11 DIAGNOSIS — I15.0 RENOVASCULAR HYPERTENSION: ICD-10-CM

## 2021-03-11 RX ORDER — CARVEDILOL 25 MG/1
TABLET ORAL
Qty: 180 TABLET | Refills: 0 | OUTPATIENT
Start: 2021-03-11

## 2021-03-11 RX ORDER — CARVEDILOL 25 MG/1
TABLET ORAL
Qty: 60 TABLET | Refills: 0 | Status: SHIPPED | OUTPATIENT
Start: 2021-03-11 | End: 2021-08-03

## 2021-03-11 RX ORDER — FLUTICASONE PROPIONATE 50 MCG
SPRAY, SUSPENSION (ML) NASAL
Qty: 16 G | Refills: 0 | Status: SHIPPED | OUTPATIENT
Start: 2021-03-11 | End: 2021-07-30

## 2021-03-11 RX ORDER — FLUTICASONE PROPIONATE 50 MCG
SPRAY, SUSPENSION (ML) NASAL
Qty: 48 G | Refills: 0 | OUTPATIENT
Start: 2021-03-11

## 2021-03-11 RX ORDER — FAMOTIDINE 20 MG/1
20 TABLET ORAL 2 TIMES DAILY
COMMUNITY
Start: 2021-01-28 | End: 2021-04-28

## 2021-03-11 NOTE — TELEPHONE ENCOUNTER
Requested Prescriptions     Refused Prescriptions Disp Refills   • CARVEDILOL 25 MG Oral Tab [Pharmacy Med Name: CARVEDILOL 25MG TABLETS] 180 tablet 0     Sig: TAKE 1 TABLET BY MOUTH TWICE DAILY WITH MEALS     Refused By: Shy JACKSON     Reason for Ref

## 2021-03-11 NOTE — TELEPHONE ENCOUNTER
Pt requesting refill of   Requested Prescriptions     Pending Prescriptions Disp Refills   • FLUTICASONE PROPIONATE 50 MCG/ACT Nasal Suspension [Pharmacy Med Name: FLUTICASONE 50MCG NASAL SP (120) RX] 16 g 0     Sig: SHAKE LIQUID AND USE 2 SPRAYS IN [facility]

## 2021-03-11 NOTE — TELEPHONE ENCOUNTER
Pt requesting refill of   Requested Prescriptions     Pending Prescriptions Disp Refills   • CARVEDILOL 25 MG Oral Tab [Pharmacy Med Name: CARVEDILOL 25MG TABLETS] 180 tablet 1     Sig: TAKE 1 TABLET BY MOUTH TWICE DAILY WITH MEALS     Refused Prescription

## 2021-03-17 DIAGNOSIS — Z23 NEED FOR VACCINATION: ICD-10-CM

## 2021-04-06 ENCOUNTER — TELEPHONE (OUTPATIENT)
Dept: FAMILY MEDICINE CLINIC | Facility: CLINIC | Age: 61
End: 2021-04-06

## 2021-04-06 DIAGNOSIS — L65.9 HAIR LOSS: ICD-10-CM

## 2021-04-06 DIAGNOSIS — Z94.0 S/P KIDNEY TRANSPLANT: ICD-10-CM

## 2021-04-06 DIAGNOSIS — Z79.4 TYPE 2 DIABETES MELLITUS WITH LEFT EYE AFFECTED BY PROLIFERATIVE RETINOPATHY AND TRACTION RETINAL DETACHMENT INVOLVING MACULA, WITH LONG-TERM CURRENT USE OF INSULIN (HCC): Primary | ICD-10-CM

## 2021-04-06 DIAGNOSIS — E11.3522 TYPE 2 DIABETES MELLITUS WITH LEFT EYE AFFECTED BY PROLIFERATIVE RETINOPATHY AND TRACTION RETINAL DETACHMENT INVOLVING MACULA, WITH LONG-TERM CURRENT USE OF INSULIN (HCC): Primary | ICD-10-CM

## 2021-04-06 NOTE — TELEPHONE ENCOUNTER
Incoming call from [facility] nephrology states that updated referral is needed for 2021 to transplant doctor   Pt has appt 4/12/2021 with Dr Estefania Cagle  Referral pending for #10 visits.  Please review and sign if ok

## 2021-04-15 NOTE — TELEPHONE ENCOUNTER
Referral team re reviewing since referral was originally denied.   For continuity of care patient to continue to follow up with post transplant team form [facility]

## 2021-04-22 DIAGNOSIS — E11.69 HYPERLIPIDEMIA ASSOCIATED WITH TYPE 2 DIABETES MELLITUS (HCC): ICD-10-CM

## 2021-04-22 DIAGNOSIS — E78.5 HYPERLIPIDEMIA ASSOCIATED WITH TYPE 2 DIABETES MELLITUS (HCC): ICD-10-CM

## 2021-04-22 DIAGNOSIS — I15.0 RENOVASCULAR HYPERTENSION: ICD-10-CM

## 2021-04-22 RX ORDER — ATORVASTATIN CALCIUM 40 MG/1
TABLET, FILM COATED ORAL
Qty: 90 TABLET | Refills: 0 | Status: SHIPPED | OUTPATIENT
Start: 2021-04-22 | End: 2021-06-21 | Stop reason: ALTCHOICE

## 2021-04-22 RX ORDER — AMLODIPINE BESYLATE 5 MG/1
TABLET ORAL
Qty: 90 TABLET | Refills: 0 | Status: SHIPPED | OUTPATIENT
Start: 2021-04-22 | End: 2021-06-21 | Stop reason: ALTCHOICE

## 2021-04-22 NOTE — TELEPHONE ENCOUNTER
Pt requesting refill of   Requested Prescriptions     Pending Prescriptions Disp Refills   • ATORVASTATIN 40 MG Oral Tab [Pharmacy Med Name: ATORVASTATIN 40MG TABLETS] 90 tablet 0     Sig: TAKE 1 TABLET BY MOUTH EVERY NIGHT   • AMLODIPINE BESYLATE 5 MG O

## 2021-04-28 RX ORDER — FAMOTIDINE 20 MG/1
TABLET ORAL
Qty: 180 TABLET | Refills: 0 | Status: SHIPPED | OUTPATIENT
Start: 2021-04-28 | End: 2021-07-30

## 2021-04-28 RX ORDER — CETIRIZINE HYDROCHLORIDE 10 MG/1
TABLET ORAL
Qty: 90 TABLET | Refills: 0 | Status: SHIPPED | OUTPATIENT
Start: 2021-04-28 | End: 2021-07-30

## 2021-04-28 NOTE — TELEPHONE ENCOUNTER
Pt requesting refill of   Requested Prescriptions     Pending Prescriptions Disp Refills   • CETIRIZINE 10 MG Oral Tab [Pharmacy Med Name: CETIRIZINE 10MG TABLETS] 90 tablet 1     Sig: TAKE ONE TABLET BY MOUTH ONCE DAILY   • FAMOTIDINE 20 MG Oral Tab [Ph

## 2021-04-29 ENCOUNTER — PATIENT MESSAGE (OUTPATIENT)
Dept: FAMILY MEDICINE CLINIC | Facility: CLINIC | Age: 61
End: 2021-04-29

## 2021-04-29 NOTE — TELEPHONE ENCOUNTER
From: Marixa Nolasco  To: Veena Hill DO  Sent: 4/29/2021 4:16 PM CDT  Subject: Other    I have attached a copy of my vaccine card for you to have a record of it.     Ida

## 2021-05-14 RX ORDER — BUPROPION HYDROCHLORIDE 150 MG/1
150 TABLET, EXTENDED RELEASE ORAL DAILY
Qty: 90 TABLET | Refills: 1 | Status: SHIPPED | OUTPATIENT
Start: 2021-05-14 | End: 2021-08-03

## 2021-06-17 ENCOUNTER — PATIENT MESSAGE (OUTPATIENT)
Dept: FAMILY MEDICINE CLINIC | Facility: CLINIC | Age: 61
End: 2021-06-17

## 2021-06-17 DIAGNOSIS — Z94.0 S/P KIDNEY TRANSPLANT: Primary | ICD-10-CM

## 2021-06-17 DIAGNOSIS — J44.9 ASTHMA WITH COPD (CHRONIC OBSTRUCTIVE PULMONARY DISEASE) (HCC): ICD-10-CM

## 2021-06-19 NOTE — TELEPHONE ENCOUNTER
Refill request for cetrizine, protocol passed, refill approved. Refill request for amlodipine, protocol failed, unable to approve. LOV 5/2/2017 LF 9/18/2017     Refill request for levothyroxine, protocol failed, unable to approve.  LOV 5/2/2017  9/18/
 used

## 2021-06-21 ENCOUNTER — TELEPHONE (OUTPATIENT)
Dept: FAMILY MEDICINE CLINIC | Facility: CLINIC | Age: 61
End: 2021-06-21

## 2021-06-21 NOTE — TELEPHONE ENCOUNTER
[facility] is calling for a referral for pt to see Dr. Leonard Qualres for an appointment on 7/12/2021 regarding a transplant. Please fax referral to [phone].

## 2021-06-21 NOTE — TELEPHONE ENCOUNTER
From: Higinio Hernandez  To: Clemencia Flanagan, DO  Sent: 6/17/2021 9:08 PM CDT  Subject: Referral Request    Dr. Vira Lesch,    [facility] is asking for a referral for an upcoming visit. To see Dr. Jaimes Room they said if it can be faxed to [phone].

## 2021-06-30 NOTE — TELEPHONE ENCOUNTER
Referral approved to [facility]- Dr. Dorys Jovel    Faxed with confirmation to [facility].

## 2021-07-19 DIAGNOSIS — E11.69 HYPERLIPIDEMIA ASSOCIATED WITH TYPE 2 DIABETES MELLITUS (HCC): ICD-10-CM

## 2021-07-19 DIAGNOSIS — E78.5 HYPERLIPIDEMIA ASSOCIATED WITH TYPE 2 DIABETES MELLITUS (HCC): ICD-10-CM

## 2021-07-19 DIAGNOSIS — I15.0 RENOVASCULAR HYPERTENSION: ICD-10-CM

## 2021-07-21 DIAGNOSIS — E11.69 HYPERLIPIDEMIA ASSOCIATED WITH TYPE 2 DIABETES MELLITUS (HCC): ICD-10-CM

## 2021-07-21 DIAGNOSIS — E78.5 HYPERLIPIDEMIA ASSOCIATED WITH TYPE 2 DIABETES MELLITUS (HCC): ICD-10-CM

## 2021-07-21 DIAGNOSIS — I15.0 RENOVASCULAR HYPERTENSION: ICD-10-CM

## 2021-07-21 RX ORDER — AMLODIPINE BESYLATE 5 MG/1
TABLET ORAL
Qty: 30 TABLET | Refills: 0 | Status: SHIPPED | OUTPATIENT
Start: 2021-07-21 | End: 2021-08-03

## 2021-07-21 RX ORDER — ATORVASTATIN CALCIUM 40 MG/1
TABLET, FILM COATED ORAL
Qty: 90 TABLET | Refills: 0 | OUTPATIENT
Start: 2021-07-21

## 2021-07-21 RX ORDER — ATORVASTATIN CALCIUM 40 MG/1
TABLET, FILM COATED ORAL
Qty: 30 TABLET | Refills: 0 | Status: SHIPPED | OUTPATIENT
Start: 2021-07-21 | End: 2021-08-03

## 2021-07-21 RX ORDER — AMLODIPINE BESYLATE 5 MG/1
TABLET ORAL
Qty: 90 TABLET | Refills: 0 | OUTPATIENT
Start: 2021-07-21

## 2021-07-30 DIAGNOSIS — J44.9 ASTHMA WITH COPD (CHRONIC OBSTRUCTIVE PULMONARY DISEASE) (HCC): Chronic | ICD-10-CM

## 2021-07-30 DIAGNOSIS — I15.0 RENOVASCULAR HYPERTENSION: ICD-10-CM

## 2021-07-30 RX ORDER — FLUTICASONE PROPIONATE 50 MCG
SPRAY, SUSPENSION (ML) NASAL
Qty: 48 G | Refills: 0 | Status: SHIPPED | OUTPATIENT
Start: 2021-07-30 | End: 2021-10-28

## 2021-07-30 RX ORDER — CETIRIZINE HYDROCHLORIDE 10 MG/1
TABLET ORAL
Qty: 90 TABLET | Refills: 0 | Status: SHIPPED | OUTPATIENT
Start: 2021-07-30 | End: 2021-10-28

## 2021-07-30 RX ORDER — FLUTICASONE PROPIONATE 50 MCG
SPRAY, SUSPENSION (ML) NASAL
Qty: 16 G | Refills: 0 | Status: SHIPPED | OUTPATIENT
Start: 2021-07-30 | End: 2021-07-30

## 2021-07-30 RX ORDER — FAMOTIDINE 20 MG/1
TABLET ORAL
Qty: 180 TABLET | Refills: 0 | Status: SHIPPED | OUTPATIENT
Start: 2021-07-30 | End: 2021-10-28

## 2021-07-30 RX ORDER — MONTELUKAST SODIUM 10 MG/1
TABLET ORAL
Qty: 90 TABLET | Refills: 1 | Status: SHIPPED | OUTPATIENT
Start: 2021-07-30 | End: 2022-01-26

## 2021-07-30 RX ORDER — CARVEDILOL 25 MG/1
TABLET ORAL
Qty: 180 TABLET | Refills: 0 | OUTPATIENT
Start: 2021-07-30

## 2021-07-30 NOTE — TELEPHONE ENCOUNTER
Pt requesting refill of   Requested Prescriptions     Pending Prescriptions Disp Refills   • FLUTICASONE PROPIONATE 50 MCG/ACT Nasal Suspension [Pharmacy Med Name: FLUTICASONE 50MCG NASAL SP (120) RX] 48 g 0     Sig: SHAKE LIQUID AND USE 2 SPRAYS IN [facility]

## 2021-07-30 NOTE — TELEPHONE ENCOUNTER
Pt requesting refill of   Requested Prescriptions     Pending Prescriptions Disp Refills   • CARVEDILOL 25 MG Oral Tab [Pharmacy Med Name: CARVEDILOL 25MG TABLETS] 180 tablet 0     Sig: TAKE 0.5 TABLETS(12.5 MG TOTAL) BY MOUTH TWICE DAILY WITH MEALS   •

## 2021-08-02 RX ORDER — NITROFURANTOIN MACROCRYSTALS 100 MG/1
100 CAPSULE ORAL 2 TIMES DAILY
COMMUNITY
Start: 2021-06-24

## 2021-08-03 ENCOUNTER — OFFICE VISIT (OUTPATIENT)
Dept: FAMILY MEDICINE CLINIC | Facility: CLINIC | Age: 61
End: 2021-08-03
Payer: MEDICARE

## 2021-08-03 VITALS
OXYGEN SATURATION: 98 % | SYSTOLIC BLOOD PRESSURE: 126 MMHG | DIASTOLIC BLOOD PRESSURE: 86 MMHG | RESPIRATION RATE: 16 BRPM | BODY MASS INDEX: 31.06 KG/M2 | WEIGHT: 158.19 LBS | HEART RATE: 73 BPM | HEIGHT: 59.65 IN | TEMPERATURE: 98 F

## 2021-08-03 DIAGNOSIS — F32.A DEPRESSIVE DISORDER: ICD-10-CM

## 2021-08-03 DIAGNOSIS — H43.10 VITREOUS HEMORRHAGE, UNSPECIFIED LATERALITY (HCC): ICD-10-CM

## 2021-08-03 DIAGNOSIS — Z79.899 IMMUNOSUPPRESSION DUE TO DRUG THERAPY (HCC): ICD-10-CM

## 2021-08-03 DIAGNOSIS — Z79.4 TYPE 2 DIABETES MELLITUS WITH LEFT EYE AFFECTED BY PROLIFERATIVE RETINOPATHY AND TRACTION RETINAL DETACHMENT INVOLVING MACULA, WITH LONG-TERM CURRENT USE OF INSULIN (HCC): ICD-10-CM

## 2021-08-03 DIAGNOSIS — E11.3522 TYPE 2 DIABETES MELLITUS WITH LEFT EYE AFFECTED BY PROLIFERATIVE RETINOPATHY AND TRACTION RETINAL DETACHMENT INVOLVING MACULA, WITH LONG-TERM CURRENT USE OF INSULIN (HCC): ICD-10-CM

## 2021-08-03 DIAGNOSIS — D63.1 ANEMIA IN ESRD (END-STAGE RENAL DISEASE) (HCC): ICD-10-CM

## 2021-08-03 DIAGNOSIS — Z94.0 S/P KIDNEY TRANSPLANT: ICD-10-CM

## 2021-08-03 DIAGNOSIS — I15.0 RENOVASCULAR HYPERTENSION: ICD-10-CM

## 2021-08-03 DIAGNOSIS — E78.5 HYPERLIPIDEMIA ASSOCIATED WITH TYPE 2 DIABETES MELLITUS (HCC): ICD-10-CM

## 2021-08-03 DIAGNOSIS — E11.69 HYPERLIPIDEMIA ASSOCIATED WITH TYPE 2 DIABETES MELLITUS (HCC): ICD-10-CM

## 2021-08-03 DIAGNOSIS — E03.9 ACQUIRED HYPOTHYROIDISM: ICD-10-CM

## 2021-08-03 DIAGNOSIS — Z23 NEED FOR VACCINATION: ICD-10-CM

## 2021-08-03 DIAGNOSIS — N18.6 ANEMIA IN ESRD (END-STAGE RENAL DISEASE) (HCC): ICD-10-CM

## 2021-08-03 DIAGNOSIS — H43.392 VITREOUS FLOATERS OF LEFT EYE: ICD-10-CM

## 2021-08-03 DIAGNOSIS — D12.6 TUBULAR ADENOMA OF COLON: ICD-10-CM

## 2021-08-03 DIAGNOSIS — K21.9 GASTROESOPHAGEAL REFLUX DISEASE WITHOUT ESOPHAGITIS: ICD-10-CM

## 2021-08-03 DIAGNOSIS — D64.9 NORMOCYTIC ANEMIA: ICD-10-CM

## 2021-08-03 DIAGNOSIS — Z00.00 ENCOUNTER FOR ANNUAL HEALTH EXAMINATION: Primary | ICD-10-CM

## 2021-08-03 DIAGNOSIS — D84.821 IMMUNOSUPPRESSION DUE TO DRUG THERAPY (HCC): ICD-10-CM

## 2021-08-03 DIAGNOSIS — Z12.4 CERVICAL CANCER SCREENING: ICD-10-CM

## 2021-08-03 DIAGNOSIS — Z12.31 SCREENING MAMMOGRAM, ENCOUNTER FOR: ICD-10-CM

## 2021-08-03 DIAGNOSIS — G47.33 SEVERE OBSTRUCTIVE SLEEP APNEA: ICD-10-CM

## 2021-08-03 DIAGNOSIS — M65.312 TRIGGER FINGER OF LEFT THUMB: ICD-10-CM

## 2021-08-03 DIAGNOSIS — B37.2 CANDIDA INFECTION OF FLEXURAL SKIN: ICD-10-CM

## 2021-08-03 DIAGNOSIS — J44.9 ASTHMA WITH COPD (CHRONIC OBSTRUCTIVE PULMONARY DISEASE) (HCC): Chronic | ICD-10-CM

## 2021-08-03 PROBLEM — Z99.2 ESRD ON DIALYSIS (HCC): Status: RESOLVED | Noted: 2021-02-04 | Resolved: 2021-08-03

## 2021-08-03 PROCEDURE — 87624 HPV HI-RISK TYP POOLED RSLT: CPT | Performed by: FAMILY MEDICINE

## 2021-08-03 PROCEDURE — 88175 CYTOPATH C/V AUTO FLUID REDO: CPT | Performed by: FAMILY MEDICINE

## 2021-08-03 PROCEDURE — G0009 ADMIN PNEUMOCOCCAL VACCINE: HCPCS | Performed by: FAMILY MEDICINE

## 2021-08-03 PROCEDURE — 90732 PPSV23 VACC 2 YRS+ SUBQ/IM: CPT | Performed by: FAMILY MEDICINE

## 2021-08-03 PROCEDURE — G0439 PPPS, SUBSEQ VISIT: HCPCS | Performed by: FAMILY MEDICINE

## 2021-08-03 RX ORDER — BUPROPION HYDROCHLORIDE 150 MG/1
150 TABLET, EXTENDED RELEASE ORAL DAILY
Qty: 90 TABLET | Refills: 1 | Status: SHIPPED | OUTPATIENT
Start: 2021-08-03

## 2021-08-03 RX ORDER — TACROLIMUS 0.75 MG/1
TABLET, EXTENDED RELEASE ORAL
COMMUNITY
Start: 2021-07-12

## 2021-08-03 RX ORDER — AMLODIPINE BESYLATE 5 MG/1
5 TABLET ORAL DAILY
Qty: 90 TABLET | Refills: 1 | Status: SHIPPED | OUTPATIENT
Start: 2021-08-03

## 2021-08-03 RX ORDER — ATORVASTATIN CALCIUM 20 MG/1
40 TABLET, FILM COATED ORAL NIGHTLY
Qty: 90 TABLET | Refills: 1 | Status: SHIPPED | OUTPATIENT
Start: 2021-08-03 | End: 2021-11-15

## 2021-08-03 RX ORDER — CARVEDILOL 25 MG/1
25 TABLET ORAL 2 TIMES DAILY WITH MEALS
Qty: 90 TABLET | Refills: 1 | Status: SHIPPED | OUTPATIENT
Start: 2021-08-03

## 2021-08-03 RX ORDER — KETOCONAZOLE 20 MG/G
1 CREAM TOPICAL 2 TIMES DAILY
Qty: 60 G | Refills: 2 | Status: SHIPPED | OUTPATIENT
Start: 2021-08-03 | End: 2021-08-24

## 2021-08-03 RX ORDER — ZOSTER VACCINE RECOMBINANT, ADJUVANTED 50 MCG/0.5
0.5 KIT INTRAMUSCULAR ONCE
Qty: 1 EACH | Refills: 1 | Status: SHIPPED | OUTPATIENT
Start: 2021-08-03 | End: 2021-08-03

## 2021-08-03 NOTE — PROGRESS NOTES
HPI:   Megha Rose is a 61year old female who presents for a MA (Medicare Advantage) Supervisit (Once per calendar year).     Thin prep performed adequate 8/2016 with partially obscuring inflammation but normal endocervical and metaplastic ce HI    Status post renal transplant x1 year at [address], on suppressive medications has labs every 3 months and states they are \"ordering everything\".     Trigger finger left thumb x2 to 3 months has to manually extend the thumb after bending medications?: No       Depression Screening (PHQ-2/PHQ-9): Over the LAST 2 WEEKS   Little interest or pleasure in doing things: Several days  Feeling down, depressed, or hopeless: Several days  PHQ-2 SCORE: 2     PHQ-9 Depression Screen     1.  [facility] (SURGERY, ORTHOPEDIC)  Rafael Emmanuel, PT as Physical Therapist  Shivani Villanueva (Transplant)  Orestes Quach MD (GASTROENTEROLOGY)  Reina Zaragoza MD (ENDOCRINOLOGY)    Patient Active Problem List:     Hyperlipidemia associated with type 2 b12-ca.     CURRENT MEDICATIONS:   ENVARSUS XR 0.75 MG Oral Tablet 24 Hr,   TRUEPLUS PEN NEEDLES 32G X 4 MM Does not apply Misc, TEST FOUR TIMES DAILY  MONTELUKAST SODIUM 10 MG Oral Tab, TAKE 1 TABLET BY MOUTH EVERY DAY  CETIRIZINE 10 MG Oral Tab, TAKE 1 TA total) by mouth 4 (four) times daily. For kidney transplant  Biotin 5000 MCG Oral Cap,   Magnesium Oxide 400 (241.3 Mg) MG Oral Tab,   glipiZIDE 10 MG Oral Tab, Take 10 mg by mouth 2 (two) times daily. JANUVIA 100 MG Oral Tab, Take 100 mg by mouth daily. has a past surgical history that includes hysterectomy (1990); appendectomy;  Eye surgery (2013); upper gi endo no barretts (5/15); ish biopsy stereo nodule 1 site right (cpt=19081) (??); other surgical history (1990); colonoscopy; cataract (Bilateral); av SpO2 98%   BMI 31.26 kg/m²  Estimated body mass index is 31.26 kg/m² as calculated from the following:    Height as of this encounter: 4' 11.65\" (1.515 m). Weight as of this encounter: 158 lb 3.2 oz (71.8 kg).     Medicare Hearing Assessment  (Required barefoot skin diabetic exam is normal, visualized feet and the appearance is normal.  Bilateral monofilament/sensation of both feet is normal.  Pulsation pedal pulse exam of both lower legs/feet is normal as well.   Left thumb interphalangeal joint difficul Medicare Assessment. PLAN SUMMARY:   Diagnoses and all orders for this visit:    Encounter for annual health examination    Cervical cancer screening  -     THINPREP PAP SMEAR B; Future  -     HPV HIGH RISK , THIN PREP COLLECTION;  Future  If normal, re colonscopy 11/8/2022    Asthma with COPD (chronic obstructive pulmonary disease) ([facility]  -     PULMONARY - INTERNAL  Started on BREO  Well controlled  No more choking sensations at night with cpap and feeling sob  Combination of GERD uncontrolled and possib (14)         Diet assessment: good     PLAN:  The patient indicates understanding of these issues and agrees to the plan. Further testing ordered. Imaging studies ordered. Reinforced healthy diet, lifestyle, and exercise.     Return in 6 months (on 2/3/2 risk factors   • Men who are 73-68 years old and have ever smoked   • Anyone with a family history -     Colorectal Cancer Screening  Covered for ages 52-80; only need ONE of the following:    Colonoscopy   Covered every 10 years    Covered every 2 years i pharmacy  prescription benefits -  Zoster Vaccines(1 of 2) Never done       Diabetes      Hemoglobin A1C Annually; if last result is elevated, may be asked to retest more frequently.     Medicare covers every 3 months Lab Results   Component Value Date    E

## 2021-08-03 NOTE — PATIENT INSTRUCTIONS
Valeria Arce's SCREENING SCHEDULE   Tests on this list are recommended by your physician but may not be covered, or covered at this frequency, by your insurer. Please check with your insurance carrier before scheduling to verify coverage. visit. No recommendations at this time   Pap and Pelvic    Pap   Covered every 2 years for women at normal risk;  Annually if at high risk 07/30/2020  Pap Smear,1 Year due on 07/30/2021    Chlamydia Annually if high risk -  No recommendations at this t Directives    [email or web address] org/publications/[email or web address]/personal_[email or web address]. pdf  An information packet, including necessary form from the [employer] website. http://[email or web address] [place].state. [place]/public/books/advin.htm  A link to the [place]

## 2021-08-05 LAB — HPV I/H RISK 1 DNA SPEC QL NAA+PROBE: NEGATIVE

## 2021-08-07 PROBLEM — L65.9 HAIR LOSS: Status: RESOLVED | Noted: 2020-04-17 | Resolved: 2021-08-07

## 2021-08-07 PROBLEM — R42 DIZZINESS: Status: RESOLVED | Noted: 2020-10-02 | Resolved: 2021-08-07

## 2021-08-07 PROBLEM — IMO0002 COMPLICATIONS AFFECTING OTHER SPECIFIED BODY SYSTEMS, HYPERTENSION: Status: RESOLVED | Noted: 2019-06-28 | Resolved: 2021-08-07

## 2021-08-07 NOTE — TELEPHONE ENCOUNTER
NEPHROLOGY PROGRESS NOTE    ADMISSION DATE:  8/4/2021  DATE:  8/7/2021  CURRENT HOSPITAL DAY:  Hospital Day: 4  ATTENDING PHYSICIAN:  Kaykay Montez MD      CHIEF COMPLAINT:  NORTH on CKD III      INTERVAL HISTORY:    Pt seen today, confused. Repeating the Lord's prayer      REVIEW OF SYSTEMS:   A complete 3 point ROS done and is neg except as mentioned above        MEDICATIONS:    • potassium CHLORIDE  40 mEq Per NG tube Once   • nystatin   Topical 2 times per day   • [Held by provider] bumetanide  1 mg Intravenous Daily   • WARFARIN - PHARMACIST MONITORED   Does not apply See Admin Instructions   • pantoprazole  40 mg Intravenous 2 times per day   • sodium chloride (PF)  2 mL Intracatheter 2 times per day   • amLODIPine  5 mg Oral Nightly   • aspirin  81 mg Oral Daily   • atorvastatin  10 mg Oral Daily   • insulin glargine  10 Units Subcutaneous Nightly   • QUEtiapine  12.5 mg Oral Nightly   • sertraline  50 mg Oral Daily   • insulin lispro   Subcutaneous TID WC   • Potassium Standard Replacement Protocol   Does not apply See Admin Instructions   • Magnesium Standard Replacement Protocol   Does not apply See Admin Instructions   • cefepime (MAXIPIME) extended infusion IVPB  2,000 mg Intravenous 2 times per day         OBJECTIVE:    VITAL SIGNS:     Visit Vitals  BP (!) 147/61 (BP Location: RFA - Right forearm, Patient Position: Semi-Breaux's)   Pulse 82   Temp 98.4 °F (36.9 °C) (Axillary)   Resp 20   Ht 5' 9\" (1.753 m)   Wt 93.3 kg   LMP 11/16/1964   SpO2 92%   BMI 30.38 kg/m²         INTAKE/OUTPUT:      Intake/Output Summary (Last 24 hours) at 8/7/2021 1714  Last data filed at 8/7/2021 1549  Gross per 24 hour   Intake 825 ml   Output 2350 ml   Net -1525 ml         PHYSICAL EXAM:    GEN: No apparent distress, lying in bed  HEENT: No conjunctival erythema, neck supple, normocephalic head, mucous membranes moist  SKIN: Warm to touch  EXT: No pedal edema  PSYCH: Cooperative  NEURO: Alert and oriented x  Pt requesting 90 day supply, protocol passed, refill approved 3      LABORATORY DATA:    Lab Results   Component Value Date    SODIUM 146 (H) 08/07/2021    SODIUM 142 08/06/2021    POTASSIUM 3.8 08/07/2021    POTASSIUM 4.4 08/06/2021    CHLORIDE 111 (H) 08/07/2021    CHLORIDE 108 (H) 08/06/2021    CO2 27 08/07/2021    CO2 25 08/06/2021    BUN 30 (H) 08/07/2021    BUN 27 (H) 08/06/2021    CREATININE 1.68 (H) 08/07/2021    CREATININE 1.46 (H) 08/06/2021    GLUCOSE 237 (H) 08/07/2021    GLUCOSE 233 (H) 08/06/2021     Lab Results   Component Value Date    WBC 7.5 08/07/2021    WBC 10.1 08/06/2021    HCT 25.4 (L) 08/07/2021    HCT 27.9 (L) 08/06/2021    HGB 7.8 (L) 08/07/2021    HGB 8.2 (L) 08/06/2021     08/07/2021     08/06/2021     No components found for: CRCLCALC  Lab Results   Component Value Date    INR 1.3 08/07/2021    INR 1.2 08/05/2021                                    ASSESSMENT/PLAN:     Acute kidney injury-likely prerenal due to sepsis  CKD stage IIIA-baseline creatinine 1.1-1.2  Hypertension  Sepsis due to pneumonia   Hospital-acquired pneumonia  Acute hypoxic respiratory failure  Hypertension  Obesity  Supratherapeutic INR   Chronic AFib on Coumadin   History of staghorn calculi     Pan:  -Pt is disoriented on my exam today  - Appears euvolemic on exam with improving respiratory status, would hold further diuresis  -Her ultrasound kidneys negative for obstruction  -BP stable.  Monitor   -Hemoglobin low but stable.  No indication for EPO    Raul Maldonado  [facility]  Cell: [phone]  Pager: [phone]  Thank you for providing me with the opportunity to care for this patient.

## 2021-08-09 ENCOUNTER — TELEPHONE (OUTPATIENT)
Dept: FAMILY MEDICINE CLINIC | Facility: CLINIC | Age: 61
End: 2021-08-09

## 2021-08-09 NOTE — TELEPHONE ENCOUNTER
Call placed using  Services.  ID#: [id number]. LMOM to return call to the office.  Provided pt office phone [phone]

## 2021-08-09 NOTE — TELEPHONE ENCOUNTER
----- Message from Lisandra Cisneros DO sent at 8/6/2021  5:57 PM CDT -----  Normal Pap smear. Repeat 5 years.   My chart notified

## 2021-08-10 ENCOUNTER — TELEPHONE (OUTPATIENT)
Dept: FAMILY MEDICINE CLINIC | Facility: CLINIC | Age: 61
End: 2021-08-10

## 2021-08-10 DIAGNOSIS — H43.392 VITREOUS FLOATERS OF LEFT EYE: ICD-10-CM

## 2021-08-10 DIAGNOSIS — H43.10 VITREOUS HEMORRHAGE, UNSPECIFIED LATERALITY (HCC): ICD-10-CM

## 2021-08-10 DIAGNOSIS — Z79.4 TYPE 2 DIABETES MELLITUS WITH LEFT EYE AFFECTED BY PROLIFERATIVE RETINOPATHY AND TRACTION RETINAL DETACHMENT INVOLVING MACULA, WITH LONG-TERM CURRENT USE OF INSULIN (HCC): Primary | ICD-10-CM

## 2021-08-10 DIAGNOSIS — E11.3522 TYPE 2 DIABETES MELLITUS WITH LEFT EYE AFFECTED BY PROLIFERATIVE RETINOPATHY AND TRACTION RETINAL DETACHMENT INVOLVING MACULA, WITH LONG-TERM CURRENT USE OF INSULIN (HCC): Primary | ICD-10-CM

## 2021-08-10 DIAGNOSIS — E11.3522: ICD-10-CM

## 2021-08-10 NOTE — TELEPHONE ENCOUNTER
Referral pending for ophthalmology Dr. Maricel Gale.    Please review and sign if  [facility]

## 2021-08-10 NOTE — TELEPHONE ENCOUNTER
Call placed using  Services.  ID#: [id number]  LMOM to return call to the office.  Provided pt office phone [phone]

## 2021-08-10 NOTE — TELEPHONE ENCOUNTER
[facility] office asking for a referral for an upcoming appt th pt has on 08/13/2021. Their fax number is [phone].

## 2021-08-12 NOTE — TELEPHONE ENCOUNTER
[address] [place] [address] ID [id number]  Call placed to patient using  services. LMOM to return call to the office as this was my 3rd and final attempt to reach you. Provided pt office phone [phone] along with office hours.   Mailed patient a letter

## 2021-08-29 DIAGNOSIS — E78.5 HYPERLIPIDEMIA ASSOCIATED WITH TYPE 2 DIABETES MELLITUS (HCC): ICD-10-CM

## 2021-08-29 DIAGNOSIS — E11.69 HYPERLIPIDEMIA ASSOCIATED WITH TYPE 2 DIABETES MELLITUS (HCC): ICD-10-CM

## 2021-08-30 RX ORDER — ATORVASTATIN CALCIUM 40 MG/1
TABLET, FILM COATED ORAL
Qty: 30 TABLET | Refills: 0 | OUTPATIENT
Start: 2021-08-30

## 2021-08-30 NOTE — TELEPHONE ENCOUNTER
Pt requesting refill of   Requested Prescriptions     Refused Prescriptions Disp Refills   • ATORVASTATIN 40 MG Oral Tab [Pharmacy Med Name: ATORVASTATIN 40MG TABLETS] 30 tablet 0     Sig: TAKE 1 TABLET BY MOUTH EVERY NIGHT         Protocol failed, unable

## 2021-09-08 ENCOUNTER — IMMUNIZATION (OUTPATIENT)
Dept: LAB | Facility: HOSPITAL | Age: 61
End: 2021-09-08
Attending: EMERGENCY MEDICINE
Payer: MEDICARE

## 2021-09-08 DIAGNOSIS — Z23 NEED FOR VACCINATION: Primary | ICD-10-CM

## 2021-09-08 PROCEDURE — 0013A SARSCOV2 VAC 100MCG/0.5ML IM: CPT

## 2021-10-02 ENCOUNTER — HOSPITAL ENCOUNTER (EMERGENCY)
Facility: HOSPITAL | Age: 61
Discharge: HOME | End: 2021-10-02
Attending: EMERGENCY MEDICINE
Payer: MEDICARE

## 2021-10-02 ENCOUNTER — APPOINTMENT (OUTPATIENT)
Dept: GENERAL RADIOLOGY | Facility: HOSPITAL | Age: 61
End: 2021-10-02
Attending: EMERGENCY MEDICINE
Payer: MEDICARE

## 2021-10-02 VITALS
BODY MASS INDEX: 31 KG/M2 | SYSTOLIC BLOOD PRESSURE: 146 MMHG | OXYGEN SATURATION: 97 % | TEMPERATURE: 97 F | WEIGHT: 157 LBS | RESPIRATION RATE: 16 BRPM | DIASTOLIC BLOOD PRESSURE: 65 MMHG | HEART RATE: 69 BPM

## 2021-10-02 DIAGNOSIS — S30.0XXA CONTUSION OF LOWER BACK, INITIAL ENCOUNTER: Primary | ICD-10-CM

## 2021-10-02 PROCEDURE — 99284 EMERGENCY DEPT VISIT MOD MDM: CPT

## 2021-10-02 PROCEDURE — 72100 X-RAY EXAM L-S SPINE 2/3 VWS: CPT | Performed by: EMERGENCY MEDICINE

## 2021-10-02 PROCEDURE — 73502 X-RAY EXAM HIP UNI 2-3 VIEWS: CPT | Performed by: EMERGENCY MEDICINE

## 2021-10-02 RX ORDER — HYDROCODONE BITARTRATE AND ACETAMINOPHEN 5; 325 MG/1; MG/1
1-2 TABLET ORAL EVERY 6 HOURS PRN
Qty: 10 TABLET | Refills: 0 | Status: SHIPPED | OUTPATIENT
Start: 2021-10-02 | End: 2021-10-06 | Stop reason: ALTCHOICE

## 2021-10-02 RX ORDER — HYDROCODONE BITARTRATE AND ACETAMINOPHEN 5; 325 MG/1; MG/1
2 TABLET ORAL ONCE
Status: COMPLETED | OUTPATIENT
Start: 2021-10-02 | End: 2021-10-02

## 2021-10-03 NOTE — ED PROVIDER NOTES
Patient Seen in: [facility] Emergency Department      History   Patient presents with:  Leg or Foot Injury    Stated Complaint: Rt hip pain S/P fall at [address]. No LOC, nor blodd thinner use.     Subjective:   HPI    Patient here for right poste 5-9-16   • Sleep apnea     cpap   • Traction detachment of retina    • Unspecified disorder of thyroid    • Visual impairment     glasses   • Vitreous hemorrhage (HCC)    • Wears glasses               Past Surgical History:   Procedure Laterality Date   • Normal range of motion. Neck supple. No JVD present. No bruising/abrasions. No hematomas. Normal voice. Cardiovascular: Normal rate and regular rhythm. Pulmonary/Chest: Effort normal and breath sounds normal. No stridor.   No tenderness of the ches Finalized by (CST): Dominga Campbell MD on 10/02/2021 at 10:46 PM       XR HIP W OR WO PELVIS 2 OR 3 VIEWS, RIGHT (CPT=73502)    Result Date: 10/2/2021  PROCEDURE:  XR HIP W OR WO PELVIS 2 OR 3 VIEWS, RIGHT ( CPT=73502)  TECHNIQUE:  Unilateral 2 to 3 views of

## 2021-10-06 ENCOUNTER — OFFICE VISIT (OUTPATIENT)
Dept: FAMILY MEDICINE CLINIC | Facility: CLINIC | Age: 61
End: 2021-10-06
Payer: MEDICARE

## 2021-10-06 VITALS
WEIGHT: 155.81 LBS | RESPIRATION RATE: 18 BRPM | OXYGEN SATURATION: 98 % | HEIGHT: 59.65 IN | HEART RATE: 71 BPM | SYSTOLIC BLOOD PRESSURE: 138 MMHG | TEMPERATURE: 98 F | BODY MASS INDEX: 30.59 KG/M2 | DIASTOLIC BLOOD PRESSURE: 80 MMHG

## 2021-10-06 DIAGNOSIS — M54.41 ACUTE RIGHT-SIDED LOW BACK PAIN WITH RIGHT-SIDED SCIATICA: ICD-10-CM

## 2021-10-06 DIAGNOSIS — M25.551 RIGHT HIP PAIN: ICD-10-CM

## 2021-10-06 DIAGNOSIS — Z23 NEED FOR VACCINATION: ICD-10-CM
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
## 2021-10-06 PROCEDURE — 90686 IIV4 VACC NO PRSV 0.5 ML IM: CPT | Performed by: FAMILY MEDICINE

## 2021-10-06 PROCEDURE — G0008 ADMIN INFLUENZA VIRUS VAC: HCPCS | Performed by: FAMILY MEDICINE

## 2021-10-06 PROCEDURE — 99214 OFFICE O/P EST MOD 30 MIN: CPT | Performed by: FAMILY MEDICINE

## 2021-10-06 RX ORDER — TRAMADOL HYDROCHLORIDE 50 MG/1
50 TABLET ORAL EVERY 6 HOURS PRN
Qty: 30 TABLET | Refills: 0 | Status: SHIPPED | OUTPATIENT
Start: 2021-10-06

## 2021-10-06 NOTE — PROGRESS NOTES
CHIEF COMPLAINT: Patient presents with:  Pain: c/o back back   ER F/U: THE [facility] er f/u for fall injury         HPI:     Shereen Fernandes is a 64year old female presents for ED f-u.     Milton Chaney is a 63 yo F with PMH of GERD, hypothyroidism, HL, and s/ 12/12/2019-right kidney transplant donated by his son. Former dialysis patient due to diabetic kidney disease performed at [facility].      • Severe obstructive sleep apnea SPLIT NIGHT 5-9-16   • Sleep apnea     cpap   • Traction detachment of retina    • Unspeci tablet 0   • OMEPRAZOLE 20 MG Oral Capsule Delayed Release TAKE 1 CAPSULE BY MOUTH TWICE DAILY BEFORE MEALS 180 capsule 0   • LANTUS SOLOSTAR 100 UNIT/ML Subcutaneous Solution Pen-injector ADMINISTER 30 UNITS UNDER THE SKIN EVERY MORNING 30 mL 1   • ENVARS (12.5 mg total) by mouth 3 (three) times daily as needed. 30 tablet 0   • valGANciclovir HCl (VALCYTE OR) Take 450 mg by mouth.      • Lancets Does not apply Misc 3x daily lancets for True Metrix meter 300 each 3   • mycophenolate sodium 360 MG Oral Tab EC Pertinent positives and negatives noted in the the HPI.     PHYSICAL EXAM:   /80 (BP Location: Left arm, Patient Position: Sitting, Cuff Size: adult)   Pulse 71   Temp 97.7 °F (36.5 °C) (Temporal)   Resp 18   Ht 4' 11.65\" (1.515 m)   Wt 155 lb 12.8 o displayed here. • Procedure 08/03/2021                      Value: This result contains rich text formatting which cannot be displayed here. • Clinical Information 08/03/2021                      Value: This result contains rich text formatting which can Visit:  Requested Prescriptions     Signed Prescriptions Disp Refills   • traMADol 50 MG Oral Tab 30 tablet 0     Sig: Take 1 tablet (50 mg total) by mouth every 6 (six) hours as needed for Pain.        Health Maintenance:  Zoster Vaccines(1 of 2) Never don

## 2021-10-06 NOTE — PATIENT INSTRUCTIONS
Cuidado personal para el dolor en la espalda baja  Muchas personas tienen dolor en la espalda baja de vez en cuando. En muchos casos, no es un problema grave y el cuidado personal puede ayudar.  A veces, el dolor en la espalda baja puede ser un signo de u espalda. No trate de levantar más peso del que pueda. · Cuando se siente, mantenga la espalda baja apoyada sobre un respaldo. Util[employer] toalla enrollada cuando sea necesario. Busque atención médica de inmediato si:   · No puede estar de pie ni caminar.

## 2021-10-28 RX ORDER — FAMOTIDINE 20 MG/1
TABLET ORAL
Qty: 180 TABLET | Refills: 0 | Status: SHIPPED | OUTPATIENT
Start: 2021-10-28

## 2021-10-28 RX ORDER — FLUTICASONE PROPIONATE 50 MCG
SPRAY, SUSPENSION (ML) NASAL
Qty: 48 G | Refills: 0 | Status: SHIPPED | OUTPATIENT
Start: 2021-10-28 | End: 2022-01-26

## 2021-10-28 RX ORDER — CETIRIZINE HYDROCHLORIDE 10 MG/1
TABLET ORAL
Qty: 90 TABLET | Refills: 0 | Status: SHIPPED | OUTPATIENT
Start: 2021-10-28 | End: 2022-01-26

## 2021-10-28 NOTE — TELEPHONE ENCOUNTER
Refill Passed Protocol:     Pt requesting refill of   Requested Prescriptions     Pending Prescriptions Disp Refills   • CETIRIZINE 10 MG Oral Tab [Pharmacy Med Name: CETIRIZINE 10MG TABLETS] 90 tablet 0     Sig: TAKE 1 TABLET BY MOUTH EVERY DAY   • FROILAN

## 2021-11-05 ENCOUNTER — HOSPITAL ENCOUNTER (OUTPATIENT)
Dept: MAMMOGRAPHY | Facility: HOSPITAL | Age: 61
Discharge: HOME | End: 2021-11-05
Attending: FAMILY MEDICINE
Payer: MEDICARE

## 2021-11-05 DIAGNOSIS — Z12.31 SCREENING MAMMOGRAM, ENCOUNTER FOR: ICD-10-CM

## 2021-11-05 PROCEDURE — 77067 SCR MAMMO BI INCL CAD: CPT | Performed by: FAMILY MEDICINE

## 2021-11-05 PROCEDURE — 77063 BREAST TOMOSYNTHESIS BI: CPT | Performed by: FAMILY MEDICINE

## 2021-11-15 ENCOUNTER — TELEPHONE (OUTPATIENT)
Dept: FAMILY MEDICINE CLINIC | Facility: CLINIC | Age: 61
End: 2021-11-15

## 2021-11-15 DIAGNOSIS — E78.5 HYPERLIPIDEMIA ASSOCIATED WITH TYPE 2 DIABETES MELLITUS (HCC): ICD-10-CM

## 2021-11-15 DIAGNOSIS — E11.69 HYPERLIPIDEMIA ASSOCIATED WITH TYPE 2 DIABETES MELLITUS (HCC): ICD-10-CM

## 2021-11-15 RX ORDER — ATORVASTATIN CALCIUM 20 MG/1
20 TABLET, FILM COATED ORAL NIGHTLY
Qty: 90 TABLET | Refills: 0 | Status: SHIPPED | OUTPATIENT
Start: 2021-11-15

## 2021-11-15 NOTE — TELEPHONE ENCOUNTER
Refill Failed Protocol:     Pt requesting refill of   Requested Prescriptions     Pending Prescriptions Disp Refills   • ATORVASTATIN 20 MG Oral Tab [Pharmacy Med Name: ATORVASTATIN 20MG TABLETS] 90 tablet 1     Sig: TAKE 2 TABLETS(40 MG) BY MOUTH EVERY NI

## 2021-11-16 NOTE — TELEPHONE ENCOUNTER
Patient's last lipid panel is at [facility] 7/2021 in care everywhere-LDL was low at 30-will decrease atorvastatin to 20 mg 1 tab daily and patient should repeat fasting labs lipid and CMP in 6 weeks and follow-up in office late December early January sooner i

## 2021-11-24 NOTE — TELEPHONE ENCOUNTER
Spoke to Gretchen pt daughter in law ok per HIP[employer]. Informed of medication dose change per provider recommendations. Gretchen voiced understanding states will have pt take one 20 mg tab of atorvastatin instead of two now.  Will complete labs and follow up in Nate 7

## 2022-01-26 DIAGNOSIS — J44.9 ASTHMA WITH COPD (CHRONIC OBSTRUCTIVE PULMONARY DISEASE) (HCC): Chronic | ICD-10-CM

## 2022-01-26 RX ORDER — CETIRIZINE HYDROCHLORIDE 10 MG/1
TABLET ORAL
Qty: 90 TABLET | Refills: 0 | Status: SHIPPED | OUTPATIENT
Start: 2022-01-26

## 2022-01-26 RX ORDER — MONTELUKAST SODIUM 10 MG/1
TABLET ORAL
Qty: 90 TABLET | Refills: 1 | Status: SHIPPED | OUTPATIENT
Start: 2022-01-26

## 2022-01-26 RX ORDER — FLUTICASONE PROPIONATE 50 MCG
SPRAY, SUSPENSION (ML) NASAL
Qty: 48 G | Refills: 0 | Status: SHIPPED | OUTPATIENT
Start: 2022-01-26

## 2022-01-26 NOTE — TELEPHONE ENCOUNTER
Requested Prescriptions     Pending Prescriptions Disp Refills   • MONTELUKAST 10 MG Oral Tab [Pharmacy Med Name: MONTELUKAST 10MG TABLETS] 90 tablet 1     Sig: TAKE 1 TABLET BY MOUTH EVERY DAY     Signed Prescriptions Disp Refills   • CETIRIZINE 10 MG Ora

## 2022-02-08 ENCOUNTER — PATIENT MESSAGE (OUTPATIENT)
Dept: FAMILY MEDICINE CLINIC | Facility: CLINIC | Age: 62
End: 2022-02-08

## 2022-02-08 NOTE — TELEPHONE ENCOUNTER
From: Laura Calderón  To:  Mari Seen, DO  Sent: 2/8/2022 2:20 PM CST  Subject: GI Refferal    Dr. Yovanny Porter needs a referral to see the GI doctors for an appointment March 16, if you can please send a referral.    Alex Cote, in behalf of Sarah Mace

## 2022-02-09 RX ORDER — ATORVASTATIN CALCIUM 20 MG/1
TABLET, FILM COATED ORAL
Qty: 90 TABLET | Refills: 0 | Status: SHIPPED | OUTPATIENT
Start: 2022-02-09 | End: 2022-03-01

## 2022-02-09 NOTE — TELEPHONE ENCOUNTER
Spoke to Teja , pt daughter in law on [address], informed pt due for labs and OV. Apt scheduled for 03/01/2022- next available  Aware to have pt complete labs ordered to [facility] prior to scheduled apt. Natalee ONEILL. Pt would need refill to gap until appointment.   Refill routed to provider for review and approval

## 2022-02-28 RX ORDER — AMOXICILLIN AND CLAVULANATE POTASSIUM 500; 125 MG/1; MG/1
1 TABLET, FILM COATED ORAL 2 TIMES DAILY
COMMUNITY
Start: 2021-11-03 | End: 2022-03-01

## 2022-03-01 ENCOUNTER — MED REC SCAN ONLY (OUTPATIENT)
Dept: FAMILY MEDICINE CLINIC | Facility: CLINIC | Age: 62
End: 2022-03-01

## 2022-03-01 ENCOUNTER — OFFICE VISIT (OUTPATIENT)
Dept: FAMILY MEDICINE CLINIC | Facility: CLINIC | Age: 62
End: 2022-03-01
Payer: MEDICARE

## 2022-03-01 VITALS
TEMPERATURE: 98 F | WEIGHT: 159.19 LBS | BODY MASS INDEX: 30.06 KG/M2 | SYSTOLIC BLOOD PRESSURE: 131 MMHG | HEIGHT: 61 IN | DIASTOLIC BLOOD PRESSURE: 70 MMHG | HEART RATE: 77 BPM | OXYGEN SATURATION: 92 % | RESPIRATION RATE: 16 BRPM

## 2022-03-01 DIAGNOSIS — Z87.19 HISTORY OF GASTRITIS: ICD-10-CM

## 2022-03-01 DIAGNOSIS — E78.5 HYPERLIPIDEMIA ASSOCIATED WITH TYPE 2 DIABETES MELLITUS (HCC): Primary | ICD-10-CM

## 2022-03-01 DIAGNOSIS — F32.A DEPRESSIVE DISORDER: ICD-10-CM

## 2022-03-01 DIAGNOSIS — K21.9 GASTROESOPHAGEAL REFLUX DISEASE WITHOUT ESOPHAGITIS: ICD-10-CM

## 2022-03-01 DIAGNOSIS — J44.9 ASTHMA WITH COPD (CHRONIC OBSTRUCTIVE PULMONARY DISEASE) (HCC): Chronic | ICD-10-CM

## 2022-03-01 DIAGNOSIS — I15.0 RENOVASCULAR HYPERTENSION: ICD-10-CM

## 2022-03-01 DIAGNOSIS — E11.69 HYPERLIPIDEMIA ASSOCIATED WITH TYPE 2 DIABETES MELLITUS (HCC): Primary | ICD-10-CM

## 2022-03-01 DIAGNOSIS — D12.6 TUBULAR ADENOMA OF COLON: ICD-10-CM

## 2022-03-01 DIAGNOSIS — Z12.11 SCREEN FOR COLON CANCER: ICD-10-CM

## 2022-03-01 PROCEDURE — 99214 OFFICE O/P EST MOD 30 MIN: CPT | Performed by: FAMILY MEDICINE

## 2022-03-01 RX ORDER — ALBUTEROL SULFATE 90 UG/1
2 AEROSOL, METERED RESPIRATORY (INHALATION) EVERY 4 HOURS PRN
Qty: 18 G | Refills: 0 | Status: SHIPPED | OUTPATIENT
Start: 2022-03-01 | End: 2022-03-18

## 2022-03-01 RX ORDER — FLUTICASONE FUROATE AND VILANTEROL TRIFENATATE 100; 25 UG/1; UG/1
1 POWDER RESPIRATORY (INHALATION) DAILY
Qty: 3 EACH | Refills: 5 | Status: SHIPPED | OUTPATIENT
Start: 2022-03-01 | End: 2022-05-30

## 2022-03-01 RX ORDER — MONTELUKAST SODIUM 10 MG/1
10 TABLET ORAL DAILY
Qty: 90 TABLET | Refills: 3 | Status: SHIPPED | OUTPATIENT
Start: 2022-03-01

## 2022-03-01 RX ORDER — CARVEDILOL 25 MG/1
25 TABLET ORAL 2 TIMES DAILY WITH MEALS
Qty: 90 TABLET | Refills: 1 | Status: SHIPPED | OUTPATIENT
Start: 2022-03-01

## 2022-03-01 RX ORDER — BUPROPION HYDROCHLORIDE 150 MG/1
150 TABLET, EXTENDED RELEASE ORAL DAILY
Qty: 90 TABLET | Refills: 1 | Status: SHIPPED | OUTPATIENT
Start: 2022-03-01

## 2022-03-01 RX ORDER — FAMOTIDINE 20 MG/1
20 TABLET, FILM COATED ORAL 2 TIMES DAILY
Qty: 180 TABLET | Refills: 1 | Status: SHIPPED | OUTPATIENT
Start: 2022-03-01

## 2022-03-01 RX ORDER — ATORVASTATIN CALCIUM 20 MG/1
20 TABLET, FILM COATED ORAL NIGHTLY
Qty: 90 TABLET | Refills: 1 | Status: SHIPPED | OUTPATIENT
Start: 2022-03-01

## 2022-03-01 RX ORDER — OLOPATADINE HYDROCHLORIDE 7 MG/ML
SOLUTION OPHTHALMIC
Refills: 3 | Status: CANCELLED | OUTPATIENT
Start: 2022-03-01

## 2022-03-01 RX ORDER — AMLODIPINE BESYLATE 5 MG/1
5 TABLET ORAL DAILY
Qty: 90 TABLET | Refills: 1 | Status: SHIPPED | OUTPATIENT
Start: 2022-03-01

## 2022-03-15 ENCOUNTER — PATIENT MESSAGE (OUTPATIENT)
Dept: FAMILY MEDICINE CLINIC | Facility: CLINIC | Age: 62
End: 2022-03-15

## 2022-03-16 NOTE — TELEPHONE ENCOUNTER
Spoke to eldon informed referral is still open status and awaiting response from referrals dept. States pt apt was canceled and she will call and reschedule once referral process is complete.

## 2022-03-16 NOTE — TELEPHONE ENCOUNTER
Patient settled in room 3002 in stable condition. Report received from [facility]. Bands verified with mom.  Pediatrician verified with mom Referral Status remains open. Message sent to referrals dept for update.

## 2022-03-16 NOTE — TELEPHONE ENCOUNTER
From: Angelia Gonzalez  To: Barbie Cedeño DO  Sent: 3/15/2022 4:21 PM CDT  Subject: Referral    Good afternoon Dr. Melecio Bob,    I was wondering if you had signed the referral for the GI doctor. She has an appointment tomorrow.      Jesse Said

## 2022-03-17 NOTE — TELEPHONE ENCOUNTER
LMOM to return call to the office as this is 2nd attempt to reach you. Provided pt office phone [phone] along with office hours.

## 2022-03-17 NOTE — TELEPHONE ENCOUNTER
Spoke to Gretchen pt daughter in law informed referral has been authorized and she may call and schedule apt for pt to see .  Gretchen voiced understanding, denies questions or concerns at this time

## 2022-03-18 RX ORDER — ALBUTEROL SULFATE 90 UG/1
AEROSOL, METERED RESPIRATORY (INHALATION)
Qty: 18 G | Refills: 0 | Status: SHIPPED | OUTPATIENT
Start: 2022-03-18

## 2022-04-18 RX ORDER — CETIRIZINE HYDROCHLORIDE 10 MG/1
TABLET ORAL
Qty: 90 TABLET | Refills: 0 | Status: SHIPPED | OUTPATIENT
Start: 2022-04-18

## 2022-04-18 RX ORDER — FLUTICASONE PROPIONATE 50 MCG
SPRAY, SUSPENSION (ML) NASAL
Qty: 48 G | Refills: 0 | OUTPATIENT
Start: 2022-04-18

## 2022-04-18 RX ORDER — ALBUTEROL SULFATE 90 UG/1
AEROSOL, METERED RESPIRATORY (INHALATION)
Qty: 18 G | Refills: 0 | OUTPATIENT
Start: 2022-04-18

## 2022-06-27 ENCOUNTER — TELEPHONE (OUTPATIENT)
Dept: FAMILY MEDICINE CLINIC | Facility: CLINIC | Age: 62
End: 2022-06-27

## 2022-06-27 NOTE — TELEPHONE ENCOUNTER
Call placed using  Services.  ID#: [id number]. LMOM to return call to the office.  Provided pt office phone [phone]

## 2022-06-28 ENCOUNTER — PATIENT MESSAGE (OUTPATIENT)
Dept: FAMILY MEDICINE CLINIC | Facility: CLINIC | Age: 62
End: 2022-06-28

## 2022-06-28 ENCOUNTER — LAB ENCOUNTER (OUTPATIENT)
Dept: LAB | Facility: HOSPITAL | Age: 62
End: 2022-06-28
Attending: FAMILY MEDICINE
Payer: MEDICARE

## 2022-06-28 ENCOUNTER — TELEMEDICINE (OUTPATIENT)
Dept: FAMILY MEDICINE CLINIC | Facility: CLINIC | Age: 62
End: 2022-06-28

## 2022-06-28 VITALS — DIASTOLIC BLOOD PRESSURE: 67 MMHG | SYSTOLIC BLOOD PRESSURE: 152 MMHG

## 2022-06-28 DIAGNOSIS — J02.9 SORE THROAT: Primary | ICD-10-CM

## 2022-06-28 DIAGNOSIS — R05.9 COUGHING: ICD-10-CM

## 2022-06-28 DIAGNOSIS — J02.9 SORE THROAT: ICD-10-CM

## 2022-06-28 LAB — SARS-COV-2 RNA RESP QL NAA+PROBE: NOT DETECTED

## 2022-06-28 PROCEDURE — 99213 OFFICE O/P EST LOW 20 MIN: CPT | Performed by: FAMILY MEDICINE

## 2022-06-28 PROCEDURE — 87430 STREP A AG IA: CPT

## 2022-06-28 RX ORDER — BENZONATATE 100 MG/1
100 CAPSULE ORAL 3 TIMES DAILY PRN
Qty: 30 CAPSULE | Refills: 0 | Status: SHIPPED | OUTPATIENT
Start: 2022-06-28

## 2022-06-28 RX ORDER — GUAIFENESIN AND DEXTROMETHORPHAN HYDROBROMIDE 1200; 60 MG/1; MG/1
1 TABLET, EXTENDED RELEASE ORAL EVERY 12 HOURS
Qty: 20 TABLET | Refills: 0 | COMMUNITY
Start: 2022-06-28 | End: 2022-07-08

## 2022-06-28 RX ORDER — AMOXICILLIN 875 MG/1
875 TABLET, COATED ORAL EVERY 12 HOURS
Qty: 20 TABLET | Refills: 0 | Status: SHIPPED | OUTPATIENT
Start: 2022-06-28

## 2022-06-28 RX ORDER — FLUTICASONE FUROATE AND VILANTEROL TRIFENATATE 100; 25 UG/1; UG/1
1 POWDER RESPIRATORY (INHALATION) DAILY
COMMUNITY
Start: 2022-06-01

## 2022-06-28 NOTE — TELEPHONE ENCOUNTER
Spoke with pt's daughter in law  Pt has had a dry cough x 1 week   otc cough meds not helping , neither is albuterol.   coughing so hard will leak urine sometimes  Coughing is disrupting sleep  Feels like she may have swollen tonsil on R side of neck   Has felt SOB at times with coughing fits   Pt has not completed covid test   Has 3 Moderna doses and is due for booster  Per Alia Winters, DO appt changed to video visit

## 2022-06-28 NOTE — TELEPHONE ENCOUNTER
From: Evans Lei  To: Berna West DO  Sent: 6/28/2022 1:52 PM CDT  Subject: Testing    I can go get her tested for COVID and Strep without an appointment? Or I have to make one?

## 2022-06-29 NOTE — TELEPHONE ENCOUNTER
Strep is negative-does not need to take amoxicillin-we discussed this at the televisit. COVID is also negative. Patient should take the [employer] and supportive care. Most likely viral pharyngitis. If her symptoms do not resolve then I will refer her to see an ENT. Patient has had several issues with GERD. She admitted her symptoms are controlled on omeprazole 20 mg which she recently restarted. But if she is breaking through ENT would notice on laryngoscope in the back of her throat if LPR.     Please inform

## 2022-06-29 NOTE — TELEPHONE ENCOUNTER
From: Alek Matos  To: Ole Leiva DO  Sent: 6/28/2022 7:07 PM CDT  Subject: Which medication to      Since she tested negative for both COVID and strep which medication should she take?

## 2022-06-30 NOTE — TELEPHONE ENCOUNTER
Outreach call to pt since preferred language is Moroccan, call taken by pt tr on hippa, Informed of recommendations and indications per Earle Barrios voiced understanding and agrees.  Denies any questions or concerns at this time

## 2022-07-11 RX ORDER — CETIRIZINE HYDROCHLORIDE 10 MG/1
TABLET ORAL
Qty: 90 TABLET | Refills: 0 | Status: SHIPPED | OUTPATIENT
Start: 2022-07-11

## 2022-07-22 ENCOUNTER — MED REC SCAN ONLY (OUTPATIENT)
Dept: FAMILY MEDICINE CLINIC | Facility: CLINIC | Age: 62
End: 2022-07-22

## 2022-08-01 ENCOUNTER — OFFICE VISIT (OUTPATIENT)
Dept: FAMILY MEDICINE CLINIC | Facility: CLINIC | Age: 62
End: 2022-08-01
Payer: MEDICARE

## 2022-08-01 VITALS
BODY MASS INDEX: 29.45 KG/M2 | HEART RATE: 70 BPM | DIASTOLIC BLOOD PRESSURE: 68 MMHG | HEIGHT: 61 IN | RESPIRATION RATE: 16 BRPM | TEMPERATURE: 98 F | OXYGEN SATURATION: 93 % | SYSTOLIC BLOOD PRESSURE: 120 MMHG | WEIGHT: 156 LBS

## 2022-08-01 DIAGNOSIS — Z00.00 ENCOUNTER FOR ANNUAL HEALTH EXAMINATION: Primary | ICD-10-CM

## 2022-08-01 DIAGNOSIS — D84.821 IMMUNOSUPPRESSION DUE TO DRUG THERAPY (HCC): ICD-10-CM

## 2022-08-01 DIAGNOSIS — H43.10 VITREOUS HEMORRHAGE, UNSPECIFIED LATERALITY (HCC): ICD-10-CM

## 2022-08-01 DIAGNOSIS — Z23 NEED FOR VACCINATION: ICD-10-CM

## 2022-08-01 DIAGNOSIS — E11.69 HYPERLIPIDEMIA ASSOCIATED WITH TYPE 2 DIABETES MELLITUS (HCC): ICD-10-CM

## 2022-08-01 DIAGNOSIS — Z12.31 SCREENING MAMMOGRAM, ENCOUNTER FOR: ICD-10-CM

## 2022-08-01 DIAGNOSIS — D64.9 NORMOCYTIC ANEMIA: ICD-10-CM

## 2022-08-01 DIAGNOSIS — I15.0 RENOVASCULAR HYPERTENSION: ICD-10-CM

## 2022-08-01 DIAGNOSIS — J44.9 ASTHMA WITH COPD (CHRONIC OBSTRUCTIVE PULMONARY DISEASE) (HCC): Chronic | ICD-10-CM

## 2022-08-01 DIAGNOSIS — G47.33 SEVERE OBSTRUCTIVE SLEEP APNEA: ICD-10-CM

## 2022-08-01 DIAGNOSIS — H43.392 VITREOUS FLOATERS OF LEFT EYE: ICD-10-CM

## 2022-08-01 DIAGNOSIS — E83.42 HYPOMAGNESEMIA: ICD-10-CM

## 2022-08-01 DIAGNOSIS — E03.9 ACQUIRED HYPOTHYROIDISM: ICD-10-CM

## 2022-08-01 DIAGNOSIS — K21.9 GASTROESOPHAGEAL REFLUX DISEASE WITHOUT ESOPHAGITIS: ICD-10-CM

## 2022-08-01 DIAGNOSIS — Z79.4 TYPE 2 DIABETES MELLITUS WITH LEFT EYE AFFECTED BY PROLIFERATIVE RETINOPATHY AND TRACTION RETINAL DETACHMENT INVOLVING MACULA, WITH LONG-TERM CURRENT USE OF INSULIN (HCC): ICD-10-CM

## 2022-08-01 DIAGNOSIS — Z91.81 AT HIGH RISK FOR FALLS: ICD-10-CM

## 2022-08-01 DIAGNOSIS — Z79.899 IMMUNOSUPPRESSION DUE TO DRUG THERAPY (HCC): ICD-10-CM

## 2022-08-01 DIAGNOSIS — D12.6 TUBULAR ADENOMA OF COLON: ICD-10-CM

## 2022-08-01 DIAGNOSIS — F32.A DEPRESSIVE DISORDER: ICD-10-CM

## 2022-08-01 DIAGNOSIS — E11.3522 TYPE 2 DIABETES MELLITUS WITH LEFT EYE AFFECTED BY PROLIFERATIVE RETINOPATHY AND TRACTION RETINAL DETACHMENT INVOLVING MACULA, WITH LONG-TERM CURRENT USE OF INSULIN (HCC): ICD-10-CM

## 2022-08-01 DIAGNOSIS — Z94.0 S/P KIDNEY TRANSPLANT: ICD-10-CM

## 2022-08-01 DIAGNOSIS — E78.5 HYPERLIPIDEMIA ASSOCIATED WITH TYPE 2 DIABETES MELLITUS (HCC): ICD-10-CM

## 2022-08-01 PROBLEM — R10.33 PERIUMBILICAL ABDOMINAL PAIN: Status: RESOLVED | Noted: 2020-04-17 | Resolved: 2022-08-01

## 2022-08-01 PROBLEM — E21.3 HYPERPARATHYROIDISM (HCC): Status: RESOLVED | Noted: 2019-06-28 | Resolved: 2022-08-01

## 2022-08-01 RX ORDER — CARVEDILOL 6.25 MG/1
6.25 TABLET ORAL 2 TIMES DAILY WITH MEALS
Qty: 180 TABLET | Refills: 1 | COMMUNITY
Start: 2022-08-01

## 2022-08-01 RX ORDER — ATORVASTATIN CALCIUM 20 MG/1
20 TABLET, FILM COATED ORAL NIGHTLY
Qty: 90 TABLET | Refills: 3 | Status: SHIPPED | OUTPATIENT
Start: 2022-08-01

## 2022-08-01 RX ORDER — MONTELUKAST SODIUM 10 MG/1
10 TABLET ORAL DAILY
Qty: 90 TABLET | Refills: 3 | Status: SHIPPED | OUTPATIENT
Start: 2022-08-01

## 2022-08-01 RX ORDER — ZOSTER VACCINE RECOMBINANT, ADJUVANTED 50 MCG/0.5
50 KIT INTRAMUSCULAR ONCE
Qty: 1 EACH | Refills: 1 | Status: SHIPPED | OUTPATIENT
Start: 2022-08-01 | End: 2022-08-01

## 2022-08-01 RX ORDER — BUPROPION HYDROCHLORIDE 150 MG/1
150 TABLET, EXTENDED RELEASE ORAL DAILY
Qty: 90 TABLET | Refills: 3 | Status: SHIPPED | OUTPATIENT
Start: 2022-08-01

## 2022-08-08 DIAGNOSIS — Z87.19 HISTORY OF GASTRITIS: ICD-10-CM

## 2022-08-09 RX ORDER — FAMOTIDINE 20 MG/1
TABLET, FILM COATED ORAL
Qty: 180 TABLET | Refills: 1 | OUTPATIENT
Start: 2022-08-09

## 2022-09-19 ENCOUNTER — MED REC SCAN ONLY (OUTPATIENT)
Dept: FAMILY MEDICINE CLINIC | Facility: CLINIC | Age: 62
End: 2022-09-19

## 2022-09-26 RX ORDER — CETIRIZINE HYDROCHLORIDE 10 MG/1
TABLET ORAL
Qty: 90 TABLET | Refills: 0 | Status: SHIPPED | OUTPATIENT
Start: 2022-09-26

## 2022-10-15 LAB
ALBUMIN/GLOBULIN RATIO: 1.8 (CALC) (ref 1–2.5)
ALBUMIN: 4.7 G/DL (ref 3.6–5.1)
ALKALINE PHOSPHATASE: 125 U/L (ref 37–153)
ALT: 16 U/L (ref 6–29)
AST: 25 U/L (ref 10–35)
BILIRUBIN, TOTAL: 0.6 MG/DL (ref 0.2–1.2)
BUN/CREATININE RATIO: 18 (CALC) (ref 6–22)
BUN: 26 MG/DL (ref 7–25)
CALCIUM: 9.7 MG/DL (ref 8.6–10.4)
CARBON DIOXIDE: 29 MMOL/L (ref 20–32)
CHLORIDE: 105 MMOL/L (ref 98–110)
CHOL/HDLC RATIO: 2.9 (CALC)
CHOLESTEROL, TOTAL: 123 MG/DL
CREATININE: 1.45 MG/DL (ref 0.5–1.05)
EGFR: 41 ML/MIN/1.73M2
GLOBULIN: 2.6 G/DL (CALC) (ref 1.9–3.7)
GLUCOSE: 95 MG/DL (ref 65–99)
HDL CHOLESTEROL: 43 MG/DL
LDL-CHOLESTEROL: 50 MG/DL (CALC)
NON-HDL CHOLESTEROL: 80 MG/DL (CALC)
POTASSIUM: 4.2 MMOL/L (ref 3.5–5.3)
PROTEIN, TOTAL: 7.3 G/DL (ref 6.1–8.1)
SODIUM: 142 MMOL/L (ref 135–146)
TRIGLYCERIDES: 239 MG/DL

## 2022-11-01 PROBLEM — Z86.0101 HISTORY OF ADENOMATOUS POLYP OF COLON: Status: ACTIVE | Noted: 2022-11-01

## 2022-11-01 PROBLEM — D12.3 BENIGN NEOPLASM OF TRANSVERSE COLON: Status: ACTIVE | Noted: 2022-11-01

## 2022-11-01 PROBLEM — Z86.010 HISTORY OF ADENOMATOUS POLYP OF COLON: Status: ACTIVE | Noted: 2022-11-01

## 2022-11-01 PROCEDURE — 88305 TISSUE EXAM BY PATHOLOGIST: CPT | Performed by: INTERNAL MEDICINE

## 2022-11-08 ENCOUNTER — OFFICE VISIT (OUTPATIENT)
Dept: FAMILY MEDICINE CLINIC | Facility: CLINIC | Age: 62
End: 2022-11-08
Payer: MEDICARE

## 2022-11-08 VITALS
SYSTOLIC BLOOD PRESSURE: 122 MMHG | HEIGHT: 61 IN | OXYGEN SATURATION: 94 % | TEMPERATURE: 98 F | HEART RATE: 78 BPM | WEIGHT: 160.81 LBS | RESPIRATION RATE: 16 BRPM | BODY MASS INDEX: 30.36 KG/M2 | DIASTOLIC BLOOD PRESSURE: 70 MMHG

## 2022-11-08 DIAGNOSIS — Z23 NEED FOR VACCINATION: ICD-10-CM

## 2022-11-08 DIAGNOSIS — E78.5 HYPERLIPIDEMIA ASSOCIATED WITH TYPE 2 DIABETES MELLITUS (HCC): Primary | ICD-10-CM

## 2022-11-08 DIAGNOSIS — E11.3522 TYPE 2 DIABETES MELLITUS WITH LEFT EYE AFFECTED BY PROLIFERATIVE RETINOPATHY AND TRACTION RETINAL DETACHMENT INVOLVING MACULA, WITH LONG-TERM CURRENT USE OF INSULIN (HCC): ICD-10-CM

## 2022-11-08 DIAGNOSIS — Z79.4 TYPE 2 DIABETES MELLITUS WITH LEFT EYE AFFECTED BY PROLIFERATIVE RETINOPATHY AND TRACTION RETINAL DETACHMENT INVOLVING MACULA, WITH LONG-TERM CURRENT USE OF INSULIN (HCC): ICD-10-CM

## 2022-11-08 DIAGNOSIS — E11.69 HYPERLIPIDEMIA ASSOCIATED WITH TYPE 2 DIABETES MELLITUS (HCC): Primary | ICD-10-CM

## 2022-11-08 PROBLEM — Z12.11 ENCOUNTER FOR SCREENING FOR MALIGNANT NEOPLASM OF COLON: Status: ACTIVE | Noted: 2022-11-08

## 2022-11-08 PROCEDURE — 90686 IIV4 VACC NO PRSV 0.5 ML IM: CPT | Performed by: FAMILY MEDICINE

## 2022-11-08 PROCEDURE — 99214 OFFICE O/P EST MOD 30 MIN: CPT | Performed by: FAMILY MEDICINE

## 2022-11-08 PROCEDURE — G0008 ADMIN INFLUENZA VIRUS VAC: HCPCS | Performed by: FAMILY MEDICINE

## 2022-11-08 RX ORDER — ZOSTER VACCINE RECOMBINANT, ADJUVANTED 50 MCG/0.5
50 KIT INTRAMUSCULAR ONCE
Qty: 1 EACH | Refills: 1 | Status: SHIPPED | OUTPATIENT
Start: 2022-11-08 | End: 2022-11-08

## 2022-11-08 RX ORDER — MAGNESIUM OXIDE 400 MG/1
TABLET ORAL
COMMUNITY
Start: 2022-09-30 | End: 2022-11-08 | Stop reason: ALTCHOICE

## 2022-11-08 RX ORDER — ATORVASTATIN CALCIUM 40 MG/1
40 TABLET, FILM COATED ORAL NIGHTLY
Qty: 90 TABLET | Refills: 0 | Status: SHIPPED | OUTPATIENT
Start: 2022-11-08

## 2022-11-21 ENCOUNTER — PATIENT MESSAGE (OUTPATIENT)
Dept: FAMILY MEDICINE CLINIC | Facility: CLINIC | Age: 62
End: 2022-11-21

## 2022-11-22 NOTE — TELEPHONE ENCOUNTER
From: Elias Baker  To: Vlaarie Brizuela DO  Sent: 11/21/2022 7:29 PM CST  Subject: Atorvastatin    Good evening Dr. Rahat Sam has been on the same dose of Atorvastatin 40mg,1x a day for at least a year. Does it need to be increased to 80mg?

## 2022-12-07 NOTE — TELEPHONE ENCOUNTER
Dr. Tano Freeman, please see [employer] message. Patient has been taking 40mg of Atorvastatin at the time of the most recent lab draw.      Please advise

## 2022-12-07 NOTE — TELEPHONE ENCOUNTER
Patient should continue on atorvastatin 40 mg. Apparently she brought a bottle of atorvastatin 20 mg to appointment but now is stating that she was taking 40 mg which was not told to me at the appointment. Patient unfortunately has not had an A1c since 3/2022    Her LDL is good but her triglycerides are on the higher side. Please have her repeat her labs in the next 2 weeks-she will need to give a urine sample for her urine microalbumin, complete her A1c which is overdue and then she can repeat her cholesterol and will monitor. If worsening then may increased atorvastatin to 80 mg but would like to have her repeat the blood work.     Please inform

## 2022-12-17 LAB
ALBUMIN/GLOBULIN RATIO: 2 (CALC) (ref 1–2.5)
ALBUMIN: 4.3 G/DL (ref 3.6–5.1)
ALKALINE PHOSPHATASE: 70 U/L (ref 37–153)
ALT: 18 U/L (ref 6–29)
AST: 28 U/L (ref 10–35)
BILIRUBIN, DIRECT: 0.3 MG/DL
BILIRUBIN, INDIRECT: 1.1 MG/DL (CALC) (ref 0.2–1.2)
BILIRUBIN, TOTAL: 1.4 MG/DL (ref 0.2–1.2)
CHOL/HDLC RATIO: 4 (CALC)
CHOLESTEROL, TOTAL: 161 MG/DL
CREATININE, RANDOM URINE: 159 MG/DL (ref 20–275)
GLOBULIN: 2.2 G/DL (CALC) (ref 1.9–3.7)
HDL CHOLESTEROL: 40 MG/DL
HEMOGLOBIN A1C: <4 % OF TOTAL HGB
LDL-CHOLESTEROL: 86 MG/DL (CALC)
MICROALBUMIN/CREATININE RATIO, RANDOM URINE: 6 MCG/MG CREAT
MICROALBUMIN: 1 MG/DL
NON-HDL CHOLESTEROL: 121 MG/DL (CALC)
PROTEIN, TOTAL: 6.5 G/DL (ref 6.1–8.1)
TRIGLYCERIDES: 265 MG/DL

## 2022-12-20 ENCOUNTER — OFFICE VISIT (OUTPATIENT)
Dept: FAMILY MEDICINE CLINIC | Facility: CLINIC | Age: 62
End: 2022-12-20
Payer: MEDICARE

## 2022-12-20 VITALS
DIASTOLIC BLOOD PRESSURE: 70 MMHG | RESPIRATION RATE: 20 BRPM | OXYGEN SATURATION: 95 % | WEIGHT: 161.19 LBS | TEMPERATURE: 97 F | HEART RATE: 76 BPM | BODY MASS INDEX: 30.43 KG/M2 | HEIGHT: 61 IN | SYSTOLIC BLOOD PRESSURE: 118 MMHG

## 2022-12-20 DIAGNOSIS — E11.3522 TYPE 2 DIABETES MELLITUS WITH LEFT EYE AFFECTED BY PROLIFERATIVE RETINOPATHY AND TRACTION RETINAL DETACHMENT INVOLVING MACULA, WITH LONG-TERM CURRENT USE OF INSULIN (HCC): Primary | ICD-10-CM

## 2022-12-20 DIAGNOSIS — E11.69 HYPERLIPIDEMIA ASSOCIATED WITH TYPE 2 DIABETES MELLITUS (HCC): ICD-10-CM

## 2022-12-20 DIAGNOSIS — E78.5 HYPERLIPIDEMIA ASSOCIATED WITH TYPE 2 DIABETES MELLITUS (HCC): ICD-10-CM

## 2022-12-20 DIAGNOSIS — Z79.4 TYPE 2 DIABETES MELLITUS WITH LEFT EYE AFFECTED BY PROLIFERATIVE RETINOPATHY AND TRACTION RETINAL DETACHMENT INVOLVING MACULA, WITH LONG-TERM CURRENT USE OF INSULIN (HCC): Primary | ICD-10-CM

## 2022-12-20 PROCEDURE — 99213 OFFICE O/P EST LOW 20 MIN: CPT | Performed by: FAMILY MEDICINE

## 2022-12-20 RX ORDER — INSULIN GLARGINE 100 [IU]/ML
20 INJECTION, SOLUTION SUBCUTANEOUS NIGHTLY
Qty: 3 ML | Refills: 0 | COMMUNITY
Start: 2022-12-20

## 2022-12-29 ENCOUNTER — HOSPITAL ENCOUNTER (OUTPATIENT)
Dept: MAMMOGRAPHY | Age: 62
Discharge: HOME | End: 2022-12-29
Attending: FAMILY MEDICINE
Payer: MEDICARE

## 2022-12-29 DIAGNOSIS — Z12.31 SCREENING MAMMOGRAM, ENCOUNTER FOR: ICD-10-CM

## 2022-12-29 PROCEDURE — 77063 BREAST TOMOSYNTHESIS BI: CPT | Performed by: FAMILY MEDICINE

## 2022-12-29 PROCEDURE — 77067 SCR MAMMO BI INCL CAD: CPT | Performed by: FAMILY MEDICINE

## 2023-01-04 LAB
ALBUMIN/GLOBULIN RATIO: 1.9 (CALC) (ref 1–2.5)
ALBUMIN: 4.4 G/DL (ref 3.6–5.1)
ALKALINE PHOSPHATASE: 84 U/L (ref 37–153)
ALT: 18 U/L (ref 6–29)
AST: 31 U/L (ref 10–35)
BILIRUBIN, DIRECT: 0.3 MG/DL
BILIRUBIN, INDIRECT: 1.2 MG/DL (CALC) (ref 0.2–1.2)
BILIRUBIN, TOTAL: 1.5 MG/DL (ref 0.2–1.2)
CHOL/HDLC RATIO: 3.3 (CALC)
CHOLESTEROL, TOTAL: 147 MG/DL
GLOBULIN: 2.3 G/DL (CALC) (ref 1.9–3.7)
HDL CHOLESTEROL: 45 MG/DL
LDL-CHOLESTEROL: 70 MG/DL (CALC)
NON-HDL CHOLESTEROL: 102 MG/DL (CALC)
PROTEIN, TOTAL: 6.7 G/DL (ref 6.1–8.1)
TRIGLYCERIDES: 219 MG/DL

## 2023-01-25 ENCOUNTER — TELEPHONE (OUTPATIENT)
Dept: FAMILY MEDICINE CLINIC | Facility: CLINIC | Age: 63
End: 2023-01-25

## 2023-01-25 NOTE — TELEPHONE ENCOUNTER
----- Message from Mari Gonsales, DO sent at 1/15/2023 11:48 PM CST -----  Results reviewed. Please inform via  patient cholesterol improved LDL is at 70 and triglycerides decreased but still slightly more elevated than prefer. Patient has appointment in March and we can repeat labs at that time to monitor. May consider adding second medication to help with cholesterol if not improved. Patient should work on [place], exercise walk or ride stationary bicycle at least 30 to 60 minutes 5 to 7 days a week.

## 2023-01-27 ENCOUNTER — TELEPHONE (OUTPATIENT)
Dept: FAMILY MEDICINE CLINIC | Facility: CLINIC | Age: 63
End: 2023-01-27

## 2023-01-27 DIAGNOSIS — E11.69 HYPERLIPIDEMIA ASSOCIATED WITH TYPE 2 DIABETES MELLITUS (HCC): ICD-10-CM

## 2023-01-27 DIAGNOSIS — E11.3522 TYPE 2 DIABETES MELLITUS WITH LEFT EYE AFFECTED BY PROLIFERATIVE RETINOPATHY AND TRACTION RETINAL DETACHMENT INVOLVING MACULA, WITH LONG-TERM CURRENT USE OF INSULIN (HCC): ICD-10-CM

## 2023-01-27 DIAGNOSIS — Z79.4 TYPE 2 DIABETES MELLITUS WITH LEFT EYE AFFECTED BY PROLIFERATIVE RETINOPATHY AND TRACTION RETINAL DETACHMENT INVOLVING MACULA, WITH LONG-TERM CURRENT USE OF INSULIN (HCC): ICD-10-CM

## 2023-01-27 DIAGNOSIS — E78.5 HYPERLIPIDEMIA ASSOCIATED WITH TYPE 2 DIABETES MELLITUS (HCC): ICD-10-CM

## 2023-01-30 RX ORDER — ATORVASTATIN CALCIUM 40 MG/1
40 TABLET, FILM COATED ORAL NIGHTLY
Qty: 90 TABLET | Refills: 0 | Status: SHIPPED | OUTPATIENT
Start: 2023-01-30

## 2023-01-30 RX ORDER — INSULIN GLARGINE 100 [IU]/ML
20 INJECTION, SOLUTION SUBCUTANEOUS NIGHTLY
Qty: 3 ML | Refills: 0 | OUTPATIENT
Start: 2023-01-30

## 2023-01-30 NOTE — TELEPHONE ENCOUNTER
Refill no protocol available:     Pt requesting refill of insulin glargine (LANTUS SOLOSTAR) 100 UNIT/ML Subcutaneous Solution Pen-injector    Sent to Provider for review:     Last Time Medication was Filled:  12/20/2022    Last Office Visit with Provider: 12/20/2022    Appt scheduled on 3/7/2023      Refill Passed Protocol:     Pt requesting refill of atorvastatin     Refill was approved and sent to pharmacy:     Last Time Medication was Filled: 11/8/2022    Last Office Visit with Provider: 12/20/2022    Appt. scheduled on 3/7/2023

## 2023-02-03 NOTE — TELEPHONE ENCOUNTER
LMOM to return call to the office. Provided pt office phone [phone] along with office hours. Also sent patient a [employer] message.

## 2023-02-10 RX ORDER — SITAGLIPTIN 100 MG/1
100 TABLET, FILM COATED ORAL DAILY
Qty: 90 TABLET | Refills: 0 | Status: SHIPPED | OUTPATIENT
Start: 2023-02-10

## 2023-02-10 RX ORDER — GLIPIZIDE 10 MG/1
10 TABLET ORAL 2 TIMES DAILY
Qty: 90 TABLET | Refills: 0 | Status: SHIPPED | OUTPATIENT
Start: 2023-02-10 | End: 2023-02-10

## 2023-02-10 RX ORDER — GLIPIZIDE 10 MG/1
TABLET ORAL
Qty: 180 TABLET | Refills: 0 | Status: SHIPPED | OUTPATIENT
Start: 2023-02-10

## 2023-02-23 RX ORDER — CETIRIZINE HYDROCHLORIDE 10 MG/1
10 TABLET ORAL DAILY
Qty: 90 TABLET | Refills: 0 | Status: SHIPPED | OUTPATIENT
Start: 2023-02-23

## 2023-03-07 ENCOUNTER — TELEMEDICINE (OUTPATIENT)
Dept: FAMILY MEDICINE CLINIC | Facility: CLINIC | Age: 63
End: 2023-03-07
Payer: MEDICARE

## 2023-03-07 DIAGNOSIS — E11.3522 TYPE 2 DIABETES MELLITUS WITH LEFT EYE AFFECTED BY PROLIFERATIVE RETINOPATHY AND TRACTION RETINAL DETACHMENT INVOLVING MACULA, WITH LONG-TERM CURRENT USE OF INSULIN (HCC): ICD-10-CM

## 2023-03-07 DIAGNOSIS — E11.69 HYPERLIPIDEMIA ASSOCIATED WITH TYPE 2 DIABETES MELLITUS (HCC): Primary | ICD-10-CM

## 2023-03-07 DIAGNOSIS — E78.5 HYPERLIPIDEMIA ASSOCIATED WITH TYPE 2 DIABETES MELLITUS (HCC): Primary | ICD-10-CM

## 2023-03-07 DIAGNOSIS — R17 ELEVATED BILIRUBIN: ICD-10-CM

## 2023-03-07 DIAGNOSIS — Z79.4 TYPE 2 DIABETES MELLITUS WITH LEFT EYE AFFECTED BY PROLIFERATIVE RETINOPATHY AND TRACTION RETINAL DETACHMENT INVOLVING MACULA, WITH LONG-TERM CURRENT USE OF INSULIN (HCC): ICD-10-CM

## 2023-03-07 PROCEDURE — 99214 OFFICE O/P EST MOD 30 MIN: CPT | Performed by: FAMILY MEDICINE

## 2023-03-07 RX ORDER — ATORVASTATIN CALCIUM 40 MG/1
40 TABLET, FILM COATED ORAL NIGHTLY
Qty: 90 TABLET | Refills: 1 | Status: SHIPPED | OUTPATIENT
Start: 2023-03-07

## 2023-03-07 RX ORDER — MELATONIN
3
COMMUNITY
Start: 2023-01-09 | End: 2024-01-09

## 2023-03-08 RX ORDER — SITAGLIPTIN 100 MG/1
TABLET, FILM COATED ORAL
Qty: 90 TABLET | Refills: 0 | Status: SHIPPED
Start: 2023-03-08

## 2023-03-10 ENCOUNTER — PATIENT MESSAGE (OUTPATIENT)
Dept: FAMILY MEDICINE CLINIC | Facility: CLINIC | Age: 63
End: 2023-03-10

## 2023-03-10 DIAGNOSIS — H25.9 SENILE CATARACT, UNSPECIFIED AGE-RELATED CATARACT TYPE, UNSPECIFIED LATERALITY: Primary | ICD-10-CM

## 2023-03-13 NOTE — TELEPHONE ENCOUNTER
Dr. Annie Xie, please see [facility]. Referral to Dr. Edelmira Castro is pended.

## 2023-03-13 NOTE — TELEPHONE ENCOUNTER
From: Brynn Arana  To:  Michelle Roldan DO  Sent: 3/10/2023 3:14 PM CST  Subject: capsulotomy eye surgery    Shay Kuo,    Dr. Savage Little is recommending Capsulotomy eye surgery for Harden Roller and needs a referral.     Arabella Santacruz, in behalf of Ronny Martin

## 2023-03-15 ENCOUNTER — OFFICE VISIT (OUTPATIENT)
Facility: CLINIC | Age: 63
End: 2023-03-15
Payer: MEDICARE

## 2023-03-15 VITALS — SYSTOLIC BLOOD PRESSURE: 124 MMHG | OXYGEN SATURATION: 92 % | DIASTOLIC BLOOD PRESSURE: 72 MMHG | HEART RATE: 86 BPM

## 2023-03-15 DIAGNOSIS — E11.39 TYPE 2 DIABETES MELLITUS WITH OTHER OPHTHALMIC COMPLICATION, WITH LONG-TERM CURRENT USE OF INSULIN (HCC): ICD-10-CM

## 2023-03-15 DIAGNOSIS — E03.8 OTHER SPECIFIED HYPOTHYROIDISM: Primary | ICD-10-CM

## 2023-03-15 DIAGNOSIS — Z79.4 TYPE 2 DIABETES MELLITUS WITH OTHER OPHTHALMIC COMPLICATION, WITH LONG-TERM CURRENT USE OF INSULIN (HCC): ICD-10-CM

## 2023-03-15 PROCEDURE — 99205 OFFICE O/P NEW HI 60 MIN: CPT

## 2023-03-15 RX ORDER — BLOOD-GLUCOSE METER
1 EACH MISCELLANEOUS ONCE
Qty: 1 EACH | Refills: 0 | Status: SHIPPED | OUTPATIENT
Start: 2023-03-15 | End: 2023-03-15

## 2023-03-15 RX ORDER — INSULIN GLARGINE AND LIXISENATIDE 100; 33 U/ML; UG/ML
30 INJECTION, SOLUTION SUBCUTANEOUS DAILY
Qty: 15 ML | Refills: 2 | Status: SHIPPED | OUTPATIENT
Start: 2023-03-15

## 2023-03-15 RX ORDER — BLOOD-GLUCOSE SENSOR
1 EACH MISCELLANEOUS
Qty: 3 EACH | Refills: 5 | Status: SHIPPED | OUTPATIENT
Start: 2023-03-15

## 2023-03-15 RX ORDER — PERPHENAZINE 16 MG/1
TABLET, FILM COATED ORAL
Qty: 200 EACH | Refills: 2 | Status: SHIPPED | OUTPATIENT
Start: 2023-03-15

## 2023-03-15 RX ORDER — LANCETS
EACH MISCELLANEOUS
Qty: 200 EACH | Refills: 1 | Status: SHIPPED | OUTPATIENT
Start: 2023-03-15

## 2023-03-15 RX ORDER — BLOOD-GLUCOSE,RECEIVER,CONT
1 EACH MISCELLANEOUS ONCE
Qty: 1 EACH | Refills: 1 | Status: SHIPPED | OUTPATIENT
Start: 2023-03-15 | End: 2023-03-15

## 2023-03-22 ENCOUNTER — TELEPHONE (OUTPATIENT)
Facility: CLINIC | Age: 63
End: 2023-03-22

## 2023-03-22 NOTE — TELEPHONE ENCOUNTER
Phoned patient's pharmacy to check in on these two medications. Hortencia Godfrey- they were able to run this and it went through insurance with no cost. They will prepare for patient. Dexcom is going to need to go through DME. Phoned patient's daughter in law, Archie pedroza. Reviewed that Hortencia Godfrey will be being prepared for . Reviewed Dexcom needing to go through DME. She gave ok to send this route. Will go to [facility]. Reviewed whole process can take up to 1 month. States patient currently testing with fingersticks- will continue this in mean time. [employer] Dexcom 7 form filled out, signed by Lela STEVENS, and faxed along with the most recent office visit notes- [phone].

## 2023-03-22 NOTE — TELEPHONE ENCOUNTER
Koki Downing should be $35 / mo on medicare -- may depend on her part D plan. I do see [employer] so if medicare/medicaid plan, then it may be different - can you call pharmacy?     Dexcom G7 may need to go to DME route

## 2023-03-22 NOTE — TELEPHONE ENCOUNTER
3/22/23- Patients daughter in law phoned clinic stating that they attempted to  the following  prescriptions from the pharmacy and were very expensive out of pocket d/t insurance not covering. They could not afford to pay out of pocket. Insulin Glargine-Lixisenatide (SOLIQUA) 100-33 UNT-MCG/ML Subcutaneous Solution Pen-injector    Continuous Blood Gluc Sensor (DEXCOM G7 SENSOR)     Informed pts daughter in law that I would pass the information along to APRN and someone from our clinic would call back with further instructions.

## 2023-03-27 NOTE — TELEPHONE ENCOUNTER
Called DJ at [employer] to check on Dexom order. He stated it has not been received. Order re-faxed to [phone]. Fax confirmation received.

## 2023-04-03 NOTE — TELEPHONE ENCOUNTER
Phoned GEORGINA with [employer] at [phone] to check on status of Dexcom 7 order. They received order and have tried to reach out to patient on Friday, 3/31. Patient can call them back : [phone]. They can not move forward with this order until then. Confirmed what phone number they called- called [phone]. Nica Marisol phone number: [phone]. They also requested order form to be faxed again- address was left off. Re-faxed to [phone]. Phoned Mark Saavedra, reviewed need for her to contact [employer] to move order along. Gave their contact info.

## 2023-04-05 LAB
T4, FREE: 1.2 NG/DL (ref 0.8–1.8)
THYROID PEROXIDASE$ANTIBODIES: 1 IU/ML
TSH: 1.72 MIU/L (ref 0.4–4.5)

## 2023-04-10 ENCOUNTER — TELEPHONE (OUTPATIENT)
Dept: FAMILY MEDICINE CLINIC | Facility: CLINIC | Age: 63
End: 2023-04-10

## 2023-04-10 ENCOUNTER — TELEMEDICINE (OUTPATIENT)
Dept: FAMILY MEDICINE CLINIC | Facility: CLINIC | Age: 63
End: 2023-04-10

## 2023-04-10 DIAGNOSIS — T50.905A MEDICATION SIDE EFFECT, INITIAL ENCOUNTER: ICD-10-CM

## 2023-04-10 DIAGNOSIS — Z79.4 TYPE 2 DIABETES MELLITUS WITH LEFT EYE AFFECTED BY PROLIFERATIVE RETINOPATHY AND TRACTION RETINAL DETACHMENT INVOLVING MACULA, WITH LONG-TERM CURRENT USE OF INSULIN (HCC): Primary | ICD-10-CM

## 2023-04-10 DIAGNOSIS — E11.3522 TYPE 2 DIABETES MELLITUS WITH LEFT EYE AFFECTED BY PROLIFERATIVE RETINOPATHY AND TRACTION RETINAL DETACHMENT INVOLVING MACULA, WITH LONG-TERM CURRENT USE OF INSULIN (HCC): Primary | ICD-10-CM

## 2023-04-10 DIAGNOSIS — R19.7 ACUTE DIARRHEA: ICD-10-CM

## 2023-04-10 NOTE — TELEPHONE ENCOUNTER
Spoke with KIM Carreno. Patient does not have appointment with endocrine until June 2023. .  KIM endocrinologist is okay with me discontinuing Soliqua for 1 week to see if diarrhea resolves and use Lantus 33 units. If after 1 week, diarrhea resolves but fasting blood sugars are still greater than 130, patient should increased to 35 units. After 1 week patient may resume Soliqua at 33 units if fasting blood sugars are at goal on Lantus 33 units and diarrhea resolved or at 35 units if not at goal.  If diarrhea restarts, patient should use Pepto-Bismol 2 to 3 tablets orally daily. Patient should complete studies ordered by GI to assess hyperbilirubinemia and diarrhea. Please review with patient via -so she understands plan.

## 2023-04-11 NOTE — TELEPHONE ENCOUNTER
Phoned DJ with [employer] to get update on Dexcom 7. States this order is still in initial enrollment phase, they haven't heard back from patient. [employer] message sent to patient. Phoned patient's daughter, Carlos Ta, she states she has not had time to call back yet, but she does want to move forward with Dexcom. She understands will not be processed until she calls [facility]. States patient using finger sticks currently for glucose checks. States she will call.

## 2023-04-25 NOTE — TELEPHONE ENCOUNTER
Noted- ok to continue with Lantus. I did discuss this with her primary care physician when she saw her on 4/10 and am in agreement to hold it.  Once she gets Dexcom set up, we can have a return appt and revisit BG control on Lantus dosing    Will remove soliqua from med list

## 2023-04-26 NOTE — TELEPHONE ENCOUNTER
Spoke with DJ with [employer] for update. States because patient has a [facility] [place] plan- this order must be processed through [address]- they are the only ones contracted. Attempt to phone patient's daughter to discuss, no answer. Left voicemail asking for call back. [facility] form filled out and placed in Heather's in-box for review/sign off. Then will need to be faxed to Clarissa Jerome.

## 2023-04-27 NOTE — TELEPHONE ENCOUNTER
Faxed signed form, LOV notes, and copy of pts ins cards to [facility] at [phone]. Received fax confirmation. Placed in RN in basket. Will await determination.

## 2023-05-04 ENCOUNTER — PATIENT MESSAGE (OUTPATIENT)
Dept: FAMILY MEDICINE CLINIC | Facility: CLINIC | Age: 63
End: 2023-05-04

## 2023-05-04 DIAGNOSIS — Z79.4 TYPE 2 DIABETES MELLITUS WITH LEFT EYE AFFECTED BY PROLIFERATIVE RETINOPATHY AND TRACTION RETINAL DETACHMENT INVOLVING MACULA, WITH LONG-TERM CURRENT USE OF INSULIN (HCC): Primary | ICD-10-CM

## 2023-05-04 DIAGNOSIS — E11.3522 TYPE 2 DIABETES MELLITUS WITH LEFT EYE AFFECTED BY PROLIFERATIVE RETINOPATHY AND TRACTION RETINAL DETACHMENT INVOLVING MACULA, WITH LONG-TERM CURRENT USE OF INSULIN (HCC): Primary | ICD-10-CM

## 2023-05-04 NOTE — TELEPHONE ENCOUNTER
Called [facility] to inquire about the status of the Dexcom G7 order. States that they are still in the process of verifying patients insurance. Once insurance is verified they will contact the patient to confirm. Then will fax a Physician written order form to filled out by clinic. No further updates at this time.

## 2023-05-05 NOTE — TELEPHONE ENCOUNTER
Phoned patient's daughter, Jelena Julio, and reviewed order sent to [facility].    She verbalized understanding and will await their call.

## 2023-05-05 NOTE — TELEPHONE ENCOUNTER
From: Candie Gallagher  To: Sarah Khalil DO  Sent: 5/4/2023 6:53 PM CDT  Subject: Rashad Freeman,    I was wondering if you have gotten a referral request from Rashad Cid for Joseis Maile?

## 2023-05-09 NOTE — TELEPHONE ENCOUNTER
Phoned [facility] for update [employer] G7. On hold for 40\". Will need to call back.

## 2023-05-09 NOTE — TELEPHONE ENCOUNTER
Phoned [facility] at [phone] to get update on Dexcom G7. States they are awaiting predetermination from the insurance. Still pending.

## 2023-05-18 ENCOUNTER — MED REC SCAN ONLY (OUTPATIENT)
Dept: FAMILY MEDICINE CLINIC | Facility: CLINIC | Age: 63
End: 2023-05-18

## 2023-05-18 RX ORDER — ATORVASTATIN CALCIUM 20 MG/1
TABLET, FILM COATED ORAL
COMMUNITY
Start: 2023-04-29

## 2023-05-24 RX ORDER — CETIRIZINE HYDROCHLORIDE 10 MG/1
TABLET ORAL
Qty: 90 TABLET | Refills: 0 | Status: SHIPPED | OUTPATIENT
Start: 2023-05-24

## 2023-05-30 ENCOUNTER — PATIENT MESSAGE (OUTPATIENT)
Facility: CLINIC | Age: 63
End: 2023-05-30

## 2023-05-30 DIAGNOSIS — Z79.4 TYPE 2 DIABETES MELLITUS WITH LEFT EYE AFFECTED BY PROLIFERATIVE RETINOPATHY AND TRACTION RETINAL DETACHMENT INVOLVING MACULA, WITH LONG-TERM CURRENT USE OF INSULIN (HCC): ICD-10-CM

## 2023-05-30 DIAGNOSIS — E11.3522 TYPE 2 DIABETES MELLITUS WITH LEFT EYE AFFECTED BY PROLIFERATIVE RETINOPATHY AND TRACTION RETINAL DETACHMENT INVOLVING MACULA, WITH LONG-TERM CURRENT USE OF INSULIN (HCC): ICD-10-CM

## 2023-05-30 RX ORDER — INSULIN GLARGINE 100 [IU]/ML
35 INJECTION, SOLUTION SUBCUTANEOUS NIGHTLY
Qty: 45 ML | Refills: 1 | Status: SHIPPED | OUTPATIENT
Start: 2023-05-30

## 2023-05-30 NOTE — TELEPHONE ENCOUNTER
LOV: 3/15/23    RTC: 3 months    FU: scheduled 6/16/23    Lantus not on current med list, but per TE dated 3/22- patient had stopped Maricel Sheller and continue with Lantus. Pended 90 day supply.

## 2023-05-30 NOTE — TELEPHONE ENCOUNTER
From: Alek Garrett  To: RODNEY Paredes  Sent: 5/30/2023 9:09 AM CDT  Subject: Lantus    Good morning Dora Green needs a new script for Lantus. She is taking 35 unit now.

## 2023-05-31 RX ORDER — DAPSONE 100 MG/1
TABLET ORAL
COMMUNITY
Start: 2023-05-26

## 2023-05-31 RX ORDER — GLIPIZIDE 10 MG/1
10 TABLET ORAL 2 TIMES DAILY
Qty: 90 TABLET | Refills: 0 | Status: SHIPPED | OUTPATIENT
Start: 2023-05-31

## 2023-06-01 ENCOUNTER — MED REC SCAN ONLY (OUTPATIENT)
Facility: CLINIC | Age: 63
End: 2023-06-01

## 2023-06-16 ENCOUNTER — OFFICE VISIT (OUTPATIENT)
Facility: CLINIC | Age: 63
End: 2023-06-16
Payer: MEDICARE

## 2023-06-16 VITALS — DIASTOLIC BLOOD PRESSURE: 84 MMHG | OXYGEN SATURATION: 94 % | HEART RATE: 74 BPM | SYSTOLIC BLOOD PRESSURE: 136 MMHG

## 2023-06-16 DIAGNOSIS — Z79.4 TYPE 2 DIABETES MELLITUS WITH LEFT EYE AFFECTED BY PROLIFERATIVE RETINOPATHY AND TRACTION RETINAL DETACHMENT INVOLVING MACULA, WITH LONG-TERM CURRENT USE OF INSULIN (HCC): Primary | ICD-10-CM

## 2023-06-16 DIAGNOSIS — E11.3522 TYPE 2 DIABETES MELLITUS WITH LEFT EYE AFFECTED BY PROLIFERATIVE RETINOPATHY AND TRACTION RETINAL DETACHMENT INVOLVING MACULA, WITH LONG-TERM CURRENT USE OF INSULIN (HCC): Primary | ICD-10-CM

## 2023-06-16 DIAGNOSIS — E03.8 OTHER SPECIFIED HYPOTHYROIDISM: ICD-10-CM

## 2023-06-16 PROCEDURE — 95251 CONT GLUC MNTR ANALYSIS I&R: CPT

## 2023-06-16 PROCEDURE — 99215 OFFICE O/P EST HI 40 MIN: CPT

## 2023-06-16 RX ORDER — INSULIN GLARGINE 100 [IU]/ML
25 INJECTION, SOLUTION SUBCUTANEOUS NIGHTLY
Qty: 45 ML | Refills: 1 | COMMUNITY
Start: 2023-06-16

## 2023-06-19 LAB
CARTRIDGE LOT#: NORMAL NUMERIC
HEMOGLOBIN A1C: 4.3 % (ref 4.3–5.6)

## 2023-07-12 DIAGNOSIS — E11.3522 TYPE 2 DIABETES MELLITUS WITH LEFT EYE AFFECTED BY PROLIFERATIVE RETINOPATHY AND TRACTION RETINAL DETACHMENT INVOLVING MACULA, WITH LONG-TERM CURRENT USE OF INSULIN (HCC): Primary | ICD-10-CM

## 2023-07-12 DIAGNOSIS — E11.3522 TYPE 2 DIABETES MELLITUS WITH LEFT EYE AFFECTED BY PROLIFERATIVE RETINOPATHY AND TRACTION RETINAL DETACHMENT INVOLVING MACULA, WITH LONG-TERM CURRENT USE OF INSULIN (HCC): ICD-10-CM

## 2023-07-12 DIAGNOSIS — Z79.4 TYPE 2 DIABETES MELLITUS WITH LEFT EYE AFFECTED BY PROLIFERATIVE RETINOPATHY AND TRACTION RETINAL DETACHMENT INVOLVING MACULA, WITH LONG-TERM CURRENT USE OF INSULIN (HCC): ICD-10-CM

## 2023-07-12 DIAGNOSIS — Z79.4 TYPE 2 DIABETES MELLITUS WITH LEFT EYE AFFECTED BY PROLIFERATIVE RETINOPATHY AND TRACTION RETINAL DETACHMENT INVOLVING MACULA, WITH LONG-TERM CURRENT USE OF INSULIN (HCC): Primary | ICD-10-CM

## 2023-07-12 RX ORDER — GLIPIZIDE 10 MG/1
10 TABLET ORAL 2 TIMES DAILY
Qty: 180 TABLET | Refills: 0 | Status: SHIPPED | OUTPATIENT
Start: 2023-07-12

## 2023-07-12 RX ORDER — GLIPIZIDE 10 MG/1
10 TABLET ORAL 2 TIMES DAILY
Qty: 90 TABLET | Refills: 0 | Status: SHIPPED | OUTPATIENT
Start: 2023-07-12 | End: 2023-07-12

## 2023-07-12 NOTE — TELEPHONE ENCOUNTER
Refill Passed Protocol:     Pt requesting refill of   Requested Prescriptions     Pending Prescriptions Disp Refills    GLIPIZIDE 10 MG Oral Tab [Pharmacy Med Name: GLIPIZIDE 10MG TABLETS] 90 tablet 0     Sig: TAKE 1 TABLET(10 MG) BY MOUTH TWICE DAILY     Refill was approved and sent to pharmacy:     Last Time Medication was Filled:  5/31/2023    Last Office Visit with Provider: 4/10/2023 via telemedicine    Appt. scheduled on 8/8/2023

## 2023-07-31 DIAGNOSIS — F32.A DEPRESSIVE DISORDER: ICD-10-CM

## 2023-07-31 NOTE — TELEPHONE ENCOUNTER
Refill no protocol available:     Pt requesting refill of   Requested Prescriptions     Pending Prescriptions Disp Refills    buPROPion  MG Oral Tablet 12 Hr 90 tablet 3     Sig: Take 1 tablet (150 mg total) by mouth daily.      Sent to Provider for review:    Last Time Medication was Filled: 8/1/2022    Last Office Visit with Provider: 4/10/2023    Appt scheduled on 8/8/2023

## 2023-08-02 RX ORDER — BUPROPION HYDROCHLORIDE 150 MG/1
150 TABLET, EXTENDED RELEASE ORAL DAILY
Qty: 90 TABLET | Refills: 0 | Status: SHIPPED | OUTPATIENT
Start: 2023-08-02

## 2023-08-08 ENCOUNTER — OFFICE VISIT (OUTPATIENT)
Dept: FAMILY MEDICINE CLINIC | Facility: CLINIC | Age: 63
End: 2023-08-08
Payer: MEDICARE

## 2023-08-08 VITALS
SYSTOLIC BLOOD PRESSURE: 138 MMHG | OXYGEN SATURATION: 94 % | HEART RATE: 73 BPM | DIASTOLIC BLOOD PRESSURE: 80 MMHG | BODY MASS INDEX: 33.26 KG/M2 | RESPIRATION RATE: 16 BRPM | HEIGHT: 58.5 IN | WEIGHT: 162.81 LBS | TEMPERATURE: 98 F

## 2023-08-08 DIAGNOSIS — J44.9 ASTHMA WITH COPD (CHRONIC OBSTRUCTIVE PULMONARY DISEASE) (HCC): Chronic | ICD-10-CM

## 2023-08-08 DIAGNOSIS — E78.5 HYPERLIPIDEMIA ASSOCIATED WITH TYPE 2 DIABETES MELLITUS: ICD-10-CM

## 2023-08-08 DIAGNOSIS — H43.10 VITREOUS HEMORRHAGE, UNSPECIFIED LATERALITY (HCC): ICD-10-CM

## 2023-08-08 DIAGNOSIS — Z94.0 S/P KIDNEY TRANSPLANT: ICD-10-CM

## 2023-08-08 DIAGNOSIS — J30.2 SEASONAL ALLERGIES: ICD-10-CM

## 2023-08-08 DIAGNOSIS — E03.9 ACQUIRED HYPOTHYROIDISM: ICD-10-CM

## 2023-08-08 DIAGNOSIS — E11.69 HYPERLIPIDEMIA ASSOCIATED WITH TYPE 2 DIABETES MELLITUS: ICD-10-CM

## 2023-08-08 DIAGNOSIS — Z79.4 TYPE 2 DIABETES MELLITUS WITH LEFT EYE AFFECTED BY PROLIFERATIVE RETINOPATHY AND TRACTION RETINAL DETACHMENT INVOLVING MACULA, WITH LONG-TERM CURRENT USE OF INSULIN (HCC): ICD-10-CM

## 2023-08-08 DIAGNOSIS — E11.3522 TYPE 2 DIABETES MELLITUS WITH LEFT EYE AFFECTED BY PROLIFERATIVE RETINOPATHY AND TRACTION RETINAL DETACHMENT INVOLVING MACULA, WITH LONG-TERM CURRENT USE OF INSULIN (HCC): ICD-10-CM

## 2023-08-08 DIAGNOSIS — K21.9 GASTROESOPHAGEAL REFLUX DISEASE WITHOUT ESOPHAGITIS: ICD-10-CM

## 2023-08-08 DIAGNOSIS — Z00.00 ENCOUNTER FOR ANNUAL HEALTH EXAMINATION: Primary | ICD-10-CM

## 2023-08-08 DIAGNOSIS — G47.33 SEVERE OBSTRUCTIVE SLEEP APNEA: ICD-10-CM

## 2023-08-08 DIAGNOSIS — F33.42 RECURRENT MAJOR DEPRESSIVE DISORDER, IN FULL REMISSION (HCC): ICD-10-CM

## 2023-08-08 DIAGNOSIS — Z12.31 BREAST CANCER SCREENING BY MAMMOGRAM: ICD-10-CM

## 2023-08-08 DIAGNOSIS — E83.42 HYPOMAGNESEMIA: ICD-10-CM

## 2023-08-08 DIAGNOSIS — D64.9 NORMOCYTIC ANEMIA: ICD-10-CM

## 2023-08-08 DIAGNOSIS — R25.2 EXERCISE-INDUCED LEG CRAMPS: ICD-10-CM

## 2023-08-08 DIAGNOSIS — I10 PRIMARY HYPERTENSION: ICD-10-CM

## 2023-08-08 PROBLEM — D12.3 BENIGN NEOPLASM OF TRANSVERSE COLON: Status: RESOLVED | Noted: 2022-11-01 | Resolved: 2023-08-08

## 2023-08-08 PROBLEM — R17 ELEVATED BILIRUBIN: Status: RESOLVED | Noted: 2023-03-07 | Resolved: 2023-08-08

## 2023-08-08 PROBLEM — R19.7 ACUTE DIARRHEA: Status: RESOLVED | Noted: 2023-04-10 | Resolved: 2023-08-08

## 2023-08-08 RX ORDER — ZOSTER VACCINE RECOMBINANT, ADJUVANTED 50 MCG/0.5
50 KIT INTRAMUSCULAR ONCE
Qty: 1 EACH | Refills: 1 | Status: SHIPPED | OUTPATIENT
Start: 2023-08-08 | End: 2023-08-08

## 2023-08-08 RX ORDER — ATORVASTATIN CALCIUM 40 MG/1
40 TABLET, FILM COATED ORAL NIGHTLY
Qty: 90 TABLET | Refills: 1 | Status: SHIPPED | OUTPATIENT
Start: 2023-08-08

## 2023-08-08 RX ORDER — BUPROPION HYDROCHLORIDE 150 MG/1
150 TABLET, EXTENDED RELEASE ORAL DAILY
Qty: 90 TABLET | Refills: 0 | Status: SHIPPED | OUTPATIENT
Start: 2023-08-08

## 2023-08-22 ENCOUNTER — TELEPHONE (OUTPATIENT)
Dept: FAMILY MEDICINE CLINIC | Facility: CLINIC | Age: 63
End: 2023-08-22

## 2023-08-22 DIAGNOSIS — G47.33 OBSTRUCTIVE SLEEP APNEA: Primary | ICD-10-CM

## 2023-08-25 RX ORDER — CETIRIZINE HYDROCHLORIDE 10 MG/1
10 TABLET ORAL DAILY
Qty: 90 TABLET | Refills: 0 | Status: SHIPPED | OUTPATIENT
Start: 2023-08-25

## 2023-09-20 ENCOUNTER — OFFICE VISIT (OUTPATIENT)
Facility: CLINIC | Age: 63
End: 2023-09-20
Payer: MEDICARE

## 2023-09-20 VITALS — OXYGEN SATURATION: 98 % | DIASTOLIC BLOOD PRESSURE: 74 MMHG | HEART RATE: 73 BPM | SYSTOLIC BLOOD PRESSURE: 126 MMHG

## 2023-09-20 DIAGNOSIS — Z79.4 TYPE 2 DIABETES MELLITUS WITH HYPERGLYCEMIA, WITH LONG-TERM CURRENT USE OF INSULIN (HCC): Primary | ICD-10-CM

## 2023-09-20 DIAGNOSIS — Z94.0 RENAL TRANSPLANT RECIPIENT: ICD-10-CM

## 2023-09-20 DIAGNOSIS — E11.65 TYPE 2 DIABETES MELLITUS WITH HYPERGLYCEMIA, WITH LONG-TERM CURRENT USE OF INSULIN (HCC): Primary | ICD-10-CM

## 2023-09-20 DIAGNOSIS — E03.9 HYPOTHYROIDISM, UNSPECIFIED TYPE: ICD-10-CM

## 2023-09-20 LAB
CARTRIDGE LOT#: NORMAL NUMERIC
HEMOGLOBIN A1C: 5 % (ref 4.3–5.6)

## 2023-09-20 PROCEDURE — 95251 CONT GLUC MNTR ANALYSIS I&R: CPT

## 2023-09-20 PROCEDURE — 83036 HEMOGLOBIN GLYCOSYLATED A1C: CPT

## 2023-09-20 PROCEDURE — 99215 OFFICE O/P EST HI 40 MIN: CPT

## 2023-09-20 RX ORDER — INSULIN GLARGINE 100 [IU]/ML
30 INJECTION, SOLUTION SUBCUTANEOUS NIGHTLY
Qty: 45 ML | Refills: 1 | Status: SHIPPED | OUTPATIENT
Start: 2023-09-20

## 2023-09-20 RX ORDER — LEVOTHYROXINE SODIUM 88 UG/1
88 TABLET ORAL
Qty: 90 TABLET | Refills: 2 | Status: SHIPPED | OUTPATIENT
Start: 2023-09-20

## 2023-09-20 NOTE — PROGRESS NOTES
Endocrinology Clinic Note    Name: Christine Irvin  Date: 9/20/2023      HISTORY OF PRESENT ILLNESS   Christine Irvin is a 58year old female with PMHx significant for HTN, HLD, type 2 diabetes mellitus c/b retinopathy & nephropathy, depression, irregular bowel habits, hypothyroidism, ESRD s/p renal transplant in 2019 at [address], who presents for initial endocrinology evaluation for type 2 diabetes mellitus. Accompanied today by ROMIE Encinas, declines language line    Initial HPI consult in March 2023:  A1C was <4% most recently  Prev w/ [facility], transitioning care d/t Guy Simmons PCP  Insulin started after transplant  Prev was taking Lantus 35u, glipizide 10mg BID + Januvia, PCP understandably stopped Lantus until she could receive endo eval  Diagnosed age: 25 during pregnancy, states only started managing DM ~10 years ago when eyes were getting worse  Hx of ESRD on HD, and now is s/p renal tx in 2019 at [address] On Myfortic and Envarsus   Pt denies hx of hospitalization for DM, no pancreatitis hx  Family history of DM: T2DM- father, siblings  Cpeptide 9/2019- 7.4  Current DM meds at first OV: januvia 100mg daily, glipizide 10mg BID,   Previously trialed/failed: metformin- GI upset, Lantus 35u qAM    Hypothyroidism history  - unsure of diagnosis date, no hx thyroidectomy  - On LT4 88mcg daily  - last TFTs (Mar 2022) stable    Interim hx  June 2023- visit w/ DM APN  GMI on Dexcom 7.3% today  We started [place] last OV, she had horrible diarrhea so we stopped over the phone and PCP restarted Lantus 35U  Since then feeling much better although does note she will have lows <70 thru night and have to eat to get BG normal  This happens almost daily  Current meds: Lantus 35U daily. Sept 2023- A1C 5%.   Started dexcom G7 CGM through [facility], liking it a lot   DM meds at visit: Lantus 30U daily, januvia 50mcg daily  BG have been higher lately which she attributes to recent COVID dx    Continuous Glucose Monitoring Interpretation    Godwin Garcia has undergone continuous glucose monitoring with the Dexcom CGM. The blood glucose tracings were evaluated for two weeks prior to office visit. Blood glucose tracings demonstrated areas of hyperglycemia throughout entire day. There were no areas of hypoglycemia during the weeks of evaluation. At goal () 26% of the time. High 48% of the time. Very high 26% of the time. Low 0% of the time. Very low 0% of the time. Previously trialed/failed: glipizide- stopped d/t hypo episodes    REVIEW OF SYSTEMS  Ten point review of systems has been performed and is otherwise negative and/or non-contributory, except as described above. Medications:     Current Outpatient Medications:     empagliflozin (JARDIANCE) 10 MG Oral Tab, Take 1 tablet (10 mg total) by mouth daily. , Disp: 90 tablet, Rfl: 0    insulin glargine (LANTUS SOLOSTAR) 100 UNIT/ML Subcutaneous Solution Pen-injector, Inject 30 Units into the skin nightly. 3 mo supply, Disp: 45 mL, Rfl: 1    SITagliptin Phosphate (JANUVIA) 100 MG Oral Tab, Take 1 tablet (100 mg total) by mouth daily. , Disp: 90 tablet, Rfl: 1    levothyroxine 88 MCG Oral Tab, Take 1 tablet (88 mcg total) by mouth before breakfast., Disp: 90 tablet, Rfl: 2    cetirizine 10 MG Oral Tab, Take 1 tablet (10 mg total) by mouth daily. , Disp: 90 tablet, Rfl: 0    buPROPion  MG Oral Tablet 12 Hr, Take 1 tablet (150 mg total) by mouth daily. , Disp: 90 tablet, Rfl: 0    atorvastatin 40 MG Oral Tab, Take 1 tablet (40 mg total) by mouth nightly., Disp: 90 tablet, Rfl: 1    dapsone 100 MG Oral Tab, , Disp: , Rfl:     omeprazole 20 MG Oral Capsule Delayed Release, Take 1 capsule (20 mg total) by mouth 2 (two) times daily before meals. , Disp: 180 capsule, Rfl: 3    Microlet Lancets Does not apply Misc, Check blood sugar once daily, Disp: 200 each, Rfl: 1    Glucose Blood (CONTOUR NEXT TEST) In Vitro Strip, Check BG once daily, Disp: 200 each, Rfl: 2    melatonin 3 MG Oral Tab, Take 1 tablet (3 mg total) by mouth., Disp: , Rfl:     carvedilol 6.25 MG Oral Tab, Take 1 tablet (6.25 mg total) by mouth 2 (two) times daily with meals. , Disp: 180 tablet, Rfl: 1    montelukast 10 MG Oral Tab, Take 1 tablet (10 mg total) by mouth daily. , Disp: 90 tablet, Rfl: 3    BREO ELLIPTA 100-25 MCG/INH Inhalation Aerosol Powder, Breath Activated, Inhale 1 puff into the lungs daily. , Disp: , Rfl:     ALBUTEROL 108 (90 Base) MCG/ACT Inhalation Aero Soln, INHALE 2 PUFFS BY MOUTH INTO THE LUNGS EVERY 4 HOURS AS NEEDED FOR WHEEZING, Disp: 18 g, Rfl: 0    FLUTICASONE PROPIONATE 50 MCG/ACT Nasal Suspension, SHAKE LIQUID AND USE 2 SPRAYS IN EACH NOSTRIL DAILY, Disp: 48 g, Rfl: 0    ENVARSUS XR 0.75 MG Oral Tablet 24 Hr, Take 3 tablets by mouth daily. , Disp: , Rfl:     TRUEPLUS PEN NEEDLES 32G X 4 MM Does not apply Misc, TEST FOUR TIMES DAILY, Disp: 400 each, Rfl: 3    TRUE METRIX BLOOD GLUCOSE TEST In Vitro Strip, TEST 3 TIMES A DAY, Disp: 300 strip, Rfl: 3    Meclizine HCl 12.5 MG Oral Tab, Take 1 tablet (12.5 mg total) by mouth 3 (three) times daily as needed. , Disp: 30 tablet, Rfl: 0    valGANciclovir HCl (VALCYTE OR), Take 450 mg by mouth., Disp: , Rfl:     Lancets Does not apply Misc, 3x daily lancets for True Metrix meter, Disp: 300 each, Rfl: 3    mycophenolate sodium 360 MG Oral Tab EC, Take 1 tablet (360 mg total) by mouth 4 (four) times daily. For kidney transplant, Disp: 120 tablet, Rfl: 0    Biotin 5000 MCG Oral Cap, , Disp: , Rfl:     ondansetron 4 MG Oral Tablet Dispersible, , Disp: , Rfl:     ergocalciferol 1.25 MG (34318 UT) Oral Cap, , Disp: , Rfl:     FEROSUL 325 (65 Fe) MG Oral Tab, Take by mouth daily. , Disp: , Rfl:     Blood Glucose Monitoring Suppl (TRUE METRIX AIR GLUCOSE METER) w/Device Does not apply Kit, U UTD WITH STRIPS TO TEST BLOOD GLUCOSE, Disp: , Rfl:     Alcohol Swabs (BD SWAB SINGLE USE REGULAR) Does not apply Pads, U UTD QID, Disp: , Rfl: acetaminophen 500 MG Oral Tab, Take 2 tablets (1,000 mg total) by mouth one time. , Disp: , Rfl:      Allergies: Ace Inhibitors          RENAL INSUFFICIENCY  B12-Ca                  RASH    Comment:Vit b12 and calcium acetate combo    Social History:   Social History    Socioeconomic History      Marital status:       Number of children: 3    Occupational History      Occupation: House Keeper    Tobacco Use      Smoking status: Never      Smokeless tobacco: Never    Vaping Use      Vaping Use: Never used    Substance and Sexual Activity      Alcohol use: No      Drug use: No      Sexual activity: Never    Other Topics      Concerns:        Caffeine Concern: No        Exercise: Yes        Seat Belt: No      Medical History:   Past Medical History:   Diagnosis Date    Allergic rhinitis     Bad breath     Bloating 2010    Always feels bloated    Cataracts, bilateral 01/26/2015    Per Dr. Tomasa Moscoso    Cholecystitis     Chronic kidney disease (CKD)     Constipation 2010    If she is not stuffed up she has diarrhea    COVID     01/2022    Depression     Diabetes ([address]     no meds    Diabetic retinopathy ([facility] [address] 01/26/2015    Per Dr. Tomasa Moscoso    Diastolic dysfunction 23/9267    with LVH. EF >55% on ECHO     Encephalopathy acute     Esophageal reflux     Essential hypertension     Eye disease     Flatulence/gas pain/belching 2010    Always has gas    Food intolerance 2010    Lactose intolerance    Frequent UTI 2019    Always tests positive    Glaucoma     Heartburn 2010    Hyperlipidemia     Hypertension     Hypothyroidism     Irregular bowel habits 2010    Not regular    Loss of appetite 2010    Only eats 1 maybe 2 a day    Macular puckering of retina     Nausea     PONV (postoperative nausea and vomiting)     Prerenal azotemia     Proliferative diabetic retinopathy(362.02)     left eye    Renal disorder     ESRD    S/P kidney transplant 01/08/2020 12/12/2019-right kidney transplant donated by his son.   Former dialysis patient due to diabetic kidney disease performed at [facility]. Severe obstructive sleep apnea SPLIT NIGHT 16    has cpap    Sleep apnea     Traction detachment of retina     Uncomfortable fullness after meals     Always she eats super slow. Unspecified disorder of thyroid     Visual impairment     glasses    Vitreous hemorrhage (HCC)     Wears glasses        Surgical history:   Past Surgical History:   Procedure Laterality Date    APPENDECTOMY      AV FISTULA REVISION, OPEN Left 2019    CATARACT Bilateral     COLONOSCOPY      COLONOSCOPY N/A 2019    Procedure: COLONOSCOPY;  Surgeon: Saul Devi MD;  Location: [address] [place] [address] ENDOSCOPY    EYE SURGERY      RV surgery    HYSTERECTOMY      fibroids. unsure but thinks she still has her cervix    AMPARO BIOPSY STEREO NODULE 1 SITE RIGHT (CPT=19081)  ? ? BENIGN      83, 86, 84, 90    ORGAN TRANSPLANT      OTHER SURGICAL HISTORY      cyst removal on thigh area of leg    TOTAL ABDOM HYSTERECTOMY      TRANSPLANTATION OF KIDNEY  2019    kidney swap with son  at [address]  2015    antral erosions         PHYSICAL EXAM   23  1358   BP: 126/74   Pulse: 73   SpO2: 98%       General Appearance:  alert, well developed, in no acute distress  Eyes:  normal conjunctivae, sclera  Respiratory:  breathing comfortably  Cardiovascular:  regular rate  Musculoskeletal:  normal muscle strength and tone  Skin:  normal moisture and skin texture  Hair & Nails:  normal scalp hair     Neuro:  sensory grossly intact and motor grossly intact  Psychiatric:  oriented to time, self, and place    Labs/Imaging:  No results found.     ASSESSMENT/PLAN:    (H14.6221,  Z79.4) Type 2 diabetes mellitus with left eye affected by proliferative retinopathy and traction retinal detachment involving macula, with long-term current use of insulin (HCC)  (primary encounter diagnosis)  Plan:     GMI 8.6% on Dexcom, worsening. Will adjust januvia dosing today. Also discussed starting Jardiance w/ goal of reducing insulin needs. Pt agreeable to plan. We discussed the importance of glycemic control to prevent complications of diabetes. We discussed the importance of SBGM and consistent, low carb diet as well as moderate exercise as well. We also discussed the complications of diabetes include retinopathy, neuropathy, nephropathy and cardiovascular disease.     - incr januvia 50--> 100mg daily  - start jardiance 10mg daily (GFR 63)  - continue lantus 30u daily; can decr to 25u daily with jardiance start; wean by 2u q2-3 days to FBG <130  - continue dexcom G7 through [facility]  - SGLT2i instructions provided re: surgery, times of illness/dehydration, UTI prevention. To contact clinic if UTI occurs  - With insulin should check BG minimum once daily  - advised on lower carb diet, incr protein  - did not tolerate metformin- GI upset  - did not tolerate GLP1a; soliqua- bad diarrhea  - Ophtho: no DR, Last Dilated Eye Exam: 04/07/23   Eye Exam shows Diabetic Retinopathy?: Yes  - BP controlled, on beta blocker  - LDL at goal, on statin    - MAB negative  - Neuropathy/ Foot exam: Last Foot Exam: 08/08/23  - CAD: none     (E03.8) Other specified hypothyroidism  Plan:  - April TSH 1.72, fT4 1.2  - stable on 88mcg daily, repeat TFTs annually and PRN    Return in about 3 months (around 12/20/2023) for Jardiance f/u. A total of 40 minutes was spent today on obtaining history, reviewing pertinent labs, evaluating patient, providing multiple treatment options, reinforcing diet/exercise and compliance, and completing documentation.      9/20/2023  RODNEY Del Toro blood glucose testing

## 2023-10-04 ENCOUNTER — PATIENT MESSAGE (OUTPATIENT)
Dept: FAMILY MEDICINE CLINIC | Facility: CLINIC | Age: 63
End: 2023-10-04

## 2023-10-04 DIAGNOSIS — E11.3522 TYPE 2 DIABETES MELLITUS WITH LEFT EYE AFFECTED BY PROLIFERATIVE RETINOPATHY AND TRACTION RETINAL DETACHMENT INVOLVING MACULA, WITH LONG-TERM CURRENT USE OF INSULIN (HCC): Primary | ICD-10-CM

## 2023-10-04 DIAGNOSIS — Z79.4 TYPE 2 DIABETES MELLITUS WITH LEFT EYE AFFECTED BY PROLIFERATIVE RETINOPATHY AND TRACTION RETINAL DETACHMENT INVOLVING MACULA, WITH LONG-TERM CURRENT USE OF INSULIN (HCC): Primary | ICD-10-CM

## 2023-10-05 NOTE — TELEPHONE ENCOUNTER
_______________________________________________________  Referred to Provider Information:  Provider Address Phone   Sarah Beth Junior [address] [facility] [phone]   Referral signed. Please inform. Remind patient to get her COVID and flu vaccine. If already completed then please update vaccinations. She is high risk.

## 2023-10-05 NOTE — TELEPHONE ENCOUNTER
From: Mar Ivey  To: Callie Benavidez  Sent: 10/4/2023 3:37 PM CDT  Subject: Capsulotomy eye surgery I    Good afternoon Dr. Nimco Hilliard,    I need a referral for [facility] in [place]. So Ida can see one of there doctors for this surgery that Dr. Connie Potter recommended.     Jose Luis in behave of Azalia Saa

## 2023-12-11 ENCOUNTER — TELEMEDICINE (OUTPATIENT)
Facility: CLINIC | Age: 63
End: 2023-12-11
Payer: MEDICARE

## 2023-12-11 DIAGNOSIS — E11.3522 TYPE 2 DIABETES MELLITUS WITH LEFT EYE AFFECTED BY PROLIFERATIVE RETINOPATHY AND TRACTION RETINAL DETACHMENT INVOLVING MACULA, WITH LONG-TERM CURRENT USE OF INSULIN (HCC): Primary | ICD-10-CM

## 2023-12-11 DIAGNOSIS — E03.8 OTHER SPECIFIED HYPOTHYROIDISM: ICD-10-CM

## 2023-12-11 DIAGNOSIS — Z79.4 TYPE 2 DIABETES MELLITUS WITH LEFT EYE AFFECTED BY PROLIFERATIVE RETINOPATHY AND TRACTION RETINAL DETACHMENT INVOLVING MACULA, WITH LONG-TERM CURRENT USE OF INSULIN (HCC): Primary | ICD-10-CM

## 2023-12-11 RX ORDER — EMPAGLIFLOZIN 25 MG/1
25 TABLET, FILM COATED ORAL DAILY
Qty: 90 TABLET | Refills: 2 | Status: SHIPPED | OUTPATIENT
Start: 2023-12-11

## 2023-12-11 NOTE — PROGRESS NOTES
[facility] Note - Telemedicine    Teri Barton verbally consents to a Telemedicine visit on 12/11/2023. The visit was conducted in a private patient room. HISTORY OF PRESENT ILLNESS   Teri Barton is a 61year old female with PMHx significant for HTN, HLD, type 2 diabetes mellitus c/b retinopathy & nephropathy, depression, irregular bowel habits, hypothyroidism, ESRD s/p renal transplant in 2019 at [facility], who presents for initial endocrinology evaluation for type 2 diabetes mellitus. Accompanied today by brandon Sen language line    Initial HPI consult in March 2023:  A1C was <4% most recently  Prev w/ [facility], transitioning care d/t [facility] PCP  Insulin started after transplant  Prev was taking Lantus 35u, glipizide 10mg BID + Januvia, PCP understandably stopped Lantus until she could receive endo eval  Diagnosed age: 25 during pregnancy, states only started managing DM ~10 years ago when eyes were getting worse  Hx of ESRD on HD, and now is s/p renal tx in 2019 at [facility]. On Myfortic and Envarsus   Pt denies hx of hospitalization for DM, no pancreatitis hx  Family history of DM: T2DM- father, siblings  Cpeptide 9/2019- 7.4  Current DM meds at first OV: januvia 100mg daily, glipizide 10mg BID,   Previously trialed/failed: metformin- GI upset, Lantus 35u qAM    Hypothyroidism history  - unsure of diagnosis date, no hx thyroidectomy  - On LT4 88mcg daily  - last TFTs (Mar 2022) stable    Interim hx  June 2023- visit w/ DM APN  GMI on Dexcom 7.3% today  We started [place] last OV, she had horrible diarrhea so we stopped over the phone and PCP restarted Lantus 35U  Since then feeling much better although does note she will have lows <70 thru night and have to eat to get BG normal  This happens almost daily  Current meds: Lantus 35U daily. Sept 2023- Last A1C 5%.   Started dexcom G7 CGM through [facility], liking it a lot   DM meds at visit: Lantus 30U daily, [place] and [place] 50mcg daily  BG have been higher lately which she attributes to recent COVID dx    Dec 2023-  Last A1c value was 5% done 9/20/2023. Video visit; ROMIE Anne Johnson providing translation  Started Jardiance, tolerting well, minor incr'd urination, but also increased her water intake  Notes her highest BG are around dinner time despite dietary changes (cut out tortillas)  Current DM meds: januvia 100mg daily, jardiance 10mg daily, lantus 35u daily        Continuous Glucose Monitoring Interpretation    Mani Munoz has undergone continuous glucose monitoring with the Dexcom G7 CGM with phone (connected to clinic profile) The blood glucose tracings were evaluated for two weeks prior to office visit. Blood glucose tracings demonstrated areas of hyperglycemia from 6p to 12a. There were no areas of hypoglycemia during the weeks of evaluation. Previously trialed/failed: glipizide- stopped d/t hypo episodes, metformin- GI upset    REVIEW OF SYSTEMS  Ten point review of systems has been performed and is otherwise negative and/or non-contributory, except as described above. Medications:     Current Outpatient Medications:     Empagliflozin (JARDIANCE) 25 MG Oral Tab, Take 25 mg by mouth daily. , Disp: 90 tablet, Rfl: 2    insulin glargine (LANTUS SOLOSTAR) 100 UNIT/ML Subcutaneous Solution Pen-injector, Inject 30 Units into the skin nightly. 3 mo supply, Disp: 45 mL, Rfl: 1    SITagliptin Phosphate (JANUVIA) 100 MG Oral Tab, Take 1 tablet (100 mg total) by mouth daily. , Disp: 90 tablet, Rfl: 1    levothyroxine 88 MCG Oral Tab, Take 1 tablet (88 mcg total) by mouth before breakfast., Disp: 90 tablet, Rfl: 2    cetirizine 10 MG Oral Tab, Take 1 tablet (10 mg total) by mouth daily. , Disp: 90 tablet, Rfl: 0    buPROPion  MG Oral Tablet 12 Hr, Take 1 tablet (150 mg total) by mouth daily. , Disp: 90 tablet, Rfl: 0    atorvastatin 40 MG Oral Tab, Take 1 tablet (40 mg total) by mouth nightly., Disp: 90 tablet, Rfl: 1    dapsone 100 MG Oral Tab, , Disp: , Rfl:     omeprazole 20 MG Oral Capsule Delayed Release, Take 1 capsule (20 mg total) by mouth 2 (two) times daily before meals. , Disp: 180 capsule, Rfl: 3    Microlet Lancets Does not apply Misc, Check blood sugar once daily, Disp: 200 each, Rfl: 1    Glucose Blood (CONTOUR NEXT TEST) In Vitro Strip, Check BG once daily, Disp: 200 each, Rfl: 2    melatonin 3 MG Oral Tab, Take 1 tablet (3 mg total) by mouth., Disp: , Rfl:     carvedilol 6.25 MG Oral Tab, Take 1 tablet (6.25 mg total) by mouth 2 (two) times daily with meals. , Disp: 180 tablet, Rfl: 1    montelukast 10 MG Oral Tab, Take 1 tablet (10 mg total) by mouth daily. , Disp: 90 tablet, Rfl: 3    ALBUTEROL 108 (90 Base) MCG/ACT Inhalation Aero Soln, INHALE 2 PUFFS BY MOUTH INTO THE LUNGS EVERY 4 HOURS AS NEEDED FOR WHEEZING, Disp: 18 g, Rfl: 0    FLUTICASONE PROPIONATE 50 MCG/ACT Nasal Suspension, SHAKE LIQUID AND USE 2 SPRAYS IN EACH NOSTRIL DAILY, Disp: 48 g, Rfl: 0    ENVARSUS XR 0.75 MG Oral Tablet 24 Hr, Take 3 tablets by mouth daily. , Disp: , Rfl:     TRUEPLUS PEN NEEDLES 32G X 4 MM Does not apply Misc, TEST FOUR TIMES DAILY, Disp: 400 each, Rfl: 3    TRUE METRIX BLOOD GLUCOSE TEST In Vitro Strip, TEST 3 TIMES A DAY, Disp: 300 strip, Rfl: 3    Meclizine HCl 12.5 MG Oral Tab, Take 1 tablet (12.5 mg total) by mouth 3 (three) times daily as needed. , Disp: 30 tablet, Rfl: 0    valGANciclovir HCl (VALCYTE OR), Take 450 mg by mouth., Disp: , Rfl:     Lancets Does not apply Misc, 3x daily lancets for True Metrix meter, Disp: 300 each, Rfl: 3    mycophenolate sodium 360 MG Oral Tab EC, Take 1 tablet (360 mg total) by mouth 4 (four) times daily.  For kidney transplant, Disp: 120 tablet, Rfl: 0    Biotin 5000 MCG Oral Cap, , Disp: , Rfl:     ondansetron 4 MG Oral Tablet Dispersible, , Disp: , Rfl:     ergocalciferol 1.25 MG (89904 UT) Oral Cap, , Disp: , Rfl:     FEROSUL 325 (65 Fe) MG Oral Tab, Take by mouth daily., Disp: , Rfl:     Blood Glucose Monitoring Suppl (TRUE METRIX AIR GLUCOSE METER) w/Device Does not apply Kit, U UTD WITH STRIPS TO TEST BLOOD GLUCOSE, Disp: , Rfl:     Alcohol Swabs (BD SWAB SINGLE USE REGULAR) Does not apply Pads, U UTD QID, Disp: , Rfl:     acetaminophen 500 MG Oral Tab, Take 2 tablets (1,000 mg total) by mouth one time. , Disp: , Rfl:      Allergies:    Allergies   Allergen Reactions    Ace Inhibitors RENAL INSUFFICIENCY    B12-Ca RASH     Vit b12 and calcium acetate combo       Social History:   Social History     Socioeconomic History    Marital status:     Number of children: 3   Occupational History    Occupation: House Keeper   Tobacco Use    Smoking status: Never    Smokeless tobacco: Never   Vaping Use    Vaping Use: Never used   Substance and Sexual Activity    Alcohol use: No    Drug use: No    Sexual activity: Never   Other Topics Concern    Caffeine Concern No    Exercise Yes    Seat Belt No       Medical History:   Past Medical History:   Diagnosis Date    Allergic rhinitis     Bad breath     Bloating 2010    Always feels bloated    Cataracts, bilateral 01/26/2015    Per Dr. Mira Perkins    Cholecystitis     Chronic kidney disease (CKD)     Constipation 2010    If she is not stuffed up she has diarrhea    COVID     01/2022    Depression     Diabetes ([address]     no meds    Diabetic retinopathy ([facility] [address] 01/26/2015    Per Dr. Mira Perkins    Diastolic dysfunction 28/0453    with LVH. EF >55% on ECHO     Encephalopathy acute     Esophageal reflux     Essential hypertension     Eye disease     Flatulence/gas pain/belching 2010    Always has gas    Food intolerance 2010    Lactose intolerance    Frequent UTI 2019    Always tests positive    Glaucoma     Heartburn 2010    Hyperlipidemia     Hypertension     Hypothyroidism     Irregular bowel habits 2010    Not regular    Loss of appetite 2010    Only eats 1 maybe 2 a day    Macular puckering of retina     Nausea     PONV (postoperative nausea and vomiting)     Prerenal azotemia     Proliferative diabetic retinopathy(362.02)     left eye    Renal disorder     ESRD    S/P kidney transplant 2020-right kidney transplant donated by his son. Former dialysis patient due to diabetic kidney disease performed at [facility]. Severe obstructive sleep apnea SPLIT NIGHT 16    has cpap    Sleep apnea     Traction detachment of retina     Uncomfortable fullness after meals     Always she eats super slow. Unspecified disorder of thyroid     Visual impairment     glasses    Vitreous hemorrhage (HCC)     Wears glasses        Surgical history:   Past Surgical History:   Procedure Laterality Date    APPENDECTOMY      AV FISTULA REVISION, OPEN Left 2019    CATARACT Bilateral     COLONOSCOPY      COLONOSCOPY N/A 2019    Procedure: COLONOSCOPY;  Surgeon: Farzaneh Machado MD;  Location: [address] [place] [address] ENDOSCOPY    EYE SURGERY      RV surgery    HYSTERECTOMY      fibroids. unsure but thinks she still has her cervix    AMPARO BIOPSY STEREO NODULE 1 SITE RIGHT (CPT=19081)  ? ? BENIGN      83, 86, 84, 90    ORGAN TRANSPLANT      OTHER SURGICAL HISTORY      cyst removal on thigh area of leg    TOTAL ABDOM HYSTERECTOMY      TRANSPLANTATION OF KIDNEY  2019    kidney swap with son  at [facility]  2015    antral erosions     PHYSICAL EXAM  Limited due to telemedicine encounter    Constitutional Not in acute distress  Pulmonary: no wheezing heard  No coughing on the phone. Speaking in full sentences   Neurological:  Alert and oriented to person, place and time. Psychiatric: Normal affect, mood and behavior appropriate      Labs/Imaging:  No results found.     ASSESSMENT/PLAN:    (W75.6854,  Z79.4) Type 2 diabetes mellitus with left eye affected by proliferative retinopathy and traction retinal detachment involving macula, with long-term current use of insulin (HCC)  (primary encounter diagnosis)  Plan:     Last A1c value was 5% done 9/20/2023, to goal <7%, however Dexcom report TIR 40%; uncontrolled. Goal A1C <7% given hx of ESRD s/p renal transplant. Tolerating jardiance start, will adjust dose. She is persistently hyperglycemic in the evenings with dinner despite dietary changes. We discussed she might need PLATT or mealtime insulin to cover this prandial elevation. Plan to complete c-peptide, which will help guide choice PLATT vs prandial insulin. We discussed the importance of glycemic control to prevent complications of diabetes. We discussed the importance of SBGM and consistent, low carb diet as well as moderate exercise as well. We also discussed the complications of diabetes include retinopathy, neuropathy, nephropathy and cardiovascular disease.     - incr jardiance 10mg daily (GFR 63)--> 25mg daily  - continue januvia 100mg daily  - continue lantus 35u daily; incr by 2u every 2 days until FBG <130  - discussed if mealtime BG are still elevated despite basal/SGLT2i adjustment, can consider PLATT start with largest meal of day vs. SSI   - eval cpeptide, repeat A1C after 12/19 (90 days d/t MCR)  - continue dexcom G7  through [facility]  - SGLT2i instructions provided re: surgery, times of illness/dehydration, UTI prevention.  To contact clinic if UTI occurs  - With insulin should check BG minimum once daily  - did not tolerate metformin- GI upset  - did not tolerate GLP1a; soliqua- bad diarrhea  - Ophtho:  Last Dilated Eye Exam: 10/06/23   Eye Exam shows Diabetic Retinopathy?: Yes (proliferative diabetic retinopathy with traction retinal detachment invlving the mascula, bilateral)  - BP controlled, on beta blocker  - LDL at goal, on statin    - MAB negative  - Neuropathy/ Foot exam: Last Foot Exam: 08/08/23  - CAD: none     (E03.8) Other specified hypothyroidism  Plan:  - April TSH 1.72, fT4 1.2  - stable on 88mcg daily, repeat TFTs annually and PRN    Return in about 6 weeks (around 1/22/2024) for evaluation of insulin adjustments.       12/11/2023  RODNEY Hoover

## 2023-12-11 NOTE — PATIENT INSTRUCTIONS
- repeat A1c after 12/19 (and cpeptide- this will tell me how well pancreas is making insulin which may help our decision in starting glipizide/glimepiride)  - increase jardiance from 10mg --> 25mg daily  - give jardiance 2-3 days to work, if fasting BG are still >130, increase lantus from 35u to 40u daily  - continue januvia 100mg daily

## 2023-12-15 RX ORDER — CETIRIZINE HYDROCHLORIDE 10 MG/1
10 TABLET ORAL DAILY
Qty: 90 TABLET | Refills: 0 | Status: SHIPPED | OUTPATIENT
Start: 2023-12-15

## 2023-12-17 DIAGNOSIS — J44.89 ASTHMA WITH COPD (CHRONIC OBSTRUCTIVE PULMONARY DISEASE): Chronic | ICD-10-CM

## 2023-12-18 RX ORDER — MONTELUKAST SODIUM 10 MG/1
10 TABLET ORAL DAILY
Qty: 90 TABLET | Refills: 3 | OUTPATIENT
Start: 2023-12-18

## 2023-12-18 NOTE — TELEPHONE ENCOUNTER
Refill Failed Protocol:     Pt requesting refill of   Requested Prescriptions     Pending Prescriptions Disp Refills    MONTELUKAST 10 MG Oral Tab [Pharmacy Med Name: MONTELUKAST 10MG TABLETS] 90 tablet 3     Sig: TAKE 1 TABLET(10 MG) BY MOUTH DAILY       Last Time Medication was Filled:  8/01/2022    Last Office Visit with Provider: 8/08/2023    Unable to approve refill,     Asthma & COPD Medication Protocol [id number]/17/2023 06:02 AM    Asthma Action Score greater than or equal to 20    AAP/ACT given in last 12 months    Appointment in past 6 or next 3 months        Asthma with COPD (chronic obstructive pulmonary disease) ([facility] [address]  Controlled   continue Breo and albuterol as needed  Follow-up with pulmonologist annually    No future appointments. Return in about 6 months (around 2/8/2024) for medication monitoring, sooner if needed- cholesterol.

## 2023-12-21 ENCOUNTER — TELEPHONE (OUTPATIENT)
Facility: CLINIC | Age: 63
End: 2023-12-21

## 2023-12-21 NOTE — TELEPHONE ENCOUNTER
Reviewed dexcom. Despite jardiance dose change still hyperglycemic. Staff to confirm she started jardiance. Increase Lantus from 35u daily --> 42u daily. Continue januvia 100mg daily. Continue jardiance 25mg daily. If she hasn't started jardiance, needs to start, and go up to Lantus 38u with jardiance start.      Please set remind me to follow up with patient again in 1 week to eval BG control

## 2023-12-21 NOTE — TELEPHONE ENCOUNTER
Placed called to patients daughter in law, Anne Johnson, with instructions from APN :    \"Increase Lantus from 35u daily --> 42u daily. Continue januvia 100mg daily. Continue jardiance 25mg daily. If she hasn't started jardiance, needs to start, and go up to Lantus 38u with jardiance start.  \"    [facility] to call back when available and sent follow up [phone]

## 2024-01-05 LAB
ALBUMIN/GLOBULIN RATIO: 1.5 (CALC) (ref 1–2.5)
ALBUMIN: 4.6 G/DL (ref 3.6–5.1)
ALKALINE PHOSPHATASE: 74 U/L (ref 37–153)
ALT: 40 U/L (ref 6–29)
AST: 54 U/L (ref 10–35)
BILIRUBIN, TOTAL: 1.4 MG/DL (ref 0.2–1.2)
BUN/CREATININE RATIO: 26 (CALC) (ref 6–22)
BUN: 31 MG/DL (ref 7–25)
C-PEPTIDE: 1.97 NG/ML (ref 0.8–3.85)
CALCIUM: 9.2 MG/DL (ref 8.6–10.4)
CARBON DIOXIDE: 27 MMOL/L (ref 20–32)
CHLORIDE: 105 MMOL/L (ref 98–110)
CHOL/HDLC RATIO: 2.4 (CALC)
CHOLESTEROL, TOTAL: 107 MG/DL
CREATININE: 1.21 MG/DL (ref 0.5–1.05)
EGFR: 50 ML/MIN/1.73M2
GLOBULIN: 3 G/DL (CALC) (ref 1.9–3.7)
GLUCOSE: 137 MG/DL (ref 65–99)
HDL CHOLESTEROL: 44 MG/DL
HEMOGLOBIN A1C: <4.2 % OF TOTAL HGB
LDL-CHOLESTEROL: 38 MG/DL (CALC)
MAGNESIUM: 2 MG/DL (ref 1.5–2.5)
NON-HDL CHOLESTEROL: 63 MG/DL (CALC)
POTASSIUM: 4.3 MMOL/L (ref 3.5–5.3)
PROTEIN, TOTAL: 7.6 G/DL (ref 6.1–8.1)
SODIUM: 140 MMOL/L (ref 135–146)
TRIGLYCERIDES: 167 MG/DL

## 2024-01-21 DIAGNOSIS — E11.69 HYPERLIPIDEMIA ASSOCIATED WITH TYPE 2 DIABETES MELLITUS  (HCC): ICD-10-CM

## 2024-01-21 DIAGNOSIS — E78.5 HYPERLIPIDEMIA ASSOCIATED WITH TYPE 2 DIABETES MELLITUS  (HCC): ICD-10-CM

## 2024-01-22 ENCOUNTER — TELEMEDICINE (OUTPATIENT)
Facility: CLINIC | Age: 64
End: 2024-01-22
Payer: MEDICARE

## 2024-01-22 DIAGNOSIS — Z79.4 TYPE 2 DIABETES MELLITUS WITH LEFT EYE AFFECTED BY PROLIFERATIVE RETINOPATHY AND TRACTION RETINAL DETACHMENT INVOLVING MACULA, WITH LONG-TERM CURRENT USE OF INSULIN (HCC): ICD-10-CM

## 2024-01-22 DIAGNOSIS — E03.9 ACQUIRED HYPOTHYROIDISM: ICD-10-CM

## 2024-01-22 DIAGNOSIS — E11.65 TYPE 2 DIABETES MELLITUS WITH HYPERGLYCEMIA, WITH LONG-TERM CURRENT USE OF INSULIN (HCC): Primary | ICD-10-CM

## 2024-01-22 DIAGNOSIS — Z79.4 TYPE 2 DIABETES MELLITUS WITH HYPERGLYCEMIA, WITH LONG-TERM CURRENT USE OF INSULIN (HCC): Primary | ICD-10-CM

## 2024-01-22 DIAGNOSIS — E11.3522 TYPE 2 DIABETES MELLITUS WITH LEFT EYE AFFECTED BY PROLIFERATIVE RETINOPATHY AND TRACTION RETINAL DETACHMENT INVOLVING MACULA, WITH LONG-TERM CURRENT USE OF INSULIN (HCC): ICD-10-CM

## 2024-01-22 RX ORDER — PEN NEEDLE, DIABETIC 32GX 5/32"
NEEDLE, DISPOSABLE MISCELLANEOUS
Qty: 400 EACH | Refills: 3 | Status: SHIPPED | OUTPATIENT
Start: 2024-01-22

## 2024-01-22 RX ORDER — ATORVASTATIN CALCIUM 20 MG/1
20 TABLET, FILM COATED ORAL NIGHTLY
Qty: 90 TABLET | Refills: 3 | Status: SHIPPED | OUTPATIENT
Start: 2024-01-22

## 2024-01-22 RX ORDER — INSULIN ASPART 100 [IU]/ML
INJECTION, SOLUTION INTRAVENOUS; SUBCUTANEOUS
Qty: 15 ML | Refills: 2 | Status: SHIPPED | OUTPATIENT
Start: 2024-01-22

## 2024-01-22 NOTE — PATIENT INSTRUCTIONS
Plan:  THYROID:  - repeat thyroid labs April 2024- TSH, free T4  - continue levothyroxine 88mcg daily    Medications:   - start novolog - see scale  - continue jardiance 25mg daily  - continue januvia 100mg daily  - continue lantus 35u daily    NOVOLOG if eating a large meal/meal with carbs:   <140: 4u w/ the meal  141-180- take 5u extra  181-220- take 6u  221-260- take 7u  261-300- take 8u  301-340- take 9u  341-380- take 10u  381-420- take 11u  421-460- take 12u      NOVOLOG if she just needs a correction because she's high:  Take 1u for every 40 points over 140.  <140: no additional correction insulin, just take mealtime dose if eating  141-180- take 1u extra  181-220- take 2u  221-260- take 3u  261-300- take 4u  301-340- take 5u  341-380- take 6u  381-420- take 7u  421-460- take 8u      - Please review the following if you are taking JARDIANCE OR FARXIGA:  This medication will remove extra sugar out of blood into your urine. It can be dosed anytime of day, but morning is probably best time to take it as it can cause increased urination. Please drink at least 4 glasses of water daily to avoid dehydration. It does not cause low blood sugars (hypoglycemia). If you develop any low blood sugars, we will need to adjust other medications. Please call me if you develop any symptoms of urinary tract infection (burning with urination) or yeast infection (new rash itching or burning in the genital area).     While you are taking this medication please be mindful of sick day protocol (for illness, if you are vomiting, excessively exercising/sweating, or alcohol intake) to avoid dehydration:   -Temporarily hold the medication if you are in a dehydrated state. You can call the clinic for further instruction if you are sick and can't remember which diabetes medication to hold.  -Check blood sugar levels more often while sick  -Seek medical help early if you develop any abdominal pain, nausea or vomiting.  -Okay to resume this  medication once you are eating /drinking regularly and no longer dehydrated.    Surgery protocol:  If you are having surgery please contact the clinic as we will need to adjust your diabetes medications. This medication needs to be held at least 4 days prior to your procedure.. Once eating/drinking normally after surgery, ok to resume (this avoids dehydration).     Blood sugar targets:  Before breakfast: , 2 hours after meals: <180 (preferably <150), A1C goal: <7.0%  Time in Range goal is higher than 80% if using a continuous glucose monitor (Dexcom or Ysabel).  Time in Range can be found within the Dexcom G7 meredith, on Clarity if using Dexcom G6, or on LibreView if using Ysabel.    HOW TO TREAT LOW BLOOD SUGAR (Hypoglycemia)  Low blood sugar= Less than 70, or if you start to have symptoms (below)  Symptoms: Shaking or trembling, fast heart rate, extreme hunger, sweating, confusion/difficulty concentrating, dizziness.    How to treat a low blood sugar if you are able to eat/drink: The Rule of 15  If you are using continuous glucose monitor that says you are low, but you do not have any symptoms, verify on fingerstick that your blood sugar is actually low before treating.   Eat 15 grams of carbs (see examples below)  Check your blood sugar after 15 minutes. If it’s still below your target range, have another serving.   Repeat these steps until it’s in your target range. Once it’s in range, if you're nervous about your sugar going low again, have a protein source (ie, a spoonful of peanut butter).   If you have a CGM you want to look for how your arrow has changed. If you arrow is pointed up or sideways after 15 min, give your CGM more time OR check with a finger stick. Try not to eat more food until at least 15 min after the first BG check - otherwise you risk having a rebound high.  If you are experiencing symptoms and you are unable to check your blood glucose for any reason, treat the hypoglycemia.  If someone  has a low blood sugar and is unconscious: Don’t hesitate to call 911. If someone is unconscious and glucagon is not available or someone does not know how to use it, call 911 immediately.    To treat a low, I recommend you carry with you easy, pre-portioned treatment for low blood sugars that are 15G of carbs:   - Children sized squeeze pouch applesauce (high fiber + carbs help prevent too high of a spike)  - Small children's sized juicebox- 15g carb --> 4oz juice box  - Glucose tablets from [employer], you can find them near diabetes supplies --> Note, you will need to eat 3-4 tablets to get to 15g of carbs  - Children sized fruit snack pack- look for one with 15 grams of total carbohydrate  - 3-4 Starburst candies  - 1 pack of fun sized skittles  - Choice of how to treat your low is important. Complex carbs, or foods that containf ats along with carbs (like chocolate) can slow the absorption of glucose and should NOT be used to treat an emergency low      Diabetes annual care reminders:  - If you haven't, remember to complete a yearly diabetes eye exam. This exam is critical to preventing and treating eye conditions caused by diabetes that could potentially lead to vision loss. Once complete, please call your eye care provider and ask them to fax your latest report to our office. This will help us update our records. Our fax number is: [phone]  - If you ever have an upcoming surgery, please notify the office. We will make adjustments to your diabetes medications prior to the procedure.    Diet & Exercise:  Helpful apps for Carb Counting: remember, carb goal is 30-45g of carb per meal, plus 15-30g of carbs with snacks.   Increase your protein and fiber intake to feel blandon, longer.   If you want more dietary guidance, let me know and we can get you scheduled with Bisi, our diabetes educator or a dietician who specializes in diabetes  MyFitnessPal- Calorie counter and diet tracker, CalorieKing food search  meredith or www.calorieking.com, LoseIt!, Carb Manager  Nutrition analyzer: Copy and paste any recipe into this analyzer, it will give you an estimated carb count for homemade recipes:  https://www.Gridstore/recipe-nutrition-analyzer-6318476    General exercise guidelines from the American [employer] Ass[employer]:  Recommendations for Adults: Get at least 150 minutes per week of moderate-intensity aerobic activity or 75 minutes per week of vigorous aerobic activity, or a combination of both, preferably spread throughout the week.  Add moderate- to high-intensity muscle-strengthening activity (such as resistance or weights) on at least 2 days per week.  Spend less time sitting. Even light-intensity activity can offset some of the risks of being sedentary. Taking a 10-15 minute walk after meals is very beneficial to blood sugar regulation.  Increase amount and intensity of exercise gradually over time.      Thank you for visiting our office. We look forward to working with you to reach your health goals. As a reminder, if you need refills, please request early so there is enough time to process the request. We ask that you provide at least 5 days' notice before a refill is due, so there is time to send a request to pharmacy, process the refill, and ensure there are no other problems with obtaining the medication (backorders, prior authorization paperwork, etc). Routine refills will not be addressed on weekends, so please submit these requests during the week.

## 2024-01-22 NOTE — PROGRESS NOTES
[facility] Note - Telemedicine    Lupe Arce verbally consents to a Telemedicine visit on 1/22/2024. The visit was conducted in a private patient room.      HISTORY OF PRESENT ILLNESS   Lupe Arce is a 63 year old female with PMHx significant for HTN, HLD, type 2 diabetes mellitus c/b retinopathy & nephropathy, depression, irregular bowel habits, hypothyroidism, ESRD s/p renal transplant in 2019 at [facility], who presents for endocrinology evaluation for type 2 diabetes mellitus. Accompanied today by brandon Wilkerson language line    Initial HPI consult in March 2023:  A1C was <4% most recently  Prev w/ [facility], transitioning care d/t [facility]  Insulin started after transplant  Prev was taking Lantus 35u, glipizide 10mg BID + Januvia, PCP understandably stopped Lantus until she could receive endo eval  Diagnosed age: 22 during pregnancy, states only started managing DM ~10 years ago when eyes were getting worse  Hx of ESRD on HD, and now is s/p renal tx in 2019 at [facility]. On Myfortic and Envarsus   Pt denies hx of hospitalization for DM, no pancreatitis hx  Family history of DM: T2DM- father, siblings  Cpeptide 9/2019- 7.4  Current DM meds at first OV: januvia 100mg daily, glipizide 10mg BID,   Previously trialed/failed: metformin- GI upset, Lantus 35u qAM    Hypothyroidism history  - unsure of diagnosis date, no hx thyroidectomy  - On LT4 88mcg daily  - last TFTs (Mar 2022) stable    Interim hx  June 2023- visit w/ DM APN  GMI on Dexcom 7.3% today  We started Soliqua last OV, she had horrible diarrhea so we stopped over the phone and PCP restarted Lantus 35U  Since then feeling much better although does note she will have lows <70 thru night and have to eat to get BG normal  This happens almost daily  Current meds: Lantus 35U daily.    Sept 2023- Last A1C 5%.  Started dexcom G7 CGM through [facility], liking it a lot   DM meds at visit: Lantus 30U daily, januvia 50mcg  daily  BG have been higher lately which she attributes to recent COVID dx    Dec 2023-  Last A1c value was  % done 1/4/2024.  Video visit; ROMIE Albright providing translation  Started Jardiance, tolerting well, minor incr'd urination, but also increased her water intake  Notes her highest BG are around dinner time despite dietary changes (cut out tortillas)  Current DM meds: januvia 100mg daily, jardiance 10mg daily, lantus 35u daily      January 2024- w/ moody STEVENS, here for DM follow up. Last A1c value was 4.9% done 1/4/2024.  Changes made at LOV include: jardiance dose incr'd to 25mg daily.  Feeling well on the incr'd Jardiance dose, denies excessive urination, noticing her BG are much better  Early Jan, had another cycle of vomiting/diarrhea occurs for 3-4 days at a time, then she's fine   ROMIE notes she's very selective on what she eats because she's nervous about her BG trending higher   Current DM meds at visit: jardiance 25mg daily,  januvia 100mg daily, lantus 35u daily  Re: Thyroid: continues on LT4 88mcg daily. Feeling well    Continuous Glucose Monitoring Interpretation    Lupe Arce has undergone continuous glucose monitoring with their CGM.  The blood glucose tracings were evaluated for two weeks prior to office visit.    Blood glucose tracings demonstrated areas of hyperglycemia after high carb meals  There were no areas of hypoglycemia during the weeks of evaluation.              Previously trialed/failed: glipizide- stopped d/t hypo episodes, metformin- GI upset    REVIEW OF SYSTEMS  Ten point review of systems has been performed and is otherwise negative and/or non-contributory, except as described above.     Medications:     Current Outpatient Medications:     atorvastatin 20 MG Oral Tab, Take 1 tablet (20 mg total) by mouth nightly., Disp: 90 tablet, Rfl: 3    insulin glargine (LANTUS SOLOSTAR) 100 UNIT/ML Subcutaneous Solution Pen-injector, Inject 46 Units into the skin nightly. 3 mo  supply, Disp: , Rfl:     cetirizine 10 MG Oral Tab, Take 1 tablet (10 mg total) by mouth daily., Disp: 90 tablet, Rfl: 0    Empagliflozin (JARDIANCE) 25 MG Oral Tab, Take 25 mg by mouth daily., Disp: 90 tablet, Rfl: 2    SITagliptin Phosphate (JANUVIA) 100 MG Oral Tab, Take 1 tablet (100 mg total) by mouth daily., Disp: 90 tablet, Rfl: 1    levothyroxine 88 MCG Oral Tab, Take 1 tablet (88 mcg total) by mouth before breakfast., Disp: 90 tablet, Rfl: 2    buPROPion  MG Oral Tablet 12 Hr, Take 1 tablet (150 mg total) by mouth daily., Disp: 90 tablet, Rfl: 0    atorvastatin 40 MG Oral Tab, Take 1 tablet (40 mg total) by mouth nightly., Disp: 90 tablet, Rfl: 1    dapsone 100 MG Oral Tab, , Disp: , Rfl:     omeprazole 20 MG Oral Capsule Delayed Release, Take 1 capsule (20 mg total) by mouth 2 (two) times daily before meals., Disp: 180 capsule, Rfl: 3    Microlet Lancets Does not apply Misc, Check blood sugar once daily, Disp: 200 each, Rfl: 1    Glucose Blood (CONTOUR NEXT TEST) In Vitro Strip, Check BG once daily, Disp: 200 each, Rfl: 2    carvedilol 6.25 MG Oral Tab, Take 1 tablet (6.25 mg total) by mouth 2 (two) times daily with meals., Disp: 180 tablet, Rfl: 1    montelukast 10 MG Oral Tab, Take 1 tablet (10 mg total) by mouth daily., Disp: 90 tablet, Rfl: 3    ALBUTEROL 108 (90 Base) MCG/ACT Inhalation Aero Soln, INHALE 2 PUFFS BY MOUTH INTO THE LUNGS EVERY 4 HOURS AS NEEDED FOR WHEEZING, Disp: 18 g, Rfl: 0    FLUTICASONE PROPIONATE 50 MCG/ACT Nasal Suspension, SHAKE LIQUID AND USE 2 SPRAYS IN EACH NOSTRIL DAILY, Disp: 48 g, Rfl: 0    ENVARSUS XR 0.75 MG Oral Tablet 24 Hr, Take 3 tablets by mouth daily., Disp: , Rfl:     TRUEPLUS PEN NEEDLES 32G X 4 MM Does not apply Misc, TEST FOUR TIMES DAILY, Disp: 400 each, Rfl: 3    TRUE METRIX BLOOD GLUCOSE TEST In Vitro Strip, TEST 3 TIMES A DAY, Disp: 300 strip, Rfl: 3    Meclizine HCl 12.5 MG Oral Tab, Take 1 tablet (12.5 mg total) by mouth 3 (three) times daily as  needed., Disp: 30 tablet, Rfl: 0    valGANciclovir HCl (VALCYTE OR), Take 450 mg by mouth., Disp: , Rfl:     Lancets Does not apply Misc, 3x daily lancets for True Metrix meter, Disp: 300 each, Rfl: 3    mycophenolate sodium 360 MG Oral Tab EC, Take 1 tablet (360 mg total) by mouth 4 (four) times daily. For kidney transplant, Disp: 120 tablet, Rfl: 0    Biotin 5000 MCG Oral Cap, , Disp: , Rfl:     ondansetron 4 MG Oral Tablet Dispersible, , Disp: , Rfl:     ergocalciferol 1.25 MG (83803 UT) Oral Cap, , Disp: , Rfl:     FEROSUL 325 (65 Fe) MG Oral Tab, Take by mouth daily., Disp: , Rfl:     Blood Glucose Monitoring Suppl (TRUE METRIX AIR GLUCOSE METER) w/Device Does not apply Kit, U UTD WITH STRIPS TO TEST BLOOD GLUCOSE, Disp: , Rfl:     Alcohol Swabs (BD SWAB SINGLE USE REGULAR) Does not apply Pads, U UTD QID, Disp: , Rfl:     acetaminophen 500 MG Oral Tab, Take 2 tablets (1,000 mg total) by mouth one time., Disp: , Rfl:      Allergies:   Allergies   Allergen Reactions    Ace Inhibitors RENAL INSUFFICIENCY    B12-Ca RASH     Vit b12 and calcium acetate combo       Social History:   Social History     Socioeconomic History    Marital status:     Number of children: 3   Occupational History    Occupation: House Keeper   Tobacco Use    Smoking status: Never    Smokeless tobacco: Never   Vaping Use    Vaping Use: Never used   Substance and Sexual Activity    Alcohol use: No    Drug use: No    Sexual activity: Never   Other Topics Concern    Caffeine Concern No    Exercise Yes    Seat Belt No       Medical History:   Past Medical History:   Diagnosis Date    Allergic rhinitis     Bad breath     Bloating 2010    Always feels bloated    Cataracts, bilateral 01/26/2015    Per Dr. Jernigan    Cholecystitis     Chronic kidney disease (CKD)     Constipation 2010    If she is not stuffed up she has diarrhea    COVID     01/2022    Depression     Diabetes (HCC)     no meds    Diabetic retinopathy (HCC) 01/26/2015    Per  Dr. Jernigan    Diastolic dysfunction 2013    with LVH. EF >55% on ECHO     Encephalopathy acute     Esophageal reflux     Essential hypertension     Eye disease     Flatulence/gas pain/belching     Always has gas    Food intolerance     Lactose intolerance    Frequent UTI     Always tests positive    Glaucoma     Heartburn 2010    Hyperlipidemia     Hypertension     Hypothyroidism     Irregular bowel habits     Not regular    Loss of appetite     Only eats 1 maybe 2 a day    Macular puckering of retina     Nausea     PONV (postoperative nausea and vomiting)     Prerenal azotemia     Proliferative diabetic retinopathy(362.02)     left eye    Renal disorder     ESRD    S/P kidney transplant 2020-right kidney transplant donated by his son.  Former dialysis patient due to diabetic kidney disease performed at [facility].      Severe obstructive sleep apnea SPLIT NIGHT 16    has cpap    Sleep apnea     Traction detachment of retina     Uncomfortable fullness after meals     Always she eats super slow.    Unspecified disorder of thyroid     Visual impairment     glasses    Vitreous hemorrhage (HCC)     Wears glasses        Surgical history:   Past Surgical History:   Procedure Laterality Date    APPENDECTOMY      AV FISTULA REVISION, OPEN Left 2019    CATARACT Bilateral     COLONOSCOPY      COLONOSCOPY N/A 2019    Procedure: COLONOSCOPY;  Surgeon: Armand Zhao MD;  Location:  ENDOSCOPY    EYE SURGERY      RV surgery    HYSTERECTOMY      fibroids. unsure but thinks she still has her cervix    AMPARO BIOPSY STEREO NODULE 1 SITE RIGHT (CPT=19081)  ??    BENIGN      83, 86, 84, 90    ORGAN TRANSPLANT      OTHER SURGICAL HISTORY      cyst removal on thigh area of leg    TOTAL ABDOM HYSTERECTOMY      TRANSPLANTATION OF KIDNEY  2019    kidney swap with son  at [facility] [place]    UPPER GI ENDO NO BARRETTS  2015    antral  erosions     PHYSICAL EXAM  Limited due to telemedicine encounter    Constitutional Not in acute distress  Pulmonary: no wheezing heard  No coughing on the phone. Speaking in full sentences   Neurological:  Alert and oriented to person, place and time.   Psychiatric: Normal affect, mood and behavior appropriate      Labs/Imaging:  No results found.    ASSESSMENT/PLAN:    (E11.3522,  Z79.4) Type 2 diabetes mellitus with left eye affected by proliferative retinopathy and traction retinal detachment involving macula, with long-term current use of insulin (Former[facility])  (primary encounter diagnosis)  Plan:     Last A1c value was 4.9%, likely falsely low d/t anemia, to goal <7%. GMI on Dexcom 7.1%. Cpeptide 1/2024 1.97. Tolerating jardiance incr'd dose. Pt is still quite restrictive on intake d/t fears of sugars going high.  We discussed PLATT restart vs SSI- pt daughter in law things a SSI scale may be helpful- will start novolog as a PRN for high BG >140.     We discussed the importance of glycemic control to prevent complications of diabetes. We discussed the importance of SBGM and consistent, low carb diet as well as moderate exercise as well.  We also discussed the complications of diabetes include retinopathy, neuropathy, nephropathy and cardiovascular disease.     - start novolog 4u TID AC (w/ large/high carb meals), plus ISF 1:40>140 - conservatively dosed  - continue jardiance 25mg daily (GFR 63)  - continue januvia 100mg daily  - continue lantus 35u daily  - continue dexcom G7  through [facility]  - meet with CDE in 1 week re: check in on SSI start, BG control  - SGLT2i instructions provided re: surgery, times of illness/dehydration, UTI prevention. To contact clinic if UTI occurs  - With insulin should check BG minimum once daily  - did not tolerate metformin- GI upset  - did not tolerate GLP1a; soliqua- bad diarrhea    DM quality metrics:  - Ophtho:  Last Dilated Eye Exam: 10/06/23   Eye Exam shows Diabetic Retinopathy?: Yes  (proliferative diabetic retinopathy with traction retinal detachment invlving the mascula, bilateral)  - BP controlled, on beta blocker  - LDL at goal 1/2024, on statin    - MAB negative - due for repeat, ordered  - Neuropathy/ Foot exam: Last Foot Exam: 08/08/23  - CAD: none     (E03.8) Other specified hypothyroidism  Plan:  April 2023 TSH 1.72, fT4 1.2, stable on levothyroxine 88mcg daily  - continue LT4 88mcg daily  - repeat TFTs annually (next due 4/2024)  and PRN    No follow-ups on file.      1/22/2024  RODNEY Beckford

## 2024-01-23 ENCOUNTER — TELEPHONE (OUTPATIENT)
Facility: CLINIC | Age: 64
End: 2024-01-23

## 2024-01-23 DIAGNOSIS — E11.65 TYPE 2 DIABETES MELLITUS WITH HYPERGLYCEMIA, WITH LONG-TERM CURRENT USE OF INSULIN (HCC): Primary | ICD-10-CM

## 2024-01-23 DIAGNOSIS — Z79.4 TYPE 2 DIABETES MELLITUS WITH HYPERGLYCEMIA, WITH LONG-TERM CURRENT USE OF INSULIN (HCC): Primary | ICD-10-CM

## 2024-01-23 NOTE — TELEPHONE ENCOUNTER
1/23/24-[facility] a PA for True Plus Pen Needles [id number].  Placed in PA in basket.

## 2024-01-24 NOTE — TELEPHONE ENCOUNTER
RN phoned provided plan number [phone] and spoke with [employer] and answered questions over the phone.    Reference number: [id number]    Will fax determination to office.

## 2024-01-26 NOTE — TELEPHONE ENCOUNTER
Received fax from [employer] for a notice of denial of coverage.     \"The request for TruePlus Pen Needle has been denied under Medicare Part D because the drug you asked for is not listed in your preferred drug list.\"    Possible preferred alternatives are listed on fax. Placed in RN in basket for review.

## 2024-01-29 RX ORDER — PEN NEEDLE, DIABETIC 32GX 5/32"
NEEDLE, DISPOSABLE MISCELLANEOUS
Qty: 400 EACH | Refills: 3 | OUTPATIENT
Start: 2024-01-29

## 2024-01-29 NOTE — TELEPHONE ENCOUNTER
RN reviewed faxed denial for TRUEPLUS PEN NEEDLE [id number].    Preferred drugs listed: BD auto shield duo pen needle, BD dwight 2nd gen pen needle, BD ultra-fine micro pen needle, BD ultra-fine Mini Pen needle, BD ultra-fine dwight pen needle etc.      Pended BD dwight 2nd Gen Pen Needles and routed for review.

## 2024-01-31 ENCOUNTER — MED REC SCAN ONLY (OUTPATIENT)
Facility: CLINIC | Age: 64
End: 2024-01-31

## 2024-02-05 ENCOUNTER — NURSE ONLY (OUTPATIENT)
Facility: CLINIC | Age: 64
End: 2024-02-05
Payer: MEDICARE

## 2024-02-05 DIAGNOSIS — Z79.4 TYPE 2 DIABETES MELLITUS WITH HYPERGLYCEMIA, WITH LONG-TERM CURRENT USE OF INSULIN (HCC): Primary | ICD-10-CM

## 2024-02-05 DIAGNOSIS — E11.65 TYPE 2 DIABETES MELLITUS WITH HYPERGLYCEMIA, WITH LONG-TERM CURRENT USE OF INSULIN (HCC): Primary | ICD-10-CM

## 2024-02-05 NOTE — PROGRESS NOTES
Appreciate recommendations by diabetes educator, reviewed and agree.  Decrease lantus 35 --> 33u daily  Can use novolog SSI if needed prior to meal    RODNEY Beckford  2/5/2024

## 2024-02-05 NOTE — PROGRESS NOTES
Continuous Glucose Monitor Download - Dexcom    Start Time: 12:57 pm  End Time: 1:05 pm    Indication:  Medications adjusted at LOV with APN on 1/22/2024    Current Diabetes Medication:  - start novolog 4u TID AC (w/ large/high carb meals), plus ISF 1:40>140   - continue januvia 100mg daily  - continue lantus 35u daily    Review of CGM download:    Days of sensor use: 14  Average glucose (mg/dL): 136  Estimated GMI:6.6 %  Coefficient of Deviation = 26 % (goal <35%)  Target Ranges:  mg/dL          86% of time in range  14% of time above range  0% of time below range          Interpretation:  BG levels much improved from last 14 days, most significant change with overnight highs, patient having true lows in the AM consistently        Patient Concerns:  - ROMIE Amaya states that patient has not been taking the short acting insulin at all    Recommended medication changes/Plan:  - May need reduction in Lantus    Routed to provider for review.    Bisi MARIE  BC-ADM

## 2024-02-26 DIAGNOSIS — F33.42 RECURRENT MAJOR DEPRESSIVE DISORDER, IN FULL REMISSION (HCC): ICD-10-CM

## 2024-02-26 DIAGNOSIS — J44.89 ASTHMA WITH COPD (CHRONIC OBSTRUCTIVE PULMONARY DISEASE): Chronic | ICD-10-CM

## 2024-02-27 RX ORDER — MONTELUKAST SODIUM 10 MG/1
10 TABLET ORAL DAILY
Qty: 90 TABLET | Refills: 3 | OUTPATIENT
Start: 2024-02-27

## 2024-02-27 RX ORDER — BUPROPION HYDROCHLORIDE 150 MG/1
150 TABLET, EXTENDED RELEASE ORAL DAILY
Qty: 90 TABLET | Refills: 0 | OUTPATIENT
Start: 2024-02-27

## 2024-02-27 NOTE — TELEPHONE ENCOUNTER
Refill Failed Protocol:     Pt requesting refill of   Requested Prescriptions     Pending Prescriptions Disp Refills    montelukast 10 MG Oral Tab 90 tablet 3     Sig: Take 1 tablet (10 mg total) by mouth daily.    buPROPion  MG Oral Tablet 12 Hr 90 tablet 0     Sig: Take 1 tablet (150 mg total) by mouth daily.     Last Time Medication was Filled:  Montelukast 08/01/2022  Bupropion 08/08/2023    Last Office Visit with Provider: 08/08/2023    Unable to approve refill,   Asthma & COPD Medication Protocol [id number]/26/2024 07:02 PM   Protocol Details Asthma Action Score greater than or equal to 20    Appointment in past 6 or next 3 months    AAP/ACT given in last 12 months          No future appointments.

## 2024-03-11 ENCOUNTER — HOSPITAL ENCOUNTER (OUTPATIENT)
Dept: MAMMOGRAPHY | Age: 64
Discharge: HOME | End: 2024-03-11
Attending: FAMILY MEDICINE
Payer: MEDICARE

## 2024-03-11 DIAGNOSIS — Z12.31 BREAST CANCER SCREENING BY MAMMOGRAM: ICD-10-CM

## 2024-03-11 PROCEDURE — 77067 SCR MAMMO BI INCL CAD: CPT | Performed by: FAMILY MEDICINE

## 2024-03-11 PROCEDURE — 77063 BREAST TOMOSYNTHESIS BI: CPT | Performed by: FAMILY MEDICINE

## 2024-03-12 DIAGNOSIS — J44.89 ASTHMA WITH COPD (CHRONIC OBSTRUCTIVE PULMONARY DISEASE): Chronic | ICD-10-CM

## 2024-03-12 DIAGNOSIS — F33.42 RECURRENT MAJOR DEPRESSIVE DISORDER, IN FULL REMISSION (HCC): ICD-10-CM

## 2024-03-13 RX ORDER — MONTELUKAST SODIUM 10 MG/1
10 TABLET ORAL DAILY
Qty: 90 TABLET | Refills: 0 | Status: SHIPPED | OUTPATIENT
Start: 2024-03-13

## 2024-03-13 RX ORDER — BUPROPION HYDROCHLORIDE 150 MG/1
150 TABLET, EXTENDED RELEASE ORAL DAILY
Qty: 90 TABLET | Refills: 0 | Status: SHIPPED | OUTPATIENT
Start: 2024-03-13

## 2024-03-13 NOTE — TELEPHONE ENCOUNTER
Refill Failed Protocol:     Pt requesting refill of   Requested Prescriptions     Pending Prescriptions Disp Refills    montelukast 10 MG Oral Tab 90 tablet 3     Sig: Take 1 tablet (10 mg total) by mouth daily.     Last Time Medication was Filled:  8/1/2022    Last Office Visit with Provider: 8/8/2023    Unable to approve refill,     Asthma & COPD Medication Protocol [id number]/12/2024 11:13 AM    Asthma Action Score greater than or equal to 20    AAP/ACT given in last 12 months    Appointment in past 6 or next 3 months        Appt. scheduled on 4/15/2024      Refill Passed Protocol:     Pt requesting refill of   Requested Prescriptions     Pending Prescriptions Disp Refills    buPROPion  MG Oral Tablet 12 Hr 90 tablet 0     Sig: Take 1 tablet (150 mg total) by mouth daily.     Refill was approved and sent to pharmacy:     Last Time Medication was Filled:  8/8/2023    Last Office Visit with Provider: 8/8/2023    Appt. scheduled on 4/15/2024

## 2024-03-18 ENCOUNTER — TELEPHONE (OUTPATIENT)
Facility: CLINIC | Age: 64
End: 2024-03-18

## 2024-03-18 NOTE — TELEPHONE ENCOUNTER
Received fax from [facility] with a [facility] Physician's Order form to be filled out and faxed back with a patient demographic sheet. Placed in APN in basket for signature.

## 2024-03-19 NOTE — TELEPHONE ENCOUNTER
Signed form filled out and faxed to [facility] at [phone].    Will await fax confirmation.

## 2024-04-05 RX ORDER — CETIRIZINE HYDROCHLORIDE 10 MG/1
10 TABLET ORAL DAILY
Qty: 90 TABLET | Refills: 0 | Status: SHIPPED | OUTPATIENT
Start: 2024-04-05

## 2024-04-15 ENCOUNTER — OFFICE VISIT (OUTPATIENT)
Dept: FAMILY MEDICINE CLINIC | Facility: CLINIC | Age: 64
End: 2024-04-15
Payer: MEDICARE

## 2024-04-15 VITALS
SYSTOLIC BLOOD PRESSURE: 120 MMHG | DIASTOLIC BLOOD PRESSURE: 50 MMHG | OXYGEN SATURATION: 89 % | TEMPERATURE: 98 F | BODY MASS INDEX: 30 KG/M2 | RESPIRATION RATE: 16 BRPM | HEART RATE: 66 BPM | WEIGHT: 146.63 LBS

## 2024-04-15 DIAGNOSIS — E11.69 HYPERLIPIDEMIA ASSOCIATED WITH TYPE 2 DIABETES MELLITUS (HCC): Primary | ICD-10-CM

## 2024-04-15 DIAGNOSIS — F33.42 RECURRENT MAJOR DEPRESSIVE DISORDER, IN FULL REMISSION (HCC): ICD-10-CM

## 2024-04-15 DIAGNOSIS — R79.89 ELEVATED LIVER FUNCTION TESTS: ICD-10-CM

## 2024-04-15 DIAGNOSIS — I15.0 RENOVASCULAR HYPERTENSION: ICD-10-CM

## 2024-04-15 DIAGNOSIS — E78.5 HYPERLIPIDEMIA ASSOCIATED WITH TYPE 2 DIABETES MELLITUS (HCC): Primary | ICD-10-CM

## 2024-04-15 DIAGNOSIS — Z23 NEED FOR VACCINATION: ICD-10-CM

## 2024-04-15 PROCEDURE — 99214 OFFICE O/P EST MOD 30 MIN: CPT | Performed by: FAMILY MEDICINE

## 2024-04-15 PROCEDURE — G2211 COMPLEX E/M VISIT ADD ON: HCPCS | Performed by: FAMILY MEDICINE

## 2024-04-15 PROCEDURE — 96127 BRIEF EMOTIONAL/BEHAV ASSMT: CPT | Performed by: FAMILY MEDICINE

## 2024-04-15 RX ORDER — ATORVASTATIN CALCIUM 10 MG/1
10 TABLET, FILM COATED ORAL NIGHTLY
Qty: 90 TABLET | Refills: 1 | Status: SHIPPED | OUTPATIENT
Start: 2024-04-15

## 2024-04-15 RX ORDER — ZOSTER VACCINE RECOMBINANT, ADJUVANTED 50 MCG/0.5
50 KIT INTRAMUSCULAR ONCE
Qty: 1 EACH | Refills: 1 | Status: SHIPPED | OUTPATIENT
Start: 2024-04-15 | End: 2024-04-15

## 2024-04-15 RX ORDER — ATORVASTATIN CALCIUM 10 MG/1
10 TABLET, FILM COATED ORAL NIGHTLY
Qty: 90 TABLET | Refills: 1 | Status: SHIPPED | OUTPATIENT
Start: 2024-04-15 | End: 2024-04-15

## 2024-04-15 RX ORDER — BUPROPION HYDROCHLORIDE 150 MG/1
150 TABLET, EXTENDED RELEASE ORAL DAILY
Qty: 90 TABLET | Refills: 1 | Status: SHIPPED | OUTPATIENT
Start: 2024-04-15

## 2024-04-15 RX ORDER — CARVEDILOL 6.25 MG/1
6.25 TABLET ORAL 2 TIMES DAILY WITH MEALS
Qty: 180 TABLET | Refills: 1 | Status: SHIPPED | OUTPATIENT
Start: 2024-04-15

## 2024-04-15 NOTE — PROGRESS NOTES
CHIEF COMPLAINT:   Chief Complaint   Patient presents with    Medication Follow-Up     HPI:     Lupe Arce is a 63 year old female presents for medication monitoring      Pt presents for recheck of hyperlipidemia. Patient reports taking medications as instructed, no medication side effects noted. Denies any generalized muscle aches.  Taking atorvastatin 20 mg at night.  Labs were performed 1/5/2024-lipid panel , HDL 44, triglycerides 167, LDL 38, non-HDL 63.  CMP demonstrated mild elevation of the liver-AST is 54 and ALT T was 40.  Patient's daughter states she was seen by Dr. Zhao's APRN previously for this issue.  Denies any new medications.    Patient presents for recheck of hypertension. Pt has been taking medications as instructed-only on carvedilol, no medication side effects, home BP monitoring in the range of 110-120's systolic and 70-80's diastolic.denies headache, dizziness  chest pain, dyspnea on exertion, leg swelling.  Using CPAP.  Daughter states normally her transplant team refills the carvedilol but she needs to establish with [facility] transplant [facility] and needs a refill.  Denies any medication side effects.  BP Readings from Last 5 Encounters:   04/15/24 120/50   09/20/23 126/74   08/08/23 138/80   07/12/23 (!) 174/72   06/16/23 136/84     Depression-feels controlled taking bupropion.  Denies feeling depressed, sad, hopeless, insomnia, crying spells, SI, HI or wanting to die.  Feels well since kidney transplant and off of dialysis.      Last visit partial HPI-reviewed  Type 2 diabetes-now managed by [facility] seeing KIM Gonzalez at [facility]  HEMOGLOBIN A1C (%)   Date Value   06/19/2023 4.3   03/07/2022 4.2 (A)   11/01/2021 6.0 (A)     HEMOGLOBIN A1c (% of total Hgb)   Date Value   12/16/2022 <4.0   History of kidney transplant.  Taking glipizide, Januvia, Lantus    History of hypothyroidism on levothyroxine-managed by [facility] KIM Bautista  Carlos. Taking levothyroxine. Denies mood changes. Feels well..     History of sleep apnea-on CPAP/BiPAP states compliant.  Last office visit with pulmonologist/sleep specialist [facility] up refreshed.    Asthma-managed by pulmonologist mild persistent.Taking Breo and montelukast.  Denies night time symptoms of asthma. Denies chest pain, sob, wheezing, LEON, cough or fever.    Environmental allergies on cetirizine and fluticasone daily.  Feels controlled.        HISTORY:  Past Medical History:    Allergic rhinitis    Bad breath    Bloating    Always feels bloated    Cataracts, bilateral    Per Dr. Jernigan    Cholecystitis    Chronic kidney disease (CKD)    Constipation    If she is not stuffed up she has diarrhea    COVID    01/2022    Depression    Diabetes ([facility])    no meds    Diabetic retinopathy ([facility])    Per Dr. Jernigan    Diastolic dysfunction    with LVH. EF >55% on ECHO     Encephalopathy acute    Esophageal reflux    Essential hypertension    Eye disease    Flatulence/gas pain/belching    Always has gas    Food intolerance    Lactose intolerance    Frequent UTI    Always tests positive    Glaucoma    Heartburn    Hyperlipidemia    Hypertension    Hypothyroidism    Irregular bowel habits    Not regular    Loss of appetite    Only eats 1 maybe 2 a day    Macular puckering of retina    Nausea    PONV (postoperative nausea and vomiting)    Prerenal azotemia    Proliferative diabetic retinopathy(362.02)    left eye    Renal disorder    ESRD    S/P kidney transplant ([facility])    12/12/2019-right kidney transplant donated by his son.  Former dialysis patient due to diabetic kidney disease performed at [facility].      Severe obstructive sleep apnea    has cpap    Sleep apnea    Traction detachment of retina    Uncomfortable fullness after meals    Always she eats super slow.    Unspecified disorder of thyroid    Visual impairment    glasses    Vitreous hemorrhage ([facility])    Wears glasses      Past  Surgical History:   Procedure Laterality Date    Appendectomy      Av fistula revision, open Left 2019    Cataract Bilateral     Colonoscopy      Colonoscopy N/A 2019    Procedure: COLONOSCOPY;  Surgeon: Armand Zhao MD;  Location:  ENDOSCOPY    Eye surgery      RV surgery    Hysterectomy      fibroids. unsure but thinks she still has her cervix    Roopa biopsy stereo nodule 1 site right (cpt=19081)  ??    BENIGN      83, 86, 84, 90    Organ transplant      Other surgical history      cyst removal on thigh area of leg    Total abdom hysterectomy      Transplantation of kidney  2019    kidney swap with son  at [facility]    Upper gi endo no barretts  2015    antral erosions      Family History   Problem Relation Age of Onset    High Blood Pressure Mother     Ulcerative Colitis Mother     Diabetes Mother     Other (Positive Coivd) Mother 80    Diabetes Father     Heart Attack Father     Depression Daughter     Other (High Blood Pressure, and Pre-Diabetes) Daughter     Depression Daughter     High Blood Pressure Son     Hypertension Son     High Blood Pressure Son     Breast Cancer Paternal Aunt     Uterine Cancer Paternal Aunt       Social History:   Social History     Socioeconomic History    Marital status:     Number of children: 3   Occupational History    Occupation: House Keeper   Tobacco Use    Smoking status: Never    Smokeless tobacco: Never   Vaping Use    Vaping status: Never Used   Substance and Sexual Activity    Alcohol use: No    Drug use: No    Sexual activity: Never   Other Topics Concern    Caffeine Concern No    Exercise Yes    Seat Belt No        Medications (Active prior to today's visit):  Current Outpatient Medications   Medication Sig Dispense Refill    cetirizine 10 MG Oral Tab Take 1 tablet (10 mg total) by mouth daily. 90 tablet 0    montelukast 10 MG Oral Tab Take 1 tablet (10 mg total) by mouth daily. 90 tablet 0     buPROPion  MG Oral Tablet 12 Hr Take 1 tablet (150 mg total) by mouth daily. 90 tablet 0    Insulin Pen Needle (BD PEN NEEDLE ANTHONY 2ND GEN) 32G X 4 MM Does not apply Misc Use to inject insulin up to five times daily. 3 month supply 450 each 2    atorvastatin 20 MG Oral Tab Take 1 tablet (20 mg total) by mouth nightly. 90 tablet 3    insulin aspart (NOVOLOG FLEXPEN) 100 Units/mL Subcutaneous Solution Pen-injector Take 4u before each meal plus a correction 1:40>140 TID AC. Max TDD 35u daily. 15 mL 2    TRUEPLUS PEN NEEDLES 32G X 4 MM Does not apply Misc TEST FOUR TIMES DAILY 400 each 3    insulin glargine (LANTUS SOLOSTAR) 100 UNIT/ML Subcutaneous Solution Pen-injector Inject 46 Units into the skin nightly. 3 mo supply      Empagliflozin (JARDIANCE) 25 MG Oral Tab Take 25 mg by mouth daily. 90 tablet 2    SITagliptin Phosphate (JANUVIA) 100 MG Oral Tab Take 1 tablet (100 mg total) by mouth daily. 90 tablet 1    levothyroxine 88 MCG Oral Tab Take 1 tablet (88 mcg total) by mouth before breakfast. 90 tablet 2    atorvastatin 40 MG Oral Tab Take 1 tablet (40 mg total) by mouth nightly. 90 tablet 1    dapsone 100 MG Oral Tab       omeprazole 20 MG Oral Capsule Delayed Release Take 1 capsule (20 mg total) by mouth 2 (two) times daily before meals. 180 capsule 3    Microlet Lancets Does not apply Misc Check blood sugar once daily 200 each 1    Glucose Blood (CONTOUR NEXT TEST) In Vitro Strip Check BG once daily 200 each 2    carvedilol 6.25 MG Oral Tab Take 1 tablet (6.25 mg total) by mouth 2 (two) times daily with meals. 180 tablet 1    ALBUTEROL 108 (90 Base) MCG/ACT Inhalation Aero Soln INHALE 2 PUFFS BY MOUTH INTO THE LUNGS EVERY 4 HOURS AS NEEDED FOR WHEEZING 18 g 0    FLUTICASONE PROPIONATE 50 MCG/ACT Nasal Suspension SHAKE LIQUID AND USE 2 SPRAYS IN EACH NOSTRIL DAILY 48 g 0    ENVARSUS XR 0.75 MG Oral Tablet 24 Hr Take 3 tablets by mouth daily.      TRUE METRIX BLOOD GLUCOSE TEST In Vitro Strip TEST 3 TIMES A   strip 3    Meclizine HCl 12.5 MG Oral Tab Take 1 tablet (12.5 mg total) by mouth 3 (three) times daily as needed. 30 tablet 0    valGANciclovir HCl (VALCYTE OR) Take 450 mg by mouth.      Lancets Does not apply Misc 3x daily lancets for True Metrix meter 300 each 3    mycophenolate sodium 360 MG Oral Tab EC Take 1 tablet (360 mg total) by mouth 4 (four) times daily. For kidney transplant 120 tablet 0    Biotin 5000 MCG Oral Cap       ondansetron 4 MG Oral Tablet Dispersible       ergocalciferol 1.25 MG (22542 UT) Oral Cap       FEROSUL 325 (65 Fe) MG Oral Tab Take by mouth daily.      Blood Glucose Monitoring Suppl (TRUE METRIX AIR GLUCOSE METER) w/Device Does not apply Kit U UTD WITH STRIPS TO TEST BLOOD GLUCOSE      Alcohol Swabs (BD SWAB SINGLE USE REGULAR) Does not apply Pads U UTD QID      acetaminophen 500 MG Oral Tab Take 2 tablets (1,000 mg total) by mouth one time.         Allergies:  Allergies   Allergen Reactions    Ace Inhibitors RENAL INSUFFICIENCY    Folic Acid-Vit B6-Vit B12 HIVES and ITCHING    B12-Ca RASH     Vit b12 and calcium acetate combo       PSFH elements reviewed from today and agreed except as otherwise stated in HPI.  PHYSICAL EXAM:   /50 (BP Location: Right arm, Patient Position: Sitting, Cuff Size: adult)   Pulse 66   Temp 97.9 °F (36.6 °C) (Temporal)   Resp 16   Wt 146 lb 9.6 oz (66.5 kg)   LMP  (LMP Unknown)   SpO2 (!) 89%   BMI 30.12 kg/m²   Vital signs reviewed.Appears stated age, well groomed.  Physical Exam   GENERAL: well developed, well nourished,in no apparent distress  EYES; PERRLA, EOM-i B/L. Sclera clear and non icteric bilateral  SKIN: no rashes,no suspicious lesions  HEENT: atraumatic, normocephalic  NECK: supple,no adenopathy,no bruits, no JVD  LUNGS: clear to auscultation bilateral. No RRW. Good inspiratory and expiratory effort  CARDIO: RRR without murmur, clear S1, S2  VS: no carotid artery bruit bilateral.    GI: good BS's,no masses,No HSM or  tenderness.   EXTREMITIES: no cyanosis, clubbing or edema, bilateral. No calf tenderness    LABS       Component      Latest Ref Rng 1/4/2024   Glucose      65 - 99 mg/dL 137 (H)    BUN      7 - 25 mg/dL 31 (H)    CREATININE      0.50 - 1.05 mg/dL 1.21 (H)    EGFR      > OR = 60 mL/min/1.73m2 50 (L)    BUN/CREATININE RATIO      6 - 22 (calc) 26 (H)    Sodium      135 - 146 mmol/L 140    Potassium      3.5 - 5.3 mmol/L 4.3    Chloride      98 - 110 mmol/L 105    Carbon Dioxide, Total      20 - 32 mmol/L 27    CALCIUM      8.6 - 10.4 mg/dL 9.2    PROTEIN, TOTAL      6.1 - 8.1 g/dL 7.6    Albumin      3.6 - 5.1 g/dL 4.6    Globulin      1.9 - 3.7 g/dL (calc) 3.0    A/G Ratio      1.0 - 2.5 (calc) 1.5    Total Bilirubin      0.2 - 1.2 mg/dL 1.4 (H)    Alkaline Phosphatase      37 - 153 U/L 74    AST (SGOT)      10 - 35 U/L 54 (H)    ALT (SGPT)      6 - 29 U/L 40 (H)    Cholesterol, Total      <200 mg/dL 107    HDL Cholesterol      > OR = 50 mg/dL 44 (L)    Triglycerides      <150 mg/dL 167 (H)    LDL Cholesterol Calc      mg/dL (calc) 38    Chol/HDL Ratio      <5.0 (calc) 2.4    NON-HDL CHOLESTEROL      <130 mg/dL (calc) 63    Magnesium, Serum      1.5 - 2.5 mg/dL 2.0       Legend:  (H) High  (L) Low    REVIEWED THIS VISIT  ASSESSMENT/PLAN:   63 year old female with    1. Hyperlipidemia associated with type 2 diabetes mellitus (HCC)  - Lipid Panel [E]; Future  - Comp Metabolic Panel (14); Future  - Lipid Panel [E]  - Comp Metabolic Panel (14)  -Decrease atorvastatin 10 MG Oral Tab; Take 1 tablet (10 mg total) by mouth nightly.  Dispense: 90 tablet; Refill: 1  LDL 38-too low and triglycerides mildly elevated 167  Liver function was mildly elevated this may be due to atorvastatin.  Daughter is denying any other medication changes.  7/2023 LFTs and CMP were normal.  Encourage Mediterranean diet continue weight loss and stay active walk at least 30 to 60 minutes daily  Repeat labs in 6 months and follow-up in the  office    2. Renovascular hypertension  - carvedilol 6.25 MG Oral Tab; Take 1 tablet (6.25 mg total) by mouth 2 (two) times daily with meals.  Dispense: 180 tablet; Refill: 1  Controlled  Encouraged 150-300 mins aerobic exercise weekly  <2g Na daily and Mediterranean diet  Encouraged weight loss  Home b/p monitoring 5x weekly goal <130/80, bring readings and cuff to each visit.  Continue carvedilol  Labs q 3 months    3. Elevated liver function tests  - Comp Metabolic Panel (14); Future  - Comp Metabolic Panel (14)  Will monitor  Decreasing dose of atorvastatin  If continues to be elevated then will refer to Dr. Zhao-suspicious for fatty liver?  Versus medication side effect    4. Recurrent major depressive disorder, in full remission (HCC)  - buPROPion  MG Oral Tablet 12 Hr; Take 1 tablet (150 mg total) by mouth daily.  Dispense: 90 tablet; Refill: 1  Controlled  -contracts for safety- if suicidal or homicidal, call 911 or go to nearest ER and call office  -increased suicide risk on SNRI   Exercise 3 x weekly x 30-60min    5. Need for vaccination  - Zoster Vac Recomb Adjuvanted (SHINGRIX) 50 MCG/0.5ML Intramuscular Recon Susp; Inject 50 mcg into the muscle one time for 1 dose. Repeat in 2 to 6 months to complete the series.  Dispense: 1 each; Refill: 1      The patient and provider have a longitudinal relationship to address/treat the serious or complex condition as stated in this encounter.      Meds This Visit:  Requested Prescriptions     Signed Prescriptions Disp Refills    buPROPion  MG Oral Tablet 12 Hr 90 tablet 1     Sig: Take 1 tablet (150 mg total) by mouth daily.    carvedilol 6.25 MG Oral Tab 180 tablet 1     Sig: Take 1 tablet (6.25 mg total) by mouth 2 (two) times daily with meals.    atorvastatin 10 MG Oral Tab 90 tablet 1     Sig: Take 1 tablet (10 mg total) by mouth nightly.    Zoster Vac Recomb Adjuvanted (SHINGRIX) 50 MCG/0.5ML Intramuscular Recon Susp 1 each 1     Sig: Inject 50  mcg into the muscle one time for 1 dose. Repeat in 2 to 6 months to complete the series.       Health Maintenance:  Health Maintenance   Topic Date Due    Zoster Vaccines (1 of 2) Never done    COVID-19 Vaccine (6 - 2023-24 season) 09/01/2023    Annual Depression Screening  01/01/2024    Diabetes Care A1C  07/04/2024    Diabetes Care Foot Exam  08/08/2024    Annual Physical  08/08/2024    Influenza Vaccine (Season Ended) 10/01/2024    Diabetes Care Dilated Eye Exam  10/06/2024    Mammogram  03/11/2025    Pap Smear  08/03/2026    Pneumococcal Vaccine: Birth to 64yrs (4 of 4 - PPSV23 or PCV20) 08/03/2026    Colorectal Cancer Screening  11/01/2027    DTaP,Tdap,and Td Vaccines (4 - Td or Tdap) 11/07/2027         Patient/Caregiver Education: Patient/Caregiver Education: There are no barriers to learning. Medical education done.   Outcome: Patient verbalizes understanding. Patient is notified to call with any questions, comp lications, allergies, or worsening or changing symptoms.  Patient is to call with any side effects or complications from the treatments as a result of today.     Problem List:     Patient Active Problem List   Diagnosis    Hyperlipidemia associated with type 2 diabetes mellitus (HCC)    GERD (gastroesophageal reflux disease)    Vitamin D deficiency    Hypomagnesemia    Seasonal allergies    Severe obstructive sleep apnea    [place]ian speaking patient    Depressive disorder    Acquired hypothyroidism    Primary hypertension    Type 2 diabetes mellitus with ophthalmic complication, with long-term current use of insulin (HCC)    Asthma with COPD (chronic obstructive pulmonary disease) (HCC)    Vitreous hemorrhage (HCC)    Tubular adenoma of colon    S/P kidney transplant (HCC)    Normocytic anemia    Immunosuppression due to drug therapy (HCC)    History of gastritis    Constipation    Vitreous floaters of left eye    At high risk for falls    History of adenomatous polyp of colon    Encounter for  screening for malignant neoplasm of colon    Medication side effect, initial encounter       Imaging & Referrals:  None     4/15/2024  Marisol Martinez, DO      Patient understands plan and follow-up.  Return in about 4 months (around 8/15/2024) for medicare physical, sooner if needed..

## 2024-04-19 ENCOUNTER — MED REC SCAN ONLY (OUTPATIENT)
Facility: CLINIC | Age: 64
End: 2024-04-19

## 2024-04-19 NOTE — PATIENT INSTRUCTIONS
Follow up plan:  With RODNEY Ansari: No follow-ups on file.    - repeat thyroid function tests - hold biotin 3 days before completing  - check CBC today and repeat A1C in lab to eval for machine error    Medications:   - start novolog 4u TID AC (w/ large/high carb meals), plus ISF 1:40>140 - conservatively dosed  - continue jardiance 25mg daily (GFR 63)  - continue januvia 100mg daily  - continue lantus 35u daily      - Please review the following if you are taking JARDIANCE OR FARXIGA:  This medication will remove extra sugar out of blood into your urine. It can be dosed anytime of day, but morning is probably best time to take it as it can cause increased urination. Please drink at least 4 glasses of water daily to avoid dehydration. It does not cause low blood sugars (hypoglycemia). If you develop any low blood sugars, we will need to adjust other medications. Please call me if you develop any symptoms of urinary tract infection (burning with urination) or yeast infection (new rash itching or burning in the genital area).     While you are taking this medication please be mindful of sick day protocol (for illness, if you are vomiting, excessively exercising/sweating, or alcohol intake) to avoid dehydration:   -Temporarily hold the medication if you are in a dehydrated state. You can call the clinic for further instruction if you are sick and can't remember which diabetes medication to hold.  -Check blood sugar levels more often while sick  -Seek medical help early if you develop any abdominal pain, nausea or vomiting.  -Okay to resume this medication once you are eating /drinking regularly and no longer dehydrated.    Surgery protocol:  If you are having surgery please contact the clinic as we will need to adjust your diabetes medications. This medication needs to be held at least 4 days prior to your procedure.. Once eating/drinking normally after surgery, ok to resume (this avoids dehydration).     Blood  sugar targets:  Before breakfast: , 2 hours after meals: <180 (preferably <150), A1C goal: <7.0%  Time in Range goal is higher than 80% if using a continuous glucose monitor (Dexcom or Ysaebl).  Time in Range can be found within the Dexcom G7 meredith, on Clarity if using Dexcom G6, or on LibreView if using Ysabel.    HOW TO TREAT LOW BLOOD SUGAR (Hypoglycemia)  Low blood sugar= Less than 70, or if you start to have symptoms (below)  Symptoms: Shaking or trembling, fast heart rate, extreme hunger, sweating, confusion/difficulty concentrating, dizziness.    How to treat a low blood sugar if you are able to eat/drink: The Rule of 15  If you are using continuous glucose monitor that says you are low, but you do not have any symptoms, verify on fingerstick that your blood sugar is actually low before treating.   Eat 15 grams of carbs (see examples below)  Check your blood sugar after 15 minutes. If it’s still below your target range, have another serving.   Repeat these steps until it’s in your target range. Once it’s in range, if you're nervous about your sugar going low again, have a protein source (ie, a spoonful of peanut butter).   If you have a CGM you want to look for how your arrow has changed. If you arrow is pointed up or sideways after 15 min, give your CGM more time OR check with a finger stick. Try not to eat more food until at least 15 min after the first BG check - otherwise you risk having a rebound high.  If you are experiencing symptoms and you are unable to check your blood glucose for any reason, treat the hypoglycemia.  If someone has a low blood sugar and is unconscious: Don’t hesitate to call 911. If someone is unconscious and glucagon is not available or someone does not know how to use it, call 911 immediately.    To treat a low, I recommend you carry with you easy, pre-portioned treatment for low blood sugars that are 15G of carbs:   - Children sized squeeze pouch applesauce (high fiber + carbs  help prevent too high of a spike)  - Small children's sized juicebox- 15g carb --> 4oz juice box  - Glucose tablets from [facility], you can find them near diabetes supplies --> Note, you will need to eat 3-4 tablets to get to 15g of carbs  - Children sized fruit snack pack- look for one with 15 grams of total carbohydrate  - Choice of how to treat your low is important. Complex carbs, or foods that contain fats along with carbs (like chocolate) can slow the absorption of glucose and should NOT be used to treat an emergency low    NAVIGATING INSURANCE / PRICING OF MEDICATIONS WITH DIABETES:  Diabetes medications and supplies can be costly. It is your responsibility to contact your insurance company if there is an issue with pricing and then contact the office with what medications are covered.  The best way to reduce costs relating to your diabetes is to contact your insurance at the start of the year directly.  If you have questions about the pricing of any of your diabetes supplies or medications, please reach out directly to your insurance pharmacy plan (phone number on the back of your card), or look online at their website and their 'formulary'.   Here are some questions you can ask your insurance that may help you identify areas to save on costs:    1.    What is the formulary preferred brand of my medication / supply (ie: my doctor ordered Ozempic, is that preferred or is another agent preferred)? Do you prefer a generic over brand name?  2.    What quantity is preferred by my plan? (30 or 90 day supply)  3.    Will my price be lower if I use a specific local or mail order pharmacy? (For example, [facility] or [facility] is sometimes preferred, or a mail order service like [facility] or [facility])  4.    What is my estimated pricing for these medications / supplies if all rules are followed?  5.     Do I have a deductible I have to meet before my medications will be fully covered? What is my deductible? (In  that case, you often will pay full price until you reach your deductible)    For CGM's (Dexcom or Ysabel) specifically, if your pharmacy plan states that they do NOT offer coverage/there is a high copay at your pharmacy, ask to be transferred to your plans Durable Medical Equipment team, or to be give a direct contact number for that team if they cannot transfer.  Your plan may prefer that your CGM goes through a medical supplier rather than pharmacy coverage.  If this is the case, please ask the following questions:    1.    Which durable medical equipment suppliers do you have a contract with for CGMs specifically (not all DME's supply CGMs!)  2.    What is the estimated pricing for my CGM supplies through medical coverage?    Diabetes annual care reminders:  - If you haven't, remember to complete a yearly diabetes eye exam. This exam is critical to preventing and treating eye conditions caused by diabetes that could potentially lead to vision loss. Once complete, please call your eye care provider and ask them to fax your latest report to our office. This will help us update our records. Our fax number is: [phone]  - If you ever have an upcoming surgery, please notify the office. We will make adjustments to your diabetes medications prior to the procedure. Dietary guidelines:  These are general recommendations. If you would like more specific information from a registered dietician, you can call the [facility] at [phone] and ask to meet with a Diabetes dietician    Carb/protein goals  45-60 grams per meal  15-30 grams per snack  - increase your fiber intake to feel blandon, longer.  Fiber can also be beneficial for lowering cholesterol  - increase protein intake: aim for ~25g of protein per meal, 10-15g of protein for snacks    Carb counting apps:  - MyFitnessPal  - Lose It!  - Carb Manager  - [employer] food search meredith   - Nutrition analyzer: Copy and paste any recipe into this analyzer, it  will give you an estimated carb count for homemade recipes:  https://www."[employer]/recipe-nutrition-analyzer-2088459   Exercise guidelines from the American [employer] Assocation:  - Get at least 150 minutes per week of moderate-intensity aerobic activity or 75 minutes per week of vigorous aerobic activity, or a combination of both, preferably spread throughout the week.    - Add moderate- to high-intensity muscle-strengthening activity (such as resistance or weights) on at least 2 days per week.    - Spend less time sitting. Even light-intensity activity can offset some of the risks of being sedentary. Taking a 10-15 minute walk after meals is very beneficial to blood sugar regulation.    - Increase amount and intensity of exercise gradually over time.     Thank you for visiting our office. We look forward to working with you to reach your health goals. As a reminder, if you need refills, please request early so there is enough time to process the request. We ask that you provide at least 5 days' notice before a refill is due, so there is time to send a request to pharmacy, process the refill, and ensure there are no other problems with obtaining the medication (backorders, prior authorization paperwork, etc). Routine refills will not be addressed on weekends, so please submit these requests during the week.

## 2024-04-19 NOTE — PROGRESS NOTES
[facility]    Name: Lupe Arce  Date: 4/22/2024      HISTORY OF PRESENT ILLNESS   Lupe Arce is a 63 year old female with PMHx significant for HTN, HLD, type 2 diabetes mellitus c/b retinopathy & nephropathy, depression, irregular bowel habits, hypothyroidism, ESRD s/p renal transplant in 2019 at [facility], who presents for endocrinology evaluation for type 2 diabetes mellitus. Accompanied today by brandon Wilkerson language line    Initial HPI consult in March 2023:  A1C was <4% most recently  Prev w/ [facility], transitioning care d/t [facility] PCP  Insulin started after transplant  Prev was taking Lantus 35u, glipizide 10mg BID + Januvia, PCP understandably stopped Lantus until she could receive endo eval  Diagnosed age: 22 during pregnancy, states only started managing DM ~10 years ago when eyes were getting worse  Hx of ESRD on HD, and now is s/p renal tx in 2019 at [facility].   On Myfortic and Envarsus   Pt denies hx of hospitalization for DM, no pancreatitis hx  Family history of DM: T2DM- father, siblings  Cpeptide 9/2019- 7.4  Current DM meds at first OV: januvia 100mg daily, glipizide 10mg BID,   Previously trialed/failed: metformin- GI upset, Lantus 35u qAM    Hypothyroidism history  - unsure of diagnosis date, no hx thyroidectomy  - On LT4 88mcg daily  - last TFTs (Mar 2022) stable    Interim hx  June 2023- visit w/ DM APN  GMI on Dexcom 7.3% today  We started Soliqua last OV, she had horrible diarrhea so we stopped over the phone and PCP restarted Lantus 35U  Since then feeling much better although does note she will have lows <70 thru night and have to eat to get BG normal  This happens almost daily  Current meds: Lantus 35U daily.    Sept 2023- Last A1C 5%.  Started dexcom G7 CGM through [facility], liking it a lot   DM meds at visit: Lantus 30U daily, januvia 50mcg daily  BG have been higher lately which she attributes to recent COVID dx    Dec 2023-  Last A1c value  was  % done 1/4/2024.  Video visit; ROMIE Albright providing translation  Started Jardiance, tolerting well, minor incr'd urination, but also increased her water intake  Notes her highest BG are around dinner time despite dietary changes (cut out tortillas)  Current DM meds: januvia 100mg daily, jardiance 10mg daily, lantus 35u daily      January 2024- w/ endo APN, here for DM follow up. Last A1c value was 4.9% done 1/4/2024.  Changes made at LOV include: jardiance dose incr'd to 25mg daily.  Feeling well on the incr'd Jardiance dose, denies excessive urination, noticing her BG are much better  Early Jan, had another cycle of vomiting/diarrhea occurs for 3-4 days at a time, then she's fine   ROMIE notes she's very selective on what she eats because she's nervous about her BG trending higher   Current DM meds at visit: jardiance 25mg daily,  januvia 100mg daily, lantus 35u daily  Re: Thyroid: continues on LT4 88mcg daily. Feeling well    April 2024-w/ [facility] APN. Last A1c value was  % done 1/4/2024. Here with Natalee (ROMIE) who provides interpretation. Changes made at last office visit : patient was quite fearful of spiking her BG if eating higher-carb food items, so we added on SSI for more food freedom-- hasn't really needed or started. BG are stable. Following up with PCP re: elevated LFTs (statin dose was adjusted). Family has noted she seems more tired, endorses fatigue only when she has a busy day  Current DM meds at visit:  jardiance 25mg daily,  januvia 100mg daily, lantus 32u daily  Re: Thyroid: continues on LT4 88mcg daily. Feeling well      Continuous Glucose Monitoring Interpretation    Lupe Arce has undergone continuous glucose monitoring with their CGM.  The blood glucose tracings were evaluated for two weeks prior to office visit.    Blood glucose tracings demonstrated no significant hyperglycemia- much more stable. There were no areas of hypoglycemia during the weeks of evaluation.               Previously trialed/failed: glipizide- stopped d/t hypo episodes, metformin- GI upset    History/Other:   REVIEW OF SYSTEMS  Ten point review of systems has been performed and is otherwise negative and/or non-contributory, except as described above.     Medications:     Current Outpatient Medications:     insulin aspart (NOVOLOG FLEXPEN) 100 Units/mL Subcutaneous Solution Pen-injector, Take 4u before each meal plus a correction 1:40>140 TID AC. Max TDD 35u daily. Taking on an as-needed basis for now., Disp: , Rfl:     buPROPion  MG Oral Tablet 12 Hr, Take 1 tablet (150 mg total) by mouth daily., Disp: 90 tablet, Rfl: 1    carvedilol 6.25 MG Oral Tab, Take 1 tablet (6.25 mg total) by mouth 2 (two) times daily with meals., Disp: 180 tablet, Rfl: 1    atorvastatin 10 MG Oral Tab, Take 1 tablet (10 mg total) by mouth nightly., Disp: 90 tablet, Rfl: 1    cetirizine 10 MG Oral Tab, Take 1 tablet (10 mg total) by mouth daily., Disp: 90 tablet, Rfl: 0    montelukast 10 MG Oral Tab, Take 1 tablet (10 mg total) by mouth daily., Disp: 90 tablet, Rfl: 0    Insulin Pen Needle (BD PEN NEEDLE ANTHONY 2ND GEN) 32G X 4 MM Does not apply Misc, Use to inject insulin up to five times daily. 3 month supply, Disp: 450 each, Rfl: 2    TRUEPLUS PEN NEEDLES 32G X 4 MM Does not apply Misc, TEST FOUR TIMES DAILY, Disp: 400 each, Rfl: 3    insulin glargine (LANTUS SOLOSTAR) 100 UNIT/ML Subcutaneous Solution Pen-injector, Inject 34 Units into the skin nightly. 3 mo supply, Disp: , Rfl:     Empagliflozin (JARDIANCE) 25 MG Oral Tab, Take 25 mg by mouth daily., Disp: 90 tablet, Rfl: 2    SITagliptin Phosphate (JANUVIA) 100 MG Oral Tab, Take 1 tablet (100 mg total) by mouth daily., Disp: 90 tablet, Rfl: 1    levothyroxine 88 MCG Oral Tab, Take 1 tablet (88 mcg total) by mouth before breakfast., Disp: 90 tablet, Rfl: 2    dapsone 100 MG Oral Tab, , Disp: , Rfl:     omeprazole 20 MG Oral Capsule Delayed Release, Take 1 capsule (20 mg  total) by mouth 2 (two) times daily before meals., Disp: 180 capsule, Rfl: 3    Microlet Lancets Does not apply Misc, Check blood sugar once daily, Disp: 200 each, Rfl: 1    Glucose Blood (CONTOUR NEXT TEST) In Vitro Strip, Check BG once daily, Disp: 200 each, Rfl: 2    ALBUTEROL 108 (90 Base) MCG/ACT Inhalation Aero Soln, INHALE 2 PUFFS BY MOUTH INTO THE LUNGS EVERY 4 HOURS AS NEEDED FOR WHEEZING, Disp: 18 g, Rfl: 0    FLUTICASONE PROPIONATE 50 MCG/ACT Nasal Suspension, SHAKE LIQUID AND USE 2 SPRAYS IN EACH NOSTRIL DAILY, Disp: 48 g, Rfl: 0    ENVARSUS XR 0.75 MG Oral Tablet 24 Hr, Take 3 tablets by mouth daily., Disp: , Rfl:     TRUE METRIX BLOOD GLUCOSE TEST In Vitro Strip, TEST 3 TIMES A DAY, Disp: 300 strip, Rfl: 3    Meclizine HCl 12.5 MG Oral Tab, Take 1 tablet (12.5 mg total) by mouth 3 (three) times daily as needed., Disp: 30 tablet, Rfl: 0    valGANciclovir HCl (VALCYTE OR), Take 450 mg by mouth., Disp: , Rfl:     Lancets Does not apply Misc, 3x daily lancets for True Metrix meter, Disp: 300 each, Rfl: 3    mycophenolate sodium 360 MG Oral Tab EC, Take 1 tablet (360 mg total) by mouth 4 (four) times daily. For kidney transplant, Disp: 120 tablet, Rfl: 0    Biotin 5000 MCG Oral Cap, , Disp: , Rfl:     ondansetron 4 MG Oral Tablet Dispersible, , Disp: , Rfl:     ergocalciferol 1.25 MG (74536 UT) Oral Cap, , Disp: , Rfl:     FEROSUL 325 (65 Fe) MG Oral Tab, Take by mouth daily., Disp: , Rfl:     Blood Glucose Monitoring Suppl (TRUE METRIX AIR GLUCOSE METER) w/Device Does not apply Kit, U UTD WITH STRIPS TO TEST BLOOD GLUCOSE, Disp: , Rfl:     Alcohol Swabs (BD SWAB SINGLE USE REGULAR) Does not apply Pads, U UTD QID, Disp: , Rfl:     acetaminophen 500 MG Oral Tab, Take 2 tablets (1,000 mg total) by mouth one time., Disp: , Rfl:      Allergies:   Allergies   Allergen Reactions    Ace Inhibitors RENAL INSUFFICIENCY    Folic Acid-Vit B6-Vit B12 HIVES and ITCHING    B12-Ca RASH     Vit b12 and calcium acetate  combo       Social History:   Social History     Socioeconomic History    Marital status:     Number of children: 3   Occupational History    Occupation: House Keeper   Tobacco Use    Smoking status: Never    Smokeless tobacco: Never   Vaping Use    Vaping status: Never Used   Substance and Sexual Activity    Alcohol use: No    Drug use: No    Sexual activity: Never   Other Topics Concern    Caffeine Concern No    Exercise Yes    Seat Belt No       Medical History:   Reviewed    Surgical history:   Past Surgical History:   Procedure Laterality Date    Appendectomy      Av fistula revision, open Left 2019    Cataract Bilateral     Colonoscopy      Colonoscopy N/A 2019    Procedure: COLONOSCOPY;  Surgeon: Armand Zhao MD;  Location:  ENDOSCOPY    Eye surgery      RV surgery    Hysterectomy      fibroids. unsure but thinks she still has her cervix    Roopa biopsy stereo nodule 1 site right (cpt=19081)  ??    BENIGN      83, 86, 84, 90    Organ transplant      Other surgical history      cyst removal on thigh area of leg    Total abdom hysterectomy      Transplantation of kidney  2019    kidney swap with son  at [facility]    Upper gi endo no barretts  2015    antral erosions       Objective:   PHYSICAL EXAM  Vitals:    24 1356   BP: 116/62   Pulse: 73       General Appearance:  alert, well developed, in no acute distress  Eyes:  normal conjunctivae, sclera  Cardiovascular:  regular rate  Respiratory:  breathing comfortably  Musculoskeletal:  normal muscle strength and tone  Skin:  mild pallor  Hair & Nails:  normal scalp hair     Psychiatric:  oriented to time, self, and place  Neuro:  sensory grossly intact and motor grossly intact      Labs/Imaging: Pertinent imaging reviewed.    DM quality metrics:   CAD: none  Neuropathy/ Foot exam: Last Foot Exam: 23    Ophtho: Last Dilated Eye Exam: 10/06/23   Eye Exam shows Diabetic  Retinopathy?: Yes (proliferative diabetic retinopathy with traction retinal detachment invlving the mascula, bilateral)    Kidney health evaluation:  Lab Results   Component Value Date    EGFRCR 50 (L) 01/04/2024    MICROALBCREA 33.5 (H) 07/30/2020      HTN:   BP Readings from Last 1 Encounters:   04/22/24 116/62     Blood Pressure and Cardiac Medications            carvedilol 6.25 MG Oral Tab          Lipids:  Lab Results   Component Value Date    CHOLEST 107 01/04/2024    LDL 38 01/04/2024    TRIG 167 (H) 01/04/2024    HDL 44 (L) 01/04/2024     Atorvastatin adjusted by PCP d/t elevated LFTs  Cholesterol Lowering Medications            atorvastatin 10 MG Oral Tab             Assessment & Plan:     (E11.3522,  Z79.4) Type 2 diabetes mellitus with left eye affected by proliferative 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  Cpeptide 1/2024 1.97. BG are stable. Unable to obtain accurate A1C in-office, likely d/t anemia; GMI on dexcom improved LOV 7.1 --> 6.6%. Will continue current regimen.    We discussed the importance of glycemic control to prevent complications of diabetes. We discussed the importance of SBGM and consistent, low carb diet as well as moderate exercise as well.  We also discussed the complications of diabetes include retinopathy, neuropathy, nephropathy and cardiovascular disease.     - continue jardiance 25mg daily (GFR 63)  - continue januvia 100mg daily (GFR 50 in 1/2024- would need dose adjustment to 50mg once GFR 30-45)  - continue lantus 35u daily  - continue novolog PRN using SSI 1:40>140; not really needing  - continue dexcom G7  through [facility]  - SGLT2i instructions provided re: surgery, times of illness/dehydration, UTI prevention  - did not tolerate metformin- GI upset  - did not  tolerate GLP1a; soliqua- bad diarrhea    (E03.8) Other specified hypothyroidism  Plan:  April 2023 TSH 1.72, fT4 1.2, stable on levothyroxine 88mcg daily. Some ongoing fatigue  - continue LT4 88mcg daily  - repeat TFTs annually (next due 4/2024- ordered today)  and PRN    (R53.83) Fatigue, unspecified type  Plan: CBC, Platelet, No Differential [E]  Patient previously with anemia on CBC 8/2023, was following w/ GI. States she takes iron supplementation. Today we attempted in-office A1C and were unable to get accurate recording, (machine error) likely 2/2 anemia. Given reported fatigue and pallor present on exam, I sent her to lab downstairs to repeat labs. She clinically appears stable. Will pass on info to PCP.   - CBC now w/ A1C  - TFT management as above  - continue follow up with PCP        Return in about 3 months (around 7/22/2024) for DM follow up.      4/22/2024  RODNEY Beckford

## 2024-04-21 NOTE — TELEPHONE ENCOUNTER
From: Kanwal Lombardi  To: Lilia Wood DO  Sent: 9/21/2020 11:45 AM CDT  Subject: Other    Good afternoon,    Is there a way to see if the flu shot is covered thru [address] [place] [address] or do I need to go thru [facility]?     Ida
Vaccine status unknown

## 2024-04-22 ENCOUNTER — LAB ENCOUNTER (OUTPATIENT)
Dept: LAB | Age: 64
End: 2024-04-22
Payer: MEDICARE

## 2024-04-22 ENCOUNTER — OFFICE VISIT (OUTPATIENT)
Facility: CLINIC | Age: 64
End: 2024-04-22
Payer: MEDICARE

## 2024-04-22 VITALS — HEART RATE: 73 BPM | OXYGEN SATURATION: 92 % | SYSTOLIC BLOOD PRESSURE: 116 MMHG | DIASTOLIC BLOOD PRESSURE: 62 MMHG

## 2024-04-22 DIAGNOSIS — Z79.4 TYPE 2 DIABETES MELLITUS WITH LEFT EYE AFFECTED BY PROLIFERATIVE RETINOPATHY AND TRACTION RETINAL DETACHMENT INVOLVING MACULA, WITH LONG-TERM CURRENT USE OF INSULIN (HCC): ICD-10-CM

## 2024-04-22 DIAGNOSIS — R53.83 FATIGUE, UNSPECIFIED TYPE: ICD-10-CM

## 2024-04-22 DIAGNOSIS — E03.9 ACQUIRED HYPOTHYROIDISM: ICD-10-CM

## 2024-04-22 DIAGNOSIS — Z79.4 TYPE 2 DIABETES MELLITUS WITH HYPERGLYCEMIA, WITH LONG-TERM CURRENT USE OF INSULIN (HCC): Primary | ICD-10-CM

## 2024-04-22 DIAGNOSIS — E11.65 TYPE 2 DIABETES MELLITUS WITH HYPERGLYCEMIA, WITH LONG-TERM CURRENT USE OF INSULIN (HCC): Primary | ICD-10-CM

## 2024-04-22 DIAGNOSIS — E11.65 TYPE 2 DIABETES MELLITUS WITH HYPERGLYCEMIA, WITH LONG-TERM CURRENT USE OF INSULIN (HCC): ICD-10-CM

## 2024-04-22 DIAGNOSIS — E11.3522 TYPE 2 DIABETES MELLITUS WITH LEFT EYE AFFECTED BY PROLIFERATIVE RETINOPATHY AND TRACTION RETINAL DETACHMENT INVOLVING MACULA, WITH LONG-TERM CURRENT USE OF INSULIN (HCC): ICD-10-CM

## 2024-04-22 DIAGNOSIS — Z79.4 TYPE 2 DIABETES MELLITUS WITH HYPERGLYCEMIA, WITH LONG-TERM CURRENT USE OF INSULIN (HCC): ICD-10-CM

## 2024-04-22 LAB
ERYTHROCYTE [DISTWIDTH] IN BLOOD BY AUTOMATED COUNT: 17.2 %
HCT VFR BLD AUTO: 31.6 %
HGB BLD-MCNC: 9.4 G/DL
MCH RBC QN AUTO: 29.7 PG (ref 26–34)
MCHC RBC AUTO-ENTMCNC: 29.7 G/DL (ref 31–37)
MCV RBC AUTO: 99.7 FL
PLATELET # BLD AUTO: 174 10(3)UL (ref 150–450)
RBC # BLD AUTO: 3.17 X10(6)UL
WBC # BLD AUTO: 6.8 X10(3) UL (ref 4–11)

## 2024-04-22 PROCEDURE — 83036 HEMOGLOBIN GLYCOSYLATED A1C: CPT

## 2024-04-22 PROCEDURE — 95251 CONT GLUC MNTR ANALYSIS I&R: CPT

## 2024-04-22 PROCEDURE — 85027 COMPLETE CBC AUTOMATED: CPT

## 2024-04-22 PROCEDURE — 36415 COLL VENOUS BLD VENIPUNCTURE: CPT

## 2024-04-22 PROCEDURE — 99215 OFFICE O/P EST HI 40 MIN: CPT

## 2024-04-22 RX ORDER — INSULIN ASPART 100 [IU]/ML
INJECTION, SOLUTION INTRAVENOUS; SUBCUTANEOUS
COMMUNITY
Start: 2024-04-22

## 2024-04-24 DIAGNOSIS — Z94.0 S/P KIDNEY TRANSPLANT (HCC): ICD-10-CM

## 2024-04-24 DIAGNOSIS — D64.9 NORMOCYTIC ANEMIA: Primary | ICD-10-CM

## 2024-04-24 DIAGNOSIS — Z79.899 IMMUNOSUPPRESSION DUE TO DRUG THERAPY (HCC): ICD-10-CM

## 2024-04-24 DIAGNOSIS — D84.821 IMMUNOSUPPRESSION DUE TO DRUG THERAPY (HCC): ICD-10-CM

## 2024-04-25 LAB — HGBA1C: <4.2 %

## 2024-05-14 ENCOUNTER — LAB ENCOUNTER (OUTPATIENT)
Dept: LAB | Age: 64
End: 2024-05-14
Attending: FAMILY MEDICINE

## 2024-05-14 ENCOUNTER — TELEPHONE (OUTPATIENT)
Dept: HEMATOLOGY/ONCOLOGY | Facility: HOSPITAL | Age: 64
End: 2024-05-14

## 2024-05-14 DIAGNOSIS — Z94.0 KIDNEY REPLACED BY TRANSPLANT (HCC): Primary | ICD-10-CM

## 2024-05-14 DIAGNOSIS — Z79.899 ENCOUNTER FOR LONG-TERM (CURRENT) DRUG USE: ICD-10-CM

## 2024-05-14 DIAGNOSIS — D64.9 NORMOCYTIC ANEMIA: ICD-10-CM

## 2024-05-14 DIAGNOSIS — E03.9 ACQUIRED HYPOTHYROIDISM: ICD-10-CM

## 2024-05-14 LAB
ALBUMIN SERPL-MCNC: 3.9 G/DL (ref 3.4–5)
ALBUMIN/GLOB SERPL: 1.3 {RATIO} (ref 1–2)
ALP LIVER SERPL-CCNC: 173 U/L
ALT SERPL-CCNC: 23 U/L
ANION GAP SERPL CALC-SCNC: 4 MMOL/L (ref 0–18)
AST SERPL-CCNC: 35 U/L (ref 15–37)
BASOPHILS # BLD AUTO: 0.03 X10(3) UL (ref 0–0.2)
BASOPHILS NFR BLD AUTO: 0.7 %
BILIRUB SERPL-MCNC: 0.8 MG/DL (ref 0.1–2)
BILIRUB UR QL STRIP.AUTO: NEGATIVE
BUN BLD-MCNC: 30 MG/DL (ref 9–23)
CALCIUM BLD-MCNC: 8.3 MG/DL (ref 8.5–10.1)
CHLORIDE SERPL-SCNC: 113 MMOL/L (ref 98–112)
CHOLEST SERPL-MCNC: 102 MG/DL (ref ?–200)
CO2 SERPL-SCNC: 26 MMOL/L (ref 21–32)
COLOR UR AUTO: YELLOW
CREAT BLD-MCNC: 1.2 MG/DL
CREAT UR-SCNC: 71.6 MG/DL
CREAT UR-SCNC: 71.6 MG/DL
EGFRCR SERPLBLD CKD-EPI 2021: 51 ML/MIN/1.73M2 (ref 60–?)
EOSINOPHIL # BLD AUTO: 0.06 X10(3) UL (ref 0–0.7)
EOSINOPHIL NFR BLD AUTO: 1.4 %
ERYTHROCYTE [DISTWIDTH] IN BLOOD BY AUTOMATED COUNT: 13.3 %
FASTING PATIENT LIPID ANSWER: YES
FASTING STATUS PATIENT QL REPORTED: YES
GLOBULIN PLAS-MCNC: 3 G/DL (ref 2.8–4.4)
GLUCOSE BLD-MCNC: 75 MG/DL (ref 70–99)
GLUCOSE UR STRIP.AUTO-MCNC: >1000 MG/DL
HCT VFR BLD AUTO: 36.6 %
HDLC SERPL-MCNC: 52 MG/DL (ref 40–59)
HGB BLD-MCNC: 10.7 G/DL
IMM GRANULOCYTES # BLD AUTO: 0.02 X10(3) UL (ref 0–1)
IMM GRANULOCYTES NFR BLD: 0.5 %
KETONES UR STRIP.AUTO-MCNC: NEGATIVE MG/DL
LDLC SERPL CALC-MCNC: 31 MG/DL (ref ?–100)
LEUKOCYTE ESTERASE UR QL STRIP.AUTO: 500
LYMPHOCYTES # BLD AUTO: 0.89 X10(3) UL (ref 1–4)
LYMPHOCYTES NFR BLD AUTO: 20.2 %
MAGNESIUM SERPL-MCNC: 2.3 MG/DL (ref 1.6–2.6)
MCH RBC QN AUTO: 29 PG (ref 26–34)
MCHC RBC AUTO-ENTMCNC: 29.2 G/DL (ref 31–37)
MCV RBC AUTO: 99.2 FL
MICROALBUMIN UR-MCNC: 4.08 MG/DL
MICROALBUMIN/CREAT 24H UR-RTO: 57 UG/MG (ref ?–30)
MONOCYTES # BLD AUTO: 0.45 X10(3) UL (ref 0.1–1)
MONOCYTES NFR BLD AUTO: 10.2 %
NEUTROPHILS # BLD AUTO: 2.96 X10 (3) UL (ref 1.5–7.7)
NEUTROPHILS # BLD AUTO: 2.96 X10(3) UL (ref 1.5–7.7)
NEUTROPHILS NFR BLD AUTO: 67 %
NONHDLC SERPL-MCNC: 50 MG/DL (ref ?–130)
OSMOLALITY SERPL CALC.SUM OF ELEC: 301 MOSM/KG (ref 275–295)
PH UR STRIP.AUTO: 5 [PH] (ref 5–8)
PHOSPHATE SERPL-MCNC: 3.2 MG/DL (ref 2.5–4.9)
PLATELET # BLD AUTO: 136 10(3)UL (ref 150–450)
POTASSIUM SERPL-SCNC: 4 MMOL/L (ref 3.5–5.1)
PROT SERPL-MCNC: 6.9 G/DL (ref 6.4–8.2)
PROT UR-MCNC: 28.4 MG/DL
RBC # BLD AUTO: 3.69 X10(6)UL
RBC UR QL AUTO: NEGATIVE
SODIUM SERPL-SCNC: 143 MMOL/L (ref 136–145)
SP GR UR STRIP.AUTO: 1.02 (ref 1–1.03)
T4 FREE SERPL-MCNC: 1.3 NG/DL (ref 0.8–1.7)
TRIGL SERPL-MCNC: 101 MG/DL (ref 30–149)
TSI SER-ACNC: 1.51 MIU/ML (ref 0.36–3.74)
UROBILINOGEN UR STRIP.AUTO-MCNC: NORMAL MG/DL
VLDLC SERPL CALC-MCNC: 14 MG/DL (ref 0–30)
WBC # BLD AUTO: 4.4 X10(3) UL (ref 4–11)
WBC #/AREA URNS AUTO: >50 /HPF

## 2024-05-14 PROCEDURE — 80197 ASSAY OF TACROLIMUS: CPT

## 2024-05-14 PROCEDURE — 87799 DETECT AGENT NOS DNA QUANT: CPT

## 2024-05-14 PROCEDURE — 84100 ASSAY OF PHOSPHORUS: CPT

## 2024-05-14 PROCEDURE — 36415 COLL VENOUS BLD VENIPUNCTURE: CPT

## 2024-05-14 PROCEDURE — 82043 UR ALBUMIN QUANTITATIVE: CPT

## 2024-05-14 PROCEDURE — 82570 ASSAY OF URINE CREATININE: CPT

## 2024-05-14 PROCEDURE — 82728 ASSAY OF FERRITIN: CPT | Performed by: NURSE PRACTITIONER

## 2024-05-14 PROCEDURE — 83540 ASSAY OF IRON: CPT | Performed by: NURSE PRACTITIONER

## 2024-05-14 PROCEDURE — 80053 COMPREHEN METABOLIC PANEL: CPT | Performed by: NURSE PRACTITIONER

## 2024-05-14 PROCEDURE — 84439 ASSAY OF FREE THYROXINE: CPT

## 2024-05-14 PROCEDURE — 83550 IRON BINDING TEST: CPT | Performed by: NURSE PRACTITIONER

## 2024-05-14 PROCEDURE — 84443 ASSAY THYROID STIM HORMONE: CPT

## 2024-05-14 PROCEDURE — 84156 ASSAY OF PROTEIN URINE: CPT

## 2024-05-14 PROCEDURE — 83735 ASSAY OF MAGNESIUM: CPT | Performed by: NURSE PRACTITIONER

## 2024-05-14 PROCEDURE — 82607 VITAMIN B-12: CPT | Performed by: NURSE PRACTITIONER

## 2024-05-14 PROCEDURE — 83036 HEMOGLOBIN GLYCOSYLATED A1C: CPT

## 2024-05-14 PROCEDURE — 80061 LIPID PANEL: CPT | Performed by: NURSE PRACTITIONER

## 2024-05-14 PROCEDURE — 81001 URINALYSIS AUTO W/SCOPE: CPT

## 2024-05-14 PROCEDURE — 85025 COMPLETE CBC W/AUTO DIFF WBC: CPT

## 2024-05-14 PROCEDURE — 82746 ASSAY OF FOLIC ACID SERUM: CPT | Performed by: NURSE PRACTITIONER

## 2024-05-15 LAB
BK VIRUS DETECT, PL, QN LOG: NOT DETECTED {LOG_IU}/ML
BK VIRUS DETECT, PL, QN: NOT DETECTED [IU]/ML
CMV DNA DETECT, QN LOG: NOT DETECTED {LOG_IU}/ML
CMV DNA DETECT, QN: NOT DETECTED [IU]/ML
HGBA1C: <4.2 %

## 2024-05-17 LAB — TACROLIMUS LVL: 1.8 NG/ML

## 2024-05-31 ENCOUNTER — LAB ENCOUNTER (OUTPATIENT)
Dept: LAB | Age: 64
End: 2024-05-31
Payer: MEDICARE

## 2024-05-31 DIAGNOSIS — Z94.0 KIDNEY REPLACED BY TRANSPLANT (HCC): Primary | ICD-10-CM

## 2024-05-31 LAB — VIT D+METAB SERPL-MCNC: 86.1 NG/ML (ref 30–100)

## 2024-05-31 PROCEDURE — 82306 VITAMIN D 25 HYDROXY: CPT

## 2024-05-31 PROCEDURE — 80197 ASSAY OF TACROLIMUS: CPT

## 2024-05-31 PROCEDURE — 36415 COLL VENOUS BLD VENIPUNCTURE: CPT

## 2024-06-04 DIAGNOSIS — E11.65 TYPE 2 DIABETES MELLITUS WITH HYPERGLYCEMIA, WITH LONG-TERM CURRENT USE OF INSULIN (HCC): ICD-10-CM

## 2024-06-04 DIAGNOSIS — Z79.4 TYPE 2 DIABETES MELLITUS WITH HYPERGLYCEMIA, WITH LONG-TERM CURRENT USE OF INSULIN (HCC): ICD-10-CM

## 2024-06-05 LAB — TACROLIMUS LVL: 6 NG/ML

## 2024-06-05 RX ORDER — SITAGLIPTIN 100 MG/1
100 TABLET, FILM COATED ORAL DAILY
Qty: 90 TABLET | Refills: 1 | Status: SHIPPED | OUTPATIENT
Start: 2024-06-05

## 2024-06-05 NOTE — TELEPHONE ENCOUNTER
LOV:     RTC:    FU:    Last Refill:     Month Supply Pendin days    Last office note: - continue januvia 100mg daily (GFR 50 in 2024- would need dose adjustment to 50mg once GFR 30-45)     Last GFR with PCP on 24- result: 51    Refill pended and routed for review

## 2024-06-20 DIAGNOSIS — J44.89 ASTHMA WITH COPD (CHRONIC OBSTRUCTIVE PULMONARY DISEASE) (HCC): Chronic | ICD-10-CM

## 2024-06-21 RX ORDER — MONTELUKAST SODIUM 10 MG/1
10 TABLET ORAL DAILY
Qty: 90 TABLET | Refills: 0 | Status: SHIPPED | OUTPATIENT
Start: 2024-06-21

## 2024-06-21 RX ORDER — MONTELUKAST SODIUM 10 MG/1
10 TABLET ORAL DAILY
Qty: 90 TABLET | Refills: 0 | OUTPATIENT
Start: 2024-06-21

## 2024-06-21 NOTE — TELEPHONE ENCOUNTER
Refill Failed Protocol:     Pt requesting refill of   Requested Prescriptions     Pending Prescriptions Disp Refills    montelukast 10 MG Oral Tab 90 tablet 0     Sig: Take 1 tablet (10 mg total) by mouth daily.      Last Time Medication was Filled:  3/13/2024    Last Office Visit with Provider: 4/15/2024    Unable to approve refill,   Asthma & COPD Medication Protocol [id number]/20/2024 07:43 PM   Protocol Details Asthma Action Score greater than or equal to 20    AAP/ACT given in last 12 months    Appointment in past 6 or next 3 months        Appt. scheduled on 8/15/2024

## 2024-06-24 ENCOUNTER — OFFICE VISIT (OUTPATIENT)
Dept: HEMATOLOGY/ONCOLOGY | Facility: HOSPITAL | Age: 64
End: 2024-06-24
Attending: INTERNAL MEDICINE

## 2024-06-24 VITALS
OXYGEN SATURATION: 95 % | HEART RATE: 72 BPM | SYSTOLIC BLOOD PRESSURE: 157 MMHG | HEIGHT: 58.5 IN | RESPIRATION RATE: 16 BRPM | DIASTOLIC BLOOD PRESSURE: 64 MMHG | WEIGHT: 144 LBS | BODY MASS INDEX: 29.42 KG/M2 | TEMPERATURE: 97 F

## 2024-06-24 DIAGNOSIS — D69.6 THROMBOCYTOPENIA (HCC): Primary | ICD-10-CM

## 2024-06-24 DIAGNOSIS — R63.4 WEIGHT LOSS: ICD-10-CM

## 2024-06-24 DIAGNOSIS — R93.2 ABNORMAL FINDING ON IMAGING OF LIVER: ICD-10-CM

## 2024-06-24 DIAGNOSIS — D64.9 NORMOCYTIC ANEMIA: ICD-10-CM

## 2024-06-24 DIAGNOSIS — Z94.0 S/P KIDNEY TRANSPLANT (HCC): ICD-10-CM

## 2024-06-24 DIAGNOSIS — R74.8 ABNORMAL LEVELS OF OTHER SERUM ENZYMES: ICD-10-CM

## 2024-06-24 DIAGNOSIS — D84.821 IMMUNOSUPPRESSION DUE TO DRUG THERAPY (HCC): ICD-10-CM

## 2024-06-24 DIAGNOSIS — Z79.899 IMMUNOSUPPRESSION DUE TO DRUG THERAPY (HCC): ICD-10-CM

## 2024-06-24 PROCEDURE — 99205 OFFICE O/P NEW HI 60 MIN: CPT | Performed by: INTERNAL MEDICINE

## 2024-06-24 NOTE — CONSULTS
[facility] Report of Consultation    Patient Name: Lupe Arce   YOB: 1960   Medical Record Number: [id number]   CSN: [id number]   Consulting Physician: Michela Zaidi MD  Referring Physician(s): DO Dr. Calvin Flannery Dr.    Date of Consultation: 6/24/2024     Reason for Consultation:  Lupe Arce was seen today in the Cancer Center for the diagnosis of anemia    Chief Complaint:   Chief Complaint   Patient presents with    Consult         History of Present Illness:   64 y/o with a PMH significant for ESRD 2/2 DM and HTN. Was on HD since April 2018 and underwent robotic assisted living unrelated right renal transplant on 12/12/2019 at [facility]. Received induction immunosuppression with PLEX and thymoglobulin    Hemoglobin just prior to transplant was 10.2, platelets 112, creatinine was 6.83 (12/12/19). Creatinine normalized by 12/16/19. Hgb dropped to the 7s was was transfused PRBCs- she received a total of 6 units of PRBCs during her hospital stay. Scans had shown 2 hematomas. Also was treated for E coli pyelonephritis.      CBC from 10/2/2020: 4.7>14.1/42.2<186. 11/22/21: 7.5>13.1/39.8<177    By 3/2022 Hgb started to drop10.5--> 10.5 (1/2023)--> 11.2 (7/2023)-->9.4 (4/22/24). Platelets were normal until 5/14/24 from 174-->136.     Iron 62, TIBC 252, ferritin 742 (5/14/24)    Creatinine was 1.2 on 5/14/24    Says has been on oral iron since transplant. Has occasional black stools which she attributes to oral iron.     Had colonoscopy 11/2022: hemorrhoids, diveritculum, polyps. EGD 11/2019: mild duodenitis, negative for H pylori.    Reports some GI upset followed by diarrhea which she has had since she had the transplant. She said these would last for a few days then by the time she sees someone for this symptoms just improve/resolve on it's own. Has these in cycles.     Does not have much of an appetite. Eats typically a meal a day which is  healthy and balanced. Never feels hungry she said. Eats because she has to. Has also noticed that she gets cold when she eats. Has lost some weight- was 75.3 kg in 2021 and down to 65.3 kg today.    Does not feel more SOB or fatigued. Denies chest pain, change in urinary habits, headaches, dizziness or visual symptoms. No fevers or recent infections.     Recently started on folic acid supplement- last month.     Past Medical History:  Past Medical History:    Allergic rhinitis    Bad breath    Bloating    Always feels bloated    Cataracts, bilateral    Per Dr. Jernigan    Cholecystitis    Chronic kidney disease (CKD)    Constipation    If she is not stuffed up she has diarrhea    COVID    01/2022    Depression    Diabetes (HCC)    no meds    Diabetic retinopathy ([facility])    Per Dr. Jernigan    Diastolic dysfunction    with LVH. EF >55% on ECHO     Encephalopathy acute    Esophageal reflux    Essential hypertension    Eye disease    Flatulence/gas pain/belching    Always has gas    Food intolerance    Lactose intolerance    Frequent UTI    Always tests positive    Glaucoma    Heartburn    Hyperlipidemia    Hypertension    Hypothyroidism    Irregular bowel habits    Not regular    Loss of appetite    Only eats 1 maybe 2 a day    Macular puckering of retina    Nausea    PONV (postoperative nausea and vomiting)    Prerenal azotemia    Proliferative diabetic retinopathy(362.02)    left eye    Renal disorder    ESRD    S/P kidney transplant ([facility])    12/12/2019-right kidney transplant donated by his son.  Former dialysis patient due to diabetic kidney disease performed at [facility].      Severe obstructive sleep apnea    has cpap    Sleep apnea    Traction detachment of retina    Uncomfortable fullness after meals    Always she eats super slow.    Unspecified disorder of thyroid    Visual impairment    glasses    Vitreous hemorrhage (HCC)    Wears glasses       Past Surgical History:  Past Surgical History:   Procedure Laterality  Date    Appendectomy      Av fistula revision, open Left 2019    Cataract Bilateral     Colonoscopy      Colonoscopy N/A 2019    Procedure: COLONOSCOPY;  Surgeon: Armand Zhao MD;  Location:  ENDOSCOPY    Eye surgery      RV surgery    Hysterectomy      fibroids. unsure but thinks she still has her cervix    Roopa biopsy stereo nodule 1 site right (cpt=19081)  ??    BENIGN      83, 86, 84, 90    Organ transplant      Other surgical history      cyst removal on thigh area of leg    Total abdom hysterectomy      Transplantation of kidney  2019    kidney swap with son  at [facility]    Upper gi endo no barretts  2015    antral erosions       Family Medical History:  Family History   Problem Relation Age of Onset    High Blood Pressure Mother     Ulcerative Colitis Mother     Diabetes Mother     Other (Positive Coivd) Mother 80    Diabetes Father     Heart Attack Father     Depression Daughter     Other (High Blood Pressure, and Pre-Diabetes) Daughter     Depression Daughter     High Blood Pressure Son     Hypertension Son     High Blood Pressure Son     Breast Cancer Paternal Aunt     Uterine Cancer Paternal Aunt        Gyne History:  OB History    Para Term  AB Living   4 4 0 0 0 0   SAB IAB Ectopic Multiple Live Births   0 0 0 0 0       Psychosocial History:  Social History     Socioeconomic History    Marital status:      Spouse name: Not on file    Number of children: 3    Years of education: Not on file    Highest education level: Not on file   Occupational History    Occupation: House Keeper   Tobacco Use    Smoking status: Never    Smokeless tobacco: Never   Vaping Use    Vaping status: Never Used   Substance and Sexual Activity    Alcohol use: No    Drug use: No    Sexual activity: Never   Other Topics Concern     Service Not Asked    Blood Transfusions Not Asked    Caffeine Concern No    Occupational Exposure Not  Asked    Hobby Hazards Not Asked    Sleep Concern Not Asked    Stress Concern Not Asked    Weight Concern Not Asked    Special Diet Not Asked    Back Care Not Asked    Exercise Yes    Bike Helmet Not Asked    Seat Belt No    Self-Exams Not Asked   Social History Narrative    Not on file     Social Determinants of Health     Financial Resource Strain: Not on file   Food Insecurity: Not on file   Transportation Needs: Not on file   Physical Activity: Not on file   Stress: Not on file   Social Connections: Not on file   Housing Stability: Not on file       Allergies:   Allergies   Allergen Reactions    Ace Inhibitors RENAL INSUFFICIENCY    Folic Acid-Vit B6-Vit B12 HIVES and ITCHING    Vitamin B12 OTHER (SEE COMMENTS)    B12-Ca RASH     Vit b12 and calcium acetate combo       Current Medications:    Current Outpatient Medications:     montelukast 10 MG Oral Tab, Take 1 tablet (10 mg total) by mouth daily., Disp: 90 tablet, Rfl: 0    JANUVIA 100 MG Oral Tab, TAKE 1 TABLET(100 MG) BY MOUTH DAILY, Disp: 90 tablet, Rfl: 1    folic acid 1 MG Oral Tab, Take 1 tablet (1 mg total) by mouth daily., Disp: 90 tablet, Rfl: 3    insulin aspart (NOVOLOG FLEXPEN) 100 Units/mL Subcutaneous Solution Pen-injector, Take 4u before each meal plus a correction 1:40>140 TID AC. Max TDD 35u daily. Taking on an as-needed basis for now., Disp: , Rfl:     buPROPion  MG Oral Tablet 12 Hr, Take 1 tablet (150 mg total) by mouth daily., Disp: 90 tablet, Rfl: 1    carvedilol 6.25 MG Oral Tab, Take 1 tablet (6.25 mg total) by mouth 2 (two) times daily with meals., Disp: 180 tablet, Rfl: 1    atorvastatin 10 MG Oral Tab, Take 1 tablet (10 mg total) by mouth nightly., Disp: 90 tablet, Rfl: 1    cetirizine 10 MG Oral Tab, Take 1 tablet (10 mg total) by mouth daily., Disp: 90 tablet, Rfl: 0    Insulin Pen Needle (BD PEN NEEDLE ANTHONY 2ND GEN) 32G X 4 MM Does not apply Misc, Use to inject insulin up to five times daily. 3 month supply, Disp: 450 each,  Rfl: 2    TRUEPLUS PEN NEEDLES 32G X 4 MM Does not apply Misc, TEST FOUR TIMES DAILY, Disp: 400 each, Rfl: 3    insulin glargine (LANTUS SOLOSTAR) 100 UNIT/ML Subcutaneous Solution Pen-injector, Inject 34 Units into the skin nightly. 3 mo supply, Disp: , Rfl:     Empagliflozin (JARDIANCE) 25 MG Oral Tab, Take 25 mg by mouth daily., Disp: 90 tablet, Rfl: 2    levothyroxine 88 MCG Oral Tab, Take 1 tablet (88 mcg total) by mouth before breakfast., Disp: 90 tablet, Rfl: 2    dapsone 100 MG Oral Tab, , Disp: , Rfl:     omeprazole 20 MG Oral Capsule Delayed Release, Take 1 capsule (20 mg total) by mouth 2 (two) times daily before meals., Disp: 180 capsule, Rfl: 3    Microlet Lancets Does not apply Misc, Check blood sugar once daily, Disp: 200 each, Rfl: 1    Glucose Blood (CONTOUR NEXT TEST) In Vitro Strip, Check BG once daily, Disp: 200 each, Rfl: 2    ALBUTEROL 108 (90 Base) MCG/ACT Inhalation Aero Soln, INHALE 2 PUFFS BY MOUTH INTO THE LUNGS EVERY 4 HOURS AS NEEDED FOR WHEEZING, Disp: 18 g, Rfl: 0    FLUTICASONE PROPIONATE 50 MCG/ACT Nasal Suspension, SHAKE LIQUID AND USE 2 SPRAYS IN EACH NOSTRIL DAILY, Disp: 48 g, Rfl: 0    ENVARSUS XR 0.75 MG Oral Tablet 24 Hr, Take 3 tablets by mouth daily., Disp: , Rfl:     TRUE METRIX BLOOD GLUCOSE TEST In Vitro Strip, TEST 3 TIMES A DAY, Disp: 300 strip, Rfl: 3    Meclizine HCl 12.5 MG Oral Tab, Take 1 tablet (12.5 mg total) by mouth 3 (three) times daily as needed., Disp: 30 tablet, Rfl: 0    valGANciclovir HCl (VALCYTE OR), Take 450 mg by mouth., Disp: , Rfl:     Lancets Does not apply Misc, 3x daily lancets for True Metrix meter, Disp: 300 each, Rfl: 3    mycophenolate sodium 360 MG Oral Tab EC, Take 1 tablet (360 mg total) by mouth 4 (four) times daily. For kidney transplant, Disp: 120 tablet, Rfl: 0    Biotin 5000 MCG Oral Cap, , Disp: , Rfl:     ondansetron 4 MG Oral Tablet Dispersible, , Disp: , Rfl:     ergocalciferol 1.25 MG (98368 UT) Oral Cap, , Disp: , Rfl:      FEROSUL 325 (65 Fe) MG Oral Tab, Take by mouth daily., Disp: , Rfl:     Blood Glucose Monitoring Suppl (TRUE METRIX AIR GLUCOSE METER) w/Device Does not apply Kit, U UTD WITH STRIPS TO TEST BLOOD GLUCOSE, Disp: , Rfl:     Alcohol Swabs (BD SWAB SINGLE USE REGULAR) Does not apply Pads, U UTD QID, Disp: , Rfl:     acetaminophen 500 MG Oral Tab, Take 2 tablets (1,000 mg total) by mouth one time., Disp: , Rfl:     Review of Systems:  A 14-point ROS was done with pertinent positives and negative per the HPI    Vital Signs:  /64 (BP Location: Right arm, Patient Position: Sitting, Cuff Size: adult)   Pulse 72   Temp 97.1 °F (36.2 °C) (Temporal)   Resp 16   Ht 1.486 m (4' 10.5\")   Wt 65.3 kg (144 lb)   LMP  (LMP Unknown)   SpO2 95%   BMI 29.58 kg/m²       Physical Examination:  General: Patient is alert and oriented x 3, not in acute distress.  HEENT: EOMs intact. PERRL. Oropharynx is clear.   Neck: No JVD. No palpable lymphadenopathy. Neck is supple.  Chest: Clear to auscultation.  Heart: Regular rate and rhythm.   Abdomen: Soft, non tender with good bowel sounds.  Extremities: Pedal pulses are present. No edema.  Neurological: Grossly intact.   Lymphatics: There is no palpable lymphadenopathy throughout in the cervical, supraclavicular, axillary, or inguinal regions.  Psych/Depression: Mood and affect are appropriate.    Laboratory:  Lab Component   Component Value Date/Time    RED BLOOD CELL COUNT 3.79 (L) 07/15/2023 1038    RED BLOOD COUNT 4.40 07/11/2014 1105    RED BLOOD COUNT 4.57 05/23/2011 2037    RBC 3.69 (L) 05/14/2024 0804    RBC 3.17 (L) 04/22/2024 1440    RBC 4.97 10/02/2020 1258    RBC 4.30 11/16/2018 1221    HGB 10.7 (L) 05/14/2024 0804    HGB 9.4 (L) 04/22/2024 1440    HGB 14.1 10/02/2020 1258    HGB 12.1 07/11/2014 1105    HGB 12.5 05/23/2011 2037    HEMOGLOBIN 11.2 (L) 07/15/2023 1038    Hemoglobin 12.5 11/16/2018 1221    HEMATOCRIT 35.2 07/15/2023 1038    HCT 36.6 05/14/2024 0804    HCT  31.6 (L) 04/22/2024 1440    HCT 42.2 10/02/2020 1258    HCT 37.1 07/11/2014 1105    HCT 36.4 (L) 05/23/2011 2037    Hematocrit 38.8 11/16/2018 1221    MCV 99.2 05/14/2024 0804    MCV 99.7 04/22/2024 1440    MCV 92.9 07/15/2023 1038    MCV 84.9 10/02/2020 1258    MCV 90.2 11/16/2018 1221    MEAN CELL VOLUME 84.3 07/11/2014 1105    MEAN CELL VOLUME 79.6 (L) 05/23/2011 2037    MCH 29.0 05/14/2024 0804    MCH 29.7 04/22/2024 1440    MCH 29.6 07/15/2023 1038    MCH 28.4 10/02/2020 1258    MCH 29.1 11/16/2018 1221    Mean Corpuscular Hemoglobin 27.5 07/11/2014 1105    Mean Corpuscular Hemoglobin 27.4 05/23/2011 2037    MCHC 29.2 (L) 05/14/2024 0804    MCHC 29.7 (L) 04/22/2024 1440    MCHC 31.8 (L) 07/15/2023 1038    MCHC 33.4 10/02/2020 1258    MCHC 32.2 11/16/2018 1221    Mean Corpuscular HGB Conc 32.6 07/11/2014 1105    Mean Corpuscular HGB Conc 34.3 05/23/2011 2037    RDW 13.3 05/14/2024 0804    RDW 17.2 04/22/2024 1440    RDW 13.0 07/15/2023 1038    RDW 13.2 10/02/2020 1258    RDW 14.7 11/16/2018 1221    RED CELL DISTRIBUTION WIDTH 12.4 07/11/2014 1105    RED CELL DISTRIBUTION WIDTH 12.5 05/23/2011 2037    PRELIMINARY NEUTROPHIL ABS 3.91 07/11/2014 1105    Neutrophil Absolute Prelim 2.96 05/14/2024 0804    Neutrophil Absolute Prelim 3.89 10/02/2020 1258    Neutrophil Absolute Prelim 6.08 09/06/2018 1044    WBC 4.4 05/14/2024 0804    WBC 6.8 04/22/2024 1440    WBC 4.7 10/02/2020 1258    WBC 6.54 11/16/2018 1221    WBC 6.0 07/11/2014 1105    WBC 6.1 05/23/2011 2037    WHITE BLOOD CELL COUNT 5.4 07/15/2023 1038    Platelet Count 177 11/16/2018 1221    PLATELET COUNT 165 07/15/2023 1038    .0 (L) 05/14/2024 0804    .0 04/22/2024 1440    .0 10/02/2020 1258    .0 07/11/2014 1105    .0 05/23/2011 2037       Lab Component   Component Value Date/Time    GLUCOSE 137 (H) 01/04/2024 1041    GLUCOSE 165 (H) 07/15/2023 1033    GLUCOSE 95 10/14/2022 0820    Glucose 75 05/14/2024 0804    BUN 30 (H)  05/14/2024 0804    UREA NITROGEN (BUN) 31 (H) 01/04/2024 1041    UREA NITROGEN (BUN) 31 (H) 07/15/2023 1033    UREA NITROGEN (BUN) 26 (H) 10/14/2022 0820    CREATININE 1.21 (H) 01/04/2024 1041    CREATININE 1.02 07/15/2023 1033    CREATININE 1.45 (H) 10/14/2022 0820    Creatinine 1.20 (H) 05/14/2024 0804    eGFR  67 02/24/2022 1115    eGFR  71 07/23/2020 1034    eGFR  73 04/14/2020 1136    GFR, -American 62 10/02/2020 1258    eGFR NON-AFR. AMERICAN 58 (L) 02/24/2022 1115    eGFR NON-AFR. AMERICAN 61 07/23/2020 1034    eGFR NON-AFR. AMERICAN 63 04/14/2020 1136    GFR, Non- 54 (L) 10/02/2020 1258    CALCIUM 9.2 01/04/2024 1041    CALCIUM 9.6 07/15/2023 1033    CALCIUM 9.7 10/14/2022 0820    Calcium, Total 8.3 (L) 05/14/2024 0804    Albumin 3.9 05/14/2024 0804    ALBUMIN 4.6 01/04/2024 1041    ALBUMIN 4.6 07/15/2023 1033    ALBUMIN 4.4 01/03/2023 1245    Sodium 143 05/14/2024 0804    SODIUM 140 01/04/2024 1041    SODIUM 139 07/15/2023 1033    SODIUM 142 10/14/2022 0820    Potassium 4.0 05/14/2024 0804    POTASSIUM 4.3 01/04/2024 1041    POTASSIUM 4.9 07/15/2023 1033    POTASSIUM 4.2 10/14/2022 0820    Chloride 113 (H) 05/14/2024 0804    CHLORIDE 105 01/04/2024 1041    CHLORIDE 102 07/15/2023 1033    CHLORIDE 105 10/14/2022 0820    CARBON DIOXIDE 27 01/04/2024 1041    CARBON DIOXIDE 29 07/15/2023 1033    CARBON DIOXIDE 29 10/14/2022 0820    CO2 26.0 05/14/2024 0804    Alkaline Phosphatase 173 (H) 05/14/2024 0804    ALKALINE PHOSPHATASE 74 01/04/2024 1041    ALKALINE PHOSPHATASE 101 07/15/2023 1033    ALKALINE PHOSPHATASE 84 01/03/2023 1245    AST 35 05/14/2024 0804    AST 54 (H) 01/04/2024 1041    AST 24 07/15/2023 1033    AST 31 01/03/2023 1245    ALT 23 05/14/2024 0804    ALT 40 (H) 01/04/2024 1041    ALT 18 07/15/2023 1033    ALT 18 01/03/2023 1245    BILIRUBIN, TOTAL 1.4 (H) 01/04/2024 1041    BILIRUBIN, TOTAL 1.6 (H) 07/15/2023 1033     BILIRUBIN, TOTAL 1.5 (H) 01/03/2023 1245    Bilirubin, Total 0.8 05/14/2024 0804    Total Protein 6.9 05/14/2024 0804    PROTEIN, TOTAL 7.6 01/04/2024 1041    PROTEIN, TOTAL 6.9 07/15/2023 1033    PROTEIN, TOTAL 6.7 01/03/2023 1245      Latest Reference Range & Units 05/14/24 08:04   Iron, Serum 50 - 170 ug/dL 62   Transferrin 200 - 360 mg/dL 169 (L)   Iron Bind.Cap.(TIBC) 240 - 450 ug/dL 252   Iron Saturation 15 - 50 % 25   FERRITIN 18.0 - 340.0 ng/mL 742.9 (H)   Vitamin B12 193 - 986 pg/mL 559   FOLATE (FOLIC ACID), SERUM >=8.7 ng/mL 7.8 (L)   (L): Data is abnormally low  (H): Data is abnormally high   Latest Reference Range & Units 05/14/24 08:04   T4,Free (Direct) 0.8 - 1.7 ng/dL 1.3   TSH 0.358 - 3.740 mIU/mL 1.510         Radiology:  Anatomical Region Laterality Modality   Abdomen -- Ultrasound     Impression    CONCLUSION:    1. Inhomogeneous hepatic parenchyma.  This can be seen with underlying hepatocellular disease or fatty infiltration.  2. Incomplete assessment of the pancreas secondary to bowel gas obscuration.  3. Details as above.  Continued clinical correlation recommended.          Dictated by (CST): Venu Bruno MD on 10/12/2020 at 11:11 AM      Finalized by (CST): Venu Bruno MD on 10/12/2020 at 11:13 AM    Narrative    PROCEDURE:  US ABDOMEN COMPLETE (CPT=76700)    COMPARISON:  CT, CT ABDOMEN+PELVIS(CPT=74176), 9/06/2018, 12:01 PM.    INDICATIONS:  R74.8 Abnormal levels of other serum enzymes    TECHNIQUE:  Real time gray-scale ultrasound was used to evaluate the abdomen.  The exam includes images of the liver, gallbladder, common bile duct, pancreas, spleen, kidneys, IVC, and aorta.    PATIENT STATED HISTORY: (As transcribed by Technologist)  pt states nausea, and alternates between constipation and diarrhea.  Also paitent has generalized abdomen pain.        FINDINGS:    LIVER:  There is diffuse inhomogeneity.  No focal mass.  BILIARY:  Nondistended gallbladder.  No shadowing calculus.  No  intrahepatic biliary dilatation.  CBD is measured up to 0.4 cm.  PANCREAS:  Obscured by bowel gas.  SPLEEN:  Uniform echotexture.  Bipolar Size 10.8 cm.  KIDNEYS:  Native kidneys are seen in normal anatomic positions.  Both are atrophic without hydronephrosis.  Right measures 7.8 and left 8.4 cm in bipolar dimensions.  There is a right lower quadrant transplant kidney.  No hydronephrosis or perirenal  fluid.  AORTA/IVC:  No abdominal aortic aneurysm.        Procedure Note    Venu Bruno MD - 10/12/2020  Formatting of this note might be different from the original.  PROCEDURE:  US ABDOMEN COMPLETE (CPT=76700)    COMPARISON:  CT, CT ABDOMEN+PELVIS(CPT=74176), 9/06/2018, 12:01 PM.    INDICATIONS:  R74.8 Abnormal levels of other serum enzymes    TECHNIQUE:  Real time gray-scale ultrasound was used to evaluate the abdomen.  The exam includes images of the liver, gallbladder, common bile duct, pancreas, spleen, kidneys, IVC, and aorta.    PATIENT STATED HISTORY: (As transcribed by Technologist)  pt states nausea, and alternates between constipation and diarrhea.  Also paitent has generalized abdomen pain.      FINDINGS:    LIVER:  There is diffuse inhomogeneity.  No focal mass.  BILIARY:  Nondistended gallbladder.  No shadowing calculus.  No intrahepatic biliary dilatation.  CBD is measured up to 0.4 cm.  PANCREAS:  Obscured by bowel gas.  SPLEEN:  Uniform echotexture.  Bipolar Size 10.8 cm.  KIDNEYS:  Native kidneys are seen in normal anatomic positions.  Both are atrophic without hydronephrosis.  Right measures 7.8 and left 8.4 cm in bipolar dimensions.  There is a right lower quadrant transplant kidney.  No hydronephrosis or perirenal  fluid.  AORTA/IVC:  No abdominal aortic aneurysm.            =====  CONCLUSION:    1. Inhomogeneous hepatic parenchyma.  This can be seen with underlying hepatocellular disease or fatty infiltration.  2. Incomplete assessment of the pancreas secondary to bowel gas obscuration.  3.  Details as above.  Continued clinical correlation recommended.          Dictated by (CST): Venu Bruno MD on 10/12/2020 at 11:11 AM      Finalized by (CST): Venu Bruno MD on 10/12/2020 at 11:13 AM    Exam End: 10/12/20 08:58    Specimen Collected: 10/12/20 11:13 Last Resulted: 10/12/20 11:13   Received From: [facility]  Result Received: 04/18/22 10:30       STUDY REASON: Pre-Transplant evaluation    EXAM:  CT OF THE CHEST WITHOUT CONTRAST.    DATE: 11/7/2019 7:58 AM    CLINICAL INDICATION: 59-year-old female with 37 year history of secondhand smoke exposure who  presents for pretransplant evaluation.    COMPARISON: CT examination of the abdomen and pelvis dated October 22, 2019.    TECHNIQUE: Multidetector multiplanar CT images of the chest were obtained without the use of  intravenous contrast material.    FINDINGS:    MEDIASTINUM: Multiple AP window, paratracheal and subcarinal lymph nodes are noted measuring up to  0.7 cm in short axis (series 2 image 22, for example). There is no hilar adenopathy by size  criteria. The thyroid is unremarkable.    AXILLA: There is a prominent right supraclavicular lymph node measuring 0.5 x 0.5 cm (series 2  image 9). Small left supraclavicular lymph nodes are noted measuring up to 0.4 cm (series 2 image  13). There is no axillary adenopathy by size criteria.    HEART AND GREAT VESSELS:  The heart is mildly enlarged.  There is no pericardial effusion.  The  thoracic aorta is normal in caliber with three-vessel takeoff. Scattered atherosclerotic  calcifications are noted in the arch.    LUNGS: There is no pleural effusion or pneumothorax. In the left upper lobe there is a nodular  opacity measuring 1.0 x 1.1 x 0.9 cm (AP by TR by CC) with mild adjacent groundglass opacities  (series 2 image 26, series 400 image 130). There is mild atelectasis/scarring in the left lower  lobe, similar to the comparison examination. Multiple bilateral calcified pulmonary  micronodules  measuring up to 0.3 cm are noted (series 3 images 15, 16, 19, 22 and 24, for example), probably  calcified granulomas. A few solid pulmonary micronodules measuring up to 0.3 cm are also noted  (series 3 images 19 and 24, for example).    AIRWAY: The tracheobronchial tree is without obstructing mass or lesion.    UPPER ABDOMEN: There is no intraperitoneal free air or fluid in the visualized upper abdomen.  There is an incidental finding of a 0.7 cm splenule (series 2 image 46).    BONES: No acute osseous abnormality is identified. There are mild degenerative changes in the  thoracic spine with a few anterior osteophytes.      IMPRESSION:    1.  Left upper lobe 1.1 cm pulmonary nodule with prominent supraclavicular and mediastinal lymph  nodes. Recommend 3 month follow-up with CT or PET/CT.  2.  A few pulmonary micronodules measuring up to 0.3 cm. These can be reassessed for stability on  follow-up.    These images were reviewed and interpreted with attending radiologist Dr. Jiang before dictation of  this final report by resident Dr. Casas.    FINAL    I HAVE REVIEWED THE IMAGES AND INTERPRETATIONS AND AGREE WITH THE FINDING    Attending Radiologist:  Christa Jiang MD  Date Signed off:  11.07.2019 11:07  Radiology Resident:  Shady Casas DO  Dictated By:  Shady Casas DO   11/07/2019 08:45  Transcribed By:  PT   11.07.2019 11:07  Procedure Note    Provider, Generic External Data / Christa Jiang. - 10/15/2020  Formatting of this note might be different from the original.  STUDY REASON: Pre-Transplant evaluation    EXAM:  CT OF THE CHEST WITHOUT CONTRAST.    DATE: 11/7/2019 7:58 AM    CLINICAL INDICATION: 59-year-old female with 37 year history of secondhand smoke exposure who  presents for pretransplant evaluation.    COMPARISON: CT examination of the abdomen and pelvis dated October 22, 2019.    TECHNIQUE: Multidetector multiplanar CT images of the chest were obtained without the use  of  intravenous contrast material.    FINDINGS:    MEDIASTINUM: Multiple AP window, paratracheal and subcarinal lymph nodes are noted measuring up to  0.7 cm in short axis (series 2 image 22, for example). There is no hilar adenopathy by size  criteria. The thyroid is unremarkable.    AXILLA: There is a prominent right supraclavicular lymph node measuring 0.5 x 0.5 cm (series 2  image 9). Small left supraclavicular lymph nodes are noted measuring up to 0.4 cm (series 2 image  13). There is no axillary adenopathy by size criteria.    HEART AND GREAT VESSELS:  The heart is mildly enlarged.  There is no pericardial effusion.  The  thoracic aorta is normal in caliber with three-vessel takeoff. Scattered atherosclerotic  calcifications are noted in the arch.    LUNGS: There is no pleural effusion or pneumothorax. In the left upper lobe there is a nodular  opacity measuring 1.0 x 1.1 x 0.9 cm (AP by TR by CC) with mild adjacent groundglass opacities  (series 2 image 26, series 400 image 130). There is mild atelectasis/scarring in the left lower  lobe, similar to the comparison examination. Multiple bilateral calcified pulmonary micronodules  measuring up to 0.3 cm are noted (series 3 images 15, 16, 19, 22 and 24, for example), probably  calcified granulomas. A few solid pulmonary micronodules measuring up to 0.3 cm are also noted  (series 3 images 19 and 24, for example).    AIRWAY: The tracheobronchial tree is without obstructing mass or lesion.    UPPER ABDOMEN: There is no intraperitoneal free air or fluid in the visualized upper abdomen.  There is an incidental finding of a 0.7 cm splenule (series 2 image 46).    BONES: No acute osseous abnormality is identified. There are mild degenerative changes in the  thoracic spine with a few anterior osteophytes.      IMPRESSION:    1.  Left upper lobe 1.1 cm pulmonary nodule with prominent supraclavicular and mediastinal lymph  nodes. Recommend 3 month follow-up with CT or  PET/CT.  2.  A few pulmonary micronodules measuring up to 0.3 cm. These can be reassessed for stability on  follow-up.    These images were reviewed and interpreted with attending radiologist Dr. Jiang before dictation of  this final report by resident Dr. Casas.     STUDY REASON: EDGARDO lung nodule, pre-transplant    PROCEDURE: PET/CT examination from base of skull to mid thigh level.    CLINICAL STATEMENT: 59-year-old female with left upper lobe lung nodule awaiting kidney  transplant.    TECHNIQUE: 12.8 mCi of FDG was administered by IV injection into the right antecubital vein.  Approximately 84 minutes post injection, low-dose CT and PET images from the skull base to the  upper thighs were acquired on the [employer] 690 PET/CT with the patient in the fasted state. The  CT was with oral contrast, but without IV contrast and was used for image fusion, attenuation  correction and lesion localization only.  This is not a diagnostic CT and a separate CT report  will not be generated. The patient's fasting blood glucose measured just before IV injection of  FDG was 136 mg/dl.    The standardized uptake values (SUV) are normalized to patient body weight and indicate the  highest activity concentration (SUV max) in a given disease site.    COMPARISON FDG PET/CT: None.    OTHER STUDIES USED FOR CORRELATION: CT examination of the chest dated November 7, 2019.    FINDINGS:    HEAD/FACE:                          Physiologic FDG uptake is seen in the visualized regions of  the brain, large salivary glands, and oropharynx.    NECK:                                      Physiologic FDG uptake is seen in neck muscles.                                    LUNGS/PLEURA:                   There is no definite increased activity in the previously  described lung nodule, however the size is borderline for detection by PET. There is similar mild  heterogeneous activity in the left lung base, which is nonspecific and could be infectious  or  inflammatory in nature.    HEART/MEDIASTINUM:        The mediastinal lymph nodes are too small to accurately characterize by  PET. Physiologic FDG avidity is seen in mediastinal blood pool, myocardium. There is  mild-to-moderate calcific atherosclerosis of the coronary arteries.    THORACIC NODES:              No abnormal uptake.    HEPATOBILIARY:                No abnormal uptake. Liver background SUV mean, as a reference for  comparing FDG studies, is 3.0.    SPLEEN:                                 No abnormal uptake.    PANCREAS:                           No abnormal uptake.    ADRENAL GLANDS:             No abnormal uptake.    KIDNEYS/URETERS/  BLADDER:                              The kidneys are small, consistent with the patient's  history. Excreted activity is seen.    ABDOMINOPELVIC/INGUINAL  NODES:                                   No abnormal uptake.    GI TRACT/PERITONEUM/  MESENTERY:                         No abnormal uptake.    PELVIC ORGANS:                 No abnormal uptake.    BONES/SOFT TISSUES:       There is a small focus of increased activity with SUV max 2.0 and the  skin overlying the right clavicle (image 79), which could be infectious or inflammatory in nature.    OTHER FINDINGS:                 None.      IMPRESSION:    1. No evidence of intense FDG activity in the lung nodule questioned to suggest malignancy,  however size is borderline for detection by PET.    2. Lymph nodes in the mediastinum are too small to adequately characterize by PET.    3. No evidence of malignancy or abnormal FDG avid lesion in the remaining scan.      The following terms are used in [facility] Nuclear Medicine reports to convey nuclear medicine  physician's level of certainty for a given interpretation.    Consistent with                                                          > 90%  Suspicious/Probable/Probably                               approx 75%  Possible/Possibly                                                      approx 50%  Less likely                                                                   approx 25%  Unlikely                                                                        < 10%    These findings were reviewed by attending radiologist Dr. Rangel prior to final dictation by  radiology resident Dr. Antonio.        I HAVE REVIEWED THE IMAGES AND INTERPRETATIONS AND AGREE WITH THE FINDING    Attending Radiologist:  Nadeem Rangel MD  Date Signed off:  11.22.2019 17:57  Radiology Resident:  Dereje Antonio MD  Dictated By:  Dereje Antonio MD   11/22/2019 15:28  Transcribed By:  PT   11.22.2019 17:57  Procedure Note    Provider, Generic External Data / Nadeem Rangel - 10/15/2020  Formatting of this note might be different from the original.  STUDY REASON: EDGARDO lung nodule, pre-transplant    PROCEDURE: PET/CT examination from base of skull to mid thigh level.    CLINICAL STATEMENT: 59-year-old female with left upper lobe lung nodule awaiting kidney  transplant.    TECHNIQUE: 12.8 mCi of FDG was administered by IV injection into the right antecubital vein.  Approximately 84 minutes post injection, low-dose CT and PET images from the skull base to the  upper thighs were acquired on the [employer] 690 PET/CT with the patient in the fasted state. The  CT was with oral contrast, but without IV contrast and was used for image fusion, attenuation  correction and lesion localization only.  This is not a diagnostic CT and a separate CT report  will not be generated. The patient's fasting blood glucose measured just before IV injection of  FDG was 136 mg/dl.    The standardized uptake values (SUV) are normalized to patient body weight and indicate the  highest activity concentration (SUV max) in a given disease site.    COMPARISON FDG PET/CT: None.    OTHER STUDIES USED FOR CORRELATION: CT examination of the chest dated November 7, 2019.    FINDINGS:    HEAD/FACE:                          Physiologic FDG uptake  is seen in the visualized regions of  the brain, large salivary glands, and oropharynx.    NECK:                                      Physiologic FDG uptake is seen in neck muscles.              LUNGS/PLEURA:                   There is no definite increased activity in the previously  described lung nodule, however the size is borderline for detection by PET. There is similar mild  heterogeneous activity in the left lung base, which is nonspecific and could be infectious or  inflammatory in nature.    HEART/MEDIASTINUM:        The mediastinal lymph nodes are too small to accurately characterize by  PET. Physiologic FDG avidity is seen in mediastinal blood pool, myocardium. There is  mild-to-moderate calcific atherosclerosis of the coronary arteries.    THORACIC NODES:              No abnormal uptake.    HEPATOBILIARY:                No abnormal uptake. Liver background SUV mean, as a reference for  comparing FDG studies, is 3.0.    SPLEEN:                                 No abnormal uptake.    PANCREAS:                           No abnormal uptake.    ADRENAL GLANDS:             No abnormal uptake.    KIDNEYS/URETERS/  BLADDER:                              The kidneys are small, consistent with the patient's  history. Excreted activity is seen.    ABDOMINOPELVIC/INGUINAL  NODES:                                   No abnormal uptake.    GI TRACT/PERITONEUM/  MESENTERY:                         No abnormal uptake.    PELVIC ORGANS:                 No abnormal uptake.    BONES/SOFT TISSUES:       There is a small focus of increased activity with SUV max 2.0 and the  skin overlying the right clavicle (image 79), which could be infectious or inflammatory in nature.    OTHER FINDINGS:                 None.      IMPRESSION:    1. No evidence of intense FDG activity in the lung nodule questioned to suggest malignancy,  however size is borderline for detection by PET.    2. Lymph nodes in the mediastinum are too small to  adequately characterize by PET.    3. No evidence of malignancy or abnormal FDG avid lesion in the remaining scan.      The following terms are used in [facility] Medicine reports to convey nuclear medicine  physician's level of certainty for a given interpretation.    Consistent with                                                          > 90%  Suspicious/Probable/Probably                               approx 75%  Possible/Possibly                                                     approx 50%  Less likely                                                                   approx 25%  Unlikely                                                                        < 10%    These findings were reviewed by attending radiologist Dr. Rangel prior to final dictation by  radiology resident Dr. Antonio.     Impression & Plan:   1. Anemia  - normocytic, normochromic  - anemic prior to transplant, dropped during transplant requiring transfusions but eventually Hgb normalized and stayed normal as far as I can tell until 2022 then has progressively dropped  - has been on oral iron since transplant. Folic acid supplementation just started for low levels  - iron studies not c/w deficiency, B12 normal  - work-up with labs as ordered. In post-transplant patients anemia may redevelop in association with decreased allograft function (her creatinine has bumped up some recently), immunosuppressive and anti-viral drugs, infections   - parvo to be sent by renal at next blood draw per note    2. ESRD s/p renal transplant  - now being followed at [facility] transplant [place]  - on Tacrolimus, Myfortic and Valcyte    3. Gastrointestinal issues  - follows with GI  - cycles she says and run their course on its own    4. Lung nodule  - seen on CT as part of pretransplant w/u measuring 1.1 cm  - PET showed no significant uptake but described as borderline size for detection by PET   - would be prudent to monitor for stability with f/u CT    5.  Thrombocytopenia  - new this year, mild  - could be related to folate deficiency, liver disease?    6. Diffuse inhomogeneity of liver  - seen on imaging  - could be fatty liver.   - no suspicious uptake seen on PET   - LFTs normal except for elevated alk phos    7. Weight loss  - had lost weight post transplant but did gain after but started losing again by 2021  - no appetite and has 1 meal a day  - protein and albumin normal   - per PCP    W/u as ordered  [facility]u pending daughter's schedule who she relies on to bring to appointments.     I spent 60 minutes face to face with the patient.  More than 50% of that time was spent counseling the patient and/or on coordination of care.      MD [facility]

## 2024-06-24 NOTE — PROGRESS NOTES
Education Record    Learner:  Patient    Disease / Diagnosis: anemia    Barriers / Limitations:  None   Comments:    Method:  Discussion   Comments:    General Topics:  Plan of care reviewed   Comments:    Outcome:  Shows understanding   Comments: new consult referred by PCP. Pt has hx of kidney transplant in 2019. Been taking oral iron since. Tolerating ok. + black stools but no bleeding. States last GI work up was 2019 possibly even more recent. Denies any SOB or dizziness. States her energy is good.

## 2024-07-03 ENCOUNTER — TELEPHONE (OUTPATIENT)
Facility: CLINIC | Age: 64
End: 2024-07-03

## 2024-07-03 NOTE — TELEPHONE ENCOUNTER
Received fax from [facility] for a CGM Documentation request. States needs a signed CGM physician order form completed due to patient being new to medicare.     Form completed and placed in APN in basket for signing. Will fax form and chart notes upon signing.

## 2024-07-05 ENCOUNTER — TELEPHONE (OUTPATIENT)
Dept: HEMATOLOGY/ONCOLOGY | Facility: HOSPITAL | Age: 64
End: 2024-07-05

## 2024-07-05 DIAGNOSIS — D59.8 OTHER ACQUIRED HEMOLYTIC ANEMIAS (HCC): Primary | ICD-10-CM

## 2024-07-05 DIAGNOSIS — D69.6 THROMBOCYTOPENIA (HCC): ICD-10-CM

## 2024-07-05 DIAGNOSIS — D64.9 NORMOCYTIC ANEMIA: ICD-10-CM

## 2024-07-05 DIAGNOSIS — D50.8 OTHER IRON DEFICIENCY ANEMIA: ICD-10-CM

## 2024-07-05 PROBLEM — N18.9 ANEMIA DUE TO CHRONIC KIDNEY DISEASE: Status: ACTIVE | Noted: 2024-07-05

## 2024-07-05 PROBLEM — D63.1 ANEMIA DUE TO CHRONIC KIDNEY DISEASE: Status: ACTIVE | Noted: 2024-07-05

## 2024-07-13 DIAGNOSIS — J44.89 ASTHMA WITH COPD (CHRONIC OBSTRUCTIVE PULMONARY DISEASE) (HCC): Chronic | ICD-10-CM

## 2024-07-16 RX ORDER — MONTELUKAST SODIUM 10 MG/1
10 TABLET ORAL DAILY
Qty: 90 TABLET | Refills: 0 | OUTPATIENT
Start: 2024-07-16

## 2024-07-16 RX ORDER — CETIRIZINE HYDROCHLORIDE 10 MG/1
10 TABLET ORAL DAILY
Qty: 90 TABLET | Refills: 0 | Status: SHIPPED | OUTPATIENT
Start: 2024-07-16

## 2024-07-16 NOTE — TELEPHONE ENCOUNTER
Refill requested too soon:     Pt requesting refill of   Requested Prescriptions     Pending Prescriptions Disp Refills    MONTELUKAST 10 MG Oral Tab [Pharmacy Med Name: MONTELUKAST 10MG TABLETS] 90 tablet 0     Sig: TAKE 1 TABLET(10 MG) BY MOUTH DAILY          Last Time Medication was Filled:  06/21/2024  90 tabs     Remaining refills: 0 due for refill around 09/21/2024    Refused medication since patient has requested refill too soon.

## 2024-07-16 NOTE — TELEPHONE ENCOUNTER
Requested Prescriptions     Pending Prescriptions Disp Refills    CETIRIZINE 10 MG Oral Tab [Pharmacy Med Name: CETIRIZINE 10MG TABLETS] 90 tablet 0     Sig: TAKE 1 TABLET(10 MG) BY MOUTH DAILY     Last refill 4/5/24 #90  LOV 4/15/24  Future Appointments   Date Time Provider Department Center   7/22/2024 10:45 AM Lily Rod APN [facility] [place]   8/15/2024  1:00 PM Marisol Martinez DO [facility] [place]   10/15/2024  5:30 PM Marisol Martinez DO [facility] [place]       Allergy Medication Protocol [id number]/13/2024 06:02 AM   Protocol Details In person appointment or virtual visit in the past 12 mos or appointment in next 3 mos       To be filled at: [facility] #[id number] [id number]

## 2024-07-20 ENCOUNTER — LAB ENCOUNTER (OUTPATIENT)
Dept: LAB | Age: 64
End: 2024-07-20
Attending: INTERNAL MEDICINE
Payer: MEDICARE

## 2024-07-20 DIAGNOSIS — Z94.0 KIDNEY REPLACED BY TRANSPLANT (HCC): Primary | ICD-10-CM

## 2024-07-20 DIAGNOSIS — D59.8 OTHER ACQUIRED HEMOLYTIC ANEMIAS (HCC): ICD-10-CM

## 2024-07-20 LAB
ALBUMIN SERPL-MCNC: 4.2 G/DL (ref 3.2–4.8)
ANION GAP SERPL CALC-SCNC: 7 MMOL/L (ref 0–18)
BASOPHILS # BLD AUTO: 0.01 X10(3) UL (ref 0–0.2)
BASOPHILS NFR BLD AUTO: 0.2 %
BILIRUB UR QL STRIP.AUTO: NEGATIVE
BUN BLD-MCNC: 39 MG/DL (ref 9–23)
CALCIUM BLD-MCNC: 9.3 MG/DL (ref 8.7–10.4)
CHLORIDE SERPL-SCNC: 108 MMOL/L (ref 98–112)
CLARITY UR REFRACT.AUTO: CLEAR
CO2 SERPL-SCNC: 26 MMOL/L (ref 21–32)
COLOR UR AUTO: YELLOW
CREAT BLD-MCNC: 1.09 MG/DL
CREAT UR-SCNC: 87.2 MG/DL
DIRECT COOMBS POLY: NEGATIVE
EGFRCR SERPLBLD CKD-EPI 2021: 57 ML/MIN/1.73M2 (ref 60–?)
EOSINOPHIL # BLD AUTO: 0.04 X10(3) UL (ref 0–0.7)
EOSINOPHIL NFR BLD AUTO: 0.7 %
ERYTHROCYTE [DISTWIDTH] IN BLOOD BY AUTOMATED COUNT: 16.7 %
GLUCOSE BLD-MCNC: 105 MG/DL (ref 70–99)
GLUCOSE UR STRIP.AUTO-MCNC: NORMAL MG/DL
HCT VFR BLD AUTO: 32.6 %
HGB BLD-MCNC: 9.7 G/DL
IMM GRANULOCYTES # BLD AUTO: 0.02 X10(3) UL (ref 0–1)
IMM GRANULOCYTES NFR BLD: 0.4 %
KETONES UR STRIP.AUTO-MCNC: NEGATIVE MG/DL
LEUKOCYTE ESTERASE UR QL STRIP.AUTO: 500
LYMPHOCYTES # BLD AUTO: 1.31 X10(3) UL (ref 1–4)
LYMPHOCYTES NFR BLD AUTO: 24.2 %
MCH RBC QN AUTO: 28.4 PG (ref 26–34)
MCHC RBC AUTO-ENTMCNC: 29.8 G/DL (ref 31–37)
MCV RBC AUTO: 95.3 FL
MONOCYTES # BLD AUTO: 0.38 X10(3) UL (ref 0.1–1)
MONOCYTES NFR BLD AUTO: 7 %
NEUTROPHILS # BLD AUTO: 3.65 X10 (3) UL (ref 1.5–7.7)
NEUTROPHILS # BLD AUTO: 3.65 X10(3) UL (ref 1.5–7.7)
NEUTROPHILS NFR BLD AUTO: 67.5 %
OSMOLALITY SERPL CALC.SUM OF ELEC: 302 MOSM/KG (ref 275–295)
PH UR STRIP.AUTO: 5.5 [PH] (ref 5–8)
PHOSPHATE SERPL-MCNC: 3.7 MG/DL (ref 2.4–5.1)
PLATELET # BLD AUTO: 162 10(3)UL (ref 150–450)
POTASSIUM SERPL-SCNC: 5 MMOL/L (ref 3.5–5.1)
PROT UR-MCNC: 27.8 MG/DL (ref ?–14)
PROT/CREAT UR-RTO: 0.32
RBC # BLD AUTO: 3.42 X10(6)UL
RBC UR QL AUTO: NEGATIVE
SODIUM SERPL-SCNC: 141 MMOL/L (ref 136–145)
SP GR UR STRIP.AUTO: 1.02 (ref 1–1.03)
UROBILINOGEN UR STRIP.AUTO-MCNC: NORMAL MG/DL
WBC # BLD AUTO: 5.4 X10(3) UL (ref 4–11)
WBC #/AREA URNS AUTO: >50 /HPF

## 2024-07-20 PROCEDURE — 87086 URINE CULTURE/COLONY COUNT: CPT

## 2024-07-20 PROCEDURE — 87186 SC STD MICRODIL/AGAR DIL: CPT

## 2024-07-20 PROCEDURE — 36415 COLL VENOUS BLD VENIPUNCTURE: CPT

## 2024-07-20 PROCEDURE — 80197 ASSAY OF TACROLIMUS: CPT

## 2024-07-20 PROCEDURE — 87077 CULTURE AEROBIC IDENTIFY: CPT

## 2024-07-20 PROCEDURE — 85025 COMPLETE CBC W/AUTO DIFF WBC: CPT

## 2024-07-20 PROCEDURE — 80069 RENAL FUNCTION PANEL: CPT

## 2024-07-20 PROCEDURE — 86880 COOMBS TEST DIRECT: CPT

## 2024-07-20 PROCEDURE — 84156 ASSAY OF PROTEIN URINE: CPT

## 2024-07-20 PROCEDURE — 82570 ASSAY OF URINE CREATININE: CPT

## 2024-07-20 PROCEDURE — 81001 URINALYSIS AUTO W/SCOPE: CPT

## 2024-07-22 ENCOUNTER — TELEMEDICINE (OUTPATIENT)
Facility: CLINIC | Age: 64
End: 2024-07-22
Payer: MEDICARE

## 2024-07-22 DIAGNOSIS — E03.9 ACQUIRED HYPOTHYROIDISM: ICD-10-CM

## 2024-07-22 DIAGNOSIS — J44.89 ASTHMA WITH COPD (CHRONIC OBSTRUCTIVE PULMONARY DISEASE) (HCC): Chronic | ICD-10-CM

## 2024-07-22 DIAGNOSIS — E11.65 TYPE 2 DIABETES MELLITUS WITH HYPERGLYCEMIA, WITH LONG-TERM CURRENT USE OF INSULIN (HCC): Primary | ICD-10-CM

## 2024-07-22 DIAGNOSIS — Z94.0 KIDNEY REPLACED BY TRANSPLANT (HCC): Primary | ICD-10-CM

## 2024-07-22 DIAGNOSIS — Z79.4 TYPE 2 DIABETES MELLITUS WITH HYPERGLYCEMIA, WITH LONG-TERM CURRENT USE OF INSULIN (HCC): Primary | ICD-10-CM

## 2024-07-22 RX ORDER — PEN NEEDLE, DIABETIC 32GX 5/32"
NEEDLE, DISPOSABLE MISCELLANEOUS
COMMUNITY
Start: 2024-07-22

## 2024-07-22 RX ORDER — INSULIN GLARGINE 100 [IU]/ML
30 INJECTION, SOLUTION SUBCUTANEOUS NIGHTLY
COMMUNITY
Start: 2024-07-22

## 2024-07-22 RX ORDER — MONTELUKAST SODIUM 10 MG/1
10 TABLET ORAL DAILY
Qty: 90 TABLET | Refills: 0 | OUTPATIENT
Start: 2024-07-22

## 2024-07-22 RX ORDER — EMPAGLIFLOZIN 25 MG/1
25 TABLET, FILM COATED ORAL DAILY
COMMUNITY
Start: 2024-07-22

## 2024-07-22 NOTE — PROGRESS NOTES
[facility] Note - Telemedicine    Lupe Arce verbally consents to a Telemedicine (Audio + Video) visit on 7/22/2024. The visit was conducted in a private patient room.      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 Accompanied today by brandon Wilkerson language line    Initial HPI consult in March 2023:  A1C was <4% most recently  Prev w/ [facility], transitioning care d/t [facility] PCP  Insulin started after transplant  Prev was taking Lantus 35u, glipizide 10mg BID + Januvia, PCP understandably stopped Lantus until she could receive endo eval  Diagnosed age: 22 during pregnancy, states only started managing DM ~10 years ago when eyes were getting worse  Hx of ESRD on HD, and now is s/p renal tx in 2019 at [facility].   On Myfortic and Envarsus   Pt denies hx of hospitalization for DM, no pancreatitis hx  Family history of DM: T2DM- father, siblings  Cpeptide 9/2019- 7.4  Current DM meds at first OV: januvia 100mg daily, glipizide 10mg BID,   Previously trialed/failed: metformin- GI upset, Lantus 35u qAM    Hypothyroidism history  - unsure of diagnosis date, no hx thyroidectomy  - On LT4 88mcg daily  - last TFTs (Mar 2022) stable    Interim hx  June 2023- visit w/ DM APN  GMI on Dexcom 7.3% today  We started Soliqua last OV, she had horrible diarrhea so we stopped over the phone and PCP restarted Lantus 35U  Since then feeling much better although does note she will have lows <70 thru night and have to eat to get BG normal  This happens almost daily  Current meds: Lantus 35U daily.    Sept 2023- Last A1C 5%. Started dexcom G7 CGM through [facility], liking it a lot   DM meds at visit: Lantus 30U daily,  januvia 50mcg daily  BG have been higher lately which she attributes to recent COVID dx    Dec 2023- Video visit; ROMIE Albright providing translation. Started Jardiance, tolerting well, minor incr'd urination, but also increased her water intake. Notes her highest BG are around dinner time despite dietary changes (cut out tortillas)  Current DM meds: januvia 100mg daily, jardiance 10mg daily, lantus 35u daily    January 2024- w/ endo APN, here for DM follow up. Last A1c value was 4.9% done 1/4/2024. Changes made at [facility] include: jardiance dose incr'd to 25mg daily. Feeling well on the incr'd Jardiance dose, denies excessive urination, noticing her BG are much better. Early Jan, had another cycle of vomiting/diarrhea occurs for 3-4 days at a time, then she's fine   ROMIE notes she's very selective on what she eats because she's nervous about her BG trending higher   Current DM meds at visit: jardiance 25mg daily,  januvia 100mg daily, lantus 35u daily  Re: Thyroid: continues on LT4 88mcg daily. Feeling well    April 2024-w/ Heather APN. Last A1c value was  % done 5/14/2024. Here with Natalee DELGADILLO) who provides interpretation. Changes made at last office visit : patient was quite fearful of spiking her BG if eating higher-carb food items, so we added on SSI for more food freedom-- hasn't really needed or started. BG are stable. Following up with PCP re: elevated LFTs (statin dose was adjusted). Family has noted she seems more tired, endorses fatigue only when she has a busy day  Current DM meds at visit:  jardiance 25mg daily,  januvia 100mg daily, lantus 32u daily  Re: Thyroid: continues on LT4 88mcg daily. Feeling well      7/2024-w/ Heather APN. Last A1c value was  % done 5/14/2024.Video visit. Here with Natalee (daughter in law) who provides interpretation. Anemia noted on prev labs, which would explain low A1C.  Saw hematologist, starting iron infusions with hematology on Friday.  She notes ongoing mild fatigue.  She states in  general she's feeling a bit better. Seeing a new nephrologist.  Had symptomatic low BG on Sunday night-- nausea and dizziness. Treats the low BG with eating cereal or candy.   Current DM meds at visit:  jardiance 25mg daily,  januvia 100mg daily, lantus 32u daily    Continuous Glucose Monitoring Interpretation  Lupe Arce has undergone continuous glucose monitoring with their CGM.  The blood glucose tracings were evaluated for two weeks prior to office visit.   Blood glucose tracings demonstrated areas of hyperglycemia post-meal, particularly post-lunch/dinner  There were occasional hypoglycemia, particularly overnight .              Previously trialed/failed: glipizide- stopped d/t hypo episodes, metformin- GI upset    History/Other:   REVIEW OF SYSTEMS  Ten point review of systems has been performed and is otherwise negative and/or non-contributory, except as described above.     Medications:     Current Outpatient Medications:     insulin glargine (LANTUS SOLOSTAR) 100 UNIT/ML Subcutaneous Solution Pen-injector, Inject 30 Units into the skin nightly. 3 mo supply, Disp: , Rfl:     Insulin Pen Needle (BD PEN NEEDLE ANTHONY 2ND GEN) 32G X 4 MM Does not apply Misc, Use to inject insulin once daily, Disp: , Rfl:     Empagliflozin (JARDIANCE) 25 MG Oral Tab, Take 25 mg by mouth daily., Disp: , Rfl:     CETIRIZINE 10 MG Oral Tab, TAKE 1 TABLET(10 MG) BY MOUTH DAILY, Disp: 90 tablet, Rfl: 0    montelukast 10 MG Oral Tab, Take 1 tablet (10 mg total) by mouth daily., Disp: 90 tablet, Rfl: 0    JANUVIA 100 MG Oral Tab, TAKE 1 TABLET(100 MG) BY MOUTH DAILY, Disp: 90 tablet, Rfl: 1    folic acid 1 MG Oral Tab, Take 1 tablet (1 mg total) by mouth daily., Disp: 90 tablet, Rfl: 3    buPROPion  MG Oral Tablet 12 Hr, Take 1 tablet (150 mg total) by mouth daily., Disp: 90 tablet, Rfl: 1    carvedilol 6.25 MG Oral Tab, Take 1 tablet (6.25 mg total) by mouth 2 (two) times daily with meals., Disp: 180 tablet, Rfl: 1     atorvastatin 10 MG Oral Tab, Take 1 tablet (10 mg total) by mouth nightly., Disp: 90 tablet, Rfl: 1    TRUEPLUS PEN NEEDLES 32G X 4 MM Does not apply Misc, TEST FOUR TIMES DAILY, Disp: 400 each, Rfl: 3    levothyroxine 88 MCG Oral Tab, Take 1 tablet (88 mcg total) by mouth before breakfast., Disp: 90 tablet, Rfl: 2    dapsone 100 MG Oral Tab, , Disp: , Rfl:     omeprazole 20 MG Oral Capsule Delayed Release, Take 1 capsule (20 mg total) by mouth 2 (two) times daily before meals., Disp: 180 capsule, Rfl: 3    Microlet Lancets Does not apply Misc, Check blood sugar once daily, Disp: 200 each, Rfl: 1    Glucose Blood (CONTOUR NEXT TEST) In Vitro Strip, Check BG once daily, Disp: 200 each, Rfl: 2    ALBUTEROL 108 (90 Base) MCG/ACT Inhalation Aero Soln, INHALE 2 PUFFS BY MOUTH INTO THE LUNGS EVERY 4 HOURS AS NEEDED FOR WHEEZING, Disp: 18 g, Rfl: 0    FLUTICASONE PROPIONATE 50 MCG/ACT Nasal Suspension, SHAKE LIQUID AND USE 2 SPRAYS IN EACH NOSTRIL DAILY, Disp: 48 g, Rfl: 0    ENVARSUS XR 0.75 MG Oral Tablet 24 Hr, Take 3 tablets by mouth daily., Disp: , Rfl:     TRUE METRIX BLOOD GLUCOSE TEST In Vitro Strip, TEST 3 TIMES A DAY, Disp: 300 strip, Rfl: 3    Meclizine HCl 12.5 MG Oral Tab, Take 1 tablet (12.5 mg total) by mouth 3 (three) times daily as needed., Disp: 30 tablet, Rfl: 0    valGANciclovir HCl (VALCYTE OR), Take 450 mg by mouth., Disp: , Rfl:     Lancets Does not apply Misc, 3x daily lancets for True Metrix meter, Disp: 300 each, Rfl: 3    mycophenolate sodium 360 MG Oral Tab EC, Take 1 tablet (360 mg total) by mouth 4 (four) times daily. For kidney transplant, Disp: 120 tablet, Rfl: 0    Biotin 5000 MCG Oral Cap, , Disp: , Rfl:     ondansetron 4 MG Oral Tablet Dispersible, , Disp: , Rfl:     ergocalciferol 1.25 MG (25471 UT) Oral Cap, , Disp: , Rfl:     FEROSUL 325 (65 Fe) MG Oral Tab, Take by mouth daily., Disp: , Rfl:     Blood Glucose Monitoring Suppl (TRUE METRIX AIR GLUCOSE METER) w/Device Does not apply  Kit, U UTD WITH STRIPS TO TEST BLOOD GLUCOSE, Disp: , Rfl:     Alcohol Swabs (BD SWAB SINGLE USE REGULAR) Does not apply Pads, U UTD QID, Disp: , Rfl:     acetaminophen 500 MG Oral Tab, Take 2 tablets (1,000 mg total) by mouth one time., Disp: , Rfl:      Allergies:   Allergies   Allergen Reactions    Ace Inhibitors RENAL INSUFFICIENCY    Folic Acid-Vit B6-Vit B12 HIVES and ITCHING    Vitamin B12 OTHER (SEE COMMENTS)    B12-Ca RASH     Vit b12 and calcium acetate combo       Social History:   Social History     Socioeconomic History    Marital status:     Number of children: 3   Occupational History    Occupation: House Keeper   Tobacco Use    Smoking status: Never    Smokeless tobacco: Never   Vaping Use    Vaping status: Never Used   Substance and Sexual Activity    Alcohol use: No    Drug use: No    Sexual activity: Never   Other Topics Concern    Caffeine Concern No    Exercise Yes    Seat Belt No       Medical History:   Reviewed    Surgical history:   Past Surgical History:   Procedure Laterality Date    Appendectomy      Av fistula revision, open Left 2019    Cataract Bilateral     Colonoscopy      Colonoscopy N/A 2019    Procedure: COLONOSCOPY;  Surgeon: Armand Zhao MD;  Location:  ENDOSCOPY    Eye surgery      RV surgery    Hysterectomy      fibroids. unsure but thinks she still has her cervix    Roopa biopsy stereo nodule 1 site right (cpt=19081)  ??    BENIGN      83, 86, 84, 90    Organ transplant      Other surgical history      cyst removal on thigh area of leg    Total abdom hysterectomy      Transplantation of kidney  2019    kidney swap with son  at [facility]    Upper gi endo no barretts  2015    antral erosions       Objective:   PHYSICAL EXAM  Limited due to telemedicine encounter    Constitutional Not in acute distress  Pulmonary: no wheezing heard  No coughing on the phone. Speaking in full sentences   Neurological:   Alert and oriented to person, place and time.   Psychiatric: Normal affect, mood and behavior appropriate        Labs/Imaging: Pertinent imaging reviewed.    DM quality metrics:   CAD: none  Neuropathy/ Foot exam: Last Foot Exam: 08/08/23    Ophtho: Last Dilated Eye Exam: 05/10/24   Eye Exam shows Diabetic Retinopathy?: Yes (diabetic retinopathy in the right eye is stable.the left eye is stable as well.)    Kidney health evaluation:  Hx of renal transplant, following with nephrologist Dr[facility]  Lab Results   Component Value Date    EGFRCR 57 (L) 07/20/2024    MICROALBCREA 57.0 (H) 05/14/2024      HTN: Unable to assess- video visit  BP Readings from Last 1 Encounters:   06/24/24 157/64     Blood Pressure and Cardiac Medications            carvedilol 6.25 MG Oral Tab          Lipids: LDL to goal  Lab Results   Component Value Date    CHOLEST 102 05/14/2024    LDL 31 05/14/2024    TRIG 101 05/14/2024    HDL 52 05/14/2024    FASTING Yes 05/14/2024    FASTING Yes 05/14/2024     Atorvastatin adjusted by PCP d/t elevated LFTs as of 4/2024  Cholesterol Lowering Medications            atorvastatin 10 MG Oral Tab             Assessment & Plan:     (E11.3522,  Z79.4) Type 2 diabetes mellitus with left eye affected by proliferative 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  Cpeptide 1/2024 1.97.  Overnight hypoglycemia, will reduce her basal insulin dose    We discussed the importance of glycemic control to prevent complications of diabetes. We discussed the importance of SBGM and consistent, low carb diet as well as moderate exercise as well.  We also discussed the complications of diabetes include retinopathy, neuropathy, nephropathy and cardiovascular disease.     - continue jardiance  25mg daily (GFR 57 in 7/2024)  - continue januvia 100mg daily (GFR 57 in 7/2024- plan on dose adjustment to 50mg once GFR 30-45)  - Decr Lantus 32 u --> 30 u nightly  - continue novolog PRN using SSI 1:40>140; not really needing, removed from med list for now  - continue dexcom G7  through [facility]  - SGLT2i instructions provided re: surgery, times of illness/dehydration, UTI prevention  - did not tolerate metformin- GI upset  - did not tolerate GLP1a; soliqua- bad diarrhea    (E03.8) Other specified hypothyroidism  Plan:  April 2023 TSH 1.72, fT4 1.2, stable on levothyroxine 88mcg daily. Some ongoing fatigue  - continue LT4 88mcg daily  - repeat TFTs q6mo - next due 11/2024, ordered    (R53.83) Fatigue, unspecified type  Plan: CBC, Platelet, No Differential [E]  Patient previously with anemia on CBC 8/2023, was following w/ GI.  Patient now following with hematology and plans to start iron transfusions shortly.  - Continue follow-up with hematology  - A1c results may be falsely low in the context of anemia, her average blood sugar on Dexcom is 142        Return in about 4 months (around 11/22/2024) for DM follow up, can be video visit.      7/22/2024  RODNEY Beckford

## 2024-07-22 NOTE — PATIENT INSTRUCTIONS
Follow up plan:  With RODNEY Beckfrod: Return in about 4 months (around 11/22/2024) for DM follow up.    - labs before next visit-- already ordered, to be done after 10/1.     Updated/current diabetes medication instructions:  Diabetic Medications               insulin glargine (LANTUS SOLOSTAR) 100 UNIT/ML Subcutaneous Solution Pen-injector (Taking) Inject 30 Units into the skin nightly. 3 mo supply    Empagliflozin (JARDIANCE) 25 MG Oral Tab (Taking) Take 25 mg by mouth daily.    JANUVIA 100 MG Oral Tab TAKE 1 TABLET(100 MG) BY MOUTH DAILY            - Please review the following if you are taking JARDIANCE OR FARXIGA:  This medication will remove extra sugar out of blood into your urine. It can be dosed anytime of day, but morning is probably best time to take it as it can cause increased urination. Please drink at least 4 glasses of water daily to avoid dehydration. It does not cause low blood sugars (hypoglycemia). If you develop any low blood sugars, we will need to adjust other medications. Please call me if you develop any symptoms of urinary tract infection (burning with urination) or yeast infection (new rash itching or burning in the genital area).     While you are taking this medication please be mindful of sick day protocol (for illness, if you are vomiting, excessively exercising/sweating, or alcohol intake) to avoid dehydration:   -Temporarily hold the medication if you are in a dehydrated state. You can call the clinic for further instruction if you are sick and can't remember which diabetes medication to hold.  -Check blood sugar levels more often while sick  -Seek medical help early if you develop any abdominal pain, nausea or vomiting.  -Okay to resume this medication once you are eating /drinking regularly and no longer dehydrated.    Surgery protocol:  If you are having surgery please contact the clinic as we will need to adjust your diabetes medications. This medication needs to be held  at least 4 days prior to your procedure.. Once eating/drinking normally after surgery, ok to resume (this avoids dehydration).     Office phone number: [phone]; phones are open Monday-Friday 8:30-4:30.   Thank you for visiting our office. We look forward to working with you to reach your health goals. As a reminder, if you need refills, please request early so there is enough time to process the request. We ask that you provide at least 5 days' notice before a refill is due, so there is time to send a request to pharmacy, process the refill, and ensure there are no other problems with obtaining the medication (backorders, prior authorization paperwork, etc). Routine refills will not be addressed on weekends, so please submit these requests during the week.    Blood sugar targets:  Before breakfast: , 2 hours after meals: <180 (preferably <150), A1C goal: <7.0%  Time in Range goal is higher than 80% if using a continuous glucose monitor (Dexcom or Ysabel).  Time in Range can be found within the Dexcom G7 meredith, on Clarity if using Dexcom G6, or on LibreView if using Ysabel.    HOW TO TREAT LOW BLOOD SUGAR (Hypoglycemia)  Low blood sugar= Less than 70, or if you start to have symptoms (below)  Symptoms: Shaking or trembling, fast heart rate, extreme hunger, sweating, confusion/difficulty concentrating, dizziness.    How to treat a low blood sugar if you are able to eat/drink: The Rule of 15  If you are using continuous glucose monitor that says you are low, but you do not have any symptoms, verify on fingerstick that your blood sugar is actually low before treating.   Eat 15 grams of carbs (see examples below)  Check your blood sugar after 15 minutes. If it’s still below your target range, have another serving.   Repeat these steps until it’s in your target range. Once it’s in range, if you're nervous about your sugar going low again, have a protein source (ie, a spoonful of peanut butter).   If you have a CGM  you want to look for how your arrow has changed. If you arrow is pointed up or sideways after 15 min, give your CGM more time OR check with a finger stick. Try not to eat more food until at least 15 min after the first BG check - otherwise you risk having a rebound high.  If you are experiencing symptoms and you are unable to check your blood glucose for any reason, treat the hypoglycemia.  If someone has a low blood sugar and is unconscious: Don’t hesitate to call 911. If someone is unconscious and glucagon is not available or someone does not know how to use it, call 911 immediately.    To treat a low, I recommend you carry with you easy, pre-portioned treatment for low blood sugars that are 15G of carbs:   - Children sized squeeze pouch applesauce (high fiber + carbs help prevent too high of a spike)  - Small children's sized juicebox- 15g carb --> 4oz juice box  - Glucose tablets from [employer], you can find them near diabetes supplies --> Note, you will need to eat 3-4 tablets to get to 15g of carbs  - Children sized fruit snack pack- look for one with 15 grams of total carbohydrate  - Choice of how to treat your low is important. Complex carbs, or foods that contain fats along with carbs (like chocolate) can slow the absorption of glucose and should NOT be used to treat an emergency low

## 2024-07-23 ENCOUNTER — LAB ENCOUNTER (OUTPATIENT)
Dept: LAB | Age: 64
End: 2024-07-23
Payer: MEDICARE

## 2024-07-23 DIAGNOSIS — Z94.0 KIDNEY REPLACED BY TRANSPLANT (HCC): ICD-10-CM

## 2024-07-23 LAB — TACROLIMUS LVL: 7.4 NG/ML

## 2024-07-23 PROCEDURE — 87799 DETECT AGENT NOS DNA QUANT: CPT

## 2024-07-24 LAB
BK VIRUS DETECT, PL, QN LOG: NOT DETECTED {LOG_IU}/ML
BK VIRUS DETECT, PL, QN: NOT DETECTED [IU]/ML
CMV DNA DETECT, QN LOG: NOT DETECTED {LOG_IU}/ML
CMV DNA DETECT, QN: NOT DETECTED [IU]/ML

## 2024-07-25 ENCOUNTER — PATIENT MESSAGE (OUTPATIENT)
Facility: CLINIC | Age: 64
End: 2024-07-25

## 2024-07-25 DIAGNOSIS — E11.65 TYPE 2 DIABETES MELLITUS WITH HYPERGLYCEMIA, WITH LONG-TERM CURRENT USE OF INSULIN (HCC): Primary | ICD-10-CM

## 2024-07-25 DIAGNOSIS — Z79.4 TYPE 2 DIABETES MELLITUS WITH HYPERGLYCEMIA, WITH LONG-TERM CURRENT USE OF INSULIN (HCC): Primary | ICD-10-CM

## 2024-07-26 ENCOUNTER — OFFICE VISIT (OUTPATIENT)
Dept: HEMATOLOGY/ONCOLOGY | Age: 64
End: 2024-07-26
Attending: INTERNAL MEDICINE
Payer: MEDICARE

## 2024-07-26 VITALS
OXYGEN SATURATION: 98 % | SYSTOLIC BLOOD PRESSURE: 111 MMHG | RESPIRATION RATE: 16 BRPM | DIASTOLIC BLOOD PRESSURE: 64 MMHG | TEMPERATURE: 98 F | HEART RATE: 74 BPM

## 2024-07-26 DIAGNOSIS — N18.30 ANEMIA DUE TO STAGE 3 CHRONIC KIDNEY DISEASE, UNSPECIFIED WHETHER STAGE 3A OR 3B CKD (HCC): Primary | ICD-10-CM

## 2024-07-26 DIAGNOSIS — D63.1 ANEMIA DUE TO STAGE 3 CHRONIC KIDNEY DISEASE, UNSPECIFIED WHETHER STAGE 3A OR 3B CKD (HCC): Primary | ICD-10-CM

## 2024-07-26 PROCEDURE — 96374 THER/PROPH/DIAG INJ IV PUSH: CPT

## 2024-07-26 RX ORDER — INSULIN DEGLUDEC 100 U/ML
INJECTION, SOLUTION SUBCUTANEOUS
Qty: 30 ML | Refills: 1 | Status: SHIPPED | OUTPATIENT
Start: 2024-07-26

## 2024-07-26 RX ORDER — INSULIN GLARGINE 100 [IU]/ML
INJECTION, SOLUTION SUBCUTANEOUS
Qty: 30 ML | Refills: 1 | Status: CANCELLED
Start: 2024-07-26

## 2024-07-26 NOTE — TELEPHONE ENCOUNTER
From: Lupe Arce  To: Lily Rod  Sent: 7/25/2024 7:49 PM CDT  Subject: Angela Bautista,    I got a letter from her prescription provider that Lantus is not part of there list of formulary medications. They gave her an emergency refill but they will no longer cover it. So it needs to be changed to BASAGLAR KWIKPEN, TRESIBA, TRESIBA FLEXTOUCH.    Jose Luis

## 2024-07-26 NOTE — PROGRESS NOTES
Education Record     Learner:  Patient/dgtr     Disease / Diagnosis: iron infusion      Barriers / Limitations:  language, dgtr .                 Comments:     Method:  Discussion                Comments:     General Topics:  Plan of care reviewed                Comments:     Outcome:  Shows understanding                Comments:

## 2024-08-02 ENCOUNTER — OFFICE VISIT (OUTPATIENT)
Dept: HEMATOLOGY/ONCOLOGY | Age: 64
End: 2024-08-02
Attending: INTERNAL MEDICINE
Payer: MEDICARE

## 2024-08-02 VITALS
SYSTOLIC BLOOD PRESSURE: 114 MMHG | RESPIRATION RATE: 16 BRPM | HEART RATE: 72 BPM | TEMPERATURE: 98 F | OXYGEN SATURATION: 95 % | DIASTOLIC BLOOD PRESSURE: 68 MMHG

## 2024-08-02 DIAGNOSIS — N18.30 ANEMIA DUE TO STAGE 3 CHRONIC KIDNEY DISEASE, UNSPECIFIED WHETHER STAGE 3A OR 3B CKD (HCC): Primary | ICD-10-CM

## 2024-08-02 DIAGNOSIS — D63.1 ANEMIA DUE TO STAGE 3 CHRONIC KIDNEY DISEASE, UNSPECIFIED WHETHER STAGE 3A OR 3B CKD (HCC): Primary | ICD-10-CM

## 2024-08-02 PROCEDURE — 96374 THER/PROPH/DIAG INJ IV PUSH: CPT

## 2024-08-02 NOTE — PROGRESS NOTES
Education Record    Learner:  Patient    Pt here for: garima    Barriers / Limitations:  None    Method:  Brief focused, printed material and  reinforcement    General Topics:  Plan of care reviewed    Outcome: Pt tolerated infusion with no c/o.

## 2024-08-15 ENCOUNTER — OFFICE VISIT (OUTPATIENT)
Dept: FAMILY MEDICINE CLINIC | Facility: CLINIC | Age: 64
End: 2024-08-15
Payer: MEDICARE

## 2024-08-15 VITALS
TEMPERATURE: 97 F | WEIGHT: 156.38 LBS | OXYGEN SATURATION: 99 % | SYSTOLIC BLOOD PRESSURE: 126 MMHG | RESPIRATION RATE: 22 BRPM | BODY MASS INDEX: 31.52 KG/M2 | HEART RATE: 69 BPM | HEIGHT: 59.13 IN | DIASTOLIC BLOOD PRESSURE: 70 MMHG

## 2024-08-15 DIAGNOSIS — R79.89 ELEVATED LIVER FUNCTION TESTS: ICD-10-CM

## 2024-08-15 DIAGNOSIS — Z79.899 IMMUNOSUPPRESSION DUE TO DRUG THERAPY (HCC): ICD-10-CM

## 2024-08-15 DIAGNOSIS — Z79.4 TYPE 2 DIABETES MELLITUS WITH BOTH EYES AFFECTED BY PROLIFERATIVE RETINOPATHY AND TRACTION RETINAL DETACHMENTS INVOLVING MACULAE, WITH LONG-TERM CURRENT USE OF INSULIN (HCC): ICD-10-CM

## 2024-08-15 DIAGNOSIS — Z00.00 ENCOUNTER FOR ANNUAL HEALTH EXAMINATION: Primary | ICD-10-CM

## 2024-08-15 DIAGNOSIS — E11.69 HYPERLIPIDEMIA ASSOCIATED WITH TYPE 2 DIABETES MELLITUS (HCC): ICD-10-CM

## 2024-08-15 DIAGNOSIS — E11.3523 TYPE 2 DIABETES MELLITUS WITH BOTH EYES AFFECTED BY PROLIFERATIVE RETINOPATHY AND TRACTION RETINAL DETACHMENTS INVOLVING MACULAE, WITH LONG-TERM CURRENT USE OF INSULIN (HCC): ICD-10-CM

## 2024-08-15 DIAGNOSIS — D64.9 NORMOCYTIC ANEMIA: ICD-10-CM

## 2024-08-15 DIAGNOSIS — E78.5 HYPERLIPIDEMIA ASSOCIATED WITH TYPE 2 DIABETES MELLITUS (HCC): ICD-10-CM

## 2024-08-15 DIAGNOSIS — Z23 NEED FOR VACCINATION: ICD-10-CM

## 2024-08-15 DIAGNOSIS — F33.42 RECURRENT MAJOR DEPRESSIVE DISORDER, IN FULL REMISSION (HCC): ICD-10-CM

## 2024-08-15 DIAGNOSIS — G47.33 SEVERE OBSTRUCTIVE SLEEP APNEA: ICD-10-CM

## 2024-08-15 DIAGNOSIS — K21.9 GASTROESOPHAGEAL REFLUX DISEASE WITHOUT ESOPHAGITIS: ICD-10-CM

## 2024-08-15 DIAGNOSIS — D84.821 IMMUNOSUPPRESSION DUE TO DRUG THERAPY (HCC): ICD-10-CM

## 2024-08-15 DIAGNOSIS — E03.9 ACQUIRED HYPOTHYROIDISM: ICD-10-CM

## 2024-08-15 DIAGNOSIS — I10 PRIMARY HYPERTENSION: ICD-10-CM

## 2024-08-15 DIAGNOSIS — Z94.0 S/P KIDNEY TRANSPLANT (HCC): ICD-10-CM

## 2024-08-15 DIAGNOSIS — J44.89 ASTHMA WITH COPD (CHRONIC OBSTRUCTIVE PULMONARY DISEASE) (HCC): Chronic | ICD-10-CM

## 2024-08-15 PROBLEM — H43.392 VITREOUS FLOATERS OF LEFT EYE: Status: RESOLVED | Noted: 2020-10-14 | Resolved: 2024-08-15

## 2024-08-15 PROBLEM — K59.00 CONSTIPATION: Status: RESOLVED | Noted: 2020-10-12 | Resolved: 2024-08-15

## 2024-08-15 PROBLEM — Z91.81 AT HIGH RISK FOR FALLS: Status: RESOLVED | Noted: 2022-08-01 | Resolved: 2024-08-15

## 2024-08-15 PROCEDURE — G0439 PPPS, SUBSEQ VISIT: HCPCS | Performed by: FAMILY MEDICINE

## 2024-08-15 PROCEDURE — 99214 OFFICE O/P EST MOD 30 MIN: CPT | Performed by: FAMILY MEDICINE

## 2024-08-15 PROCEDURE — G0101 CA SCREEN;PELVIC/BREAST EXAM: HCPCS | Performed by: FAMILY MEDICINE

## 2024-08-15 RX ORDER — ATORVASTATIN CALCIUM 10 MG/1
10 TABLET, FILM COATED ORAL NIGHTLY
Qty: 90 TABLET | Refills: 1 | Status: SHIPPED | OUTPATIENT
Start: 2024-08-15

## 2024-08-15 RX ORDER — BUPROPION HYDROCHLORIDE 150 MG/1
150 TABLET, EXTENDED RELEASE ORAL DAILY
Qty: 90 TABLET | Refills: 1 | Status: SHIPPED | OUTPATIENT
Start: 2024-08-15

## 2024-08-15 RX ORDER — ZOSTER VACCINE RECOMBINANT, ADJUVANTED 50 MCG/0.5
50 KIT INTRAMUSCULAR ONCE
Qty: 1 EACH | Refills: 1 | Status: SHIPPED | OUTPATIENT
Start: 2024-08-15 | End: 2024-08-15

## 2024-08-15 NOTE — PROGRESS NOTES
Subjective:   Lupe Arce is a 63 year old female who presents for a Medicare Wellness Visit charge within the last 11 months and Patient may not meet criteria for AWV: Please evaluate for correct coding and  chronic diseases .       Pt presents for recheck of hyperlipidemia. Patient reports taking medications as instructed, no medication side effects noted. Denies any generalized muscle aches.  Taking atorvastatin 20 mg at night.  Labs were performed 5/14/2024 -lipid panel , HDL 52, triglycerides 101, LDL 31, non-HDL 50.  CMP demonstrated NL LFT.  Atorvastatin was decreased to 10 mg and LFT elevation resolved.  History of elevated ALT was evaluated by GI/Dr. Zhao previously.     Patient presents for recheck of hypertension. Pt has been taking medications as instructed-only on carvedilol, no medication side effects, home BP monitoring in the range of 110-120's systolic and 70-80's diastolic.denies headache, dizziness  chest pain, dyspnea on exertion, leg swelling.  Daughter states normally her transplant team refills the carvedilol/Coreg. Denies any medication side effects.  BP Readings from Last 5 Encounters:   08/15/24 126/70   08/02/24 114/68   07/26/24 111/64   06/24/24 157/64   04/22/24 116/62     Depression-feels controlled taking bupropion.  Denies feeling depressed, sad, hopeless, insomnia, crying spells, SI, HI or wanting to die.  Feels well since kidney transplant and off of dialysis.      Type 2 diabetes-now managed by [facility] seeing KIM Gonzalez at [facility].   this a.m. gained weight-states a lot of parties over the summer and needs to cut down portion sizes again.  Tries to walk every day.  Taking Januvia, Tresiba  Diabetic retinopathy followed by ophthalmologist Dr. Tristen Jernigan q 6 mon5hw.  Has a history of at least 14 surgeries for diabetic retinopathy.  Last surgery was 2014.  HEMOGLOBIN A1C (%)   Date Value   09/20/2023 5.0   06/19/2023 4.3    03/07/2022 4.2 (A)     HEMOGLOBIN A1c (% of total Hgb)   Date Value   01/04/2024 <4.2   12/16/2022 <4.0     HgbA1C   Date Value   05/14/2024      Comment:     Hemoglobin A1C to be confirmed at [facility]   05/14/2024 <4.2 % (L)   04/22/2024      Comment:     Hemoglobin A1C to be confirmed at [facility]   04/22/2024 <4.2 % (L)   History of kidney transplant.  Taking glipizide, Januvia, Lantus     Kidney transplant due to ESRD secondary to type 2 diabetes in December 2019.  Managed by Dr. Calvin Cruz MD, nephrologist, at [facility] in [place].  On tacrolimus, Envarsus and Myfortic.  Monitored by nephrology every 3 months    History of anemia-had pretransplant then posttransplant developed normocytic anemia.  Has been on iron supplementation for several years.  Now on folic acid for deficiency.  Had recent thrombocytopenia which was mild on CBC in [facility] 5/2024.  Completed consult with Dr. Zaidi on 6/24/2024 and was started on iron infusions.    History of hypothyroidism on levothyroxine-managed by [facility] KIM Gonzalez. Taking levothyroxine. Denies mood changes. Feels well.     History of sleep apnea-on CPAP/BiPAP states compliant.  Last office visit with pulmonologist/sleep specialist was 8/2/2023 at [facility] waking up refreshed.     Asthma-managed by pulmonologist-LOV 5/22/2024.  Mild intermittent.Taking montelukast.  Off Breo.  Doing well.  Denies night time symptoms of asthma. Denies chest pain, sob, wheezing, LEON, cough or fever.  Not used albuterol in several months.     Environmental allergies on cetirizine and fluticasone daily.  Feels controlled.        Wt Readings from Last 6 Encounters:   08/15/24 156 lb 6.4 oz (70.9 kg)   06/24/24 144 lb (65.3 kg)   04/15/24 146 lb 9.6 oz (66.5 kg)   08/08/23 162 lb 12.8 oz (73.8 kg)   04/10/23 155 lb (70.3 kg)   12/20/22 161 lb 3.2 oz (73.1 kg)       Health Maintenance   Topic Date Due    Zoster Vaccines (1 of 2) Never done    COVID-19  Vaccine (6 - 2023-24 season) 09/01/2023    Influenza Vaccine (1) 10/01/2024    Diabetes Care A1C  11/14/2024    Mammogram  03/11/2025    Diabetes Care Dilated Eye Exam  05/10/2025    Diabetes Care Foot Exam  08/15/2025    Annual Physical  08/15/2025    Pap Smear  08/03/2026    Pneumococcal Vaccine: Birth to 64yrs (4 of 4 - PPSV23 or PCV20) 08/03/2026    Colorectal Cancer Screening  11/01/2027    DTaP,Tdap,and Td Vaccines (4 - Td or Tdap) 11/07/2027    Annual Depression Screening  Completed           History/Other:   Fall Risk Assessment:   She has been screened for Falls and is low risk.    Worries sometime grandchild throws toys on floor and does not want to trip    Cognitive Assessment:   She had a completely normal cognitive assessment - see flowsheet entries       Functional Ability/Status:   Lupe Arce has some abnormal functions as listed below:  She has Driving difficulties based on screening of functional status. She has difficulties Shopping for Groceries based on screening of functional status. She has Vision problems based on screening of functional status.   Never had 's license- cultural.    Depression Screening (PHQ):  PHQ-2 SCORE: 0  , done 8/15/2024   If you checked off any problems, how difficult have these problems made it for you to do your work, take care of things at home, or get along with other people?: Not difficult at all    Last [place] Suicide Screening on 8/15/2024 was No Risk.         Advanced Directives:   She does NOT have a Living Will. [Do you have a living will?: No]  She does NOT have a Power of  for Health Care. [Do you have a healthcare power of ?: No]  Discussed Advance Care Planning with patient (and family/surrogate if present). Standard forms made available to patient in After Visit Summary.      Patient Active Problem List   Diagnosis    Hyperlipidemia associated with type 2 diabetes mellitus (HCC)    GERD (gastroesophageal reflux disease)     Vitamin D deficiency    Hypomagnesemia    Seasonal allergies    Severe obstructive sleep apnea    [place] speaking patient    Depressive disorder    Acquired hypothyroidism    Primary hypertension    Type 2 diabetes mellitus with ophthalmic complication, with long-term current use of insulin (HCC)    Asthma with COPD (chronic obstructive pulmonary disease) (HCC)    Vitreous hemorrhage (HCC)    Tubular adenoma of colon    S/P kidney transplant (HCC)    Normocytic anemia    Immunosuppression due to drug therapy (HCC)    History of gastritis    Constipation    Vitreous floaters of left eye    At high risk for falls    History of adenomatous polyp of colon    Encounter for screening for malignant neoplasm of colon    Medication side effect, initial encounter    Renovascular hypertension    Elevated liver function tests    Recurrent major depressive disorder, in full remission (HCC)    Anemia due to chronic kidney disease     Allergies:  She is allergic to ace inhibitors, folic acid-vit b6-vit b12, vitamin b12, and b12-ca.    Current Medications:  Outpatient Medications Marked as Taking for the 8/15/24 encounter (Office Visit) with Marisol Martinez,    Medication Sig    TRESIBA FLEXTOUCH 100 UNIT/ML Subcutaneous Solution Pen-injector Inject 30 units into the skin nightly. 3 months supply.    insulin glargine (LANTUS SOLOSTAR) 100 UNIT/ML Subcutaneous Solution Pen-injector Inject 30 Units into the skin nightly. 3 mo supply    Insulin Pen Needle (BD PEN NEEDLE ANTHONY 2ND GEN) 32G X 4 MM Does not apply Misc Use to inject insulin once daily    Empagliflozin (JARDIANCE) 25 MG Oral Tab Take 25 mg by mouth daily.    CETIRIZINE 10 MG Oral Tab TAKE 1 TABLET(10 MG) BY MOUTH DAILY    montelukast 10 MG Oral Tab Take 1 tablet (10 mg total) by mouth daily.    JANUVIA 100 MG Oral Tab TAKE 1 TABLET(100 MG) BY MOUTH DAILY    folic acid 1 MG Oral Tab Take 1 tablet (1 mg total) by mouth daily.    buPROPion  MG Oral Tablet 12 Hr Take  1 tablet (150 mg total) by mouth daily.    carvedilol 6.25 MG Oral Tab Take 1 tablet (6.25 mg total) by mouth 2 (two) times daily with meals.    atorvastatin 10 MG Oral Tab Take 1 tablet (10 mg total) by mouth nightly.    TRUEPLUS PEN NEEDLES 32G X 4 MM Does not apply Misc TEST FOUR TIMES DAILY    levothyroxine 88 MCG Oral Tab Take 1 tablet (88 mcg total) by mouth before breakfast.    dapsone 100 MG Oral Tab     omeprazole 20 MG Oral Capsule Delayed Release Take 1 capsule (20 mg total) by mouth 2 (two) times daily before meals.    Microlet Lancets Does not apply Misc Check blood sugar once daily    Glucose Blood (CONTOUR NEXT TEST) In Vitro Strip Check BG once daily    ALBUTEROL 108 (90 Base) MCG/ACT Inhalation Aero Soln INHALE 2 PUFFS BY MOUTH INTO THE LUNGS EVERY 4 HOURS AS NEEDED FOR WHEEZING    FLUTICASONE PROPIONATE 50 MCG/ACT Nasal Suspension SHAKE LIQUID AND USE 2 SPRAYS IN EACH NOSTRIL DAILY    ENVARSUS XR 0.75 MG Oral Tablet 24 Hr Take 3 tablets by mouth daily.    TRUE METRIX BLOOD GLUCOSE TEST In Vitro Strip TEST 3 TIMES A DAY    Meclizine HCl 12.5 MG Oral Tab Take 1 tablet (12.5 mg total) by mouth 3 (three) times daily as needed.    valGANciclovir HCl (VALCYTE OR) Take 450 mg by mouth.    Lancets Does not apply Misc 3x daily lancets for True Metrix meter    mycophenolate sodium 360 MG Oral Tab EC Take 1 tablet (360 mg total) by mouth 4 (four) times daily. For kidney transplant    Biotin 5000 MCG Oral Cap     ondansetron 4 MG Oral Tablet Dispersible     ergocalciferol 1.25 MG (76475 UT) Oral Cap     FEROSUL 325 (65 Fe) MG Oral Tab Take by mouth daily.    Blood Glucose Monitoring Suppl (TRUE METRIX AIR GLUCOSE METER) w/Device Does not apply Kit U UTD WITH STRIPS TO TEST BLOOD GLUCOSE    Alcohol Swabs (BD SWAB SINGLE USE REGULAR) Does not apply Pads U UTD QID    acetaminophen 500 MG Oral Tab Take 2 tablets (1,000 mg total) by mouth one time.       Medical History:  She  has a past medical history of Allergic  rhinitis, Bad breath, Bloating (), Cataracts, bilateral (2015), Cholecystitis, Chronic kidney disease (CKD), Constipation (), COVID, Depression, Diabetes (), Diabetic retinopathy (HCC) (2015), Diastolic dysfunction (2013), Encephalopathy acute, Esophageal reflux, Essential hypertension, Eye disease, Flatulence/gas pain/belching (), Food intolerance (), Frequent UTI (), Glaucoma, Heartburn (), Hyperlipidemia, Hypertension, Hypothyroidism, Irregular bowel habits (), Loss of appetite (), Macular puckering of retina, Nausea, PONV (postoperative nausea and vomiting), Prerenal azotemia, Proliferative diabetic retinopathy(362.02), Renal disorder, S/P kidney transplant ([facility]) (2020), Severe obstructive sleep apnea (SPLIT NIGHT 5-9-16), Sleep apnea, Traction detachment of retina, Uncomfortable fullness after meals (), Unspecified disorder of thyroid, Visual impairment, Vitreous hemorrhage (), and Wears glasses.  Surgical History:  She  has a past surgical history that includes hysterectomy (); appendectomy; Eye surgery (); upper gi endo no barretts (2015); ish biopsy stereo nodule 1 site right (cpt=19081) (??); other surgical history (); colonoscopy; cataract (Bilateral); av fistula revision, open (Left, 2019); colonoscopy (N/A, 2019); transplantation of kidney (2019); organ transplant; total abdom hysterectomy; and  (83, 86, 84, 90).   Family History:  Her family history includes Breast Cancer in her paternal aunt; Depression in her daughter and daughter; Diabetes in her father and mother; Heart Attack in her father; High Blood Pressure in her mother, son, and son; High Blood Pressure, and Pre-Diabetes in her daughter; Hypertension in her son; Positive Coivd (age of onset: 80) in her mother; Ulcerative Colitis in her mother; Uterine Cancer in her paternal aunt.  Social History:  She  reports that she has never  smoked. She has never used smokeless tobacco. She reports that she does not drink alcohol and does not use drugs.    Tobacco:  She has never smoked tobacco.    CAGE Alcohol Screen:   CAGE screening score of 0 on 8/15/2024, showing low risk of alcohol abuse.      Patient Care Team:  Marisol Martinez DO as PCP - General (Family Practice)  Twan Hutton MD (NEPHROLOGY)  Tristen Jernigan Jr., MD (OPHTHALMOLOGY)  Cam Cannon MD as Referring Physician (PULMONARY DISEASES)  Keith Schneider MD (SURGERY, ORTHOPEDIC)  Love Ivan PT as Physical Therapist  Suzette Stokes (Transplant)  Armand Zhao MD (GASTROENTEROLOGY)  Judson Jarrett MD (ENDOCRINOLOGY)    Review of Systems  GENERAL: feels well otherwise  SKIN: denies any unusual skin lesions  EYES: denies blurred vision or double vision  HEENT: denies nasal congestion, sinus pain or ST  LUNGS: denies shortness of breath with exertion  CARDIOVASCULAR: denies chest pain on exertion  GI: denies abdominal pain, denies heartburn  : denies dysuria, vaginal discharge or itching, no complaint of urinary incontinence   MUSCULOSKELETAL: denies back pain  NEURO: denies headaches  PSYCHE: denies depression or anxiety  HEMATOLOGIC: denies hx of anemia  ENDOCRINE: denies thyroid history  ALL/ASTHMA: denies hx of allergy or asthma    Objective:   Physical Exam  General Appearance:  Alert, cooperative, no distress, appears stated age   Head:  Normocephalic, without obvious abnormality, atraumatic   Eyes:  PERRL, conjunctiva/corneas clear, EOM's intact both eyes   Ears:  Normal TM's and external ear canals, both ears   Nose: Nares normal, septum midline,mucosa normal, no drainage or sinus tenderness   Throat: Lips, mucosa, and tongue normal; teeth and gums normal   Neck: Supple, symmetrical, trachea midline, no adenopathy;  thyroid: not enlarged, symmetric, no tenderness/mass/nodules; no carotid bruit or JVD   Back:   Symmetric, no curvature, ROM normal, no CVA  tenderness   Lungs:   Clear to auscultation bilaterally, respirations unlabored   Heart:  Regular rate and rhythm, S1 and S2 normal, no murmur, rub, or gallop   Abdomen:   Soft, non-tender, bowel sounds active all four quadrants,  no masses, no organomegaly   Pelvic: Deferred   Extremities: Extremities normal, atraumatic, no cyanosis or edema  Bilateral barefoot skin diabetic exam is normal, visualized feet and the appearance is normal.  Bilateral monofilament/sensation of both feet is normal.  Pulsation pedal pulse exam of both lower legs/feet is normal as well.     Pulses: 2+ and symmetric   Skin: Skin color, texture, turgor normal, no rashes or lesions   Lymph nodes: Cervical, supraclavicular, and axillary nodes normal   Neurologic: Normal   , Breasts:  normal appearance, no masses or tenderness, Inspection negative, No nipple retraction or dimpling, No nipple discharge or bleeding, No axillary or supraclavicular adenopathy, Normal to palpation without dominant masses, and Pelvic: cervix normal in appearance, external genitalia normal, no adnexal masses or tenderness, no cervical motion tenderness, rectovaginal septum normal, uterus normal size, shape, and consistency, and vagina normal without discharge    /70 (BP Location: Right arm, Patient Position: Sitting, Cuff Size: adult)   Pulse 69   Temp 97.4 °F (36.3 °C) (Temporal)   Resp 22   Ht 4' 11.13\" (1.502 m)   Wt 156 lb 6.4 oz (70.9 kg)   LMP  (LMP Unknown)   SpO2 99%   BMI 31.45 kg/m²  Estimated body mass index is 31.45 kg/m² as calculated from the following:    Height as of this encounter: 4' 11.13\" (1.502 m).    Weight as of this encounter: 156 lb 6.4 oz (70.9 kg).    Medicare Hearing Assessment:   Hearing Screening    Screening Method: Finger Rub  Finger Rub Result: Pass         Visual Acuity:   Right Eye Visual Acuity: Uncorrected Right Eye Chart Acuity: 20/200   Left Eye Visual Acuity: Uncorrected Left Eye Chart Acuity: 20/200   Both Eyes  Visual Acuity: Uncorrected Both Eyes Chart Acuity: 20/70   Able To Tolerate Visual Acuity: Yes        Assessment & Plan:   Lupe Arce is a 63 year old female who presents for a Medicare Assessment.     1. Encounter for annual health examination (Primary)  2. Hyperlipidemia associated with type 2 diabetes mellitus (HCC)  -     Detailed, Mod Complex (13366)  -     Lipid Panel; Future; Expected date: 02/03/2025  -     Comp Metabolic Panel (14); Future; Expected date: 02/03/2025  -     Atorvastatin Calcium; Take 1 tablet (10 mg total) by mouth nightly.  Dispense: 90 tablet; Refill: 1  Controlled  LDL less than 70, normal triglycerides  Continue atorvastatin  Encourage Mediterranean diet  Start walking 30 to 60 minutes daily May break up activity into 15-minute increments  Recheck in 6 months-if nephrologist orders lipid panels early winter then does not really need to repeat in February prior to OV    3. Primary hypertension  -     Detailed, Mod Complex (54300)  -     Comp Metabolic Panel (14); Future; Expected date: 02/03/2025  Controlled  Continue carvedilol  Managed by nephrology    4. S/P kidney transplant ([facility])  Overview:  12/13/2019-right kidney transplant donated by his son.  Former dialysis patient due to diabetic kidney disease performed at [facility].      Orders:  -     Detailed, Mod Complex (41784)  -     Comp Metabolic Panel (14); Future; Expected date: 02/03/2025  Doing well  Continue dapsone, valganciclovir, folic acid, Envarsus, mycophenolate  Follow-up with nephrology every 3 months as recommended    5. 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 Acquired hypothyroidism  -     Detailed, Mod Complex (60674)  Appears euthymic  Last TSH 5/2024 14 at goal  Continue levothyroxine  Managed by endocrinologist  7. Recurrent major depressive disorder, in full remission (HCC)  -     Julia Mod Complex (99214)  -     buPROPion HCl ER (SR); Take 1 tablet (150 mg total) by mouth daily.  Dispense: 90 tablet; Refill: 1  Controlled  -contracts for safety- if suicidal or homicidal, call 911 or go to nearest ER and call office  -increased suicide risk on  SNRI   Exercise 3 x weekly x 30-60min    8. Gastroesophageal reflux disease without esophagitis  -     Julia, Mod Complex (61386)  Controlled  Continue omeprazole  Avoid trigger foods  Denied abdominal pain, melena, dysphagia, unexplained weight loss or night sweats.    9. Elevated liver function tests  -     Julia Mod Complex (99214)  Resolved with lower dose of atorvastatin 10 mg  10. Asthma with COPD (chronic obstructive pulmonary disease) ([facility])  -     Julia Mod Complex (99214)  Asymptomatic  Continue albuterol as needed  LOV with pulmonologist 5/22/2024-evaluated by F NP-C last PFTs  At  with pulmonologist were normal but had a low FeNO    11. Normocytic anemia  Overview:  1/8/2020Has history of anemia- transfused with 6 units of blood during hospital admission for kidney transplant. daughter in law reports difficult to maintain her hemoglobin greater than 8 after transplant.  Pt feels fatigued.    4/24/2024-kidney transplant patient.  Patient had recent office visit with endocrinologist who ordered a POC hemoglobin A1c and CBC/hemoglobin and was low hemoglobin was 9.4 but MCV was normal.  Iron studies were ordered by me and I have referred her to see gastroenterology and oncology-Dr. Zaidi.  Orders:  -     Detailed, Mod Complex (23474)  History of anemia prior to renal transplant and worsened postoperatively but eventually hemoglobin normalized until 2022 then progressively  dropped.  Takes oral iron since transplant and had history of low folic acid levels so iron supplementation  In posttransplant patients anemia may redevelop in association with decreased allograft function, immune oppressive and antiviral drugs and infections.\  Parvo testing was negative.  Receiving iron infusions  Managed by Dr. Zaidi    12. Severe obstructive sleep apnea  Overview:  Dr. Cam Cannon managing CPAP now controlled.  Collapse/syncope intubation/hospitalization 2/2016 secondary to poor compliance with CPAP  Orders:  -     Detailed, Mod Complex (67153)  Compliant with BiPAP  States using 7 hours every night  History of coma 5 to 6 years ago after forgot BiPAP machine at home and went to [place].  Follows with pulmonary every 4 to 6 months    13. Immunosuppression due to drug therapy (HCC)  Overview:  S/p kidney transplant 12/2019 [facility]  Orders:  -     Detailed, Mod Complex (95208)  Complete vaccinations due for Shingrix No. 2 and needs COVID-19 vaccine and flu shot in the fall    14.  Thrombocytopenia-new  Platelets 136 on CBC 5/14/2024  Mild  Reviewed Dr. Zaidi's consults may be possibly due to liver disease or folate deficiency  CBC will be monitored by nephrologist    15. Need for vaccination  -     Shingrix; Inject 50 mcg into the muscle one time for 1 dose. Repeat in 2 to 6 months to complete the series.  Dispense: 1 each; Refill: 1  needs COVID-19 vaccine and flu shot in the fall        The patient indicates understanding of these issues and agrees to the plan.  Lab work ordered.  Reinforced healthy diet, lifestyle, and exercise.      Return in 6 months (on 2/15/2025).     Marisol Martinez DO, 8/15/2024     Supplementary Documentation:   General Health:  In the past six months, have you lost more than 10 pounds without trying?: 1 - Yes  Has your appetite been poor?: No  Type of Diet: Balanced  How does the patient maintain a good energy level?: Stretching;Appropriate Exercise  How would you  describe your daily physical activity?: Moderate  How would you describe your current health state?: Good  How do you maintain positive mental well-being?: Visiting Friends;Visiting Family  On a scale of 0 to 10, with 0 being no pain and 10 being severe pain, what is your pain level?: 0 - (None)  In the past six months, have you experienced urine leakage?: 0-No  At any time do you feel concerned for the safety/well-being of yourself and/or your children, in your home or elsewhere?: No  Have you had any immunizations at another office such as Influenza, Hepatitis B, Tetanus, or Pneumococcal?: No    Health Maintenance   Topic Date Due    Zoster Vaccines (1 of 2) Never done    COVID-19 Vaccine (6 - 2023-24 season) 09/01/2023    Diabetes Care Foot Exam  08/08/2024    Annual Physical  08/08/2024    Influenza Vaccine (1) 10/01/2024    Diabetes Care A1C  11/14/2024    Mammogram  03/11/2025    Diabetes Care Dilated Eye Exam  05/10/2025    Pap Smear  08/03/2026    Pneumococcal Vaccine: Birth to 64yrs (4 of 4 - PPSV23 or PCV20) 08/03/2026    Colorectal Cancer Screening  11/01/2027    DTaP,Tdap,and Td Vaccines (4 - Td or Tdap) 11/07/2027    Annual Depression Screening  Completed

## 2024-08-25 DIAGNOSIS — E11.65 TYPE 2 DIABETES MELLITUS WITH HYPERGLYCEMIA, WITH LONG-TERM CURRENT USE OF INSULIN (HCC): ICD-10-CM

## 2024-08-25 DIAGNOSIS — Z79.4 TYPE 2 DIABETES MELLITUS WITH HYPERGLYCEMIA, WITH LONG-TERM CURRENT USE OF INSULIN (HCC): ICD-10-CM

## 2024-08-29 RX ORDER — CETIRIZINE HYDROCHLORIDE 10 MG/1
10 TABLET ORAL DAILY
Qty: 90 TABLET | Refills: 0 | Status: SHIPPED | OUTPATIENT
Start: 2024-08-29

## 2024-08-29 RX ORDER — EMPAGLIFLOZIN 25 MG/1
25 TABLET, FILM COATED ORAL DAILY
Qty: 90 TABLET | Refills: 0 | Status: SHIPPED | OUTPATIENT
Start: 2024-08-29

## 2024-08-29 NOTE — TELEPHONE ENCOUNTER
Refill Passed Protocol:     Pt requesting refill of   Requested Prescriptions     Pending Prescriptions Disp Refills    Empagliflozin (JARDIANCE) 25 MG Oral Tab 90 tablet 0     Sig: Take 25 mg by mouth daily.      Refill was approved and sent to pharmacy:   Diabetes Medication Protocol [id number]/25/2024 07:55 PM   Protocol Details Last A1C < 7.5 and within past 6 months    In person appointment or virtual visit in the past 6 mos or appointment in next 3 mos    Microalbumin procedure in past 12 months or taking ACE/ARB    EGFRCR or GFRNAA > 50    GFR in the past 12 months     Last Time Medication was Filled:  7/22/2024    Last Office Visit with Provider: 8/15/2024    Appt. scheduled on 10/15/2024

## 2024-08-29 NOTE — TELEPHONE ENCOUNTER
Refill Passed Protocol:     Pt requesting refill of   Requested Prescriptions     Pending Prescriptions Disp Refills    CETIRIZINE 10 MG Oral Tab [Pharmacy Med Name: CETIRIZINE 10MG TABLETS] 90 tablet 0     Sig: TAKE 1 TABLET(10 MG) BY MOUTH DAILY      Refill was approved and sent to pharmacy:   Allergy Medication Protocol [id number]/25/2024 07:53 PM   Protocol Details In person appointment or virtual visit in the past 12 mos or appointment in next 3 mos     Last Time Medication was Filled:  7/16/2024    Last Office Visit with Provider: 8/15/2024    Appt. scheduled on 10/15/2024

## 2024-08-30 ENCOUNTER — NURSE ONLY (OUTPATIENT)
Dept: HEMATOLOGY/ONCOLOGY | Age: 64
End: 2024-08-30
Attending: INTERNAL MEDICINE
Payer: MEDICARE

## 2024-08-30 DIAGNOSIS — D64.9 NORMOCYTIC ANEMIA: ICD-10-CM

## 2024-08-30 DIAGNOSIS — D69.6 THROMBOCYTOPENIA (HCC): ICD-10-CM

## 2024-08-30 DIAGNOSIS — D59.8 OTHER ACQUIRED HEMOLYTIC ANEMIAS (HCC): ICD-10-CM

## 2024-08-30 DIAGNOSIS — D50.8 OTHER IRON DEFICIENCY ANEMIA: ICD-10-CM

## 2024-08-30 LAB
BASOPHILS # BLD AUTO: 0.02 X10(3) UL (ref 0–0.2)
BASOPHILS NFR BLD AUTO: 0.4 %
DEPRECATED HBV CORE AB SER IA-ACNC: 681.9 NG/ML
EOSINOPHIL # BLD AUTO: 0.03 X10(3) UL (ref 0–0.7)
EOSINOPHIL NFR BLD AUTO: 0.6 %
ERYTHROCYTE [DISTWIDTH] IN BLOOD BY AUTOMATED COUNT: 12.1 %
HAPTOGLOB SERPL-MCNC: 85 MG/DL (ref 30–200)
HCT VFR BLD AUTO: 40.6 %
HGB BLD-MCNC: 12.7 G/DL
HGB RETIC QN AUTO: 33.3 PG (ref 28.2–36.6)
IMM GRANULOCYTES # BLD AUTO: 0.01 X10(3) UL (ref 0–1)
IMM GRANULOCYTES NFR BLD: 0.2 %
IMM RETICS NFR: 0.08 RATIO (ref 0.1–0.3)
IRON SATN MFR SERPL: 45 %
IRON SERPL-MCNC: 119 UG/DL
LDH SERPL L TO P-CCNC: 218 U/L
LYMPHOCYTES # BLD AUTO: 1.28 X10(3) UL (ref 1–4)
LYMPHOCYTES NFR BLD AUTO: 27 %
MCH RBC QN AUTO: 29.5 PG (ref 26–34)
MCHC RBC AUTO-ENTMCNC: 31.3 G/DL (ref 31–37)
MCV RBC AUTO: 94.2 FL
MONOCYTES # BLD AUTO: 0.43 X10(3) UL (ref 0.1–1)
MONOCYTES NFR BLD AUTO: 9.1 %
NEUTROPHILS # BLD AUTO: 2.97 X10 (3) UL (ref 1.5–7.7)
NEUTROPHILS # BLD AUTO: 2.97 X10(3) UL (ref 1.5–7.7)
NEUTROPHILS NFR BLD AUTO: 62.7 %
PLATELET # BLD AUTO: 131 10(3)UL (ref 150–450)
PLATELETS.RETICULATED NFR BLD AUTO: 5.6 % (ref 0–7)
RBC # BLD AUTO: 4.31 X10(6)UL
RETICS # AUTO: 99.1 X10(3) UL (ref 22.5–147.5)
RETICS/RBC NFR AUTO: 2.3 %
TOTAL IRON BINDING CAPACITY: 265 UG/DL (ref 250–425)
TRANSFERRIN SERPL-MCNC: 191 MG/DL (ref 250–380)
WBC # BLD AUTO: 4.7 X10(3) UL (ref 4–11)

## 2024-08-30 PROCEDURE — 82728 ASSAY OF FERRITIN: CPT

## 2024-08-30 PROCEDURE — 83540 ASSAY OF IRON: CPT

## 2024-08-30 PROCEDURE — 36415 COLL VENOUS BLD VENIPUNCTURE: CPT

## 2024-08-30 PROCEDURE — 83550 IRON BINDING TEST: CPT

## 2024-08-30 PROCEDURE — 83010 ASSAY OF HAPTOGLOBIN QUANT: CPT

## 2024-08-30 PROCEDURE — 85045 AUTOMATED RETICULOCYTE COUNT: CPT

## 2024-08-30 PROCEDURE — 85025 COMPLETE CBC W/AUTO DIFF WBC: CPT

## 2024-08-30 PROCEDURE — 83615 LACTATE (LD) (LDH) ENZYME: CPT

## 2024-08-30 PROCEDURE — 84238 ASSAY NONENDOCRINE RECEPTOR: CPT

## 2024-09-01 LAB — SOL TRANSFERRIN RECEPTOR: 18.9 NMOL/L

## 2024-09-03 DIAGNOSIS — D64.9 NORMOCYTIC ANEMIA: ICD-10-CM

## 2024-09-03 DIAGNOSIS — D50.8 OTHER IRON DEFICIENCY ANEMIA: ICD-10-CM

## 2024-09-03 DIAGNOSIS — D69.6 THROMBOCYTOPENIA (HCC): Primary | ICD-10-CM

## 2024-09-08 DIAGNOSIS — J44.89 ASTHMA WITH COPD (CHRONIC OBSTRUCTIVE PULMONARY DISEASE) (HCC): Chronic | ICD-10-CM

## 2024-09-09 RX ORDER — MONTELUKAST SODIUM 10 MG/1
10 TABLET ORAL DAILY
Qty: 90 TABLET | Refills: 3 | Status: SHIPPED | OUTPATIENT
Start: 2024-09-09

## 2024-09-09 NOTE — TELEPHONE ENCOUNTER
Refill Failed Protocol: 30 day supply pended for review     Pt requesting refill of   Requested Prescriptions     Pending Prescriptions Disp Refills    montelukast 10 MG Oral Tab 90 tablet 0     Sig: Take 1 tablet (10 mg total) by mouth daily.     Last Time Medication was Filled:  6/21/2024    Last Office Visit with Provider: 8/15/2024   Asthma with COPD (chronic obstructive pulmonary disease) ([facility])  -     Detailed, Mod Complex (53326)  Asymptomatic  Continue albuterol as needed  LOV with pulmonologist 5/22/2024-evaluated by F NP-C last PFTs  At  with pulmonologist were normal but had a low FeNO    Unable to approve refill,     Asthma & COPD Medication Protocol [id number]/2024 07:20 PM   Protocol Details Asthma Action Score greater than or equal to 20    AAP/ACT given in last 12 months    Appointment in past 6 or next 3 months             Appt. scheduled on 10/15/2024     Return in 6 months (on 2/15/2025) for medication monitoring, discuss labs, sooner if needed..

## 2024-09-28 DIAGNOSIS — J44.89 ASTHMA WITH COPD (CHRONIC OBSTRUCTIVE PULMONARY DISEASE) (HCC): Chronic | ICD-10-CM

## 2024-10-03 RX ORDER — MONTELUKAST SODIUM 10 MG/1
10 TABLET ORAL DAILY
Qty: 90 TABLET | Refills: 3 | Status: SHIPPED | OUTPATIENT
Start: 2024-10-03

## 2024-10-03 NOTE — TELEPHONE ENCOUNTER
Pt requesting refill of   Requested Prescriptions     Pending Prescriptions Disp Refills    montelukast 10 MG Oral Tab 90 tablet 0     Sig: Take 1 tablet (10 mg total) by mouth daily.       Last Time Medication was Filled:  9/09/2024 90 days 3 refills    Last Office Visit with Provider: 8/15/2024    Asthma with COPD (chronic obstructive pulmonary disease) (HCC)  Controlled   continue Breo and albuterol as needed  Follow-up with pulmonologist annually    Appt. scheduled on 10/15/2024

## 2024-10-11 DIAGNOSIS — Z79.4 TYPE 2 DIABETES MELLITUS WITH HYPERGLYCEMIA, WITH LONG-TERM CURRENT USE OF INSULIN (HCC): ICD-10-CM

## 2024-10-11 DIAGNOSIS — E11.65 TYPE 2 DIABETES MELLITUS WITH HYPERGLYCEMIA, WITH LONG-TERM CURRENT USE OF INSULIN (HCC): ICD-10-CM

## 2024-10-11 RX ORDER — SITAGLIPTIN 100 MG/1
100 TABLET, FILM COATED ORAL DAILY
Qty: 90 TABLET | Refills: 1 | Status: SHIPPED | OUTPATIENT
Start: 2024-10-11

## 2024-10-11 NOTE — TELEPHONE ENCOUNTER
Endocrine Refill protocol for oral and injectable diabetic medications    Protocol Criteria:  PASSED  Reason: N/A    If all below requirements are met, send a 90-day supply with 1 refill per provider protocol.    Verify appointment with Endocrinology completed in the last 6 months or scheduled in the next 3 months.  Verify A1C has been completed within the last 6 months and is below 8.5%     Last completed office visit: 7/22/2024 Lily Rod APN   Next scheduled Follow up:   Future Appointments   Date Time Provider Department Center   10/12/2024 11:00 AM [facility] OUT[facility] [place]   10/15/2024  5:30 PM Marisol Martinez DO [facility] [place]      Last A1c result: Last A1c value was <4.2 % done 5/14/2024.    Per LOV note from 7/22/24,  \"- continue januvia 100mg daily (GFR 57 in 7/2024- plan on dose adjustment to 50mg once GFR 30-45)\"     Refill protocol passed, 90 day supply with 1 refill sent to pharmacy.     MCM sent to pt to remind to schedule f/u appt.

## 2024-10-12 ENCOUNTER — LAB ENCOUNTER (OUTPATIENT)
Dept: LAB | Age: 64
End: 2024-10-12
Payer: MEDICARE

## 2024-10-12 DIAGNOSIS — Z94.0 KIDNEY REPLACED BY TRANSPLANT (HCC): Primary | ICD-10-CM

## 2024-10-12 LAB
ALBUMIN SERPL-MCNC: 4.6 G/DL (ref 3.2–4.8)
ANION GAP SERPL CALC-SCNC: 4 MMOL/L (ref 0–18)
BASOPHILS # BLD AUTO: 0.02 X10(3) UL (ref 0–0.2)
BASOPHILS NFR BLD AUTO: 0.4 %
BILIRUB UR QL STRIP.AUTO: NEGATIVE
BUN BLD-MCNC: 38 MG/DL (ref 9–23)
CALCIUM BLD-MCNC: 9.5 MG/DL (ref 8.7–10.4)
CHLORIDE SERPL-SCNC: 110 MMOL/L (ref 98–112)
CLARITY UR REFRACT.AUTO: CLEAR
CO2 SERPL-SCNC: 28 MMOL/L (ref 21–32)
CREAT BLD-MCNC: 1.25 MG/DL
CREAT UR-SCNC: 63.3 MG/DL
EGFRCR SERPLBLD CKD-EPI 2021: 48 ML/MIN/1.73M2 (ref 60–?)
EOSINOPHIL # BLD AUTO: 0.04 X10(3) UL (ref 0–0.7)
EOSINOPHIL NFR BLD AUTO: 0.7 %
ERYTHROCYTE [DISTWIDTH] IN BLOOD BY AUTOMATED COUNT: 12.3 %
GLUCOSE BLD-MCNC: 97 MG/DL (ref 70–99)
GLUCOSE UR STRIP.AUTO-MCNC: >1000 MG/DL
HCT VFR BLD AUTO: 41.4 %
HGB BLD-MCNC: 13 G/DL
IMM GRANULOCYTES # BLD AUTO: 0.01 X10(3) UL (ref 0–1)
IMM GRANULOCYTES NFR BLD: 0.2 %
KETONES UR STRIP.AUTO-MCNC: NEGATIVE MG/DL
LEUKOCYTE ESTERASE UR QL STRIP.AUTO: 500
LYMPHOCYTES # BLD AUTO: 1.14 X10(3) UL (ref 1–4)
LYMPHOCYTES NFR BLD AUTO: 20.5 %
MCH RBC QN AUTO: 28.6 PG (ref 26–34)
MCHC RBC AUTO-ENTMCNC: 31.4 G/DL (ref 31–37)
MCV RBC AUTO: 91.2 FL
MONOCYTES # BLD AUTO: 0.49 X10(3) UL (ref 0.1–1)
MONOCYTES NFR BLD AUTO: 8.8 %
NEUTROPHILS # BLD AUTO: 3.87 X10 (3) UL (ref 1.5–7.7)
NEUTROPHILS # BLD AUTO: 3.87 X10(3) UL (ref 1.5–7.7)
NEUTROPHILS NFR BLD AUTO: 69.4 %
NITRITE UR QL STRIP.AUTO: NEGATIVE
OSMOLALITY SERPL CALC.SUM OF ELEC: 303 MOSM/KG (ref 275–295)
PH UR STRIP.AUTO: 5 [PH] (ref 5–8)
PHOSPHATE SERPL-MCNC: 3.7 MG/DL (ref 2.4–5.1)
PLATELET # BLD AUTO: 129 10(3)UL (ref 150–450)
POTASSIUM SERPL-SCNC: 4.8 MMOL/L (ref 3.5–5.1)
PROT UR STRIP.AUTO-MCNC: NEGATIVE MG/DL
PROT UR-MCNC: 18.3 MG/DL (ref ?–14)
PROT/CREAT UR-RTO: 0.29
RBC # BLD AUTO: 4.54 X10(6)UL
SODIUM SERPL-SCNC: 142 MMOL/L (ref 136–145)
SP GR UR STRIP.AUTO: 1.02 (ref 1–1.03)
UROBILINOGEN UR STRIP.AUTO-MCNC: NORMAL MG/DL
WBC # BLD AUTO: 5.6 X10(3) UL (ref 4–11)
WBC #/AREA URNS AUTO: >50 /HPF

## 2024-10-12 PROCEDURE — 36415 COLL VENOUS BLD VENIPUNCTURE: CPT

## 2024-10-12 PROCEDURE — 82570 ASSAY OF URINE CREATININE: CPT

## 2024-10-12 PROCEDURE — 87086 URINE CULTURE/COLONY COUNT: CPT

## 2024-10-12 PROCEDURE — 87186 SC STD MICRODIL/AGAR DIL: CPT

## 2024-10-12 PROCEDURE — 81001 URINALYSIS AUTO W/SCOPE: CPT

## 2024-10-12 PROCEDURE — 80197 ASSAY OF TACROLIMUS: CPT

## 2024-10-12 PROCEDURE — 84156 ASSAY OF PROTEIN URINE: CPT

## 2024-10-12 PROCEDURE — 87077 CULTURE AEROBIC IDENTIFY: CPT

## 2024-10-12 PROCEDURE — 80069 RENAL FUNCTION PANEL: CPT

## 2024-10-12 PROCEDURE — 87496 CYTOMEG DNA AMP PROBE: CPT

## 2024-10-12 PROCEDURE — 85025 COMPLETE CBC W/AUTO DIFF WBC: CPT

## 2024-10-14 ENCOUNTER — LAB ENCOUNTER (OUTPATIENT)
Dept: LAB | Facility: HOSPITAL | Age: 64
End: 2024-10-14
Payer: MEDICARE

## 2024-10-14 DIAGNOSIS — D50.8 OTHER IRON DEFICIENCY ANEMIA: ICD-10-CM

## 2024-10-14 DIAGNOSIS — E03.9 ACQUIRED HYPOTHYROIDISM: ICD-10-CM

## 2024-10-14 DIAGNOSIS — Z79.4 TYPE 2 DIABETES MELLITUS WITH HYPERGLYCEMIA, WITH LONG-TERM CURRENT USE OF INSULIN (HCC): ICD-10-CM

## 2024-10-14 DIAGNOSIS — E78.5 HYPERLIPIDEMIA ASSOCIATED WITH TYPE 2 DIABETES MELLITUS (HCC): ICD-10-CM

## 2024-10-14 DIAGNOSIS — D64.9 NORMOCYTIC ANEMIA: ICD-10-CM

## 2024-10-14 DIAGNOSIS — D69.6 THROMBOCYTOPENIA (HCC): ICD-10-CM

## 2024-10-14 DIAGNOSIS — E11.69 HYPERLIPIDEMIA ASSOCIATED WITH TYPE 2 DIABETES MELLITUS (HCC): ICD-10-CM

## 2024-10-14 DIAGNOSIS — Z94.0 KIDNEY REPLACED BY TRANSPLANT (HCC): ICD-10-CM

## 2024-10-14 DIAGNOSIS — E11.65 TYPE 2 DIABETES MELLITUS WITH HYPERGLYCEMIA, WITH LONG-TERM CURRENT USE OF INSULIN (HCC): ICD-10-CM

## 2024-10-14 LAB
EST. AVERAGE GLUCOSE BLD GHB EST-MCNC: 143 MG/DL (ref 68–126)
HBA1C MFR BLD: 6.6 % (ref ?–5.7)
T4 FREE SERPL-MCNC: 1.5 NG/DL (ref 0.8–1.7)
TSI SER-ACNC: 0.72 MIU/ML (ref 0.55–4.78)

## 2024-10-14 PROCEDURE — 36415 COLL VENOUS BLD VENIPUNCTURE: CPT

## 2024-10-14 PROCEDURE — 83036 HEMOGLOBIN GLYCOSYLATED A1C: CPT

## 2024-10-14 PROCEDURE — 87799 DETECT AGENT NOS DNA QUANT: CPT

## 2024-10-14 PROCEDURE — 80053 COMPREHEN METABOLIC PANEL: CPT

## 2024-10-14 PROCEDURE — 80061 LIPID PANEL: CPT

## 2024-10-14 PROCEDURE — 84439 ASSAY OF FREE THYROXINE: CPT

## 2024-10-14 PROCEDURE — 84443 ASSAY THYROID STIM HORMONE: CPT

## 2024-10-14 RX ORDER — CETIRIZINE HYDROCHLORIDE 10 MG/1
10 TABLET ORAL DAILY
Qty: 90 TABLET | Refills: 0 | Status: SHIPPED | OUTPATIENT
Start: 2024-10-14

## 2024-10-14 NOTE — TELEPHONE ENCOUNTER
Pt requesting refill of   Requested Prescriptions     Pending Prescriptions Disp Refills    CETIRIZINE 10 MG Oral Tab [Pharmacy Med Name: CETIRIZINE 10MG TABLETS] 90 tablet 0     Sig: TAKE 1 TABLET(10 MG) BY MOUTH DAILY      Last Time Medication was Filled:  8/29/2024 90 days 0 refills    Last Office Visit with Provider: 8/15/2024    Appt. scheduled on 10/15/2024

## 2024-10-15 ENCOUNTER — OFFICE VISIT (OUTPATIENT)
Dept: FAMILY MEDICINE CLINIC | Facility: CLINIC | Age: 64
End: 2024-10-15
Payer: MEDICARE

## 2024-10-15 VITALS
BODY MASS INDEX: 32.42 KG/M2 | SYSTOLIC BLOOD PRESSURE: 131 MMHG | WEIGHT: 160.81 LBS | HEIGHT: 59 IN | HEART RATE: 71 BPM | DIASTOLIC BLOOD PRESSURE: 78 MMHG | TEMPERATURE: 98 F | RESPIRATION RATE: 16 BRPM | OXYGEN SATURATION: 98 %

## 2024-10-15 DIAGNOSIS — E11.65 TYPE 2 DIABETES MELLITUS WITH HYPERGLYCEMIA, WITH LONG-TERM CURRENT USE OF INSULIN (HCC): ICD-10-CM

## 2024-10-15 DIAGNOSIS — E78.5 HYPERLIPIDEMIA ASSOCIATED WITH TYPE 2 DIABETES MELLITUS (HCC): Primary | ICD-10-CM

## 2024-10-15 DIAGNOSIS — J30.2 SEASONAL ALLERGIES: ICD-10-CM

## 2024-10-15 DIAGNOSIS — I10 PRIMARY HYPERTENSION: ICD-10-CM

## 2024-10-15 DIAGNOSIS — G47.33 SEVERE OBSTRUCTIVE SLEEP APNEA: ICD-10-CM

## 2024-10-15 DIAGNOSIS — E11.69 HYPERLIPIDEMIA ASSOCIATED WITH TYPE 2 DIABETES MELLITUS (HCC): Primary | ICD-10-CM

## 2024-10-15 DIAGNOSIS — J44.89 ASTHMA WITH COPD (CHRONIC OBSTRUCTIVE PULMONARY DISEASE) (HCC): Chronic | ICD-10-CM

## 2024-10-15 DIAGNOSIS — Z23 NEED FOR VACCINATION: ICD-10-CM

## 2024-10-15 DIAGNOSIS — Z94.0 S/P KIDNEY TRANSPLANT (HCC): ICD-10-CM

## 2024-10-15 DIAGNOSIS — Z79.4 TYPE 2 DIABETES MELLITUS WITH HYPERGLYCEMIA, WITH LONG-TERM CURRENT USE OF INSULIN (HCC): ICD-10-CM

## 2024-10-15 DIAGNOSIS — F33.42 RECURRENT MAJOR DEPRESSIVE DISORDER, IN FULL REMISSION (HCC): ICD-10-CM

## 2024-10-15 LAB
ALBUMIN SERPL-MCNC: 4.5 G/DL (ref 3.2–4.8)
ALBUMIN/GLOB SERPL: 1.7 {RATIO} (ref 1–2)
ALP LIVER SERPL-CCNC: 137 U/L
ALT SERPL-CCNC: 23 U/L
ANION GAP SERPL CALC-SCNC: 4 MMOL/L (ref 0–18)
AST SERPL-CCNC: 27 U/L (ref ?–34)
BILIRUB SERPL-MCNC: 0.3 MG/DL (ref 0.2–1.1)
BUN BLD-MCNC: 31 MG/DL (ref 9–23)
CALCIUM BLD-MCNC: 9.2 MG/DL (ref 8.7–10.4)
CHLORIDE SERPL-SCNC: 108 MMOL/L (ref 98–112)
CHOLEST SERPL-MCNC: 109 MG/DL (ref ?–200)
CO2 SERPL-SCNC: 29 MMOL/L (ref 21–32)
CREAT BLD-MCNC: 1.44 MG/DL
EGFRCR SERPLBLD CKD-EPI 2021: 41 ML/MIN/1.73M2 (ref 60–?)
GLOBULIN PLAS-MCNC: 2.6 G/DL (ref 2–3.5)
GLUCOSE BLD-MCNC: 122 MG/DL (ref 70–99)
HDLC SERPL-MCNC: 40 MG/DL (ref 40–59)
LDLC SERPL CALC-MCNC: 38 MG/DL (ref ?–100)
NONHDLC SERPL-MCNC: 69 MG/DL (ref ?–130)
OSMOLALITY SERPL CALC.SUM OF ELEC: 300 MOSM/KG (ref 275–295)
POTASSIUM SERPL-SCNC: 4.7 MMOL/L (ref 3.5–5.1)
PROT SERPL-MCNC: 7.1 G/DL (ref 5.7–8.2)
SODIUM SERPL-SCNC: 141 MMOL/L (ref 136–145)
TACROLIMUS LVL: 6.1 NG/ML
TRIGL SERPL-MCNC: 190 MG/DL (ref 30–149)
VLDLC SERPL CALC-MCNC: 26 MG/DL (ref 0–30)

## 2024-10-15 PROCEDURE — 90656 IIV3 VACC NO PRSV 0.5 ML IM: CPT | Performed by: FAMILY MEDICINE

## 2024-10-15 PROCEDURE — 99214 OFFICE O/P EST MOD 30 MIN: CPT | Performed by: FAMILY MEDICINE

## 2024-10-15 PROCEDURE — G0008 ADMIN INFLUENZA VIRUS VAC: HCPCS | Performed by: FAMILY MEDICINE

## 2024-10-15 RX ORDER — MONTELUKAST SODIUM 10 MG/1
10 TABLET ORAL NIGHTLY
Qty: 90 TABLET | Refills: 1 | Status: SHIPPED | OUTPATIENT
Start: 2024-10-15

## 2024-10-15 RX ORDER — CARVEDILOL 6.25 MG/1
6.25 TABLET ORAL 2 TIMES DAILY WITH MEALS
Qty: 180 TABLET | Refills: 1 | Status: SHIPPED | OUTPATIENT
Start: 2024-10-15

## 2024-10-15 RX ORDER — INSULIN DEGLUDEC 100 U/ML
INJECTION, SOLUTION SUBCUTANEOUS
Qty: 30 ML | Refills: 1 | Status: CANCELLED | OUTPATIENT
Start: 2024-10-15

## 2024-10-15 RX ORDER — MONTELUKAST SODIUM 10 MG/1
10 TABLET ORAL DAILY
Qty: 90 TABLET | Refills: 3 | Status: CANCELLED | OUTPATIENT
Start: 2024-10-15

## 2024-10-15 RX ORDER — ZOSTER VACCINE RECOMBINANT, ADJUVANTED 50 MCG/0.5
50 KIT INTRAMUSCULAR ONCE
Qty: 1 EACH | Refills: 1 | Status: SHIPPED | OUTPATIENT
Start: 2024-10-15 | End: 2024-10-15

## 2024-10-15 RX ORDER — BUPROPION HYDROCHLORIDE 150 MG/1
150 TABLET, EXTENDED RELEASE ORAL DAILY
Qty: 90 TABLET | Refills: 1 | Status: SHIPPED | OUTPATIENT
Start: 2024-10-15

## 2024-10-15 RX ORDER — ATORVASTATIN CALCIUM 10 MG/1
10 TABLET, FILM COATED ORAL NIGHTLY
Qty: 90 TABLET | Refills: 1 | Status: SHIPPED | OUTPATIENT
Start: 2024-10-15

## 2024-10-15 NOTE — PROGRESS NOTES
CHIEF COMPLAINT:   Chief Complaint   Patient presents with    Medication Follow-Up     HPI:     Lupe Arce is a 64 year old female presents for medication monitoring for blood pressure, cholesterol, depression. Labs performed yesterday but did not draw cmp and lipids- able to order on existing blood    Pt presents for recheck of hyperlipidemia. Patient reports taking medications as instructed, no medication side effects noted. Denies any generalized muscle aches.  Taking atorvastatin 20 mg at night.      Patient presents for recheck of hypertension. Pt has been taking medications as instructed-only on carvedilol, no medication side effects,  no home BP monitoring denies headache, dizziness  chest pain, dyspnea on exertion, leg swelling.  Using CPAP.    Denies any medication side effects.  BP Readings from Last 5 Encounters:   10/15/24 131/78   08/15/24 126/70   08/02/24 114/68   07/26/24 111/64   06/24/24 157/64     Depression-feels controlled taking bupropion.  Denies feeling depressed, sad, hopeless, insomnia, crying spells, SI, HI or wanting to die.  Feels well since kidney transplant and off of dialysis.    Asthma-managed by pulmonologist mild persistent.Taking Breo and montelukast.  Denies night time symptoms of asthma. Denies chest pain, sob, wheezing, LEON, cough or fever.    Environmental allergies on  montelukast ,cetirizine and fluticasone daily.  Feels controlled. Requesting refill of montelukast    History of sleep apnea-on BiPAP states compliant.  Last office visit with pulmonologist/sleep specialist was 5/22/2024 at [facility] waking up refreshed.    History of hypothyroidism on levothyroxine-managed by [facility] KIM Gonzalez. Taking levothyroxine- not missing doses. Denies mood changes. Feels well.    Type 2 diabetes-now managed by [facility] seeing [facility] at [facility]  HEMOGLOBIN A1C (%)   Date Value   09/20/2023 5.0   06/19/2023 4.3    03/07/2022 4.2 (A)     HEMOGLOBIN A1c (% of total Hgb)   Date Value   01/04/2024 <4.2   12/16/2022 <4.0     HgbA1C   Date Value   10/14/2024 6.6 % (H)   05/14/2024      Comment:     Hemoglobin A1C to be confirmed at [facility]   05/14/2024 <4.2 % (L)   04/22/2024      Comment:     Hemoglobin A1C to be confirmed at [facility]   04/22/2024 <4.2 % (L)   History of kidney transplant.  Taking jardiance, Januvia, tresiba    History of kidney transplant due to ESRD secondary to type 2 diabetes-previously on hemodialysis.  [facility] Calvin Cruz-LOV 7/25//2024 taking tacrolimus, mycophenolate.  OV every 3 months        HISTORY:  Past Medical History:    Allergic rhinitis    Bad breath    Bloating    Always feels bloated    Cataracts, bilateral    Per Dr. Jernigan    Cholecystitis    Chronic kidney disease (CKD)    Constipation    If she is not stuffed up she has diarrhea    COVID    01/2022    Depression    Diabetes (HCC)    no meds    Diabetic retinopathy (HCC)    Per Dr. Jernigan    Diastolic dysfunction    with LVH. EF >55% on ECHO     Encephalopathy acute    Esophageal reflux    Essential hypertension    Eye disease    Flatulence/gas pain/belching    Always has gas    Food intolerance    Lactose intolerance    Frequent UTI    Always tests positive    Glaucoma    Heartburn    Hyperlipidemia    Hypertension    Hypothyroidism    Irregular bowel habits    Not regular    Loss of appetite    Only eats 1 maybe 2 a day    Macular puckering of retina    Nausea    PONV (postoperative nausea and vomiting)    Prerenal azotemia    Proliferative diabetic retinopathy(362.02)    left eye    Renal disorder    ESRD    S/P kidney transplant ([facility])    12/12/2019-right kidney transplant donated by his son.  Former dialysis patient due to diabetic kidney disease performed at [facility].      Severe obstructive sleep apnea    has cpap    Sleep apnea    Traction detachment of retina    Uncomfortable fullness after meals     Always she eats super slow.    Unspecified disorder of thyroid    Visual impairment    glasses    Vitreous hemorrhage (HCC)    Wears glasses      Past Surgical History:   Procedure Laterality Date    Appendectomy      Av fistula revision, open Left 2019    Cataract Bilateral     Colonoscopy      Colonoscopy N/A 2019    Procedure: COLONOSCOPY;  Surgeon: Armand Zhao MD;  Location:  ENDOSCOPY    Eye surgery      RV surgery    Hysterectomy      fibroids. unsure but thinks she still has her cervix    Roopa biopsy stereo nodule 1 site right (cpt=19081)  ??    BENIGN      83, 86, 84, 90    Organ transplant      Other surgical history      cyst removal on thigh area of leg    Total abdom hysterectomy      Transplantation of kidney  2019    kidney swap with son  at [facility]    Upper gi endo no barretts  2015    antral erosions      Family History   Problem Relation Age of Onset    High Blood Pressure Mother     Ulcerative Colitis Mother     Diabetes Mother     Other (Positive Coivd) Mother 80    Diabetes Father     Heart Attack Father     Depression Daughter     Other (High Blood Pressure, and Pre-Diabetes) Daughter     Depression Daughter     High Blood Pressure Son     Hypertension Son     High Blood Pressure Son     Breast Cancer Paternal Aunt     Uterine Cancer Paternal Aunt       Social History:   Social History     Socioeconomic History    Marital status:     Number of children: 3   Occupational History    Occupation: House Keeper   Tobacco Use    Smoking status: Never    Smokeless tobacco: Never   Vaping Use    Vaping status: Never Used   Substance and Sexual Activity    Alcohol use: No    Drug use: No    Sexual activity: Never   Other Topics Concern    Caffeine Concern No    Exercise Yes    Seat Belt No        Medications (Active prior to today's visit):  Current Outpatient Medications   Medication Sig Dispense Refill    Empagliflozin  (JARDIANCE) 25 MG Oral Tab Take 25 mg by mouth daily. 90 tablet 0    CETIRIZINE 10 MG Oral Tab TAKE 1 TABLET(10 MG) BY MOUTH DAILY 90 tablet 0    OMEPRAZOLE 20 MG Oral Capsule Delayed Release TAKE 1 CAPSULE(20 MG) BY MOUTH TWICE DAILY BEFORE MEALS 60 capsule 0    JANUVIA 100 MG Oral Tab TAKE 1 TABLET(100 MG) BY MOUTH DAILY 90 tablet 1    montelukast 10 MG Oral Tab Take 1 tablet (10 mg total) by mouth daily. 90 tablet 3    atorvastatin 10 MG Oral Tab Take 1 tablet (10 mg total) by mouth nightly. 90 tablet 1    buPROPion  MG Oral Tablet 12 Hr Take 1 tablet (150 mg total) by mouth daily. 90 tablet 1    TRESIBA FLEXTOUCH 100 UNIT/ML Subcutaneous Solution Pen-injector Inject 30 units into the skin nightly. 3 months supply. 30 mL 1    Insulin Pen Needle (BD PEN NEEDLE ANTHONY 2ND GEN) 32G X 4 MM Does not apply Misc Use to inject insulin once daily      folic acid 1 MG Oral Tab Take 1 tablet (1 mg total) by mouth daily. 90 tablet 3    carvedilol 6.25 MG Oral Tab Take 1 tablet (6.25 mg total) by mouth 2 (two) times daily with meals. 180 tablet 1    TRUEPLUS PEN NEEDLES 32G X 4 MM Does not apply Misc TEST FOUR TIMES DAILY 400 each 3    levothyroxine 88 MCG Oral Tab Take 1 tablet (88 mcg total) by mouth before breakfast. 90 tablet 2    dapsone 100 MG Oral Tab       Microlet Lancets Does not apply Misc Check blood sugar once daily 200 each 1    Glucose Blood (CONTOUR NEXT TEST) In Vitro Strip Check BG once daily 200 each 2    ALBUTEROL 108 (90 Base) MCG/ACT Inhalation Aero Soln INHALE 2 PUFFS BY MOUTH INTO THE LUNGS EVERY 4 HOURS AS NEEDED FOR WHEEZING 18 g 0    FLUTICASONE PROPIONATE 50 MCG/ACT Nasal Suspension SHAKE LIQUID AND USE 2 SPRAYS IN EACH NOSTRIL DAILY 48 g 0    ENVARSUS XR 0.75 MG Oral Tablet 24 Hr Take 3 tablets by mouth daily.      TRUE METRIX BLOOD GLUCOSE TEST In Vitro Strip TEST 3 TIMES A  strip 3    Meclizine HCl 12.5 MG Oral Tab Take 1 tablet (12.5 mg total) by mouth 3 (three) times daily as  needed. 30 tablet 0    valGANciclovir HCl (VALCYTE OR) Take 450 mg by mouth.      Lancets Does not apply Misc 3x daily lancets for True Metrix meter 300 each 3    mycophenolate sodium 360 MG Oral Tab EC Take 1 tablet (360 mg total) by mouth 4 (four) times daily. For kidney transplant 120 tablet 0    Biotin 5000 MCG Oral Cap       ondansetron 4 MG Oral Tablet Dispersible       ergocalciferol 1.25 MG (53039 UT) Oral Cap       FEROSUL 325 (65 Fe) MG Oral Tab Take by mouth daily.      Blood Glucose Monitoring Suppl (TRUE METRIX AIR GLUCOSE METER) w/Device Does not apply Kit U UTD WITH STRIPS TO TEST BLOOD GLUCOSE      Alcohol Swabs (BD SWAB SINGLE USE REGULAR) Does not apply Pads U UTD QID      acetaminophen 500 MG Oral Tab Take 2 tablets (1,000 mg total) by mouth one time.         Allergies:  Allergies   Allergen Reactions    Ace Inhibitors RENAL INSUFFICIENCY    Folic Acid-Vit B6-Vit B12 HIVES and ITCHING    Vitamin B12 OTHER (SEE COMMENTS)    B12-Ca RASH     Vit b12 and calcium acetate combo       PSFH elements reviewed from today and agreed except as otherwise stated in HPI.  PHYSICAL EXAM:   /78 (BP Location: Right arm, Patient Position: Sitting, Cuff Size: adult)   Pulse 71   Temp 97.7 °F (36.5 °C) (Temporal)   Resp 16   Ht 4' 11\" (1.499 m)   Wt 160 lb 12.8 oz (72.9 kg)   LMP  (LMP Unknown)   SpO2 98%   BMI 32.48 kg/m²   Vital signs reviewed.Appears stated age, well groomed.  Physical Exam   GENERAL: well developed, well nourished,in no apparent distress  EYES; PERRLA, EOM-i B/L. Sclera clear and non icteric bilateral  SKIN: no rashes,no suspicious lesions  HEENT: atraumatic, normocephalic  NECK: supple,no adenopathy,no bruits, no JVD  LUNGS: clear to auscultation bilateral. No RRW. Good inspiratory and expiratory effort  CARDIO: RRR without murmur, clear S1, S2  VS: no carotid artery bruit bilateral.    GI: good BS's,no masses,No HSM or tenderness.   EXTREMITIES: no cyanosis, clubbing or edema,  bilateral. No calf tenderness    LABS       Component      Latest Ref Rng 1/4/2024   Glucose      65 - 99 mg/dL 137 (H)    BUN      7 - 25 mg/dL 31 (H)    CREATININE      0.50 - 1.05 mg/dL 1.21 (H)    EGFR      > OR = 60 mL/min/1.73m2 50 (L)    BUN/CREATININE RATIO      6 - 22 (calc) 26 (H)    Sodium      135 - 146 mmol/L 140    Potassium      3.5 - 5.3 mmol/L 4.3    Chloride      98 - 110 mmol/L 105    Carbon Dioxide, Total      20 - 32 mmol/L 27    CALCIUM      8.6 - 10.4 mg/dL 9.2    PROTEIN, TOTAL      6.1 - 8.1 g/dL 7.6    Albumin      3.6 - 5.1 g/dL 4.6    Globulin      1.9 - 3.7 g/dL (calc) 3.0    A/G Ratio      1.0 - 2.5 (calc) 1.5    Total Bilirubin      0.2 - 1.2 mg/dL 1.4 (H)    Alkaline Phosphatase      37 - 153 U/L 74    AST (SGOT)      10 - 35 U/L 54 (H)    ALT (SGPT)      6 - 29 U/L 40 (H)    Cholesterol, Total      <200 mg/dL 107    HDL Cholesterol      > OR = 50 mg/dL 44 (L)    Triglycerides      <150 mg/dL 167 (H)    LDL Cholesterol Calc      mg/dL (calc) 38    Chol/HDL Ratio      <5.0 (calc) 2.4    NON-HDL CHOLESTEROL      <130 mg/dL (calc) 63    Magnesium, Serum      1.5 - 2.5 mg/dL 2.0       Legend:  (H) High  (L) Low    REVIEWED THIS VISIT  ASSESSMENT/PLAN:   64 year old female with    1. Hyperlipidemia associated with type 2 diabetes mellitus (HCC)  - atorvastatin 10 MG Oral Tab; Take 1 tablet (10 mg total) by mouth nightly.  Dispense: 90 tablet; Refill: 1  - Comp Metabolic Panel (14); Future  - Lipid Panel [E]; Future  Labs ordered on existing blood  Continue atorvastatin  encourage mediterranean diet  Exercise brisk walk 30-60 mins at least 5 days weekly  Lose weight if overweight  Recheck fasting labs q 6 months    2. Primary hypertension  - carvedilol 6.25 MG Oral Tab; Take 1 tablet (6.25 mg total) by mouth 2 (two) times daily with meals.  Dispense: 180 tablet; Refill: 1  - Comp Metabolic Panel (14); Future  Controlled  Encouraged 150-300 mins aerobic exercise weekly  <2g Na daily and  Mediterranean diet  Encourage weight loss  Home b/p monitoring 5x weekly goal <130/80, bring readings and cuff to each visit.  Labs q 3 months    3. Recurrent major depressive disorder, in full remission ([facility])  - buPROPion  MG Oral Tablet 12 Hr; Take 1 tablet (150 mg total) by mouth daily.  Dispense: 90 tablet; Refill: 1  Controlled  -contracts for safety- if suicidal or homicidal, call 911 or go to nearest ER and call office  -increased suicide risk on  SNRI   Exercise 3 x weekly x 30-60min  Recheck in 6 months    4. Seasonal allergies  - montelukast 10 MG Oral Tab; Take 1 tablet (10 mg total) by mouth nightly.  Dispense: 90 tablet; Refill: 1  Continue cetirizine and fluticasone  Controlled    5. Asthma with COPD (chronic obstructive pulmonary disease) ([facility])\  Lungs are clear normal pulse ox  Not needing albuterol  Continue Breo, albuterol as needed  Control allergies  Continue annual follow-up with pulmonologist initiated    6. Type 2 diabetes mellitus with hyperglycemia, with long-term current use of insulin ([facility])  - Comp Metabolic Panel (14); Future  A1c 6.6 controlled  Continue Januvia, Jardiance, Tresiba  Continue follow-up with endocrinologist and ophthalmologist as recommended    7. S/P kidney transplant ([facility])  Continue follow-up with nephrologist every 3 months  Continue tacrolimus, mycophenolate, dapsone  Continue vit D    8. Severe obstructive sleep apnea  Compliant with CPAP/BiPAP  Continue follow-up with pulmonologist annually or sooner as directed    9. Need for vaccination  - INFLUENZA VACCINE, TRI, PRESERV FREE, 0.5 ML  - Zoster Vac Recomb Adjuvanted (SHINGRIX) 50 MCG/0.5ML Intramuscular Recon Susp; Inject 50 mcg into the muscle one time for 1 dose. Repeat in 2 to 6 months to complete the series.  Dispense: 1 each; Refill: 1  - COVID-19 mRNA Vac Pfizer (COMIRNATY) 30 mcg/0.3mL Intramuscular Suspension; Inject 0.3 mL (30 mcg total) into the muscle one time for 1 dose.  Dispense: 0.3 mL; Refill:  0  Flu shot today.  Immunocompromised  Encouraged to complete COVID-19 booster when 1 or 2 weeks later Shingrix    The patient and provider have a longitudinal relationship to address/treat the serious or complex condition as stated in this encounter.      Meds This Visit:  Requested Prescriptions     Pending Prescriptions Disp Refills    atorvastatin 10 MG Oral Tab 90 tablet 1     Sig: Take 1 tablet (10 mg total) by mouth nightly.    buPROPion  MG Oral Tablet 12 Hr 90 tablet 1     Sig: Take 1 tablet (150 mg total) by mouth daily.    carvedilol 6.25 MG Oral Tab 180 tablet 1     Sig: Take 1 tablet (6.25 mg total) by mouth 2 (two) times daily with meals.    montelukast 10 MG Oral Tab 90 tablet 1     Sig: Take 1 tablet (10 mg total) by mouth nightly.    Zoster Vac Recomb Adjuvanted (SHINGRIX) 50 MCG/0.5ML Intramuscular Recon Susp 1 each 1     Sig: Inject 50 mcg into the muscle one time for 1 dose. Repeat in 2 to 6 months to complete the series.       Health Maintenance:  Health Maintenance   Topic Date Due    Zoster Vaccines (1 of 2) Never done    COVID-19 Vaccine (6 - 2023-24 season) 09/01/2023    Annual Depression Screening  01/01/2024    Diabetes Care A1C  07/04/2024    Diabetes Care Foot Exam  08/08/2024    Annual Physical  08/08/2024    Influenza Vaccine (Season Ended) 10/01/2024    Diabetes Care Dilated Eye Exam  10/06/2024    Mammogram  03/11/2025    Pap Smear  08/03/2026    Pneumococcal Vaccine: Birth to 64yrs (4 of 4 - PPSV23 or PCV20) 08/03/2026    Colorectal Cancer Screening  11/01/2027    DTaP,Tdap,and Td Vaccines (4 - Td or Tdap) 11/07/2027         Patient/Caregiver Education: Patient/Caregiver Education: There are no barriers to learning. Medical education done.   Outcome: Patient verbalizes understanding. Patient is notified to call with any questions, comp lications, allergies, or worsening or changing symptoms.  Patient is to call with any side effects or complications from the treatments as a  result of today.     Problem List:     Patient Active Problem List   Diagnosis    Hyperlipidemia associated with type 2 diabetes mellitus (HCC)    GERD (gastroesophageal reflux disease)    Vitamin D deficiency    Hypomagnesemia    Seasonal allergies    Severe obstructive sleep apnea    Syriac speaking patient    Depressive disorder    Acquired hypothyroidism    Primary hypertension    Type 2 diabetes mellitus with ophthalmic complication, with long-term current use of insulin (HCC)    Thrombocytopenia (HCC)    Asthma with COPD (chronic obstructive pulmonary disease) (HCC)    Tubular adenoma of colon    S/P kidney transplant (HCC)    Normocytic anemia    Immunosuppression due to drug therapy (HCC)    History of gastritis    History of adenomatous polyp of colon    Encounter for screening for malignant neoplasm of colon    Medication side effect, initial encounter    Renovascular hypertension    Elevated liver function tests    Recurrent major depressive disorder, in full remission (HCC)    Anemia due to chronic kidney disease       Imaging & Referrals:  INFLUENZA VACCINE, TRI, PRESERV FREE, 0.5 ML     4/15/2024  Marisol Martinez, DO      Patient understands plan and follow-up.  Return in about 6 months (around 4/15/2025) for HTN, HL , medication monitoring.        I personally performed the service described in the documentation recorded by the scribe in my presence, and it accurately and completely records my words and actions.

## 2024-10-16 LAB
BK VIRUS DETECT, PL, QN LOG: NOT DETECTED {LOG_IU}/ML
BK VIRUS DETECT, PL, QN: NOT DETECTED [IU]/ML

## 2024-10-24 DIAGNOSIS — I10 PRIMARY HYPERTENSION: ICD-10-CM

## 2024-10-30 RX ORDER — CARVEDILOL 6.25 MG/1
6.25 TABLET ORAL 2 TIMES DAILY WITH MEALS
Qty: 180 TABLET | Refills: 1 | OUTPATIENT
Start: 2024-10-30

## 2024-10-30 NOTE — TELEPHONE ENCOUNTER
Medication: Carvedilol 6.25 mg 1 tab bid filled     Last time it was filled 10/15/24 180 with 1 refills

## 2024-11-20 ENCOUNTER — MED REC SCAN ONLY (OUTPATIENT)
Facility: CLINIC | Age: 64
End: 2024-11-20

## 2024-11-20 ENCOUNTER — PATIENT MESSAGE (OUTPATIENT)
Facility: CLINIC | Age: 64
End: 2024-11-20

## 2024-11-22 NOTE — TELEPHONE ENCOUNTER
Noted change, updated med list  Dexcom justin  Asked to follow up mid Nate  Closing this encounter.

## 2024-11-29 DIAGNOSIS — E03.9 HYPOTHYROIDISM, UNSPECIFIED TYPE: ICD-10-CM

## 2024-11-29 RX ORDER — LEVOTHYROXINE SODIUM 88 UG/1
88 TABLET ORAL
Qty: 90 TABLET | Refills: 1 | Status: SHIPPED | OUTPATIENT
Start: 2024-11-29

## 2024-11-29 NOTE — TELEPHONE ENCOUNTER
Endocrine refill protocol for medications for hypothyroidism and hyperthyroidism    Protocol Criteria:  PASSED Reason: N/A    If all below requirements are met, send a 90-day supply with 1 refill per provider protocol.    Verify appointment with Endocrinology completed in the last 12 months or scheduled in the next 6 months.    Normal TSH result in the past 12 months   Review recent telephone encounters and mychart communications with patient to ensure a dose change has not occurred since last office visit that was not updated in the medication history list     Last completed office visit:7/22/2024 Lily Rod APN   Next scheduled Follow up: No future appointments.   Last TSH result:   TSH   Date Value Ref Range Status   10/14/2024 0.721 0.550 - 4.780 mIU/mL Final   04/04/2023 1.72 0.40 - 4.50 mIU/L Final     Continue current levothyroxine 88mcg daily dose. Let me know if you need a refill.  Take care! RODNEY Beckford  Last lab result:     Passed and ordered per protocol

## 2024-12-02 RX ORDER — CETIRIZINE HYDROCHLORIDE 10 MG/1
10 TABLET ORAL DAILY
Qty: 90 TABLET | Refills: 0 | Status: SHIPPED | OUTPATIENT
Start: 2024-12-02

## 2024-12-02 NOTE — TELEPHONE ENCOUNTER
Refill Passed Protocol:     Pt requesting refill of   Requested Prescriptions     Pending Prescriptions Disp Refills    CETIRIZINE 10 MG Oral Tab [Pharmacy Med Name: CETIRIZINE 10MG TABLETS] 90 tablet 0     Sig: TAKE 1 TABLET(10 MG) BY MOUTH DAILY      Last Time Medication was Filled:  10/14/2024 90 days 0 refills    Last Office Visit with Provider: 10/15/2024    No future appointments.

## 2025-01-23 DIAGNOSIS — E11.65 TYPE 2 DIABETES MELLITUS WITH HYPERGLYCEMIA, WITH LONG-TERM CURRENT USE OF INSULIN (HCC): ICD-10-CM

## 2025-01-23 DIAGNOSIS — Z79.4 TYPE 2 DIABETES MELLITUS WITH HYPERGLYCEMIA, WITH LONG-TERM CURRENT USE OF INSULIN (HCC): ICD-10-CM

## 2025-01-23 RX ORDER — PEN NEEDLE, DIABETIC 32GX 5/32"
NEEDLE, DISPOSABLE MISCELLANEOUS
Qty: 100 EACH | Refills: 0 | Status: SHIPPED | OUTPATIENT
Start: 2025-01-23

## 2025-01-23 RX ORDER — INSULIN DEGLUDEC 100 U/ML
INJECTION, SOLUTION SUBCUTANEOUS
Qty: 15 ML | Refills: 0 | Status: SHIPPED | OUTPATIENT
Start: 2025-01-23

## 2025-01-23 NOTE — TELEPHONE ENCOUNTER
Endocrine refill protocol for basal insulins     Protocol Criteria: FAILED Reason: No Visit in required time frame    If all below requirements are met, send a 90-day supply with 1 refill per provider protocol.       Verify Appointment with Endocrinology completed in the last 6 months or scheduled in the next 3 months.  Verify A1C has been completed within the last 6 months and is below 8.5%     Last completed office visit:7/22/2024 Lily Goff APN   Next scheduled Follow up: No future appointments.   Last A1c result: Last A1c value was 6.6% done 10/14/2024.     My chart message sent to patient to follow up for more refills.

## 2025-01-28 ENCOUNTER — TELEMEDICINE (OUTPATIENT)
Facility: CLINIC | Age: 65
End: 2025-01-28
Payer: MEDICARE

## 2025-01-28 ENCOUNTER — TELEPHONE (OUTPATIENT)
Facility: CLINIC | Age: 65
End: 2025-01-28

## 2025-01-28 DIAGNOSIS — E11.59 HYPERTENSION ASSOCIATED WITH TYPE 2 DIABETES MELLITUS (HCC): ICD-10-CM

## 2025-01-28 DIAGNOSIS — Z94.0 RENAL TRANSPLANT RECIPIENT (HCC): ICD-10-CM

## 2025-01-28 DIAGNOSIS — E11.65 TYPE 2 DIABETES MELLITUS WITH HYPERGLYCEMIA, WITH LONG-TERM CURRENT USE OF INSULIN (HCC): Primary | ICD-10-CM

## 2025-01-28 DIAGNOSIS — Z79.4 TYPE 2 DIABETES MELLITUS WITH HYPERGLYCEMIA, WITH LONG-TERM CURRENT USE OF INSULIN (HCC): Primary | ICD-10-CM

## 2025-01-28 DIAGNOSIS — I15.2 HYPERTENSION ASSOCIATED WITH TYPE 2 DIABETES MELLITUS (HCC): ICD-10-CM

## 2025-01-28 DIAGNOSIS — E78.5 HYPERLIPIDEMIA ASSOCIATED WITH TYPE 2 DIABETES MELLITUS (HCC): ICD-10-CM

## 2025-01-28 DIAGNOSIS — E03.9 ACQUIRED HYPOTHYROIDISM: ICD-10-CM

## 2025-01-28 DIAGNOSIS — E11.69 HYPERLIPIDEMIA ASSOCIATED WITH TYPE 2 DIABETES MELLITUS (HCC): ICD-10-CM

## 2025-01-28 PROCEDURE — 95251 CONT GLUC MNTR ANALYSIS I&R: CPT

## 2025-01-28 PROCEDURE — G2211 COMPLEX E/M VISIT ADD ON: HCPCS

## 2025-01-28 PROCEDURE — 99215 OFFICE O/P EST HI 40 MIN: CPT

## 2025-01-28 RX ORDER — INSULIN ASPART 100 [IU]/ML
INJECTION, SOLUTION INTRAVENOUS; SUBCUTANEOUS
COMMUNITY
Start: 2025-01-28

## 2025-01-28 RX ORDER — INSULIN GLARGINE 100 [IU]/ML
INJECTION, SOLUTION SUBCUTANEOUS
Qty: 30 ML | Refills: 1 | Status: SHIPPED | OUTPATIENT
Start: 2025-01-28 | End: 2025-01-30

## 2025-01-28 NOTE — PROGRESS NOTES
[facility] Note - Telemedicine    Lupe Arce verbally consents to a Telemedicine (Audio + Video) visit on 1/28/2025. The visit was conducted in a private room.     Patient understands and accepts financial responsibility for any deductible, co-insurance and/or co-pays associated with this service.      Subjective:   Initial HPI consult in March 2023:  A1C was <4% most recently  Prev w/ [facility], transitioning care d/t [facility]  Insulin started after transplant  Prev was taking Lantus 35u, glipizide 10mg BID + Januvia, PCP understandably stopped Lantus until she could receive endo eval  Diagnosed age: 22 during pregnancy, states only started managing DM ~10 years ago when eyes were getting worse  Hx of ESRD on HD, and now is s/p renal tx in 2019 at [facility].   On Myfortic and Envarsus   Pt denies hx of hospitalization for DM, no pancreatitis hx  Family history of DM: T2DM- father, siblings  Cpeptide 9/2019- 7.4  Current DM meds at first OV: januvia 100mg daily, glipizide 10mg BID,   Previously trialed/failed: metformin- GI upset, Lantus 35u qAM     Hypothyroidism history  - unsure of diagnosis date, no hx thyroidectomy  - On LT4 88mcg daily  - last TFTs (Mar 2022) stable    Interim hx:  1/27/2025-w/ Heather STEVENS. Video visit. Using daughter in law Natalee to translate per patient request  Last A1c value was 6.6% done 10/14/2024.  Last office visit, reduced basal insulin due to hypoglycemia  Stopped Jardiance Oct 2024 by nephrologist- had e. Coli in her urine (no sx's)  Since then, persistently hyperglycemic  Notes ongoing constipation/diarrhea  Switched to tresiba in July 2024, and feels like this is when this got worse   Having highs in the afternoon and lows in the morning on current regimen  No changes to medical/surgical history since last office visit., No recent hospitalizations.     Thyroid: taking levothyroxine 88mcg daily  Notes both constipation and diarrhea  Some insomnia  Denies  palpitations  Usually runs cold, but taking off sweater more lately   Denies weight loss     DM meds at visit:   Diabetic Medications               TRESIBA FLEXTOUCH 100 UNIT/ML Subcutaneous Solution Pen-injector ADMINISTER 35 UNITS UNDER THE SKIN EVERY NIGHT      JANUVIA 100 MG Oral Tab   TAKE 1 TABLET(100 MG) BY MOUTH DAILY                Continuous Glucose Monitoring Interpretation  Lupe Arce has undergone continuous glucose monitoring with their CGM.  The blood glucose tracings were evaluated for two weeks prior to office visit.   Blood glucose tracings demonstrated areas of hyperglycemia post-meal, particularly post-dinner  There were  near hypoglycemia events in the morning time, around 6 AM-9 AM .        Previously trialed/failed DM meds:   - jardiance- recurrent UTI, stopped in Oct 2024  - metformin  - soliqua- GI/diarrhea  - glipizide-stopped due to hypoglycemia    History/Other:    Allergies, PMH, SocHx and FHx reviewed and updated as appropriate in Epic on    insulin glargine (BASAGLAR KWIKPEN) 100 UNIT/ML Subcutaneous Solution Pen-injector Take 32u every morning 30 mL 1    SITagliptin Phosphate (JANUVIA) 50 MG Oral Tab Take 1 tablet (50 mg total) by mouth daily. 90 tablet 1    insulin aspart (NOVOLOG FLEXPEN) 100 Units/mL Subcutaneous Solution Pen-injector Take 2u with dinner + ISF 1:50>140 once daily. Slowly titrating       Allergies[1]  Current Outpatient Medications   Medication Sig Dispense Refill    insulin glargine (BASAGLAR KWIKPEN) 100 UNIT/ML Subcutaneous Solution Pen-injector Take 32u every morning 30 mL 1    SITagliptin Phosphate (JANUVIA) 50 MG Oral Tab Take 1 tablet (50 mg total) by mouth daily. 90 tablet 1    insulin aspart (NOVOLOG FLEXPEN) 100 Units/mL Subcutaneous Solution Pen-injector Take 2u with dinner + ISF 1:50>140 once daily. Slowly titrating      Insulin Pen Needle (BD PEN NEEDLE ANTHONY U/F) 32G X 4 MM Does not apply Misc USE TO INJECT 5 TIMES PER  each 0     CETIRIZINE 10 MG Oral Tab TAKE 1 TABLET(10 MG) BY MOUTH DAILY 90 tablet 0    levothyroxine 88 MCG Oral Tab TAKE 1 TABLET(88 MCG) BY MOUTH BEFORE BREAKFAST 90 tablet 1    omeprazole 20 MG Oral Capsule Delayed Release Take 1 capsule (20 mg total) by mouth 2 (two) times daily before meals. 180 capsule 3    atorvastatin 10 MG Oral Tab Take 1 tablet (10 mg total) by mouth nightly. 90 tablet 1    buPROPion  MG Oral Tablet 12 Hr Take 1 tablet (150 mg total) by mouth daily. 90 tablet 1    carvedilol 6.25 MG Oral Tab Take 1 tablet (6.25 mg total) by mouth 2 (two) times daily with meals. 180 tablet 1    montelukast 10 MG Oral Tab Take 1 tablet (10 mg total) by mouth nightly. 90 tablet 1    folic acid 1 MG Oral Tab Take 1 tablet (1 mg total) by mouth daily. 90 tablet 3    Microlet Lancets Does not apply Misc Check blood sugar once daily 200 each 1    Glucose Blood (CONTOUR NEXT TEST) In Vitro Strip Check BG once daily 200 each 2    ALBUTEROL 108 (90 Base) MCG/ACT Inhalation Aero Soln INHALE 2 PUFFS BY MOUTH INTO THE LUNGS EVERY 4 HOURS AS NEEDED FOR WHEEZING 18 g 0    FLUTICASONE PROPIONATE 50 MCG/ACT Nasal Suspension SHAKE LIQUID AND USE 2 SPRAYS IN EACH NOSTRIL DAILY 48 g 0    ENVARSUS XR 0.75 MG Oral Tablet 24 Hr Take 3 tablets by mouth daily.      TRUE METRIX BLOOD GLUCOSE TEST In Vitro Strip TEST 3 TIMES A  strip 3    Meclizine HCl 12.5 MG Oral Tab Take 1 tablet (12.5 mg total) by mouth 3 (three) times daily as needed. 30 tablet 0    valGANciclovir HCl (VALCYTE OR) Take 450 mg by mouth.      mycophenolate sodium 360 MG Oral Tab EC Take 1 tablet (360 mg total) by mouth 4 (four) times daily. For kidney transplant 120 tablet 0    Biotin 5000 MCG Oral Cap       ondansetron 4 MG Oral Tablet Dispersible       ergocalciferol 1.25 MG (90312 UT) Oral Cap       FEROSUL 325 (65 Fe) MG Oral Tab Take by mouth daily.      Blood Glucose Monitoring Suppl (TRUE METRIX AIR GLUCOSE METER) w/Device Does not apply Kit U UTD WITH  STRIPS TO TEST BLOOD GLUCOSE      Alcohol Swabs (BD SWAB SINGLE USE REGULAR) Does not apply Pads U UTD QID      acetaminophen 500 MG Oral Tab Take 2 tablets (1,000 mg total) by mouth one time.       Past Medical History:    Abdominal pain    Allergic rhinitis    Anemia    Bad breath    Black stools    Bloating    Always feels bloated    Blurred vision    Cataracts, bilateral    Per Dr. Jernigan    Cholecystitis    Chronic kidney disease (CKD)    Constipation    If she is not stuffed up she has diarrhea    COVID    01/2022    Depression    Diabetes ([facility])    no meds    Diabetes mellitus ([facility])    Diabetic retinopathy ([facility])    Per Dr. Jernigan    Diarrhea, unspecified    Diastolic dysfunction    with LVH. EF >55% on ECHO     Encephalopathy acute    Esophageal reflux    Essential hypertension    Eye disease    Flatulence/gas pain/belching    Always has gas    Food intolerance    Lactose intolerance    Frequent UTI    Always tests positive    Glaucoma    Heartburn    High cholesterol    History of depression    Hyperlipidemia    Hypertension    Hypothyroidism    Irregular bowel habits    Not regular    Kidney failure    Kidney Transplant 12/12/19    Loss of appetite    Only eats 1 maybe 2 a day    Macular puckering of retina    Nausea    PONV (postoperative nausea and vomiting)    Prerenal azotemia    Proliferative diabetic retinopathy(362.02)    left eye    Renal disorder    ESRD    S/P kidney transplant ([facility])    12/12/2019-right kidney transplant donated by his son.  Former dialysis patient due to diabetic kidney disease performed at [facility].      Severe obstructive sleep apnea    has cpap    Sleep apnea    Traction detachment of retina    Uncomfortable fullness after meals    Always she eats super slow.    Unspecified disorder of thyroid    Visual impairment    glasses    Vitreous hemorrhage ([facility])    Vomiting    Wears glasses    Weight gain    Weight loss     Family History   Problem Relation Age of Onset    Diabetes Father      Heart Attack Father     High Blood Pressure Mother     Ulcerative Colitis Mother     Diabetes Mother     Other (Positive Coivd) Mother 80    Depression Daughter     Other (High Blood Pressure, and Pre-Diabetes) Daughter     Depression Daughter     High Blood Pressure Son     Hypertension Son     High Blood Pressure Son     Breast Cancer Paternal Aunt     Uterine Cancer Paternal Aunt     Heart Attack Self     Hypertension Self     Diabetes Self      Social history: Reviewed.      ROS/Exam    REVIEW OF SYSTEMS: Ten point review of systems has been performed and is otherwise negative and/or non-contributory, except as described above.     VITALS  There were no vitals filed for this visit.    Wt Readings from Last 6 Encounters:   11/05/24 162 lb 12.8 oz (73.8 kg)   10/30/24 159 lb (72.1 kg)   10/15/24 160 lb 12.8 oz (72.9 kg)   08/15/24 156 lb 6.4 oz (70.9 kg)   06/24/24 144 lb (65.3 kg)   04/15/24 146 lb 9.6 oz (66.5 kg)       PHYSICAL EXAM  Limited due to telemedicine encounter    Constitutional Not in acute distress  Pulmonary: no wheezing heard  No coughing on the phone. Speaking in full sentences   Neurological:  Alert and oriented to person, place and time.   Psychiatric: Normal affect, mood and behavior appropriate      Labs/Imaging: Pertinent imaging reviewed.    Overall glucose control:  Lab Results   Component Value Date    A1C 6.6 (H) 10/14/2024    A1C  05/14/2024      Comment:      Hemoglobin A1C to be confirmed at [facility]    A1C <4.2 (L) 05/14/2024    A1C  04/22/2024      Comment:      Hemoglobin A1C to be confirmed at [facility]    A1C <4.2 (L) 04/22/2024       Supplementary Documentation:   -Surveillance for Diabetes Complications & Risks  Foot exam/neuropathy: Last Foot Exam: 08/15/24    Retinopathy screening: Last Dilated Eye Exam: 05/10/24  Eye Exam shows Diabetic Retinopathy?: Yes (diabetic retinopathy in the right eye is stable.the left eye is stable as well.)      Assessment & Plan:     ICD-10-CM     1. Type 2 diabetes mellitus with hyperglycemia, with long-term current use of insulin ([facility])  E11.65 GLUC MNTR CONT REC FROM NTRSTL TISS FLU PHYS I&R    Z79.4 insulin glargine (BASAGLAR KWIKPEN) 100 UNIT/ML Subcutaneous Solution Pen-injector     Basic Metabolic Panel (8) [E]     C-Peptide [E]     IA-2 Autoantibodies     GOPAL-65 Autoantibody     Zinc Transporter 8 (ZnT8) Antibodies     Islet Cell Cytoplasmic Antibody, IgG      2. Acquired hypothyroidism  E03.9       3. Renal transplant recipient ([facility])  Z94.0       4. Hypertension associated with type 2 diabetes mellitus ([facility])  E11.59     I15.2       5. Hyperlipidemia associated with type 2 diabetes mellitus ([facility])  E11.69     E78.5           Lupe Arce is a pleasant 64 year old female here for evaluation of:    #Type 2 Diabetes  PMHx of Type 2 diabetes mellitus diagnosed at age 22 after a pregnancy, per reported history only started managing DM around 2012 when vision was worsening.  Cpeptide 1/2024 1.97. Intolerant of several meds as noted below, DM management further complicated by CKD.     Having post-dinner hyperglycemia, early AM hypoglycemia. Likely too basal-heavy.  Discussed our options, including restarting prandial insulin versus low-dose glipizide, patient opts to use as needed fixed dose insulin before dinner.  Discussed option for omnipod which may prevent hyper/hypo and reduce SE. Jose Luis is interested as she might be starting this herself and could feasibly help Lupe learn the device.  Plan as below.      Last A1c value was 6.6% done 10/14/2024. Goal <7%. Importance of better glucose control in preventing onset/progression of end-organ damage discussed, as well as goals of therapy and clinical significance of A1C.     Plan:  - med changes:  - switch tresiba 35u --> basaglar 32u daily (?if tresiba is contributing to diarrhea, patient feels it is)  - decr januvia 100mg --> 50mg daily (GFR 41 Oct 2024)  - start novolog before dinner: Take  2u before you eat + a correction based on the scale below:  SCALE: Take 1 unit for every 50 points starting at 140  : 2u with meal  141-190= 3 unit of insulin  191-240= 4 units of insulin  241-290= 5 units of insulin  291-340= 6 units of insulin  341-390= 7 units of insulin  391-440= 8 units of insulin    - start omnipod order process- will obtain BMP, cpeptide, SUNITHA ab to submit to [facility]  - continue Dexcom G7 CGM  - patient is on insulin, and has suboptimal glycemic control including wide glycemic swings, thus would benefit from CGM  - continue to check BG 3x/day using glucometer or CGM  - meet with endo DM educator re:BG check in    - intolerant of metformin- GI upset  - did not tolerate GLP1a- soliqua- bad diarrhea  - SGLT2i stopped by nephro  - no strict CI for PLATT but given reduced renal fxn patient would prefer insulin therapy to avoid hypoglycemia  - Discussed management of low glucose and targeted glucose levels and provided instructions in AVS   -See above header \"Supplementary Documentation\" for surveillance for diabetes complications & risks    #Renal transplant recipient (12/2019)  #CKD stage 3a  - Follows with Dr. Kaur with nephrology  - Dc'd SGLT2i in Oct 2024 with nephro due to persistent e coli in urine   Lab Results   Component Value Date    EGFRCR 41 (L) 10/14/2024    MALBCREACALC 6 12/16/2022    MICROALBCREA 57.0 (H) 05/14/2024       #Hypertension   - SBP - unable to assess, video visit   BP Readings from Last 1 Encounters:   10/30/24 152/75   BP Meds: carvedilol Tabs - 6.25 MG     #Hyperlipidemia  Lipids: LDL is to goal   Lab Results   Component Value Date    LDL 38 10/14/2024    TRIG 190 (H) 10/14/2024   Cholesterol Meds: atorvastatin Tabs - 10 MG         #Other specified hypothyroidism  Plan:  Patient is unsure of diagnosis date, no hx thyroidectomy. TFTs historically stable.   Recent labs noted below:     Recent Labs     04/04/23  1142 05/14/24  0804 10/14/24  1045   T4F 1.2 1.3 1.5   TSH  1.72 1.510 0.721      - taking levothyroxine 88mcg daily. Notes ongoing diarrhea; fT4 higher end of normal  - no weight loss  - will repeat labs now, make above changes, discussed if diarrhea persists maybe we try mild dose reduction     #Proliferative diabetic retinoapthy with traction retinal detachment involving the macula  - Per ophthalmologist note,  stable bilaterally  - follows q6mo with ophthalmology, Dr. Jernigan with [facility]    Return in about 2 months (around 3/28/2025) for diabetes follow up.    A total of 52 minutes was spent today on obtaining history, reviewing pertinent labs, evaluating patient, providing multiple treatment options, reinforcing diet/exercise and compliance, and completing documentation.       RODNEY Chin  Endocrinology, Diabetes & Metabolism   1/28/2025    Note to patient: The 21 Century Cures Act makes medical notes like these available to patients in the interest of transparency. However, be advised this is a medical document. It is intended as peer to peer communication. It is written in medical language and may contain abbreviations or verbiage that are unfamiliar. It may appear blunt or direct. Medical documents are intended to carry relevant information, facts as evident, and the clinical opinion of the practitioner.         [1]   Allergies  Allergen Reactions    Ace Inhibitors RENAL INSUFFICIENCY    Folic Acid-Vit B6-Vit B12 HIVES and ITCHING    Vitamin B12 OTHER (SEE COMMENTS)    B12-Ca RASH     Vit b12 and calcium acetate combo

## 2025-01-28 NOTE — TELEPHONE ENCOUNTER
Dexcom G7 reorder for 90 day supply sentr via parachute to [facility]

## 2025-01-28 NOTE — PATIENT INSTRUCTIONS
Return Visit:  With RODNEY Chin: Return in about 2 months (around 3/28/2025) for diabetes follow up.    - BG check in with Bisi RN (video visit) in 2 weeks to check in on changes     Plan:  - complete fasting labs, this will help w/ pump submission       Medications:  - switch tresiba 35u --> basaglar 32u daily  - decr januvia 100mg --> 50mg daily (GFR 41 Oct 2024)  - start novolog before dinner: Take 2u before you eat + a correction based on the scale below:  SCALE: Take 1 unit for every 50 points starting at 140  : 2u with meal  141-190= 3 unit of insulin  191-240= 4 units of insulin  241-290= 5 units of insulin  291-340= 6 units of insulin  341-390= 7 units of insulin  391-440= 8 units of insulin      Updated diabetes medication instructions from today:   Diabetic Medications               insulin glargine (BASAGLAR KWIKPEN) 100 UNIT/ML Subcutaneous Solution Pen-injector (Taking) Take 32u every morning    SITagliptin Phosphate (JANUVIA) 50 MG Oral Tab (Taking) Take 1 tablet (50 mg total) by mouth daily.    insulin aspart (NOVOLOG FLEXPEN) 100 Units/mL Subcutaneous Solution Pen-injector (Taking) Take 2u with dinner + ISF 1:50>140 once daily. Slowly titrating    TRESIBA FLEXTOUCH 100 UNIT/ML Subcutaneous Solution Pen-injector ADMINISTER 30 UNITS UNDER THE SKIN EVERY NIGHT            Lab Results   Component Value Date    A1C 6.6 (H) 10/14/2024    A1C  05/14/2024      Comment:      Hemoglobin A1C to be confirmed at [facility]    A1C <4.2 (L) 05/14/2024    A1C  04/22/2024      Comment:      Hemoglobin A1C to be confirmed at [facility]    A1C <4.2 (L) 04/22/2024        General follow up information:  Please let us know if you require any refills at least 1 week prior to your medication running out. If you do run out of medication, please call our office ASAP to request refills (do not wait until your follow up).   Please call our office if sugars at home are consistently greater than 250 or less than 70 for  medication adjustment (do not wait until your follow up appointment).  Lab results and imaging will typically be reviewed at follow up appointments, or within 3-5 business days of ALL results being in if you do not have an appointment scheduled in the near future. Our office will contact you for any abnormal results requiring more urgent follow up or action.   The on-call pager is for urgent matters only. If you are a type 1 diabetic and run out of insulin after business hours 8AM-4PM, you may call the on-call pager for a refill to a 24 hour pharmacy.  If you have adrenal insufficiency and run out of steroids, you may call the on-call pager for a refill to a 24 hour pharmacy. All other refill requests should be requested during business hours.    Office phone number: [phone]; phones are open Monday-Friday 8:30-4:30.       HOW TO TREAT LOW BLOOD SUGAR (Hypoglycemia)  Low blood sugar= Less than 70, or if you start to have symptoms (below)  Symptoms: Shaking or trembling, fast heart rate, extreme hunger, sweating, confusion/difficulty concentrating, dizziness.    How to treat a low blood sugar if you are able to eat/drink: The Rule of 15/15  If you are using continuous glucose monitor that says you are low, but you do not have any symptoms, verify on fingerstick that your blood sugar is actually low before treating.   Eat 15 grams of carbs (see examples below)  Check your blood sugar after 15 minutes. If it’s still below your target range, have another serving.   Repeat these steps until it’s in your target range. Once it’s in range, if you're nervous about your sugar going low again, have a protein source (ie, a spoonful of peanut butter).   If you have a CGM you want to look for how your arrow has changed. If you arrow is pointed up or sideways after 15 min, give your CGM more time OR check with a finger stick. Try not to eat more food until at least 15 min after the first BG check - otherwise you risk having a  rebound high.  If you are experiencing symptoms and you are unable to check your blood glucose for any reason, treat the hypoglycemia.  If someone has a low blood sugar and is unconscious: Don’t hesitate to call 911. If someone is unconscious and glucagon is not available or someone does not know how to use it, call 911 immediately.     To treat a low, I recommend you carry with you easy, pre-portioned treatment for low blood sugars that are 15G of carbs:   - Children sized squeeze pouch applesauce (high fiber + carbs help prevent too high of a spike)  - Small children's sized juicebox- 15g carb --> 4oz juice box  - Glucose tablets from [employer], you can find them near diabetes supplies --> Note, you will need to eat 3-4 tablets to get to 15g of carbs  - Children sized fruit snack pack- look for one with 15 grams of total carbohydrate  - Choice of how to treat your low is important. Complex carbs, or foods that contain fats along with carbs (like chocolate) can slow the absorption of glucose and should NOT be used to treat an emergency low

## 2025-01-30 ENCOUNTER — TELEPHONE (OUTPATIENT)
Facility: CLINIC | Age: 65
End: 2025-01-30

## 2025-01-30 RX ORDER — INSULIN GLARGINE-YFGN 100 [IU]/ML
INJECTION, SOLUTION SUBCUTANEOUS
Qty: 30 ML | Refills: 2 | Status: SHIPPED | OUTPATIENT
Start: 2025-01-30

## 2025-01-30 NOTE — TELEPHONE ENCOUNTER
Received fax from [facility] pharmacy with a drug change request. Patients insurance plan does not cover Basaglar 100u/ml, alternative medication inclused Insulin Glargine-YFGN. Routing to provider for review.

## 2025-02-01 ENCOUNTER — LAB ENCOUNTER (OUTPATIENT)
Dept: LAB | Age: 65
End: 2025-02-01
Payer: MEDICARE

## 2025-02-01 DIAGNOSIS — E11.65 TYPE 2 DIABETES MELLITUS WITH HYPERGLYCEMIA, WITH LONG-TERM CURRENT USE OF INSULIN (HCC): ICD-10-CM

## 2025-02-01 DIAGNOSIS — Z94.0 S/P KIDNEY TRANSPLANT (HCC): ICD-10-CM

## 2025-02-01 DIAGNOSIS — R74.8 ELEVATED ALKALINE PHOSPHATASE LEVEL: ICD-10-CM

## 2025-02-01 DIAGNOSIS — E78.5 HYPERLIPIDEMIA ASSOCIATED WITH TYPE 2 DIABETES MELLITUS (HCC): ICD-10-CM

## 2025-02-01 DIAGNOSIS — E11.69 HYPERLIPIDEMIA ASSOCIATED WITH TYPE 2 DIABETES MELLITUS (HCC): ICD-10-CM

## 2025-02-01 DIAGNOSIS — Z79.4 TYPE 2 DIABETES MELLITUS WITH HYPERGLYCEMIA, WITH LONG-TERM CURRENT USE OF INSULIN (HCC): ICD-10-CM

## 2025-02-01 DIAGNOSIS — I10 PRIMARY HYPERTENSION: ICD-10-CM

## 2025-02-01 DIAGNOSIS — E03.9 ACQUIRED HYPOTHYROIDISM: ICD-10-CM

## 2025-02-01 LAB
ALBUMIN SERPL-MCNC: 4.6 G/DL (ref 3.2–4.8)
ALBUMIN/GLOB SERPL: 1.6 {RATIO} (ref 1–2)
ALP LIVER SERPL-CCNC: 136 U/L
ALT SERPL-CCNC: 21 U/L
ANION GAP SERPL CALC-SCNC: 11 MMOL/L (ref 0–18)
AST SERPL-CCNC: 25 U/L (ref ?–34)
BASOPHILS # BLD AUTO: 0.02 X10(3) UL (ref 0–0.2)
BASOPHILS NFR BLD AUTO: 0.4 %
BILIRUB SERPL-MCNC: 0.5 MG/DL (ref 0.2–1.1)
BUN BLD-MCNC: 36 MG/DL (ref 9–23)
CALCIUM BLD-MCNC: 9.4 MG/DL (ref 8.7–10.6)
CHLORIDE SERPL-SCNC: 103 MMOL/L (ref 98–112)
CHOLEST SERPL-MCNC: 120 MG/DL (ref ?–200)
CO2 SERPL-SCNC: 28 MMOL/L (ref 21–32)
CREAT BLD-MCNC: 1.69 MG/DL
DEPRECATED HBV CORE AB SER IA-ACNC: 608 NG/ML
EGFRCR SERPLBLD CKD-EPI 2021: 34 ML/MIN/1.73M2 (ref 60–?)
EOSINOPHIL # BLD AUTO: 0.05 X10(3) UL (ref 0–0.7)
EOSINOPHIL NFR BLD AUTO: 1 %
ERYTHROCYTE [DISTWIDTH] IN BLOOD BY AUTOMATED COUNT: 12.1 %
EST. AVERAGE GLUCOSE BLD GHB EST-MCNC: 157 MG/DL (ref 68–126)
FASTING PATIENT LIPID ANSWER: YES
FASTING STATUS PATIENT QL REPORTED: YES
GLOBULIN PLAS-MCNC: 2.8 G/DL (ref 2–3.5)
GLUCOSE BLD-MCNC: 61 MG/DL (ref 70–99)
HBA1C MFR BLD: 7.1 % (ref ?–5.7)
HCT VFR BLD AUTO: 39 %
HDLC SERPL-MCNC: 41 MG/DL (ref 40–59)
HGB BLD-MCNC: 12.8 G/DL
IMM GRANULOCYTES # BLD AUTO: 0.01 X10(3) UL (ref 0–1)
IMM GRANULOCYTES NFR BLD: 0.2 %
IRON SATN MFR SERPL: 43 %
IRON SERPL-MCNC: 107 UG/DL
LDLC SERPL CALC-MCNC: 56 MG/DL (ref ?–100)
LYMPHOCYTES # BLD AUTO: 1.49 X10(3) UL (ref 1–4)
LYMPHOCYTES NFR BLD AUTO: 28.7 %
MCH RBC QN AUTO: 29.2 PG (ref 26–34)
MCHC RBC AUTO-ENTMCNC: 32.8 G/DL (ref 31–37)
MCV RBC AUTO: 89 FL
MONOCYTES # BLD AUTO: 0.46 X10(3) UL (ref 0.1–1)
MONOCYTES NFR BLD AUTO: 8.8 %
NEUTROPHILS # BLD AUTO: 3.17 X10 (3) UL (ref 1.5–7.7)
NEUTROPHILS # BLD AUTO: 3.17 X10(3) UL (ref 1.5–7.7)
NEUTROPHILS NFR BLD AUTO: 60.9 %
NONHDLC SERPL-MCNC: 79 MG/DL (ref ?–130)
OSMOLALITY SERPL CALC.SUM OF ELEC: 300 MOSM/KG (ref 275–295)
PLATELET # BLD AUTO: 150 10(3)UL (ref 150–450)
POTASSIUM SERPL-SCNC: 4.1 MMOL/L (ref 3.5–5.1)
PROT SERPL-MCNC: 7.4 G/DL (ref 5.7–8.2)
RBC # BLD AUTO: 4.38 X10(6)UL
SODIUM SERPL-SCNC: 142 MMOL/L (ref 136–145)
T4 FREE SERPL-MCNC: 1.4 NG/DL (ref 0.8–1.7)
TOTAL IRON BINDING CAPACITY: 251 UG/DL (ref 250–425)
TRANSFERRIN SERPL-MCNC: 197 MG/DL (ref 250–380)
TRIGL SERPL-MCNC: 128 MG/DL (ref 30–149)
TSI SER-ACNC: 3.6 UIU/ML (ref 0.55–4.78)
VLDLC SERPL CALC-MCNC: 19 MG/DL (ref 0–30)
WBC # BLD AUTO: 5.2 X10(3) UL (ref 4–11)

## 2025-02-01 PROCEDURE — 84075 ASSAY ALKALINE PHOSPHATASE: CPT

## 2025-02-01 PROCEDURE — 86341 ISLET CELL ANTIBODY: CPT

## 2025-02-01 PROCEDURE — 84681 ASSAY OF C-PEPTIDE: CPT

## 2025-02-01 PROCEDURE — 80053 COMPREHEN METABOLIC PANEL: CPT

## 2025-02-01 PROCEDURE — 36415 COLL VENOUS BLD VENIPUNCTURE: CPT

## 2025-02-01 PROCEDURE — 84443 ASSAY THYROID STIM HORMONE: CPT

## 2025-02-01 PROCEDURE — 84080 ASSAY ALKALINE PHOSPHATASES: CPT

## 2025-02-01 PROCEDURE — 84439 ASSAY OF FREE THYROXINE: CPT

## 2025-02-01 PROCEDURE — 83516 IMMUNOASSAY NONANTIBODY: CPT

## 2025-02-01 PROCEDURE — 80061 LIPID PANEL: CPT

## 2025-02-01 PROCEDURE — 82103 ALPHA-1-ANTITRYPSIN TOTAL: CPT

## 2025-02-01 PROCEDURE — 83036 HEMOGLOBIN GLYCOSYLATED A1C: CPT

## 2025-02-02 LAB
A-1-ANTITRYPSIN: 131 MG/DL
C-PEPTIDE: 1.9 NG/ML

## 2025-02-03 LAB — ACTIN SMOOTH MUSCLE AB: 4 UNITS

## 2025-02-04 LAB
ALK PHOSPHATASE: 141 IU/L
BONE FRAC: 35 %
GAD-65: <5 U/ML
INTESTINAL FRAC: 41 %
LIVER FRAC: 24 %

## 2025-02-05 LAB
PANCREATIC ISLET CELLS: NEGATIVE
SOL TRANSFERRIN RECEPTOR: 13.1 NMOL/L

## 2025-02-06 ENCOUNTER — TELEPHONE (OUTPATIENT)
Facility: CLINIC | Age: 65
End: 2025-02-06

## 2025-02-06 NOTE — TELEPHONE ENCOUNTER
PA populated for Omnipod 5 Pods and Intro kit. Questions answered and sent to plan. Will await determination.     Pods Key: [id number]  Intro Kit Key: [id number]

## 2025-02-10 LAB — IA-2 AUTOABS: <7.5 U/ML

## 2025-02-12 LAB — ZNT8 ABS: <15 U/ML

## 2025-02-12 NOTE — TELEPHONE ENCOUNTER
2/12/25-[facility] called into clinic today regarding appeal on Omnipod PA.  They stated they had some questions and had also faxed over some paperwork.  Please call back at [phone] case #[id number].

## 2025-02-13 NOTE — TELEPHONE ENCOUNTER
Additional fax received stating \"Omnipod 5 [id number] Intro Gen 5 Kit is approved from 2/7/25 until further notice. The Authorization number is: [id number].\"    Will send to scanning.

## 2025-02-17 RX ORDER — TRAMADOL HYDROCHLORIDE 50 MG/1
TABLET ORAL
COMMUNITY
Start: 2024-12-19

## 2025-02-17 RX ORDER — SULFAMETHOXAZOLE AND TRIMETHOPRIM 800; 160 MG/1; MG/1
1 TABLET ORAL 2 TIMES DAILY
COMMUNITY
Start: 2024-11-13

## 2025-02-18 ENCOUNTER — OFFICE VISIT (OUTPATIENT)
Dept: FAMILY MEDICINE CLINIC | Facility: CLINIC | Age: 65
End: 2025-02-18
Payer: MEDICARE

## 2025-02-18 VITALS
HEIGHT: 59 IN | SYSTOLIC BLOOD PRESSURE: 128 MMHG | DIASTOLIC BLOOD PRESSURE: 70 MMHG | RESPIRATION RATE: 16 BRPM | OXYGEN SATURATION: 98 % | BODY MASS INDEX: 33.54 KG/M2 | TEMPERATURE: 98 F | HEART RATE: 65 BPM | WEIGHT: 166.38 LBS

## 2025-02-18 DIAGNOSIS — F33.42 RECURRENT MAJOR DEPRESSIVE DISORDER, IN FULL REMISSION: ICD-10-CM

## 2025-02-18 DIAGNOSIS — Z94.0 S/P KIDNEY TRANSPLANT (HCC): ICD-10-CM

## 2025-02-18 DIAGNOSIS — E11.3522 TYPE 2 DIABETES MELLITUS WITH LEFT EYE AFFECTED BY PROLIFERATIVE RETINOPATHY AND TRACTION RETINAL DETACHMENT INVOLVING MACULA, WITH LONG-TERM CURRENT USE OF INSULIN (HCC): ICD-10-CM

## 2025-02-18 DIAGNOSIS — Z12.31 BREAST CANCER SCREENING BY MAMMOGRAM: ICD-10-CM

## 2025-02-18 DIAGNOSIS — Z79.4 TYPE 2 DIABETES MELLITUS WITH LEFT EYE AFFECTED BY PROLIFERATIVE RETINOPATHY AND TRACTION RETINAL DETACHMENT INVOLVING MACULA, WITH LONG-TERM CURRENT USE OF INSULIN (HCC): ICD-10-CM

## 2025-02-18 DIAGNOSIS — J30.2 SEASONAL ALLERGIES: ICD-10-CM

## 2025-02-18 DIAGNOSIS — Z79.899 IMMUNOSUPPRESSION DUE TO DRUG THERAPY (HCC): ICD-10-CM

## 2025-02-18 DIAGNOSIS — E11.69 HYPERLIPIDEMIA ASSOCIATED WITH TYPE 2 DIABETES MELLITUS (HCC): ICD-10-CM

## 2025-02-18 DIAGNOSIS — N18.30 ANEMIA DUE TO STAGE 3 CHRONIC KIDNEY DISEASE, UNSPECIFIED WHETHER STAGE 3A OR 3B CKD (HCC): ICD-10-CM

## 2025-02-18 DIAGNOSIS — E03.9 ACQUIRED HYPOTHYROIDISM: ICD-10-CM

## 2025-02-18 DIAGNOSIS — J44.89 ASTHMA WITH COPD (CHRONIC OBSTRUCTIVE PULMONARY DISEASE) (HCC): Chronic | ICD-10-CM

## 2025-02-18 DIAGNOSIS — D84.821 IMMUNOSUPPRESSION DUE TO DRUG THERAPY (HCC): ICD-10-CM

## 2025-02-18 DIAGNOSIS — D63.1 ANEMIA DUE TO STAGE 3 CHRONIC KIDNEY DISEASE, UNSPECIFIED WHETHER STAGE 3A OR 3B CKD (HCC): ICD-10-CM

## 2025-02-18 DIAGNOSIS — E78.5 HYPERLIPIDEMIA ASSOCIATED WITH TYPE 2 DIABETES MELLITUS (HCC): ICD-10-CM

## 2025-02-18 DIAGNOSIS — I10 PRIMARY HYPERTENSION: Primary | ICD-10-CM

## 2025-02-18 PROCEDURE — 99214 OFFICE O/P EST MOD 30 MIN: CPT | Performed by: FAMILY MEDICINE

## 2025-02-18 PROCEDURE — 99499 UNLISTED E&M SERVICE: CPT | Performed by: FAMILY MEDICINE

## 2025-02-18 RX ORDER — MONTELUKAST SODIUM 10 MG/1
10 TABLET ORAL NIGHTLY
Qty: 90 TABLET | Refills: 1 | Status: SHIPPED | OUTPATIENT
Start: 2025-02-18

## 2025-02-18 RX ORDER — ATORVASTATIN CALCIUM 10 MG/1
10 TABLET, FILM COATED ORAL NIGHTLY
Qty: 90 TABLET | Refills: 1 | Status: SHIPPED | OUTPATIENT
Start: 2025-02-18

## 2025-02-18 RX ORDER — ALBUTEROL SULFATE 90 UG/1
2 INHALANT RESPIRATORY (INHALATION) EVERY 4 HOURS PRN
Qty: 18 G | Refills: 1 | Status: SHIPPED | OUTPATIENT
Start: 2025-02-18

## 2025-02-18 RX ORDER — CARVEDILOL 6.25 MG/1
6.25 TABLET ORAL 2 TIMES DAILY WITH MEALS
Qty: 180 TABLET | Refills: 1 | Status: SHIPPED | OUTPATIENT
Start: 2025-02-18

## 2025-02-18 RX ORDER — ERGOCALCIFEROL 1.25 MG/1
50000 CAPSULE ORAL WEEKLY
COMMUNITY

## 2025-02-18 RX ORDER — BUPROPION HYDROCHLORIDE 150 MG/1
150 TABLET, EXTENDED RELEASE ORAL DAILY
Qty: 90 TABLET | Refills: 1 | Status: SHIPPED | OUTPATIENT
Start: 2025-02-18

## 2025-02-18 RX ORDER — CETIRIZINE HYDROCHLORIDE 10 MG/1
10 TABLET ORAL DAILY
Qty: 90 TABLET | Refills: 1 | Status: SHIPPED | OUTPATIENT
Start: 2025-02-18

## 2025-02-18 NOTE — PROGRESS NOTES
CHIEF COMPLAINT:   Chief Complaint   Patient presents with    Medication Follow-Up         HPI:     Lupe Arce is a 64 year old female presents for medication monitoring and discuss labs completed on 2/1/2025 CMP and lipid panel    The following individual(s) verbally consented to be recorded using ambient AI listening technology and understand that they can each withdraw their consent to this listening technology at any point by asking the clinician to turn off or pause the recording:    Patient name: Lupe Arce  Additional names:  lailaer-in-law, Miesha Patton      Type 2 diabetes-now managed by [facility] seeing KIM Gonzalez at [facility]  HEMOGLOBIN A1C (%)   Date Value   09/20/2023 5.0   06/19/2023 4.3   03/07/2022 4.2 (A)     HEMOGLOBIN A1c (% of total Hgb)   Date Value   01/04/2024 <4.2   12/16/2022 <4.0     HgbA1C   Date Value   02/01/2025 7.1 % (H)   10/14/2024 6.6 % (H)   05/14/2024      Comment:     Hemoglobin A1C to be confirmed at [facility]   05/14/2024 <4.2 % (L)   History of kidney transplant.  Taking NovoLog, glargine, Januvia  Having issues with obtaining sensors or continuous glucose monitor has not transitioned to insulin pump with CGM  Discussed with patient needs to call endocrinologist for refill cannot determine what exact sensor she needs     History of hypothyroidism on levothyroxine-managed by [facility] KIM Gonzalez. Taking levothyroxine. Denies mood changes. Feels well.     History of sleep apnea-on CPAP/BiPAP states compliant.  Last office visit with pulmonologist/sleep specialist was 8/2/2023 at [facility] medical waking up refreshed.     Asthma-managed by pulmonologist mild persistent. Off Breo and  taking montelukast and doing well.  Denies night time symptoms of asthma. Denies chest pain, sob, wheezing, LEON, cough or fever.     Environmental allergies on cetirizine and fluticasone daily.  Feels controlled.     Pt presents  for recheck of hyperlipidemia. Patient reports taking medications as instructed, no medication side effects noted. Denies any generalized muscle aches.     Patient presents for recheck of hypertension. Pt has been taking medications as instructed, no medication side effects, home BP monitoring in the range of 110-120's systolic and 70-80's diastolic.denies headache, dizziness ,chest pain, dyspnea on exertion, leg swelling.      Depression-feels controlled taking bupropion.  Denies feeling depressed, sad, hopeless, crying spells, SI, HI or wanting to die.  Feels well since kidney transplant and off of dialysis.     Wt Readings from Last 6 Encounters:   02/18/25 166 lb 6.4 oz (75.5 kg)   11/05/24 162 lb 12.8 oz (73.8 kg)   10/30/24 159 lb (72.1 kg)   10/15/24 160 lb 12.8 oz (72.9 kg)   08/15/24 156 lb 6.4 oz (70.9 kg)   06/24/24 144 lb (65.3 kg)         HISTORY:  Past Medical History:    Abdominal pain    Allergic rhinitis    Anemia    Bad breath    Black stools    Bloating    Always feels bloated    Blurred vision    Cataracts, bilateral    Per Dr. Jernigan    Cholecystitis    Chronic kidney disease (CKD)    Constipation    If she is not stuffed up she has diarrhea    COVID    01/2022    Depression    Diabetes (HCC)    no meds    Diabetes mellitus (HCC)    Diabetic retinopathy (HCC)    Per Dr. Jernigan    Diarrhea, unspecified    Diastolic dysfunction    with LVH. EF >55% on ECHO     Encephalopathy acute    Esophageal reflux    Essential hypertension    Eye disease    Flatulence/gas pain/belching    Always has gas    Food intolerance    Lactose intolerance    Frequent UTI    Always tests positive    Glaucoma    Heartburn    High cholesterol    History of depression    Hyperlipidemia    Hypertension    Hypothyroidism    Irregular bowel habits    Not regular    Kidney failure    Kidney Transplant 12/12/19    Loss of appetite    Only eats 1 maybe 2 a day    Macular puckering of retina    Nausea    PONV (postoperative  nausea and vomiting)    Prerenal azotemia    Proliferative diabetic retinopathy(362.02)    left eye    Renal disorder    ESRD    S/P kidney transplant (HCC)    2019-right kidney transplant donated by his son.  Former dialysis patient due to diabetic kidney disease performed at [facility].      Severe obstructive sleep apnea    has cpap    Sleep apnea    Traction detachment of retina    Uncomfortable fullness after meals    Always she eats super slow.    Unspecified disorder of thyroid    Visual impairment    glasses    Vitreous hemorrhage (HCC)    Vomiting    Wears glasses    Weight gain    Weight loss      Past Surgical History:   Procedure Laterality Date    Appendectomy      Av fistula revision, open Left 2019    Cataract Bilateral     Colonoscopy      Colonoscopy N/A 2019    Procedure: COLONOSCOPY;  Surgeon: Armand Zhao MD;  Location:  ENDOSCOPY    Eye surgery      RV surgery    Hysterectomy      fibroids. unsure but thinks she still has her cervix    Roopa biopsy stereo nodule 1 site right (cpt=19081)  ??    BENIGN      83, 86, 84, 90    Organ transplant      Other surgical history      cyst removal on thigh area of leg    Total abdom hysterectomy      Transplantation of kidney  2019    kidney swap with son  at [facility]    Upper gi endo no barretts  2015    antral erosions      Family History   Problem Relation Age of Onset    Diabetes Father     Heart Attack Father     High Blood Pressure Mother     Ulcerative Colitis Mother     Diabetes Mother     Other (Positive Coivd) Mother 80    Depression Daughter     Other (High Blood Pressure, and Pre-Diabetes) Daughter     Depression Daughter     High Blood Pressure Son     Hypertension Son     High Blood Pressure Son     Breast Cancer Paternal Aunt     Uterine Cancer Paternal Aunt     Heart Attack Self     Hypertension Self     Diabetes Self       Social History:   Social History     Socioeconomic  History    Marital status:     Number of children: 3   Occupational History    Occupation: House Keeper   Tobacco Use    Smoking status: Never    Smokeless tobacco: Never   Vaping Use    Vaping status: Never Used   Substance and Sexual Activity    Alcohol use: No    Drug use: No    Sexual activity: Never   Other Topics Concern    Caffeine Concern No    Exercise Yes    Seat Belt No     Social Drivers of Health     Food Insecurity: No Food Insecurity (2/18/2025)    NCSS - Food Insecurity     Worried About Running Out of Food in the Last Year: No     Ran Out of Food in the Last Year: No   Transportation Needs: No Transportation Needs (2/18/2025)    NCSS - Transportation     Lack of Transportation: No   Housing Stability: Not At Risk (2/18/2025)    NCSS - Housing/Utilities     Has Housing: Yes     Worried About Losing Housing: No     Unable to Get Utilities: No        Medications (Active prior to today's visit):  Current Outpatient Medications   Medication Sig Dispense Refill    insulin glargine 100 UNIT/ML Subcutaneous Solution Pen-injector Inject 100 Units into the skin every morning. Take 32 units every morning      ergocalciferol 1.25 MG (43788 UT) Oral Cap Take 1 capsule (50,000 Units total) by mouth once a week.      sulfamethoxazole-trimethoprim -160 MG Oral Tab per tablet Take 1 tablet by mouth 2 (two) times daily.      traMADol 50 MG Oral Tab       Insulin Disposable Pump (OMNIPOD 5 [id number] INTRO GEN 5) Does not apply Kit 1 each continuous prn. 1 kit 0    Insulin Disposable Pump (OMNIPOD 5 [id number] PODS GEN 5) Does not apply Misc 1 each every third day. 30 each 3    insulin aspart (NOVOLOG FLEXPEN) 100 Units/mL Subcutaneous Solution Pen-injector Inject up to 40 units daily via insulin pump 40 mL 0    Insulin Glargine-yfgn 100 UNIT/ML Subcutaneous Solution Pen-injector Take 32u every morning 30 mL 2    SITagliptin Phosphate (JANUVIA) 50 MG Oral Tab Take 1 tablet (50 mg total) by mouth daily. 90  tablet 1    insulin aspart (NOVOLOG FLEXPEN) 100 Units/mL Subcutaneous Solution Pen-injector Take 2u with dinner + ISF 1:50>140 once daily. Slowly titrating      Insulin Pen Needle (BD PEN NEEDLE ANTHONY U/F) 32G X 4 MM Does not apply Misc USE TO INJECT 5 TIMES PER  each 0    CETIRIZINE 10 MG Oral Tab TAKE 1 TABLET(10 MG) BY MOUTH DAILY 90 tablet 0    levothyroxine 88 MCG Oral Tab TAKE 1 TABLET(88 MCG) BY MOUTH BEFORE BREAKFAST 90 tablet 1    omeprazole 20 MG Oral Capsule Delayed Release Take 1 capsule (20 mg total) by mouth 2 (two) times daily before meals. 180 capsule 3    atorvastatin 10 MG Oral Tab Take 1 tablet (10 mg total) by mouth nightly. 90 tablet 1    buPROPion  MG Oral Tablet 12 Hr Take 1 tablet (150 mg total) by mouth daily. 90 tablet 1    carvedilol 6.25 MG Oral Tab Take 1 tablet (6.25 mg total) by mouth 2 (two) times daily with meals. 180 tablet 1    montelukast 10 MG Oral Tab Take 1 tablet (10 mg total) by mouth nightly. 90 tablet 1    folic acid 1 MG Oral Tab Take 1 tablet (1 mg total) by mouth daily. 90 tablet 3    Microlet Lancets Does not apply Misc Check blood sugar once daily 200 each 1    Glucose Blood (CONTOUR NEXT TEST) In Vitro Strip Check BG once daily 200 each 2    ALBUTEROL 108 (90 Base) MCG/ACT Inhalation Aero Soln INHALE 2 PUFFS BY MOUTH INTO THE LUNGS EVERY 4 HOURS AS NEEDED FOR WHEEZING 18 g 0    FLUTICASONE PROPIONATE 50 MCG/ACT Nasal Suspension SHAKE LIQUID AND USE 2 SPRAYS IN EACH NOSTRIL DAILY 48 g 0    ENVARSUS XR 0.75 MG Oral Tablet 24 Hr Take 3 tablets by mouth daily.      TRUE METRIX BLOOD GLUCOSE TEST In Vitro Strip TEST 3 TIMES A  strip 3    Meclizine HCl 12.5 MG Oral Tab Take 1 tablet (12.5 mg total) by mouth 3 (three) times daily as needed. 30 tablet 0    valGANciclovir HCl (VALCYTE OR) Take 450 mg by mouth.      mycophenolate sodium 360 MG Oral Tab EC Take 1 tablet (360 mg total) by mouth 4 (four) times daily. For kidney transplant 120 tablet 0    Biotin  5000 MCG Oral Cap       ondansetron 4 MG Oral Tablet Dispersible       FEROSUL 325 (65 Fe) MG Oral Tab Take by mouth daily.      Blood Glucose Monitoring Suppl (TRUE METRIX AIR GLUCOSE METER) w/Device Does not apply Kit U UTD WITH STRIPS TO TEST BLOOD GLUCOSE      Alcohol Swabs (BD SWAB SINGLE USE REGULAR) Does not apply Pads U UTD QID      acetaminophen 500 MG Oral Tab Take 2 tablets (1,000 mg total) by mouth one time.         Allergies:  Allergies[1]    [facility] and agreed except as otherwise stated in HPI.  PHYSICAL EXAM:   /70   Pulse 65   Temp 98.4 °F (36.9 °C) (Temporal)   Resp 16   Ht 4' 11\" (1.499 m)   Wt 166 lb 6.4 oz (75.5 kg)   LMP  (LMP Unknown)   SpO2 98%   BMI 33.61 kg/m²   Vital signs reviewed.Appears stated age, well groomed.  Physical Exam   GENERAL: well developed, well nourished,in no apparent distress  EYES; PERRLA, EOM-i B/L. Sclera clear and non icteric bilateral  SKIN: no rashes,no suspicious lesions  HEENT: atraumatic, normocephalic  NECK: supple,no adenopathy,no bruits, no JVD  LUNGS: clear to auscultation bilateral. No RRW. Good inspiratory and expiratory effort  CARDIO: RRR without murmur, clear S1, S2  VS: no carotid artery bruit bilateral.    GI: good BS's,no masses,No HSM or tenderness.   EXTREMITIES: no cyanosis, clubbing or edema, bilateral. No calf tenderness  Psyche: Slightly flat affect.  Good eye contact.  Well-groomed.  No  tearfulness and crying.  Normal speech.  Appropriate insight.  Denies suicidal or homicidal ideation.  Occasional random thought of life would be easier without me but no plan.  No guns in the house.    LABS     [facility] Lab Encounter on 02/01/2025   Component Date Value    Cholesterol, Total 02/01/2025 120     HDL Cholesterol 02/01/2025 41     Triglycerides 02/01/2025 128     LDL Cholesterol 02/01/2025 56     VLDL 02/01/2025 19     Non HDL Chol 02/01/2025 79     Patient Fasting for Lipi* 02/01/2025 Yes     Glucose 02/01/2025  61 (L)     Sodium 02/01/2025 142     Potassium 02/01/2025 4.1     Chloride 02/01/2025 103     CO2 02/01/2025 28.0     Anion Gap 02/01/2025 11     BUN 02/01/2025 36 (H)     Creatinine 02/01/2025 1.69 (H)     Calcium, Total 02/01/2025 9.4     Calculated Osmolality 02/01/2025 300 (H)     eGFR-Cr 02/01/2025 34 (L)     AST 02/01/2025 25     ALT 02/01/2025 21     Alkaline Phosphatase 02/01/2025 136 (H)     Bilirubin, Total 02/01/2025 0.5     Total Protein 02/01/2025 7.4     Albumin 02/01/2025 4.6     Globulin  02/01/2025 2.8     A/G Ratio 02/01/2025 1.6     Patient Fasting for CMP? 02/01/2025 Yes     Alk Phosphatase 02/01/2025 141 (H)     Liver Frac 02/01/2025 24     Bone Frac 02/01/2025 35     Intestinal Frac 02/01/2025 41 (H)     Actin Smooth Muscle Ab 02/01/2025 4     [id number] 02/01/2025 131     C-Peptide 02/01/2025 1.9     IA-2 Autoabs 02/01/2025 <7.5     GOPAL-65 02/01/2025 <5.0     Pancreatic Islet Cells 02/01/2025 Negative     Free T4 02/01/2025 1.4     TSH 02/01/2025 3.597     HgbA1C 02/01/2025 7.1 (H)     Estimated Average Glucose 02/01/2025 157 (H)    Telemedicine on 01/28/2025   Component Date Value    ZNT8 Abs 02/01/2025 <15       REVIEWED THIS VISIT  ASSESSMENT/PLAN:   64 year old female with    1. Primary hypertension  - carvedilol 6.25 MG Oral Tab; Take 1 tablet (6.25 mg total) by mouth 2 (two) times daily with meals.  Dispense: 180 tablet; Refill: 1  - CMP in 6 months; Future  - CBC W Differential W Platelet [E]; Future    2. Hyperlipidemia associated with type 2 diabetes mellitus (HCC)  - atorvastatin 10 MG Oral Tab; Take 1 tablet (10 mg total) by mouth nightly.  Dispense: 90 tablet; Refill: 1  - CMP in 6 months; Future  - Lipid in 6 months; Future    3. Recurrent major depressive disorder, in full remission  - buPROPion  MG Oral Tablet 12 Hr; Take 1 tablet (150 mg total) by mouth daily.  Dispense: 90 tablet; Refill: 1    4. Asthma with COPD (chronic obstructive pulmonary disease) (HCC)  -  albuterol 108 (90 Base) MCG/ACT Inhalation Aero Soln; Inhale 2 puffs into the lungs every 4 (four) hours as needed for Wheezing.  Dispense: 18 g; Refill: 1  - montelukast 10 MG Oral Tab; Take 1 tablet (10 mg total) by mouth nightly.  Dispense: 90 tablet; Refill: 1    5. Seasonal allergies  - montelukast 10 MG Oral Tab; Take 1 tablet (10 mg total) by mouth nightly.  Dispense: 90 tablet; Refill: 1    6. S/P kidney transplant ([facility])    7. Type 2 diabetes mellitus with left eye affected by proliferative retinopathy and traction retinal detachment involving macula, with long-term current use of insulin ([facility])    8. Immunosuppression due to drug therapy ([facility])    9. Acquired hypothyroidism    10. Breast cancer screening by mammogram  - [facility] ALFREDO 2D+3D SCREENING BILAT (CPT=77067/10185); Future    11. Anemia due to stage 3 chronic kidney disease, unspecified whether stage 3a or 3b CKD ([facility])  Patient continue iron supplementation    The patient and provider have a longitudinal relationship to address/treat the serious or complex condition as stated in this encounter.      Assessment & Plan  Diabetes Mellitus Type 2  Diabetes management is improving. A1c is 7.1, indicating better control. Previous issues with diarrhea from insulin have resolved with a new medication. Blood glucose levels fluctuate but are generally better controlled. Discussed the importance of continuous glucose monitoring and addressing supply issues with the endocrinologist.  - Continue current diabetes medication  - Monitor blood glucose levels  - Discuss continuous glucose monitor supply issues with endocrinologist    Chronic Kidney Disease (Post-Transplant)  Kidney function is well-managed post-transplant. Patient is on immunosuppressive therapy to prevent rejection. Discussed the importance of regular follow-ups with the nephrologist and adherence to medication regimen.  - Continue mycophenolate and prednisone  - Follow up with nephrologist every six  months    Hypertension  Blood pressure is well-controlled at 128/70 with current medication regimen.  - Continue current antihypertensive medication  Controlled  Encouraged 150-300 mins aerobic exercise weekly  <2g Na daily and Mediterranean diet  Weight loss if overweight  Home b/p monitoring 5x weekly goal <130/80, bring readings and cuff to each visit.  Continue current medication  Labs q 6 months     Hyperlipidemia  Hyperlipidemia is well-controlled with current medication regimen. LDL is 56, which is protective against cardiovascular events.  - Continue current cholesterol medication  Well-controlled  Continue atorvastatin    Asthma  Asthma is mild intermittent and well-controlled without maintenance therapy.  Off Breo symptoms worsen in the summer due to air conditioning. Discussed the use of albuterol as needed and monitoring symptoms during the summer.  - Continue using albuterol as needed  -Continue montelukast  - Monitor asthma symptoms, especially during summer  Continue follow-up with  ACT 21, AAP given and reviewed    Hypothyroidism  Thyroid function is normal with current medication. Managed by endocrinologist.  - Continue levothyroxine  - Follow up with endocrinologist    Depression  Depression is well-controlled with bupropion. No current symptoms of anxiety or depression. Discussed the importance of continuing medication and monitoring for any changes in mood or energy levels.  - Continue bupropion    General Health Maintenance  General health maintenance is up to date. Mammogram is due on 03/11/2024. Advised to maintain an active lifestyle and follow a Mediterranean diet. Discussed the benefits of regular exercise and a healthy diet in managing overall health and preventing chronic diseases.  - Order mammogram  - Encourage 30 minutes of daily walking  - Recommend Mediterranean diet    Follow-up  - Schedule follow-up visit in six months  - Order labs before next visit  - Discuss lab results at next  appointment.          Meds This Visit:  Requested Prescriptions     Signed Prescriptions Disp Refills    buPROPion  MG Oral Tablet 12 Hr 90 tablet 1     Sig: Take 1 tablet (150 mg total) by mouth daily.    carvedilol 6.25 MG Oral Tab 180 tablet 1     Sig: Take 1 tablet (6.25 mg total) by mouth 2 (two) times daily with meals.    atorvastatin 10 MG Oral Tab 90 tablet 1     Sig: Take 1 tablet (10 mg total) by mouth nightly.    albuterol 108 (90 Base) MCG/ACT Inhalation Aero Soln 18 g 1     Sig: Inhale 2 puffs into the lungs every 4 (four) hours as needed for Wheezing.    cetirizine 10 MG Oral Tab 90 tablet 1     Sig: Take 1 tablet (10 mg total) by mouth daily.    montelukast 10 MG Oral Tab 90 tablet 1     Sig: Take 1 tablet (10 mg total) by mouth nightly.       Health Maintenance:  Health Maintenance   Topic Date Due    COVID-19 Vaccine (7 - 2024-25 season) 09/01/2024    Diabetes Care: Foot Exam (Annual)  01/01/2025    Mammogram  03/11/2025    Diabetes Care Dilated Eye Exam  05/10/2025    Diabetes Care A1C  08/01/2025    Annual Physical  08/15/2025    Pap Smear  08/03/2026    Colorectal Cancer Screening  11/01/2027    DTaP,Tdap,and Td Vaccines (4 - Td or Tdap) 11/07/2027    Pneumococcal Vaccine: 50+ Years (4 of 4 - PCV20 or PCV21) 06/24/2029    Influenza Vaccine  Completed    Annual Depression Screening  Completed    Zoster Vaccines  Completed    Meningococcal B Vaccine  Aged Out         Patient/Caregiver Education: Patient/Caregiver Education: There are no barriers to learning. Medical education done.   Outcome: Patient verbalizes understanding. Patient is notified to call with any questions, comp lications, allergies, or worsening or changing symptoms.  Patient is to call with any side effects or complications from the treatments as a result of today.     Problem List:     Patient Active Problem List   Diagnosis    Hyperlipidemia associated with type 2 diabetes mellitus (HCC)    GERD (gastroesophageal reflux  disease)    Vitamin D deficiency    Hypomagnesemia    Seasonal allergies    Severe obstructive sleep apnea    Maori speaking patient    Depressive disorder    Acquired hypothyroidism    Primary hypertension    Type 2 diabetes mellitus with ophthalmic complication, with long-term current use of insulin (HCC)    Thrombocytopenia    Asthma with COPD (chronic obstructive pulmonary disease) (HCC)    Tubular adenoma of colon    S/P kidney transplant (HCC)    Normocytic anemia    Immunosuppression due to drug therapy (HCC)    History of gastritis    History of adenomatous polyp of colon    Encounter for screening for malignant neoplasm of colon    Medication side effect, initial encounter    Renovascular hypertension    Elevated liver function tests    Recurrent major depressive disorder, in full remission    Anemia due to chronic kidney disease       Imaging & Referrals:  None     2/18/2025  Marisol Martinez, DO      Patient understands plan and follow-up.  Return in about 6 months (around 8/15/2025) for medicare physical, discuss labs, medication monitoring.            [1]   Allergies  Allergen Reactions    Ace Inhibitors RENAL INSUFFICIENCY    Folic Acid-Vit B6-Vit B12 HIVES and ITCHING    Vitamin B12 OTHER (SEE COMMENTS)    B12-Ca RASH     Vit b12 and calcium acetate combo

## 2025-02-24 RX ORDER — CETIRIZINE HYDROCHLORIDE 10 MG/1
10 TABLET ORAL DAILY
Qty: 90 TABLET | Refills: 0 | OUTPATIENT
Start: 2025-02-24

## 2025-02-24 NOTE — TELEPHONE ENCOUNTER
Refill(s) Requested:   Requested Prescriptions     Pending Prescriptions Disp Refills    cetirizine 10 MG Oral Tab [Pharmacy Med Name: CETIRIZINE 10MG TABLETS] 90 tablet 0     Sig: TAKE 1 TABLET(10 MG) BY MOUTH DAILY     Medication Last Prescribed:  2/18/2025 90 days 1 refill     Has dose or medication been changed    since last prescription? *Review notes*    []  Yes       [x]  No     Last office visit: 2/18/2025 (in-office)  Visit date not found (virtual visit)     Patient was asked to follow-up in: 6 months    Appointment due: August 2025    Future Appointments: 8/19/2025 Marisol Martinez DO     Action taken:  [x] Medication refill denied refill request too soon

## 2025-03-02 DIAGNOSIS — E78.5 HYPERLIPIDEMIA ASSOCIATED WITH TYPE 2 DIABETES MELLITUS (HCC): ICD-10-CM

## 2025-03-02 DIAGNOSIS — E11.69 HYPERLIPIDEMIA ASSOCIATED WITH TYPE 2 DIABETES MELLITUS (HCC): ICD-10-CM

## 2025-03-03 RX ORDER — ATORVASTATIN CALCIUM 10 MG/1
10 TABLET, FILM COATED ORAL NIGHTLY
Qty: 90 TABLET | Refills: 1 | OUTPATIENT
Start: 2025-03-03

## 2025-03-10 ENCOUNTER — LAB ENCOUNTER (OUTPATIENT)
Dept: LAB | Age: 65
End: 2025-03-10
Payer: MEDICARE

## 2025-03-10 DIAGNOSIS — Z94.0 KIDNEY REPLACED BY TRANSPLANT (HCC): ICD-10-CM

## 2025-03-10 DIAGNOSIS — I10 PRIMARY HYPERTENSION: ICD-10-CM

## 2025-03-10 LAB
ALBUMIN SERPL-MCNC: 4.5 G/DL (ref 3.2–4.8)
ALBUMIN/GLOB SERPL: 1.8 {RATIO} (ref 1–2)
ALP LIVER SERPL-CCNC: 96 U/L
ALT SERPL-CCNC: 25 U/L
ANION GAP SERPL CALC-SCNC: 10 MMOL/L (ref 0–18)
AST SERPL-CCNC: 26 U/L (ref ?–34)
BASOPHILS # BLD AUTO: 0.02 X10(3) UL (ref 0–0.2)
BASOPHILS NFR BLD AUTO: 0.3 %
BILIRUB SERPL-MCNC: 0.4 MG/DL (ref 0.2–1.1)
BILIRUB UR QL STRIP.AUTO: NEGATIVE
BUN BLD-MCNC: 36 MG/DL (ref 9–23)
CALCIUM BLD-MCNC: 9.1 MG/DL (ref 8.7–10.6)
CHLORIDE SERPL-SCNC: 110 MMOL/L (ref 98–112)
CLARITY UR REFRACT.AUTO: CLEAR
CO2 SERPL-SCNC: 26 MMOL/L (ref 21–32)
CREAT BLD-MCNC: 1.99 MG/DL
CREAT UR-SCNC: 85.4 MG/DL
EGFRCR SERPLBLD CKD-EPI 2021: 28 ML/MIN/1.73M2 (ref 60–?)
EOSINOPHIL # BLD AUTO: 0.06 X10(3) UL (ref 0–0.7)
EOSINOPHIL NFR BLD AUTO: 1 %
ERYTHROCYTE [DISTWIDTH] IN BLOOD BY AUTOMATED COUNT: 12.4 %
FASTING STATUS PATIENT QL REPORTED: YES
GLOBULIN PLAS-MCNC: 2.5 G/DL (ref 2–3.5)
GLUCOSE BLD-MCNC: 104 MG/DL (ref 70–99)
GLUCOSE UR STRIP.AUTO-MCNC: NORMAL MG/DL
HCT VFR BLD AUTO: 37.7 %
HGB BLD-MCNC: 11.9 G/DL
IMM GRANULOCYTES # BLD AUTO: 0.02 X10(3) UL (ref 0–1)
IMM GRANULOCYTES NFR BLD: 0.3 %
KETONES UR STRIP.AUTO-MCNC: NEGATIVE MG/DL
LEUKOCYTE ESTERASE UR QL STRIP.AUTO: NEGATIVE
LYMPHOCYTES # BLD AUTO: 1.66 X10(3) UL (ref 1–4)
LYMPHOCYTES NFR BLD AUTO: 27.7 %
MCH RBC QN AUTO: 28.2 PG (ref 26–34)
MCHC RBC AUTO-ENTMCNC: 31.6 G/DL (ref 31–37)
MCV RBC AUTO: 89.3 FL
MICROALBUMIN UR-MCNC: 2.7 MG/DL
MICROALBUMIN/CREAT 24H UR-RTO: 31.6 UG/MG (ref ?–30)
MONOCYTES # BLD AUTO: 0.45 X10(3) UL (ref 0.1–1)
MONOCYTES NFR BLD AUTO: 7.5 %
NEUTROPHILS # BLD AUTO: 3.78 X10 (3) UL (ref 1.5–7.7)
NEUTROPHILS # BLD AUTO: 3.78 X10(3) UL (ref 1.5–7.7)
NEUTROPHILS NFR BLD AUTO: 63.2 %
NITRITE UR QL STRIP.AUTO: NEGATIVE
OSMOLALITY SERPL CALC.SUM OF ELEC: 311 MOSM/KG (ref 275–295)
PH UR STRIP.AUTO: 5 [PH] (ref 5–8)
PLATELET # BLD AUTO: 154 10(3)UL (ref 150–450)
POTASSIUM SERPL-SCNC: 4.6 MMOL/L (ref 3.5–5.1)
PROT SERPL-MCNC: 7 G/DL (ref 5.7–8.2)
PROT UR STRIP.AUTO-MCNC: NEGATIVE MG/DL
RBC # BLD AUTO: 4.22 X10(6)UL
RBC UR QL AUTO: NEGATIVE
SODIUM SERPL-SCNC: 146 MMOL/L (ref 136–145)
SP GR UR STRIP.AUTO: 1.02 (ref 1–1.03)
UROBILINOGEN UR STRIP.AUTO-MCNC: NORMAL MG/DL
WBC # BLD AUTO: 6 X10(3) UL (ref 4–11)

## 2025-03-10 PROCEDURE — 80197 ASSAY OF TACROLIMUS: CPT

## 2025-03-10 PROCEDURE — 87799 DETECT AGENT NOS DNA QUANT: CPT

## 2025-03-10 PROCEDURE — 85025 COMPLETE CBC W/AUTO DIFF WBC: CPT

## 2025-03-10 PROCEDURE — 80053 COMPREHEN METABOLIC PANEL: CPT

## 2025-03-10 PROCEDURE — 82570 ASSAY OF URINE CREATININE: CPT

## 2025-03-10 PROCEDURE — 81003 URINALYSIS AUTO W/O SCOPE: CPT

## 2025-03-10 PROCEDURE — 36415 COLL VENOUS BLD VENIPUNCTURE: CPT

## 2025-03-10 PROCEDURE — 82043 UR ALBUMIN QUANTITATIVE: CPT

## 2025-03-13 ENCOUNTER — HOSPITAL ENCOUNTER (OUTPATIENT)
Dept: MAMMOGRAPHY | Facility: HOSPITAL | Age: 65
Discharge: HOME | End: 2025-03-13
Attending: FAMILY MEDICINE
Payer: MEDICARE

## 2025-03-13 DIAGNOSIS — Z12.31 BREAST CANCER SCREENING BY MAMMOGRAM: ICD-10-CM

## 2025-03-13 LAB
BKV DNA, QUANT PCR, PLASMA: NEGATIVE IU/ML
CMV QN DNA PCR: NEGATIVE IU/ML
TACROLIMUS LVL: 21.9 NG/ML

## 2025-03-13 PROCEDURE — 77067 SCR MAMMO BI INCL CAD: CPT | Performed by: FAMILY MEDICINE

## 2025-03-13 PROCEDURE — 77063 BREAST TOMOSYNTHESIS BI: CPT | Performed by: FAMILY MEDICINE

## 2025-03-25 ENCOUNTER — PATIENT MESSAGE (OUTPATIENT)
Facility: CLINIC | Age: 65
End: 2025-03-25

## 2025-03-28 ENCOUNTER — HOSPITAL ENCOUNTER (OUTPATIENT)
Dept: LAB | Facility: HOSPITAL | Age: 65
Discharge: HOME | End: 2025-03-28
Attending: INTERNAL MEDICINE
Payer: MEDICARE

## 2025-03-28 DIAGNOSIS — D69.6 THROMBOCYTOPENIA: ICD-10-CM

## 2025-03-28 DIAGNOSIS — D64.9 NORMOCYTIC ANEMIA: ICD-10-CM

## 2025-03-28 DIAGNOSIS — D50.8 OTHER IRON DEFICIENCY ANEMIA: ICD-10-CM

## 2025-03-28 LAB
ALBUMIN SERPL-MCNC: 4.7 G/DL (ref 3.2–4.8)
ALBUMIN/GLOB SERPL: 1.8 {RATIO} (ref 1–2)
ALP LIVER SERPL-CCNC: 93 U/L
ALT SERPL-CCNC: 22 U/L
ANION GAP SERPL CALC-SCNC: 9 MMOL/L (ref 0–18)
AST SERPL-CCNC: 25 U/L (ref ?–34)
BILIRUB SERPL-MCNC: 0.6 MG/DL (ref 0.2–1.1)
BUN BLD-MCNC: 32 MG/DL (ref 9–23)
CALCIUM BLD-MCNC: 9.7 MG/DL (ref 8.7–10.6)
CHLORIDE SERPL-SCNC: 106 MMOL/L (ref 98–112)
CO2 SERPL-SCNC: 30 MMOL/L (ref 21–32)
CREAT BLD-MCNC: 1.41 MG/DL
EGFRCR SERPLBLD CKD-EPI 2021: 42 ML/MIN/1.73M2 (ref 60–?)
GLOBULIN PLAS-MCNC: 2.6 G/DL (ref 2–3.5)
GLUCOSE BLD-MCNC: 95 MG/DL (ref 70–99)
OSMOLALITY SERPL CALC.SUM OF ELEC: 307 MOSM/KG (ref 275–295)
POTASSIUM SERPL-SCNC: 4.5 MMOL/L (ref 3.5–5.1)
PROT SERPL-MCNC: 7.3 G/DL (ref 5.7–8.2)
SODIUM SERPL-SCNC: 145 MMOL/L (ref 136–145)
TACROLIMUS STAT: 4.9 MCG/L
VIT D+METAB SERPL-MCNC: 42.8 NG/ML (ref 30–100)

## 2025-03-28 PROCEDURE — 36415 COLL VENOUS BLD VENIPUNCTURE: CPT | Performed by: INTERNAL MEDICINE

## 2025-03-28 PROCEDURE — 82306 VITAMIN D 25 HYDROXY: CPT | Performed by: INTERNAL MEDICINE

## 2025-03-28 PROCEDURE — 80197 ASSAY OF TACROLIMUS: CPT | Performed by: INTERNAL MEDICINE

## 2025-03-28 PROCEDURE — 80053 COMPREHEN METABOLIC PANEL: CPT | Performed by: INTERNAL MEDICINE

## 2025-04-14 DIAGNOSIS — I10 PRIMARY HYPERTENSION: ICD-10-CM

## 2025-04-14 RX ORDER — CARVEDILOL 6.25 MG/1
6.25 TABLET ORAL 2 TIMES DAILY WITH MEALS
Qty: 180 TABLET | Refills: 1 | OUTPATIENT
Start: 2025-04-14

## 2025-05-14 LAB
BASOPHILS # BLD AUTO: 0.01 X10(3) UL (ref 0–0.2)
BASOPHILS NFR BLD AUTO: 0.2 %
DEPRECATED HBV CORE AB SER IA-ACNC: 716 NG/ML (ref 50–306)
EOSINOPHIL # BLD AUTO: 0.04 X10(3) UL (ref 0–0.7)
EOSINOPHIL NFR BLD AUTO: 0.6 %
ERYTHROCYTE [DISTWIDTH] IN BLOOD BY AUTOMATED COUNT: 13.2 %
HCT VFR BLD AUTO: 35.5 % (ref 35–48)
HGB BLD-MCNC: 11.5 G/DL (ref 12–16)
IMM GRANULOCYTES # BLD AUTO: 0.02 X10(3) UL (ref 0–1)
IMM GRANULOCYTES NFR BLD: 0.3 %
IRON SATN MFR SERPL: 36 % (ref 15–50)
IRON SERPL-MCNC: 90 UG/DL (ref 50–170)
LYMPHOCYTES # BLD AUTO: 0.84 X10(3) UL (ref 1–4)
LYMPHOCYTES NFR BLD AUTO: 13.5 %
MCH RBC QN AUTO: 28.4 PG (ref 26–34)
MCHC RBC AUTO-ENTMCNC: 32.4 G/DL (ref 31–37)
MCV RBC AUTO: 87.7 FL (ref 80–100)
MONOCYTES # BLD AUTO: 0.53 X10(3) UL (ref 0.1–1)
MONOCYTES NFR BLD AUTO: 8.5 %
NEUTROPHILS # BLD AUTO: 4.8 X10 (3) UL (ref 1.5–7.7)
NEUTROPHILS # BLD AUTO: 4.8 X10(3) UL (ref 1.5–7.7)
NEUTROPHILS NFR BLD AUTO: 76.9 %
PLATELET # BLD AUTO: 139 10(3)UL (ref 150–450)
RBC # BLD AUTO: 4.05 X10(6)UL (ref 3.8–5.3)
TOTAL IRON BINDING CAPACITY: 248 UG/DL (ref 250–425)
TRANSFERRIN SERPL-MCNC: 180 MG/DL (ref 250–380)
WBC # BLD AUTO: 6.2 X10(3) UL (ref 4–11)

## 2025-05-17 LAB — SOL TRANSFERRIN RECEPTOR: 16.5 NMOL/L

## 2025-05-21 NOTE — PROGRESS NOTES
-----------------------------  [employer]  -----------------------------  Lupe Arce    YOB: 1960    Generated at: Wed, May 21, 2025 2:39 PM CDT    Reporting period: Tue Apr 22, 2025 - Wed May 21, 2025  -----------------------------  Glucose Details    Average glucose: 182 mg/dL    GMI: 7.7%    Standard deviation: 46 mg/dL    Coefficient of Variation: 25.4%  -----------------------------  Time in Range    Very High: 9%    High: 37%    In Range: 54%    Low: 0%    Very Low: <1%    Target Range   mg/dL    -----------------------------  Sensor usage    Days with data: 27/30    Time active: 92%    Avg. calibrations per day: 0.0

## 2025-05-22 NOTE — PROGRESS NOTES
[facility] Note - Telemedicine    Lupe Arce verbally consents to a Telemedicine (Audio + Video) visit on 5/22/2025. The visit was conducted in a private room.     Patient understands and accepts financial responsibility for any deductible, co-insurance and/or co-pays associated with this service.      Subjective:   History of Present Illness  Lupe Arce is a 64 year old female with diabetes who presents for insulin pump setup and management.    Daughter in law Jose Luis has been helping set up the omnipod we ordered last office visit --  is in the process of setting up her insulin pump but has encountered difficulties due to missing basal and bolus numbers required for the setup. Most of the setup is complete except for the e-learning component, which she paused due to the need for these numbers.    BG are running higher at night time.  She is currently using a sliding scale insulin regimen, taking two units with dinner plus additional insulin as needed based on her blood sugar levels. She is taking her insulin as prescribed, including the dinner dose of 2u before dinner.    Her current medications include Semglee at 32 units in the morning and Januvia 50 mg daily. She has received insulin vials for use with the Omnipod pump, which she has not yet opened due to concerns about setup.    Recent labs showed a TSH of 3.597 and free T4 of 1.4. She continues to take 88 micrograms of her thyroid medication daily without changes.    She is scheduled to see an ophthalmologist for a diabetes-related eye exam on May 30th, following a previous surgery in November.    DM meds at office visit:   Diabetic Medications              insulin aspart (NOVOLOG FLEXPEN) 100 Units/mL Subcutaneous Solution Pen-injector (Taking) Take 4u with dinner + ISF 1:50>140 once daily. Slowly titrating    insulin aspart (NOVOLOG) 100 Units/mL Injection Solution Use to fill insulin pump with up to 70u daily.     Insulin Glargine-yfgn 100 UNIT/ML Subcutaneous Solution Pen-injector Take 32u every morning    SITagliptin Phosphate (JANUVIA) 50 MG Oral Tab Take 1 tablet (50 mg total) by mouth daily.            Continuous Glucose Monitoring Interpretation  Lupe Arce has undergone continuous glucose monitoring.  The blood glucose tracings were evaluated for two weeks prior to office visit. Blood glucose tracings demonstrated areas of hyperglycemia post-meal, particularly post-dinner. There were no areas of hypoglycemia notedduring the weeks of evaluation.        History/Other:    Allergies, PMH, SocHx and FHx reviewed and updated as appropriate in Epic on Current Medications[1]  Allergies[2]  Current Medications[3]  Past Medical History[4]  Family History[5]  Social history: Reviewed.      ROS/Exam    REVIEW OF SYSTEMS: Ten point review of systems has been performed and is otherwise negative and/or non-contributory, except as described above.     VITALS  There were no vitals filed for this visit.    There is no height or weight on file to calculate BMI.  Wt Readings from Last 6 Encounters:   02/18/25 166 lb 6.4 oz (75.5 kg)   11/05/24 162 lb 12.8 oz (73.8 kg)   10/30/24 159 lb (72.1 kg)   10/15/24 160 lb 12.8 oz (72.9 kg)   08/15/24 156 lb 6.4 oz (70.9 kg)   06/24/24 144 lb (65.3 kg)       PHYSICAL EXAM  Limited due to telemedicine encounter    Constitutional Not in acute distress  Pulmonary: no wheezing heard  No coughing on the phone. Speaking in full sentences   Neurological:  Alert and oriented to person, place and time.   Psychiatric: Normal affect, mood and behavior appropriate      Labs/Imaging: Pertinent imaging reviewed.    Thyroid labs (if present in chart):  Recent Labs     05/14/24  0804 10/14/24  1045 02/01/25  0757   T4F 1.3 1.5 1.4   TSH 1.510 0.721 3.597        Thyroid ultrasound results (if present in chart):  No results found.    Overall glucose control:  Lab Results   Component Value Date    A1C  7.1 (H) 02/01/2025    A1C 6.6 (H) 10/14/2024    A1C  05/14/2024      Comment:      Hemoglobin A1C to be confirmed at [facility]    A1C <4.2 (L) 05/14/2024    A1C  04/22/2024      Comment:      Hemoglobin A1C to be confirmed at [facility]    A1C <4.2 (L) 04/22/2024       Supplementary Documentation:   -Surveillance for Diabetes Complications & Risks  Foot exam/neuropathy: Last Foot Exam: 08/15/24    Retinopathy screening: No data recordedNo data recorded  Lipid screening:   Lab Results   Component Value Date    LDL 56 02/01/2025    TRIG 128 02/01/2025   Cholesterol Meds: atorvastatin Tabs - 10 MG      Nephropathy screening:   Lab Results   Component Value Date    EGFRCR 45 (L) 05/14/2025    MALBCREACALC 6 12/16/2022    MICROALBCREA 31.6 (H) 03/10/2025     Lab Results   Component Value Date    CREAUR 159 12/16/2022    MICROALBUMIN 1.0 12/16/2022    MALBCREACALC 6 12/16/2022     Blood pressure control:   BP Readings from Last 1 Encounters:   02/18/25 128/70   BP Meds: carvedilol Tabs - 6.25 MG       Assessment & Plan:   Overview:   1. Type 2 diabetes mellitus with proliferative retinopathy of left eye, with long-term current use of insulin, macular edema presence unspecified, unspecified proliferative retinopathy type (HCC) (Primary)  Overview:  PMHx of Type 2 diabetes mellitus diagnosed at age 22 after a pregnancy, per reported history only started managing DM around 2012 when vision was worsening.  Complicated by diabetic retinopathy.   Cpeptide 1/2024 1.97. Intolerant of several meds as noted below, DM management further complicated by CKD.    - intolerant of metformin- GI upset  - did not tolerate GLP1a- soliqua- bad diarrhea  - SGLT2i stopped by nephro  - no strict CI for PLATT but given reduced renal fxn patient would prefer insulin therapy to avoid hypoglycemia  Orders:  -     GLUC MNTR CONT REC FROM NTRSTL TISS FLU PHYS I&R  2. Renal transplant recipient (HCC)  Overview:  S/p right kidney transplant (12/13/2019),  performed at [facility], donated by son.    - Former dialysis patient due to diabetic kidney disease   - Follows with Dr. Kaur with nephrology  - Dc'd SGLT2i in Oct 2024 with nephro due to persistent e coli in urine     3. Hyperlipidemia associated with type 2 diabetes mellitus (HCC)      Lupe Arce is a pleasant 64 year old female here for evaluation of:    Assessment & Plan  Type 2 diabetes mellitus with hyperglycemia  Renal transplant recipient  She is in the process of setting up an Omnipod insulin pump but has not completed the e-learning due to needing basal and bolus numbers. Current blood glucose levels remain high post dinner.. She is using a sliding scale insulin regimen with two units at dinner, which will be increased to four units plus the sliding scale. Anticipated improvement in glycemic control once the Omnipod is operational, as it will provide continuous rapid-acting insulin, eliminating the need for multiple daily injections.  - I will coordinate in-person training for Omnipod setup with educator Cherelle.  - Increase dinner insulin dose to four units plus sliding scale ISF 1:50>140.  - Maintain current regimen of Semglee 32 units in the morning and Januvia 50 mg daily.  - Request diabetes eye exam report is faxed over to our office from ophthalmologist after appointment on May 30, 2025.    Hypothyroidism  Thyroid function is well-managed with TSH at 3.597 and free T4 at 1.4 as of February labs. She continues on 88 mcg of levothyroxine daily. Repeat TFTs in 8/2025.    Hyperlipidemia  LDL stable Feb 2025. Taking statin.    Recording duration: 6 minutes    Updated DM med list:   Diabetic Medications              insulin aspart (NOVOLOG FLEXPEN) 100 Units/mL Subcutaneous Solution Pen-injector (Taking) Take 4u with dinner + ISF 1:50>140 once daily. Slowly titrating    insulin aspart (NOVOLOG) 100 Units/mL Injection Solution Use to fill insulin pump with up to 70u daily.    Insulin Glargine-yfgn  100 UNIT/ML Subcutaneous Solution Pen-injector Take 32u every morning    SITagliptin Phosphate (JANUVIA) 50 MG Oral Tab Take 1 tablet (50 mg total) by mouth daily.          See above header \"Supplementary Documentation\" for surveillance for diabetes complications & risks    Return in about 2 months (around 7/22/2025) for diabetes follow up.    RODNEY Chin  Endocrinology, Diabetes & Metabolism   5/22/2025    Note to patient: The 21 Century Cures Act makes medical notes like these available to patients in the interest of transparency. However, be advised this is a medical document. It is intended as peer to peer communication. It is written in medical language and may contain abbreviations or verbiage that are unfamiliar. It may appear blunt or direct. Medical documents are intended to carry relevant information, facts as evident, and the clinical opinion of the practitioner.          [1]    insulin aspart (NOVOLOG FLEXPEN) 100 Units/mL Subcutaneous Solution Pen-injector Take 4u with dinner + ISF 1:50>140 once daily. Slowly titrating     [2]   Allergies  Allergen Reactions    Ace Inhibitors RENAL INSUFFICIENCY    Folic Acid-Vit B6-Vit B12 HIVES and ITCHING    Vitamin B12 OTHER (SEE COMMENTS)    B12-Ca RASH     Vit b12 and calcium acetate combo   [3]   Current Outpatient Medications   Medication Sig Dispense Refill    insulin aspart (NOVOLOG FLEXPEN) 100 Units/mL Subcutaneous Solution Pen-injector Take 4u with dinner + ISF 1:50>140 once daily. Slowly titrating      FOLIC ACID 1 MG Oral Tab TAKE 1 TABLET(1 MG) BY MOUTH DAILY 90 tablet 3    insulin aspart (NOVOLOG) 100 Units/mL Injection Solution Use to fill insulin pump with up to 70u daily. 70 mL 2    ergocalciferol 1.25 MG (61265 UT) Oral Cap Take 1 capsule (50,000 Units total) by mouth once a week.      buPROPion  MG Oral Tablet 12 Hr Take 1 tablet (150 mg total) by mouth daily. 90 tablet 1    carvedilol 6.25 MG Oral Tab Take 1 tablet (6.25 mg total) by  mouth 2 (two) times daily with meals. 180 tablet 1    atorvastatin 10 MG Oral Tab Take 1 tablet (10 mg total) by mouth nightly. 90 tablet 1    albuterol 108 (90 Base) MCG/ACT Inhalation Aero Soln Inhale 2 puffs into the lungs every 4 (four) hours as needed for Wheezing. 18 g 1    cetirizine 10 MG Oral Tab Take 1 tablet (10 mg total) by mouth daily. 90 tablet 1    montelukast 10 MG Oral Tab Take 1 tablet (10 mg total) by mouth nightly. 90 tablet 1    sulfamethoxazole-trimethoprim -160 MG Oral Tab per tablet Take 1 tablet by mouth 2 (two) times daily.      traMADol 50 MG Oral Tab       Insulin Disposable Pump (OMNIPOD 5 [id number] INTRO GEN 5) Does not apply Kit 1 each continuous prn. 1 kit 0    Insulin Disposable Pump (OMNIPOD 5 [id number] PODS GEN 5) Does not apply Misc 1 each every third day. 30 each 3    Insulin Glargine-yfgn 100 UNIT/ML Subcutaneous Solution Pen-injector Take 32u every morning 30 mL 2    SITagliptin Phosphate (JANUVIA) 50 MG Oral Tab Take 1 tablet (50 mg total) by mouth daily. 90 tablet 1    Insulin Pen Needle (BD PEN NEEDLE ATNHONY U/F) 32G X 4 MM Does not apply Misc USE TO INJECT 5 TIMES PER  each 0    levothyroxine 88 MCG Oral Tab TAKE 1 TABLET(88 MCG) BY MOUTH BEFORE BREAKFAST 90 tablet 1    omeprazole 20 MG Oral Capsule Delayed Release Take 1 capsule (20 mg total) by mouth 2 (two) times daily before meals. 180 capsule 3    Microlet Lancets Does not apply Misc Check blood sugar once daily 200 each 1    Glucose Blood (CONTOUR NEXT TEST) In Vitro Strip Check BG once daily 200 each 2    FLUTICASONE PROPIONATE 50 MCG/ACT Nasal Suspension SHAKE LIQUID AND USE 2 SPRAYS IN EACH NOSTRIL DAILY 48 g 0    ENVARSUS XR 0.75 MG Oral Tablet 24 Hr Take 3 tablets by mouth daily.      TRUE METRIX BLOOD GLUCOSE TEST In Vitro Strip TEST 3 TIMES A  strip 3    Meclizine HCl 12.5 MG Oral Tab Take 1 tablet (12.5 mg total) by mouth 3 (three) times daily as needed. 30 tablet 0    valGANciclovir HCl (VALCYTE  OR) Take 450 mg by mouth.      mycophenolate sodium 360 MG Oral Tab EC Take 1 tablet (360 mg total) by mouth 4 (four) times daily. For kidney transplant 120 tablet 0    Biotin 5000 MCG Oral Cap       ondansetron 4 MG Oral Tablet Dispersible       FEROSUL 325 (65 Fe) MG Oral Tab Take by mouth daily.      Blood Glucose Monitoring Suppl (TRUE METRIX AIR GLUCOSE METER) w/Device Does not apply Kit U UTD WITH STRIPS TO TEST BLOOD GLUCOSE      Alcohol Swabs (BD SWAB SINGLE USE REGULAR) Does not apply Pads U UTD QID      acetaminophen 500 MG Oral Tab Take 2 tablets (1,000 mg total) by mouth one time.     [4]   Past Medical History:   Abdominal pain    Allergic rhinitis    Anemia    Bad breath    Black stools    Bloating    Always feels bloated    Blurred vision    Cataracts, bilateral    Per Dr. Jernigan    Cholecystitis    Chronic kidney disease (CKD)    Constipation    If she is not stuffed up she has diarrhea    COVID    01/2022    Depression    Diabetes (HCC)    no meds    Diabetes mellitus (HCC)    Diabetic retinopathy ([facility])    Per Dr. Jernigan    Diarrhea, unspecified    Diastolic dysfunction    with LVH. EF >55% on ECHO     Encephalopathy acute    Esophageal reflux    Essential hypertension    Eye disease    Flatulence/gas pain/belching    Always has gas    Food intolerance    Lactose intolerance    Frequent UTI    Always tests positive    Glaucoma    Heartburn    High cholesterol    History of depression    Hyperlipidemia    Hypertension    Hypothyroidism    Irregular bowel habits    Not regular    Kidney failure    Kidney Transplant 12/12/19    Loss of appetite    Only eats 1 maybe 2 a day    Macular puckering of retina    Nausea    PONV (postoperative nausea and vomiting)    Prerenal azotemia    Proliferative diabetic retinopathy(362.02)    left eye    Renal disorder    ESRD    S/P kidney transplant ([facility])    12/12/2019-right kidney transplant donated by his son.  Former dialysis patient due to diabetic kidney  disease performed at [facility].      Severe obstructive sleep apnea    has cpap    Sleep apnea    Traction detachment of retina    Uncomfortable fullness after meals    Always she eats super slow.    Unspecified disorder of thyroid    Visual impairment    glasses    Vitreous hemorrhage (HCC)    Vomiting    Wears glasses    Weight gain    Weight loss   [5]   Family History  Problem Relation Age of Onset    Heart Attack Self     Hypertension Self     Diabetes Self     High Blood Pressure Mother     Ulcerative Colitis Mother     Diabetes Mother     Other (Positive Coivd) Mother 80    Diabetes Father     Heart Attack Father     Depression Daughter     Other (High Blood Pressure, and Pre-Diabetes) Daughter     Depression Daughter     High Blood Pressure Son     Hypertension Son     High Blood Pressure Son     Breast Cancer Paternal Aunt 35    Uterine Cancer Paternal Aunt

## 2025-05-22 NOTE — PATIENT INSTRUCTIONS
Return Visit:  With RODNEY Chin: Return in about 2 months (around 7/22/2025) for diabetes follow up.      - complete your diabetes eye exam. This exam is critical to preventing and treating eye conditions caused by diabetes that could potentially lead to vision loss. Once complete, please call your eye care provider and ask them to fax your latest report to our office. This will help us update our records. Our fax number is: [phone]     Updated diabetes medication instructions from today:   Diabetic Medications              insulin aspart (NOVOLOG FLEXPEN) 100 Units/mL Subcutaneous Solution Pen-injector (Taking) Take 4u with dinner + ISF 1:50>140 once daily. Slowly titrating    insulin aspart (NOVOLOG) 100 Units/mL Injection Solution Use to fill insulin pump with up to 70u daily.    Insulin Glargine-yfgn 100 UNIT/ML Subcutaneous Solution Pen-injector Take 32u every morning    SITagliptin Phosphate (JANUVIA) 50 MG Oral Tab Take 1 tablet (50 mg total) by mouth daily.            Lab Results   Component Value Date    A1C 7.1 (H) 02/01/2025    A1C 6.6 (H) 10/14/2024    A1C  05/14/2024      Comment:      Hemoglobin A1C to be confirmed at [facility]    A1C <4.2 (L) 05/14/2024    A1C  04/22/2024      Comment:      Hemoglobin A1C to be confirmed at [facility]    A1C <4.2 (L) 04/22/2024        General follow up information:  Please let us know if you require any refills at least 1 week prior to your medication running out. If you do run out of medication, please call our office ASAP to request refills (do not wait until your follow up).   Please call our office if sugars at home are consistently greater than 250 or less than 70 for medication adjustment (do not wait until your follow up appointment).  Lab results and imaging will typically be reviewed at follow up appointments, or within 3-5 business days of ALL results being in if you do not have an appointment scheduled in the near future. Our office will contact you  for any abnormal results requiring more urgent follow up or action.   The on-call pager is for urgent matters only. If you are a type 1 diabetic and run out of insulin after business hours 8AM-4PM, you may call the on-call pager for a refill to a 24 hour pharmacy.  If you have adrenal insufficiency and run out of steroids, you may call the on-call pager for a refill to a 24 hour pharmacy. All other refill requests should be requested during business hours.    Office phone number: [phone]; phones are open Monday-Friday 8:30-4:30.       HOW TO TREAT LOW BLOOD SUGAR (Hypoglycemia)  Low blood sugar= Less than 70, or if you start to have symptoms (below)  Symptoms: Shaking or trembling, fast heart rate, extreme hunger, sweating, confusion/difficulty concentrating, dizziness.    How to treat a low blood sugar if you are able to eat/drink: The Rule of 15/15  If you are using continuous glucose monitor that says you are low, but you do not have any symptoms, verify on fingerstick that your blood sugar is actually low before treating.   Eat 15 grams of carbs (see examples below)  Check your blood sugar after 15 minutes. If it’s still below your target range, have another serving.   Repeat these steps until it’s in your target range. Once it’s in range, if you're nervous about your sugar going low again, have a protein source (ie, a spoonful of peanut butter).   If you have a CGM you want to look for how your arrow has changed. If you arrow is pointed up or sideways after 15 min, give your CGM more time OR check with a finger stick. Try not to eat more food until at least 15 min after the first BG check - otherwise you risk having a rebound high.  If you are experiencing symptoms and you are unable to check your blood glucose for any reason, treat the hypoglycemia.  If someone has a low blood sugar and is unconscious: Don’t hesitate to call 911. If someone is unconscious and glucagon is not available or someone does not  know how to use it, call 911 immediately.     To treat a low, I recommend you carry with you easy, pre-portioned treatment for low blood sugars that are 15G of carbs:   - Children sized squeeze pouch applesauce (high fiber + carbs help prevent too high of a spike)  - Small children's sized juicebox- 15g carb --> 4oz juice box  - Glucose tablets from [employer], you can find them near diabetes supplies --> Note, you will need to eat 3-4 tablets to get to 15g of carbs  - Children sized fruit snack pack- look for one with 15 grams of total carbohydrate  - Choice of how to treat your low is important. Complex carbs, or foods that contain fats along with carbs (like chocolate) can slow the absorption of glucose and should NOT be used to treat an emergency low

## 2025-07-17 NOTE — TELEPHONE ENCOUNTER
Endocrine refill protocol for medications for hypothyroidism and hyperthyroidism    Protocol Criteria:  PASSED Reason: N/A    If all below requirements are met, send a 90-day supply with 1 refill per provider protocol.    Verify appointment with Endocrinology completed in the last 12 months or scheduled in the next 6 months.    Normal TSH result in the past 12 months   Review recent telephone encounters and mychart communications with patient to ensure a dose change has not occurred since last office visit that was not updated in the medication history list     Last completed office visit:Visit date not found   Last completed telemed visit: 5/22/2025 Lily Goff APN  Next scheduled Follow up:   Future Appointments   Date Time Provider Department Center   8/19/2025  5:00 PM Marisol Martinez DO [facility] [place]      Last TSH result:   TSH   Date Value Ref Range Status   02/01/2025 3.597 0.550 - 4.780 uIU/mL Final   04/04/2023 1.72 0.40 - 4.50 mIU/L Final

## 2025-07-22 NOTE — ED INITIAL ASSESSMENT (HPI)
Sharp pain in left side of abdomen. Nausea since last night. Last ate at 1800 yesterday. Vomited this AM. Pain worse with movement. Kidney transplant in 2020.

## 2025-07-22 NOTE — ED PROVIDER NOTES
Patient Seen in: [facility] Emergency Department        History  Chief Complaint   Patient presents with    Abdomen/Flank Pain     Stated Complaint: abd pain    Subjective:   Patient 64-year-old female presents emergency room for generalized abdominal pain.  Patient is [place] only speaking family is at bedside.  I offered for  however patient and family would like the family to translate for her.  She has a history of renal transplant.  She takes her medications for her antirejection.  She restarted having problems with abdominal pain today.  Patient reports that she does have issues with constipation and often have to take MiraLAX but she has not taken MiraLAX in the last several days she has not had a bowel movement for few days.  Patient reports no fevers.  Patient has very increased bowel sounds on exam.  She reports positive vomiting no sick contacts.    The history is provided by the patient and a relative.                     Objective:     No pertinent past medical history.            No pertinent past surgical history.              No pertinent social history.                              Physical Exam    ED Triage Vitals [07/21/25 2115]   BP (!) 191/80   Pulse 83   Resp 16   Temp 99 °F (37.2 °C)   Temp src Oral   SpO2 97 %   O2 Device None (Room air)       Current Vitals:   Vital Signs  BP: (!) 191/80  Pulse: 76  Resp: 18  Temp: 99 °F (37.2 °C)  Temp src: Oral    Oxygen Therapy  SpO2: 100 %  O2 Device: None (Room air)            Physical Exam  Vitals and nursing note reviewed.   Constitutional:       General: She is not in acute distress.     Appearance: She is well-developed. She is obese. She is not ill-appearing, toxic-appearing or diaphoretic.   HENT:      Head: Normocephalic and atraumatic.   Eyes:      Extraocular Movements: Extraocular movements intact.      Pupils: Pupils are equal, round, and reactive to light.   Cardiovascular:      Rate and Rhythm: Normal rate and regular  rhythm.      Heart sounds: Normal heart sounds.   Pulmonary:      Effort: Pulmonary effort is normal.      Breath sounds: Normal breath sounds.   Abdominal:      General: Bowel sounds are increased.      Palpations: Abdomen is soft.      Tenderness: There is generalized abdominal tenderness. There is no guarding or rebound.   Skin:     General: Skin is warm.      Capillary Refill: Capillary refill takes less than 2 seconds.      Findings: No rash.   Neurological:      General: No focal deficit present.      Mental Status: She is alert and oriented to person, place, and time.   Psychiatric:         Mood and Affect: Mood normal.         Behavior: Behavior normal.                 ED Course  Labs Reviewed   URINALYSIS WITH CULTURE REFLEX - Abnormal; Notable for the following components:       Result Value    Nitrite Urine 2+ (*)     Leukocyte Esterase Urine 250 (*)     WBC Urine 21-50 (*)     Bacteria Urine 1+ (*)     Squamous Epi. Cells Few (*)     All other components within normal limits   CBC WITH DIFFERENTIAL WITH PLATELET - Abnormal; Notable for the following components:    Lymphocyte Absolute 0.77 (*)     All other components within normal limits   COMP METABOLIC PANEL (14) - Abnormal; Notable for the following components:    Glucose 172 (*)     Creatinine 1.31 (*)     eGFR-Cr 45 (*)     Albumin 4.9 (*)     All other components within normal limits   URINE CULTURE, ROUTINE          CT ABDOMEN+PELVIS KIDNEYSTONE 2D RNDR(NO IV,NO ORAL)(CPT=74176)  Result Date: 7/22/2025  PROCEDURE: CT ABDOMEN+PELVIS KIDNEYSTONE 2D RNDR(NO IV,NO ORAL)(CPT=74176) INDICATIONS: abd pain COMPARISON: 9/6/2018 CT abdomen pelvis TECHNIQUE: Multi-slice CT images of the abdomen and pelvis were performed without intravenous contrast material. Automated exposure control techniques for dose reduction were used, adjustment of the mA and/or kV were based on patient's size. Use of iterative reconstruction technique for dose reduction was used.  Dose information is transmitted to the ACR (American College of Radiology) NRDR (National Radiology Data Registry) which includes the Dose Index Registry. FINDINGS: KIDNEYS: There are atrophic native kidneys bilaterally within the renal fossa without stones or hydronephrosis. There is a right renal transplant within the iliac fossa. There are 2 calcifications within the renal pelvis 1 of which could represent a renal calculus given its proximity to the ureter but does not appear to be centered within the ureter and does not cause any hydronephrosis. The other calcification is near the hilum and could be related to a phlebolith from the gonadal vein. URINARY BLADDER: Normal. No visible focal wall thickening, lesion, or calculus. LIVER: No enlargement, atrophy, abnormal density, or significant focal lesion. BILIARY: No visible dilatation or calcification. PANCREAS: No lesion, fluid collection, ductal dilatation, or atrophy. SPLEEN: No enlargement or focal lesion. ADRENALS: No mass or enlargement. AORTA/VASCULAR: Normal. No aneurysm or dissection. RETROPERITONEUM: No mass or enlarged adenopathy. BOWEL/MESENTERY: Normal. No visible mass, obstruction, or bowel wall thickening. ABDOMINAL WALL: Normal. No mass or hernia. PELVIC NODES: Normal. No adenopathy. PELVIC ORGANS: Normal. No visible mass. Pelvic organs appropriate for patient age. BONES: Normal. No bony lesion or fracture. LUNG BASES: No visible pulmonary or pleural disease. OTHER: Negative.     CONCLUSION: No evidence of acute abdominal pelvic process. Electronically Verified and Signed by Attending Radiologist: Shaheen Mace MD 7/22/2025 12:26 AM Workstation: [id number]                      [facility]     Social -negative tobacco, negative etoh, negative drugs  Family History-noncontributory  Past Medical History-hypertension, depression, renal disorder, kidney transplant, hyperlipidemia, diabetes    Differential diagnosis before testing included bowel obstruction,  gastroenteritis, constipation, obstipation, viral syndrome, kidney stone,    Co-morbidities that add to the complexity of management include: Patient is a renal transplant patient    Testing ordered during this visit included CT scan of the abdomen without contrast due to occasions for renal transplant, lab work and urinalysis    Radiographic images  I personally reviewed the radiographs and my individual interpretation shows no acute process  I also reviewed the official reports that showed CT ABDOMEN+PELVIS KIDNEYSTONE 2D RNDR(NO IV,NO ORAL)(CPT=74176)  Result Date: 7/22/2025  PROCEDURE: CT ABDOMEN+PELVIS KIDNEYSTONE 2D RNDR(NO IV,NO ORAL)(CPT=74176) INDICATIONS: abd pain COMPARISON: 9/6/2018 CT abdomen pelvis TECHNIQUE: Multi-slice CT images of the abdomen and pelvis were performed without intravenous contrast material. Automated exposure control techniques for dose reduction were used, adjustment of the mA and/or kV were based on patient's size. Use of iterative reconstruction technique for dose reduction was used. Dose information is transmitted to the ACR (American College of Radiology) NRDR (National Radiology Data Registry) which includes the Dose Index Registry. FINDINGS: KIDNEYS: There are atrophic native kidneys bilaterally within the renal fossa without stones or hydronephrosis. There is a right renal transplant within the iliac fossa. There are 2 calcifications within the renal pelvis 1 of which could represent a renal calculus given its proximity to the ureter but does not appear to be centered within the ureter and does not cause any hydronephrosis. The other calcification is near the hilum and could be related to a phlebolith from the gonadal vein. URINARY BLADDER: Normal. No visible focal wall thickening, lesion, or calculus. LIVER: No enlargement, atrophy, abnormal density, or significant focal lesion. BILIARY: No visible dilatation or calcification. PANCREAS: No lesion, fluid collection, ductal  dilatation, or atrophy. SPLEEN: No enlargement or focal lesion. ADRENALS: No mass or enlargement. AORTA/VASCULAR: Normal. No aneurysm or dissection. RETROPERITONEUM: No mass or enlarged adenopathy. BOWEL/MESENTERY: Normal. No visible mass, obstruction, or bowel wall thickening. ABDOMINAL WALL: Normal. No mass or hernia. PELVIC NODES: Normal. No adenopathy. PELVIC ORGANS: Normal. No visible mass. Pelvic organs appropriate for patient age. BONES: Normal. No bony lesion or fracture. LUNG BASES: No visible pulmonary or pleural disease. OTHER: Negative.     CONCLUSION: No evidence of acute abdominal pelvic process. Electronically Verified and Signed by Attending Radiologist: Shaheen Mace MD 7/22/2025 12:26 AM Workstation: [id number]        External chart review showed review of Care Everywhere in [employer] system shows patient's had multiple abdominal surgeries in the past including hysterectomy appendectomy and renal transplant    History obtained by an independent source included from patient, family    Discussion of management with patient, family    Social determinants of health that affect care include not applicable      Medications Provided: Morphine Zofran IV fluids Keflex    Course of Events during Emergency Room Visit include 64-year-old female with renal transplant who presents emergency room for of generalized abdominal pain with nausea and vomiting.  Patient has increased bowel sounds.  She also has a history of chronic constipation.  Will get CT scan to rule out obstruction patient is urinalysis shows a UTI will start on oral antibiotics.  Patient will be given pain medication here and antinausea medicine.      Patient CT scan shows no acute process her urinalysis shows UTI will start on Keflex.  Patient follow-up with primary care physician    Disposition:          Discharge  I have discussed with the patient the results of test, differential diagnosis, treatment plan, warning signs and symptoms which should  prompt immediate return.  They expressed understanding of these instructions and agrees to the following plan provided.  They were given written discharge instructions and agrees to return for any concerns and voiced understanding and all questions were answered.           Medical Decision Making      Disposition and Plan     Clinical Impression:  1. Acute UTI    2. Renal transplant recipient (HCC)    3. Chronic kidney disease, unspecified CKD stage         Disposition:  Discharge  7/22/2025 12:51 am    Follow-up:  Marisol Martinez DO  [address] [facility] [address]  [phone]    Schedule an appointment as soon as possible for a visit            Medications Prescribed:  Current Discharge Medication List        START taking these medications    Details   cephALEXin 500 MG Oral Cap Take 1 capsule (500 mg total) by mouth 2 (two) times daily for 10 days.  Qty: 20 capsule, Refills: 0      !! ondansetron 4 MG Oral Tablet Dispersible Take 1 tablet (4 mg total) by mouth every 8 (eight) hours as needed for Nausea (vomiting).  Qty: 10 tablet, Refills: 0       !! - Potential duplicate medications found. Please discuss with provider.                Supplementary Documentation:

## 2025-07-24 NOTE — PROGRESS NOTES
The following individual(s) verbally consented to be recorded using ambient AI listening technology and understand that they can each withdraw their consent to this listening technology at any point by asking the clinician to turn off or pause the recording:    Patient name: Lupe Patton Arce  Additional names:  [email or web address]

## 2025-07-25 NOTE — PROGRESS NOTES
CHIEF COMPLAINT:   Chief Complaint   Patient presents with    Spot     On back and stomach x  Sunday getting worse            HPI:     Lupe Arce is a 64 year old female presents for abdominal pain and rash.    HPI  History of Present Illness  Lupe Arce is a 64 year old female with a history of kidney transplant who presents with a painful rash and nausea. She is a pt of Dr. Martinez.    Abdominal pain: She experiences persistent nausea and vomiting, with difficulty keeping food down, and often vomits water. She has stomach cramps and constipation, for which she takes medication. There is no fever.    The rash consists of large, painful blisters on the left side of her back, increasing in number since appearing on Sunday. She recalls a similar rash in the past that covered her entire body.    She is on anti-rejection medication for her kidney transplant and has received a shingles vaccine. She takes Norco for pain and has been given medication for infection and nausea previously from recent ED visit for UTI.            HISTORY:  Past Medical History[1]   Past Surgical History[2]   Family History[3]   Social History: Short Social Hx on File[4]     Medications (Active prior to today's visit):  Current Medications[5]    Allergies:  Allergies[6]    PSFH elements reviewed from today and agreed except as otherwise stated in HPI.  ROS:     Review of Systems      Pertinent positives and negatives noted in the the HPI.    PHYSICAL EXAM:   /70 (BP Location: Right arm, Patient Position: Sitting, Cuff Size: adult)   Pulse 79   Temp 97.3 °F (36.3 °C) (Temporal)   Resp 18   Ht 4' 11\" (1.499 m)   Wt 159 lb 9.6 oz (72.4 kg)   SpO2 97%   BMI 32.24 kg/m²   Vital signs reviewed.Appears stated age, well groomed.  Physical Exam  Vitals reviewed.   Constitutional:       Appearance: Normal appearance.   HENT:      Head: Normocephalic.   Cardiovascular:      Rate and Rhythm: Normal rate and regular  rhythm.      Pulses: Normal pulses.      Heart sounds: Normal heart sounds.   Pulmonary:      Effort: Pulmonary effort is normal.      Breath sounds: Normal breath sounds.   Skin:     General: Skin is warm and dry.      Findings: Erythema and rash present.      Comments: Left side of trunk with linear grouped papules and blisters, erytheamtous and tender   Neurological:      Mental Status: She is alert and oriented to person, place, and time.   Psychiatric:         Behavior: Behavior normal.          LABS     Admission on 07/21/2025, Discharged on 07/22/2025   Component Date Value    Urine Color 07/21/2025 Light-Yellow     Clarity Urine 07/21/2025 Clear     Spec Gravity 07/21/2025 1.022     Glucose Urine 07/21/2025 Normal     Bilirubin Urine 07/21/2025 Negative     Ketones Urine 07/21/2025 Negative     Blood Urine 07/21/2025 Negative     pH Urine 07/21/2025 5.0     Protein Urine 07/21/2025 Negative     Urobilinogen Urine 07/21/2025 Normal     Nitrite Urine 07/21/2025 2+ (A)     Leukocyte Esterase Urine 07/21/2025 250 (A)     WBC Urine 07/21/2025 21-50 (A)     RBC Urine 07/21/2025 0-2     Bacteria Urine 07/21/2025 1+ (A)     Squamous Epi. Cells 07/21/2025 Few (A)     Renal Tubular Epithelial* 07/21/2025 None Seen     Transitional Cells 07/21/2025 None Seen     Yeast Urine 07/21/2025 None Seen     WBC 07/21/2025 7.9     RBC 07/21/2025 4.80     HGB 07/21/2025 13.5     HCT 07/21/2025 40.3     PLT 07/21/2025 178.0     MCV 07/21/2025 84.0     MCH 07/21/2025 28.1     MCHC 07/21/2025 33.5     RDW 07/21/2025 12.3     Neutrophil Absolute Prel* 07/21/2025 6.72     Neutrophil Absolute 07/21/2025 6.72     Lymphocyte Absolute 07/21/2025 0.77 (L)     Monocyte Absolute 07/21/2025 0.31     Eosinophil Absolute 07/21/2025 0.00     Basophil Absolute 07/21/2025 0.02     Immature Granulocyte Abs* 07/21/2025 0.03     Neutrophil % 07/21/2025 85.6     Lymphocyte % 07/21/2025 9.8     Monocyte % 07/21/2025 3.9     Eosinophil % 07/21/2025 0.0      Basophil % 07/21/2025 0.3     Immature Granulocyte % 07/21/2025 0.4     Glucose 07/21/2025 172 (H)     Sodium 07/21/2025 138     Potassium 07/21/2025 4.5     Chloride 07/21/2025 102     CO2 07/21/2025 28.0     Anion Gap 07/21/2025 8     BUN 07/21/2025 20     Creatinine 07/21/2025 1.31 (H)     Calcium, Total 07/21/2025 9.6     Calculated Osmolality 07/21/2025 293     eGFR-Cr 07/21/2025 45 (L)     AST 07/21/2025 28     ALT 07/21/2025 23     Alkaline Phosphatase 07/21/2025 103     Bilirubin, Total 07/21/2025 0.7     Total Protein 07/21/2025 8.0     Albumin 07/21/2025 4.9 (H)     Globulin  07/21/2025 3.1     A/G Ratio 07/21/2025 1.6     Urine Culture 07/21/2025 >100,000 CFU/ML Escherichia coli (A)    [facility] Lab Encounter on 07/19/2025   Component Date Value    WBC Urine 07/19/2025 21-50 (A)     RBC Urine 07/19/2025 0-2     Bacteria Urine 07/19/2025 Rare (A)     Squamous Epi. Cells 07/19/2025 Few (A)     Renal Tubular Epithelial* 07/19/2025 None Seen     Transitional Cells 07/19/2025 None Seen     Yeast Urine 07/19/2025 None Seen     Glucose 07/19/2025 169 (H)     Sodium 07/19/2025 141     Potassium 07/19/2025 4.8     Chloride 07/19/2025 104     CO2 07/19/2025 29.0     Anion Gap 07/19/2025 8     BUN 07/19/2025 29 (H)     Creatinine 07/19/2025 1.57 (H)     Calcium, Total 07/19/2025 9.1     Calculated Osmolality 07/19/2025 302 (H)     eGFR-Cr 07/19/2025 37 (L)     Albumin 07/19/2025 4.3     Phosphorus 07/19/2025 3.7     Tacrolimus Lvl 07/19/2025 5.1     Microalbumin, Urine 07/19/2025 0.40     Creatinine Ur Random 07/19/2025 58.40     Malb/Cre Calc 07/19/2025 6.8    [facility] Lab Encounter on 06/21/2025   Component Date Value    Glucose 06/21/2025 172 (H)     Sodium 06/21/2025 142     Potassium 06/21/2025 4.9     Chloride 06/21/2025 104     CO2 06/21/2025 30.0     Anion Gap 06/21/2025 8     BUN 06/21/2025 29 (H)     Creatinine 06/21/2025 1.65 (H)     Calcium, Total 06/21/2025 9.5     Calculated Osmolality 06/21/2025 304 (H)      eGFR-Cr 06/21/2025 34 (L)     Albumin 06/21/2025 4.6     Phosphorus 06/21/2025 3.2     Tacrolimus Lvl 06/21/2025 7.5    [facility] Lab Encounter on 06/02/2025   Component Date Value    Glucose 06/02/2025 125 (H)     Sodium 06/02/2025 140     Potassium 06/02/2025 5.0     Chloride 06/02/2025 104     CO2 06/02/2025 29.0     Anion Gap 06/02/2025 7     BUN 06/02/2025 29 (H)     Creatinine 06/02/2025 1.48 (H)     Calcium, Total 06/02/2025 9.1     Calculated Osmolality 06/02/2025 297 (H)     eGFR-Cr 06/02/2025 39 (L)     AST 06/02/2025 26     ALT 06/02/2025 19     Alkaline Phosphatase 06/02/2025 122     Bilirubin, Total 06/02/2025 0.5     Total Protein 06/02/2025 7.0     Albumin 06/02/2025 4.4     Globulin  06/02/2025 2.6     A/G Ratio 06/02/2025 1.7     Patient Fasting for CMP? 06/02/2025 Yes     Tacrolimus Lvl 06/02/2025 8.5     Urine Color 06/02/2025 Colorless (A)     Clarity Urine 06/02/2025 Clear     Spec Gravity 06/02/2025 1.009     Glucose Urine 06/02/2025 Normal     Bilirubin Urine 06/02/2025 Negative     Ketones Urine 06/02/2025 Negative     Blood Urine 06/02/2025 Negative     pH Urine 06/02/2025 6.5     Protein Urine 06/02/2025 Negative     Urobilinogen Urine 06/02/2025 Normal     Nitrite Urine 06/02/2025 Negative     Leukocyte Esterase Urine 06/02/2025 75 (A)     WBC Urine 06/02/2025 6-10 (A)     RBC Urine 06/02/2025 0-2     Bacteria Urine 06/02/2025 2+ (A)     Squamous Epi. Cells 06/02/2025 Few (A)     Renal Tubular Epithelial* 06/02/2025 None Seen     Transitional Cells 06/02/2025 None Seen     Yeast Urine 06/02/2025 None Seen     BK Virus Detect, Plasma,* 06/02/2025 NOT DETECTED     BK Virus Detect, Plasma,* 06/02/2025 NOT DETECTED     Microalbumin, Urine 06/02/2025 0.60     Creatinine Ur Random 06/02/2025 30.40     Malb/Cre Calc 06/02/2025 19.7     Magnesium 06/02/2025 1.6     WBC 06/02/2025 6.5     RBC 06/02/2025 4.26     HGB 06/02/2025 12.1     HCT 06/02/2025 37.4     PLT 06/02/2025 143.0 (L)     MCV 06/02/2025  87.8     MCH 06/02/2025 28.4     MCHC 06/02/2025 32.4     RDW 06/02/2025 12.6     Neutrophil Absolute Prel* 06/02/2025 4.85     Neutrophil Absolute 06/02/2025 4.85     Lymphocyte Absolute 06/02/2025 1.05     Monocyte Absolute 06/02/2025 0.53     Eosinophil Absolute 06/02/2025 0.06     Basophil Absolute 06/02/2025 0.02     Immature Granulocyte Abs* 06/02/2025 0.01     Neutrophil % 06/02/2025 74.4     Lymphocyte % 06/02/2025 16.1     Monocyte % 06/02/2025 8.1     Eosinophil % 06/02/2025 0.9     Basophil % 06/02/2025 0.3     Immature Granulocyte % 06/02/2025 0.2       REVIEWED THIS VISIT  ASSESSMENT/PLAN:   64 year old female with    1. Herpes zoster without complication  - START Valacyclovir x 7 days, R/B/SE of new med d/w pt  - may take OTC Tylenol prn pain, but take Norco per ED Rx for severe pain  - if sx worsen or persist after tx, advised to f-u    - valACYclovir 1 G Oral Tab; Take 1 tablet (1,000 mg total) by mouth every 8 (eight) hours for 7 days.  Dispense: 21 tablet; Refill: 0     The patient and provider have a longitudinal relationship to address/treat the serious or   complex condition(s) as stated in this encounter.       Meds This Visit:  Requested Prescriptions     Signed Prescriptions Disp Refills    valACYclovir 1 G Oral Tab 21 tablet 0     Sig: Take 1 tablet (1,000 mg total) by mouth every 8 (eight) hours for 7 days.       Health Maintenance:  Health Maintenance   Topic Date Due    COVID-19 Vaccine (7 - 2024-25 season) 09/01/2024    Diabetes Care A1C  08/01/2025    Diabetes Care Foot Exam  08/15/2025    Annual Physical  08/15/2025    Influenza Vaccine (1) 10/01/2025    Mammogram  03/13/2026    Diabetes Care Dilated Eye Exam  06/04/2026    Pap Smear  08/03/2026    Colorectal Cancer Screening  11/01/2027    DTaP,Tdap,and Td Vaccines (4 - Td or Tdap) 11/07/2027    Pneumococcal Vaccine: 50+ Years (4 of 4 - PCV20 or PCV21) 06/24/2029    Annual Depression Screening  Completed    Zoster Vaccines  Completed     Meningococcal B Vaccine  Aged Out         Patient/Caregiver Education: There are no barriers to learning. Medical education done.   Outcome: Patient verbalizes understanding and agrees with plan. Advised to call or RTC if symptoms persist or worsen.    Problem List:   Problem List[7]    Imaging & Referrals:  None     7/24/2025  Praveena Westfall MD      Patient understands plan and follow-up.  Return if symptoms worsen or fail to improve.              [1]   Past Medical History:   Abdominal pain    Allergic rhinitis    Anemia    Bad breath    Black stools    Bloating    Always feels bloated    Blurred vision    Cataracts, bilateral    Per Dr. Jernigan    Cholecystitis    Chronic kidney disease (CKD)    Constipation    If she is not stuffed up she has diarrhea    COVID    01/2022    Depression    Diabetes ([facility])    no meds    Diabetes mellitus ([facility])    Diabetic retinopathy ([facility])    Per Dr. Jernigan    Diarrhea, unspecified    Diastolic dysfunction    with LVH. EF >55% on ECHO     Encephalopathy acute    Esophageal reflux    Essential hypertension    Eye disease    Flatulence/gas pain/belching    Always has gas    Food intolerance    Lactose intolerance    Frequent UTI    Always tests positive    Glaucoma    Heartburn    High cholesterol    History of depression    Hyperlipidemia    Hypertension    Hypothyroidism    Irregular bowel habits    Not regular    Kidney failure    Kidney Transplant 12/12/19    Loss of appetite    Only eats 1 maybe 2 a day    Macular puckering of retina    Nausea    PONV (postoperative nausea and vomiting)    Prerenal azotemia    Proliferative diabetic retinopathy(362.02)    left eye    Renal disorder    ESRD    S/P kidney transplant ([facility])    12/12/2019-right kidney transplant donated by his son.  Former dialysis patient due to diabetic kidney disease performed at [facility].      Severe obstructive sleep apnea    has cpap    Sleep apnea    Traction detachment of retina    Uncomfortable  fullness after meals    Always she eats super slow.    Unspecified disorder of thyroid    Visual impairment    glasses    Vitreous hemorrhage (HCC)    Vomiting    Wears glasses    Weight gain    Weight loss   [2]   Past Surgical History:  Procedure Laterality Date    Appendectomy      Av fistula revision, open Left 2019    Cataract Bilateral     Colonoscopy      Colonoscopy N/A 2019    Procedure: COLONOSCOPY;  Surgeon: Armand Zhao MD;  Location:  ENDOSCOPY    Eye surgery      RV surgery    Hysterectomy      fibroids. unsure but thinks she still has her cervix    Roopa biopsy stereo nodule 1 site right (cpt=19081)  ??    BENIGN      83, 86, 84, 90    Organ transplant      Other surgical history      cyst removal on thigh area of leg    Total abdom hysterectomy      Transplantation of kidney  2019    kidney swap with son  at [facility]    Upper gi endo no barretts  2015    antral erosions   [3]   Family History  Problem Relation Age of Onset    Heart Attack Self     Hypertension Self     Diabetes Self     High Blood Pressure Mother     Ulcerative Colitis Mother     Diabetes Mother     Other (Positive Coivd) Mother 80    Diabetes Father     Heart Attack Father     Depression Daughter     Other (High Blood Pressure, and Pre-Diabetes) Daughter     Depression Daughter     High Blood Pressure Son     Hypertension Son     High Blood Pressure Son     Breast Cancer Paternal Aunt 35    Uterine Cancer Paternal Aunt    [4]   Social History  Socioeconomic History    Marital status:     Number of children: 3   Occupational History    Occupation: House Keeper   Tobacco Use    Smoking status: Never    Smokeless tobacco: Never   Vaping Use    Vaping status: Never Used   Substance and Sexual Activity    Alcohol use: No    Drug use: No    Sexual activity: Never   Other Topics Concern    Caffeine Concern No    Exercise Yes    Seat Belt No     Social Drivers of  Health     Food Insecurity: No Food Insecurity (2/18/2025)    NCSS - Food Insecurity     Worried About Running Out of Food in the Last Year: No     Ran Out of Food in the Last Year: No   Transportation Needs: No Transportation Needs (2/18/2025)    NCSS - Transportation     Lack of Transportation: No   Housing Stability: Not At Risk (2/18/2025)    NCSS - Housing/Utilities     Has Housing: Yes     Worried About Losing Housing: No     Unable to Get Utilities: No   [5]   Current Outpatient Medications   Medication Sig Dispense Refill    valACYclovir 1 G Oral Tab Take 1 tablet (1,000 mg total) by mouth every 8 (eight) hours for 7 days. 21 tablet 0    cephALEXin 500 MG Oral Cap Take 1 capsule (500 mg total) by mouth 2 (two) times daily for 10 days. 20 capsule 0    ondansetron 4 MG Oral Tablet Dispersible Take 1 tablet (4 mg total) by mouth every 8 (eight) hours as needed for Nausea (vomiting). 10 tablet 0    HYDROcodone-acetaminophen 5-325 MG Oral Tab Take 1 tablet by mouth every 6 (six) hours as needed for Pain. Do not drive or operate machinery within 8 hours of taking this medication 20 tablet 0    LEVOTHYROXINE 88 MCG Oral Tab TAKE 1 TABLET(88 MCG) BY MOUTH BEFORE BREAKFAST 90 tablet 1    OMEPRAZOLE 20 MG Oral Capsule Delayed Release TAKE 1 CAPSULE(20 MG) BY MOUTH TWICE DAILY BEFORE MEALS 180 capsule 3    insulin aspart (NOVOLOG FLEXPEN) 100 Units/mL Subcutaneous Solution Pen-injector Take 4u with dinner + ISF 1:50>140 once daily. Slowly titrating      FOLIC ACID 1 MG Oral Tab TAKE 1 TABLET(1 MG) BY MOUTH DAILY 90 tablet 3    insulin aspart (NOVOLOG) 100 Units/mL Injection Solution Use to fill insulin pump with up to 70u daily. 70 mL 2    ergocalciferol 1.25 MG (43377 UT) Oral Cap Take 1 capsule (50,000 Units total) by mouth once a week.      buPROPion  MG Oral Tablet 12 Hr Take 1 tablet (150 mg total) by mouth daily. 90 tablet 1    carvedilol 6.25 MG Oral Tab Take 1 tablet (6.25 mg total) by mouth 2 (two) times  daily with meals. 180 tablet 1    atorvastatin 10 MG Oral Tab Take 1 tablet (10 mg total) by mouth nightly. 90 tablet 1    albuterol 108 (90 Base) MCG/ACT Inhalation Aero Soln Inhale 2 puffs into the lungs every 4 (four) hours as needed for Wheezing. 18 g 1    cetirizine 10 MG Oral Tab Take 1 tablet (10 mg total) by mouth daily. 90 tablet 1    montelukast 10 MG Oral Tab Take 1 tablet (10 mg total) by mouth nightly. 90 tablet 1    sulfamethoxazole-trimethoprim -160 MG Oral Tab per tablet Take 1 tablet by mouth 2 (two) times daily.      traMADol 50 MG Oral Tab       Insulin Disposable Pump (OMNIPOD 5 [id number] INTRO GEN 5) Does not apply Kit 1 each continuous prn. 1 kit 0    Insulin Disposable Pump (OMNIPOD 5 HPRE4P9 PODS GEN 5) Does not apply Misc 1 each every third day. 30 each 3    Insulin Glargine-yfgn 100 UNIT/ML Subcutaneous Solution Pen-injector Take 32u every morning 30 mL 2    SITagliptin Phosphate (JANUVIA) 50 MG Oral Tab Take 1 tablet (50 mg total) by mouth daily. 90 tablet 1    Insulin Pen Needle (BD PEN NEEDLE ANTHONY U/F) 32G X 4 MM Does not apply Misc USE TO INJECT 5 TIMES PER  each 0    Microlet Lancets Does not apply Misc Check blood sugar once daily 200 each 1    Glucose Blood (CONTOUR NEXT TEST) In Vitro Strip Check BG once daily 200 each 2    FLUTICASONE PROPIONATE 50 MCG/ACT Nasal Suspension SHAKE LIQUID AND USE 2 SPRAYS IN EACH NOSTRIL DAILY 48 g 0    ENVARSUS XR 0.75 MG Oral Tablet 24 Hr Take 3 tablets by mouth daily.      TRUE METRIX BLOOD GLUCOSE TEST In Vitro Strip TEST 3 TIMES A  strip 3    Meclizine HCl 12.5 MG Oral Tab Take 1 tablet (12.5 mg total) by mouth 3 (three) times daily as needed. 30 tablet 0    mycophenolate sodium 360 MG Oral Tab EC Take 1 tablet (360 mg total) by mouth 4 (four) times daily. For kidney transplant 120 tablet 0    Biotin 5000 MCG Oral Cap       ondansetron 4 MG Oral Tablet Dispersible       FEROSUL 325 (65 Fe) MG Oral Tab Take by mouth daily.       Blood Glucose Monitoring Suppl (TRUE METRIX AIR GLUCOSE METER) w/Device Does not apply Kit U UTD WITH STRIPS TO TEST BLOOD GLUCOSE      Alcohol Swabs (BD SWAB SINGLE USE REGULAR) Does not apply Pads U UTD QID      acetaminophen 500 MG Oral Tab Take 2 tablets (1,000 mg total) by mouth one time.     [6]   Allergies  Allergen Reactions    Ace Inhibitors RENAL INSUFFICIENCY    Folic Acid-Vit B6-Vit B12 HIVES and ITCHING    Vitamin B12 OTHER (SEE COMMENTS)    B12-Ca RASH     Vit b12 and calcium acetate combo   [7]   Patient Active Problem List  Diagnosis    Hyperlipidemia associated with type 2 diabetes mellitus (HCC)    GERD (gastroesophageal reflux disease)    Vitamin D deficiency    Hypomagnesemia    Seasonal allergies    Severe obstructive sleep apnea    Jordanian speaking patient    Depressive disorder    Acquired hypothyroidism    Hypertension associated with type 2 diabetes mellitus (HCC)    Type 2 diabetes mellitus with ophthalmic complication, with long-term current use of insulin (HCC)    Thrombocytopenia    Asthma with COPD (chronic obstructive pulmonary disease) (HCC)    Tubular adenoma of colon    Renal transplant recipient (HCC)    Normocytic anemia    Immunosuppression due to drug therapy (HCC)    History of gastritis    History of adenomatous polyp of colon    Encounter for screening for malignant neoplasm of colon    Medication side effect, initial encounter    Renovascular hypertension    Elevated liver function tests    Recurrent major depressive disorder, in full remission    Anemia due to chronic kidney disease

## 2025-07-28 NOTE — TELEPHONE ENCOUNTER
Endocrine Refill protocol for oral and injectable diabetic medications    Protocol Criteria:  PASSED  Reason: N/A    If all below requirements are met, send a 90-day supply with 1 refill per provider protocol.    Verify appointment with Endocrinology completed in the last 6 months or scheduled in the next 3 months.  Verify A1C has been completed within the last 6 months and is below 8.5%     Last completed office visit: 5/22/2025 Lily Goff APN   Next scheduled Follow up:   Future Appointments   Date Time Provider Department Center   8/19/2025  5:00 PM Marisol Martinez DO [facility]      Last A1c result: Last A1c value was 7.1% done 2/1/2025.    Sent to pharmacy per protocol.

## (undated) DIAGNOSIS — K59.00 CONSTIPATION, UNSPECIFIED CONSTIPATION TYPE: ICD-10-CM

## (undated) DIAGNOSIS — K21.9 GASTROESOPHAGEAL REFLUX DISEASE, UNSPECIFIED WHETHER ESOPHAGITIS PRESENT: Primary | ICD-10-CM

## (undated) DIAGNOSIS — J44.9 ASTHMA WITH COPD (CHRONIC OBSTRUCTIVE PULMONARY DISEASE) (HCC): Chronic | ICD-10-CM

## (undated) DIAGNOSIS — D12.6 TUBULAR ADENOMA OF COLON: ICD-10-CM

## (undated) DIAGNOSIS — E78.5 HYPERLIPIDEMIA ASSOCIATED WITH TYPE 2 DIABETES MELLITUS (HCC): ICD-10-CM

## (undated) DIAGNOSIS — E11.69 HYPERLIPIDEMIA ASSOCIATED WITH TYPE 2 DIABETES MELLITUS (HCC): ICD-10-CM

## (undated) DEVICE — KENDALL SCD EXPRESS SLEEVES, KNEE LENGTH, MEDIUM: Brand: KENDALL SCD

## (undated) DEVICE — SUTURE VICRYL 3-0 SH

## (undated) DEVICE — 1200CC GUARDIAN II: Brand: GUARDIAN

## (undated) DEVICE — INDICATED FOR SURGICAL CLAMPING DURING CARDIOVASCULAR PERIPHERAL VASCULAR, AND GENERAL SURGERY.: Brand: SOFT/FIBRA® SPRING CLIP

## (undated) DEVICE — DRAPE,EXTREMITY,89X128,STERILE: Brand: MEDLINE

## (undated) DEVICE — SUTURE PROLENE 6-0 C-1

## (undated) DEVICE — GOWN SURG AERO CHROME XXL

## (undated) DEVICE — SOL  .9 1000ML BTL

## (undated) DEVICE — SOL  .9 500ML

## (undated) DEVICE — SYRINGE 20CC LL TIP

## (undated) DEVICE — GAUZE SPONGES,USP TYPE VII GAUZE, 12 PLY: Brand: CURITY

## (undated) DEVICE — SUTURE SILK 3-0

## (undated) DEVICE — INTENDED TO BE USED TO OCCLUDE, RETRACT AND IDENTIFY ARTERIES, VEINS, TENDONS AND NERVES IN SURGICAL PROCEDURES: Brand: STERION®  VESSEL LOOP

## (undated) DEVICE — STANDARD HYPODERMIC NEEDLE,POLYPROPYLENE HUB: Brand: MONOJECT

## (undated) DEVICE — HEMOCLIP HORIZON SM MULTI

## (undated) DEVICE — GLOVE BIOGEL M SURG SZ 71/2

## (undated) DEVICE — CV PACK-LF: Brand: MEDLINE INDUSTRIES, INC.

## (undated) DEVICE — DECANTER BAG 9": Brand: MEDLINE INDUSTRIES, INC.

## (undated) DEVICE — GEL AQUASONIC 100 20GR

## (undated) DEVICE — SUTURE PROLENE 6-0 BV-1

## (undated) DEVICE — BASIC DOUBLE BASIN 1-LF: Brand: MEDLINE INDUSTRIES, INC.

## (undated) DEVICE — SKIN AFFIX .4ML

## (undated) DEVICE — HEMOCLIP MED 24 CLIP/CARTRIDGE

## (undated) DEVICE — FORCEP BIOPSY RJ4 LG CAP W/ND

## (undated) DEVICE — 3M™ RED DOT™ MONITORING ELECTRODE WITH FOAM TAPE AND STICKY GEL, 50/BAG, 20/CASE, 72/PLT 2570: Brand: RED DOT™

## (undated) DEVICE — #15 STERILE STAINLESS BLADE: Brand: STERILE STAINLESS BLADES

## (undated) DEVICE — SUTURE MONOCRYL 4-0 PS-2

## (undated) DEVICE — MINI LAP PACK-LF: Brand: MEDLINE INDUSTRIES, INC.

## (undated) DEVICE — SYRINGE 10ML LL TIP

## (undated) DEVICE — SNARE 9MM 230CM 2.4MM EXACTO

## (undated) DEVICE — CLIP LGT 11MM OPEN 2.8MM 235CM

## (undated) DEVICE — TRANSPOSAL ULTRAFLEX DUO/QUAD ULTRA CART MANIFOLD

## (undated) DEVICE — ENDOSCOPY PACK UPPER: Brand: MEDLINE INDUSTRIES, INC.

## (undated) DEVICE — Device: Brand: DEFENDO AIR/WATER/SUCTION AND BIOPSY VALVE

## (undated) DEVICE — FILTERLINE NASAL ADULT O2/CO2

## (undated) DEVICE — STERILE POLYISOPRENE POWDER-FREE SURGICAL GLOVES: Brand: PROTEXIS

## (undated) DEVICE — ENDOSCOPY PACK - LOWER: Brand: MEDLINE INDUSTRIES, INC.

## (undated) DEVICE — CHLORAPREP 26ML APPLICATOR

## (undated) DEVICE — GAMMEX® NON-LATEX PI TEXTURED SIZE 6.5, STERILE POLYISOPRENE POWDER-FREE SURGICAL GLOVE: Brand: GAMMEX

## (undated) DEVICE — UNDYED BRAIDED (POLYGLACTIN 910), SYNTHETIC ABSORBABLE SUTURE: Brand: COATED VICRYL

## (undated) DEVICE — VIOLET BRAIDED (POLYGLACTIN 910), SYNTHETIC ABSORBABLE SUTURE: Brand: COATED VICRYL

## (undated) DEVICE — SUTURE PROLENE 5-0 C-1

## (undated) NOTE — ED AVS SNAPSHOT
Ashley Gunn   MRN: [id number]    Department:  THE [facility] Emergency Department in [place]   Date of Visit:  11/7/2017           Disclosure     Insurance plans vary and the physician(s) referred by the ER may not be covered by your plan.  Nakia If you have been prescribed any medication(s), please fill your prescription right away and begin taking the medication(s) as directed    If the emergency physician has read X-rays, these will be re-interpreted by a radiologist.  If there is a significant

## (undated) NOTE — LETTER
ASTHMA ACTION PLAN for Nena Daily     : 1960     Date: 3/15/2019  Provider:  Antoinette Douglass DO  Phone for doctor or clinic: [facility], [address], [address]  [facility] [address] [phone]      ACT score 15    You Signatures:  Provider  Jose Angel Carr, DO   Patient Caretaker

## (undated) NOTE — LETTER
19    Patient: Lizbeth Osgood  : 1960 Visit date: 2019    Dear  Dr. Stuart Mathew, DO,    Thank you for referring Lizbeth Osgood to my practice. Please find my assessment and plan below.                   Sincerely,       Be

## (undated) NOTE — LETTER
ASTHMA ACTION PLAN for Nasim Randolph     : 1960     Date: 2020  Provider:  Michela Sever, DO  Phone for doctor or clinic: [address] [facility], [facility] Arabella Nelson  [phone]       ACT score 17    You on the phone and mailed copy to patient or submitted via [address]      Signatures:  Provider  Ruby Thomason DO   Patient Caretaker

## (undated) NOTE — LETTER
[facility], [address], [address] [facility]  [phone]            April 1, 2019      Rohit Sousa  [address] [place] [address]      Dear Kathy Pass:    Our office has been trying to contact you to disc

## (undated) NOTE — LETTER
[facility] Testing Department  Phone: [phone]  OUTSIDE TESTING RESULT REQUEST      TO:   Dr. Hector Arrieta / Staff    Today's Date: 5/14/19    FAX #: [phone]    Patient will be calling to set up an EKG   as listed below         Patient Nam

## (undated) NOTE — Clinical Note
Kaitlin Delgado! Pathuongian ECG NL- cleared for upcoming procedure. Thanks for taking care of her. Alesha Ayala

## (undated) NOTE — LETTER
DECLINATION FORM    [address] [facility] provides interpreters for patients who are deaf, hearing impaired, or have Limited [place] Proficiency in order to ensure thee patients an equal opportunity to benefit from services.  There Patient Name: Louise Heading     : 1960    Page 1 of 1     Printed: @DATE      Medical Record #: [id number]   Revised (03)

## (undated) NOTE — Clinical Note
Patient has not started short acting insulin but Bgs are improved throughout the day and night, with some lows in the AM (true lows around 60-70 mg/dL) patient currently taking the 35 u lantus and 100 mg Januvia daily.  Please advise on next steps

## (undated) NOTE — LETTER
[facility] [address], [address]    Consentimiento para Deloras Dredge:  Hora:     1.  Autorizo la realización en [address] siguiente cirugía:    Procedure(s):  REMOVAL OF RIGHT INTERNAL JUGULAR Mara Estimable identidad no sea revelada en las imágenes o textos descriptivos que los acompañan. Si el procedimiento se ha [employer], el cirujano obtendrá el video original. El hospital no será responsable del almacenamiento o mantenimiento de esta grabación. para fines de restablecer la orden de No resucitar.     CERTIFICO QUE HE LEÍDO Y ENTENDIDO EL CONSENTIMIENTO PARA CIRUGÍA ANTERIOR, QUE MI MÉDICO ME PROPORCIONÓ LAS EXPLICACIONES ANTERIORES, QUE TODOS LOS ESPACIOS EN ROBERT, O LAS AFIRMACIONES QUE REQUIEREN [facility] lo indiquen de Saroj. b. Informar a mi anestesista antes del procedimiento:  · Si estoy embarazada. · La última vez que comí o bebí algo.   · [employer] medicamentos que lina (incluyendo medicamentos recetados, suplementos herbales y pastill Puedo cambiar de opinión acerca de recibir servicios de anestesia en cualquier momento antes de que se me administre el medicamento.      _______________________________________________________       ____________________       _________    Jyotsna Henao (o repr

## (undated) NOTE — LETTER
03/08/19        Medardo Robertson  [address] [place],    Nuestros registros indican que tiene análisis clínicos pendientes y/o pruebas que se ordenaron en mccrary nombre que no se steward completado.     Pa

## (undated) NOTE — Clinical Note
Hi Dr. Abilio Tellez,   Pt does not have HFU appt scheduled yet--I am not able to schedule virtual visits. I sent a TE to triage to f/u. Pt will be following up with ophtho on 10/7/2020 for further evaluation of L eye floaters. Thank you!

## (undated) NOTE — LETTER
09/04/20        [address] [place] [id number]      Dear Kadi Christian,    Our records indicate that you have outstanding lab work and or testing that was ordered for you and has not yet been completed:      Comp Metab

## (undated) NOTE — Clinical Note
Also going to try for omnipod- first going to have her do fasting labs foro baseline to submit, so plan to route those to you once I get them back? Let me know best workflow

## (undated) NOTE — LETTER
08/31/17        Natali Tello  [address] [facility]      Dear Adrianna Pierson,    [facility] records indicate that you have outstanding lab work and or testing that was ordered for you and has not yet been completed:          Antonio/Arabella Rati

## (undated) NOTE — LETTER
10/19/17        Ryan Fire  [address] [facility]      Dear Sg Joseph,    [facility] records indicate that you have outstanding lab work and or testing that was ordered for you and has not yet been completed:    TSH and Free T4   To prov

## (undated) NOTE — Clinical Note
06/01/2017        Kristy Daugherty  [address] [facility]      Dear Romeo Sotomayor,    [address] records indicate that you have outstanding lab work and or testing that was ordered for you and has not yet been completed:      Microalb/Creat Ratio,

## (undated) NOTE — LETTER
19    Patient: Lizbeth Osgood  : 1960 Visit date: 2019    Dear  Dr. Stuart Mathew, DO,    Thank you for referring Lizbeth Osgood to my practice. Please find my assessment and plan below.                 History of Present Ill Patient will follow up with me once a well functioning fistula has been established and she no longer relies on the right IJ permacath for hemodialysis              Sincerely,       Gennaro Medrano MD   CC: No Recipients

## (undated) NOTE — LETTER
01/11/19        Inis [address] [place],    Nuestros registros indican que tiene análisis clínicos pendientes y/o pruebas que se ordenaron en mccrary nombre que no se steward completado.     Pa

## (undated) NOTE — Clinical Note
FYI, TCM call made, see notes. [facility] placed call to see if 4/12/18 can be changed to TCM HFU. Staff will call pt to arrange TCM HFU.

## (undated) NOTE — LETTER
Consent to Procedure/Sedation    Date: __________________    Time: _______________    1. I authorize the performance upon Lorena Tyler the following:      Insertion Permanent Dialysis Catheter     2.  I authorize Dr. Kimberly Bailon  (and whomever is Armin Herman ___________________________    ___________________    Witness: ____________________     Date: ______________    Printed: 2018   7:30 AM    Patient Name: Ibrahima Davis        : 1960       Medical Record #: [id number]

## (undated) NOTE — LETTER
[facility]  Zayra Pitt [address] [facility], [address] [place] [address]    Consent for Operation    Date: __________________    Time: _______________    1.  I authorize the performance upon Hill Hutchinson the following operation:    Procedure(s):    left arm a procedure has been videotaped, the surgeon will obtain the original videotape. The hospital will not be responsible for storage or maintenance of this tape.     6. For the purpose of advancing medical education, I consent to the admittance of observers to t STATEMENTS REQUIRING INSERTION OR COMPLETION WERE FILLED IN.     Signature of Patient:   ___________________________    When the patient is a minor or mentally incompetent to give consent:  Signature of person authorized to consent for patient: ____________ supplements, and pills I can buy without a prescription (including street drugs/illegal medications). Failure to inform my anesthesiologist about these medicines may increase my risk of anesthetic complications.   · If I am allergic to anything or have had Anesthesiologist Signature     Date   Time  I have discussed the procedure and information above with the patient (or patient’s representative) and answered their questions. The patient or their representative has agreed to have anesthesia services.     ___

## (undated) NOTE — Clinical Note
Spoke to pt son Yemi Samson for TCM today. Pt discharged from hospital on 09/07/18 for acute duodenitis and hyponatremia. Pt declined a TCM HFU at this time however is high risk for readmission. Thank you!

## (undated) NOTE — Clinical Note
Her CCS shipment is now coming with only 3 dexcom G7 q3mo, DIL tried to call (Jose Luis) and it was saying something about how the prescription is written-- can you check into that? Thank you

## (undated) NOTE — LETTER
ASTHMA ACTION PLAN for Lupe Arce     : 1960     Date: 2025  Provider:  Marisol Martinez DO  Phone for doctor or clinic: [facility], [facility], [address] [address] [phone]    ACT Score: 21      You can use the colors of a traffic light to help learn about your asthma medicines.      1. Green - Go! % of Personal Best Peak Flow Use controller medicine.   Breathing is good  No cough or wheeze  Can work and play Medicine How much to take When to take it    cetirizine 10 MG Oral Tab Sig - Route: Take 1 tablet (10 mg total) by mouth daily. - Oral   montelukast 10 MG Oral Tab Sig - Route: Take 1 tablet (10 mg total) by mouth nightly. - Oral   FLUTICASONE PROPIONATE 50 MCG/ACT Nasal Suspension Sig: SHAKE LIQUID AND USE 2 SPRAYS IN EACH NOSTRIL DAILY       2. Yellow - Caution. 50-79% Personal Best Peak  Flow.  Use reliever medicine to keep an asthma attack from getting bad.   Cough  Wheezing  Tight Chest  Wake up at night Medicine How much to take When to take it    Albuterol inhaler - Take 2 puffs into the lungs every 4 hours as needed.    Inhalador de albuterol: realice 2 inhalaciones en los pulmones cada 4 horas, según sea necesario.         Additional instructions If your symptoms do not improve or require albuterol inhaler use every 4 to 6 hours, please contact our office at [phone] and ask to speak with the nurse.    Si estella síntomas no mejoran o requiere el uso de un inhalador de albuterol cada 4 a 6 horas, comuníquese con nuestra oficina al [phone] y solicite hablar con la enfermera.          3. Red - Stop! Danger!  <50% Personal Best Peak  Flow. Take these medications until  Get help from a doctor   Medicine not helping  Breathing is hard and fast  Nose opens wide  Can't walk  Ribs show  Can't talk well Medicine How much to take When to take it    Albuterol Inhaler - Take 2 puffs every 10 minutes. If no relief after 2  treatments or symptoms are worse, please go to nearest ER or call 911 immediately.    Inhalador de albuterol: realice 2 inhalaciones cada 10 minutos. Si no hay alivio después de 2 tratamientos o los síntomas empeoran, vaya a la naty de emergencias más cercana o llame al 911 de inmediato.     Additional Instructions If your symptoms do not improve and you cannot contact your doctor, [facility] or call 911 immediately!    Si estella síntomas no mejoran y no puede comunicarse con mccrary médico, vaya a la naty de emergencias o llame al 911 de inmediato.     [x] Asthma Action Plan reviewed with patient (and caregiver if necessary) and a copy of the plan was given to the patient/caregiver.   [] Asthma Action Plan reviewed with patient (and caregiver if necessary) on the phone and mailed copy to patient or submitted via [employer].     Signatures:  Provider  Marisol Martinez, DO   Patient Caretaker

## (undated) NOTE — MR AVS SNAPSHOT
[facility] [place]  [address] [facility] [id number] [phone]               Thank you for choosing us for your health care visit with Kendra Ugarte DO. We are glad to serve you and happy to provide you with this summary of your visit.   Pl with long-term current use of insulin ([facility] [E11.22, N18.4, Z79.4]                 Follow-up Instructions     Return in about 3 months (around 8/2/2017) for HTN,HL,DM if labs ok otherwise one month.          Referral Details     Referred By    Referred To with long-term current use of insulin ([facility] [E11.22, N18.4, Z79.4]        PULMONARY - INTERNAL [[id number] CUSTOM]  Order #:  [id number]         **REFERRAL REQUEST**    Your physician has referred you to a specialist.  Your physician or the clinic staff will Registration is required for 1 hour session call [phone]  Or register on line at [email or web address]         Allergies as of May 02, 2017     Ace Inhibitors Renal insuffficiency    B12-Ca Rash    Vit b12 and calcium combo Commonly known as:  DRISDOL/VITAMIN D2           Flunisolide 25 MCG/ACT (0.025%) Soln   2 sprays by Each Nare route 2 (two) times daily. furosemide 20 MG Tabs   2 tabs in the morning and 1 tab in the afternoon.    What changed:  Another medication Check blood sugars BID and prn symptoms of hypoglycemia           Vitamin D 2000 units Tabs   Take 1 tablet by mouth daily.                 Where to Get Your Medications      These medications were sent to [facility] [address], [place] [address]

## (undated) NOTE — LETTER
Mary Garcia [address] [facility]  [place], [address] [place] [address]    Consent for Operation  Date: __________________                                Time: _______________    1.  I authorize the performance upon Diaz Tan the following operation:  P procedure has been videotaped, the surgeon will obtain the original videotape. The hospital will not be responsible for storage or maintenance of this tape.   7. For the purpose of advancing medical education, I consent to the admittance of observers to the STATEMENTS REQUIRING INSERTION OR COMPLETION WERE FILLED IN.     Signature of Patient:   ___________________________    When the patient is a minor or mentally incompetent to give consent:  Signature of person authorized to consent for patient: ____________ supplements, and pills I can buy without a prescription (including street drugs/illegal medications). Failure to inform my anesthesiologist about these medicines may increase my risk of anesthetic complications. iv.  If I am allergic to anything or have ha Anesthesiologist Signature     Date   Time  I have discussed the procedure and information above with the patient (or patient’s representative) and answered their questions. The patient or their representative has agreed to have anesthesia services.     ___

## (undated) NOTE — LETTER
11/19/18        Castro Murphy  [address] [facility]      Dear Sharon Quintero,    [address] [facility] records indicate that you have outstanding lab work and or testing that was ordered for you and has not yet been completed:  Orders Placed This Encounte

## (undated) NOTE — LETTER
08/12/21    [address] [address]      Hemos hecho múltiples intentos de contactarlo con respecto a resultados sin éxito. Llame a la oficina lo antes posible. ¡Beto!         Patrecia Bethlehempablo [place]

## (undated) NOTE — LETTER
ASTHMA ACTION PLAN for Monse Madrigal     : 1960     Date: 2017  Provider:  Enrrique Peterson DO  Phone for doctor or clinic: [address] [facility], [address] [place] [address]  [phone]    ACT Score: 25      Y Signatures:  Provider  Claudine Walls, DO   Patient Caretaker